# Patient Record
Sex: MALE | Race: OTHER | HISPANIC OR LATINO
[De-identification: names, ages, dates, MRNs, and addresses within clinical notes are randomized per-mention and may not be internally consistent; named-entity substitution may affect disease eponyms.]

---

## 2017-01-04 ENCOUNTER — APPOINTMENT (OUTPATIENT)
Dept: CARDIOLOGY | Facility: CLINIC | Age: 82
End: 2017-01-04

## 2017-01-04 ENCOUNTER — OUTPATIENT (OUTPATIENT)
Dept: OUTPATIENT SERVICES | Facility: HOSPITAL | Age: 82
LOS: 1 days | End: 2017-01-04
Payer: MEDICARE

## 2017-01-04 VITALS
SYSTOLIC BLOOD PRESSURE: 154 MMHG | BODY MASS INDEX: 24.73 KG/M2 | HEIGHT: 69 IN | HEART RATE: 69 BPM | RESPIRATION RATE: 16 BRPM | WEIGHT: 167 LBS | OXYGEN SATURATION: 97 % | DIASTOLIC BLOOD PRESSURE: 64 MMHG

## 2017-01-04 DIAGNOSIS — R00.2 PALPITATIONS: ICD-10-CM

## 2017-01-04 DIAGNOSIS — I71.9 AORTIC ANEURYSM OF UNSPECIFIED SITE, WITHOUT RUPTURE: ICD-10-CM

## 2017-01-04 DIAGNOSIS — R07.9 CHEST PAIN, UNSPECIFIED: ICD-10-CM

## 2017-01-04 DIAGNOSIS — E78.5 HYPERLIPIDEMIA, UNSPECIFIED: ICD-10-CM

## 2017-01-04 DIAGNOSIS — Z00.8 ENCOUNTER FOR OTHER GENERAL EXAMINATION: ICD-10-CM

## 2017-01-04 DIAGNOSIS — I10 ESSENTIAL (PRIMARY) HYPERTENSION: ICD-10-CM

## 2017-01-04 DIAGNOSIS — Z91.89 OTHER SPECIFIED PERSONAL RISK FACTORS, NOT ELSEWHERE CLASSIFIED: ICD-10-CM

## 2017-01-04 PROCEDURE — 93005 ELECTROCARDIOGRAM TRACING: CPT

## 2017-01-04 PROCEDURE — 93010 ELECTROCARDIOGRAM REPORT: CPT

## 2017-01-04 PROCEDURE — G0463: CPT | Mod: 25

## 2017-01-31 ENCOUNTER — APPOINTMENT (OUTPATIENT)
Dept: INTERNAL MEDICINE | Facility: CLINIC | Age: 82
End: 2017-01-31

## 2017-01-31 VITALS
SYSTOLIC BLOOD PRESSURE: 130 MMHG | DIASTOLIC BLOOD PRESSURE: 60 MMHG | OXYGEN SATURATION: 98 % | HEIGHT: 69 IN | WEIGHT: 171 LBS | HEART RATE: 74 BPM | TEMPERATURE: 97.7 F | BODY MASS INDEX: 25.33 KG/M2

## 2017-01-31 DIAGNOSIS — M72.2 PLANTAR FASCIAL FIBROMATOSIS: ICD-10-CM

## 2017-01-31 LAB
ALBUMIN SERPL ELPH-MCNC: 4.2 G/DL
ALP BLD-CCNC: 38 U/L
ALT SERPL-CCNC: 19 U/L
ANION GAP SERPL CALC-SCNC: 18 MMOL/L
APPEARANCE: CLEAR
AST SERPL-CCNC: 25 U/L
BASOPHILS # BLD AUTO: 0.03 K/UL
BASOPHILS NFR BLD AUTO: 0.5 %
BILIRUB SERPL-MCNC: 0.3 MG/DL
BILIRUBIN URINE: NEGATIVE
BLOOD URINE: NEGATIVE
BUN SERPL-MCNC: 20 MG/DL
CALCIUM SERPL-MCNC: 9.6 MG/DL
CHLORIDE SERPL-SCNC: 100 MMOL/L
CHOLEST SERPL-MCNC: 137 MG/DL
CHOLEST/HDLC SERPL: 2.9 RATIO
CO2 SERPL-SCNC: 23 MMOL/L
COLOR: YELLOW
CREAT SERPL-MCNC: 1.07 MG/DL
EOSINOPHIL # BLD AUTO: 0.24 K/UL
EOSINOPHIL NFR BLD AUTO: 3.8 %
GGT SERPL-CCNC: 16 U/L
GLUCOSE QUALITATIVE U: 1000 MG/DL
GLUCOSE SERPL-MCNC: 262 MG/DL
HCT VFR BLD CALC: 42.5 %
HDLC SERPL-MCNC: 48 MG/DL
HGB BLD-MCNC: 13.5 G/DL
IMM GRANULOCYTES NFR BLD AUTO: 0.2 %
KETONES URINE: NEGATIVE
LDLC SERPL CALC-MCNC: 35 MG/DL
LEUKOCYTE ESTERASE URINE: NEGATIVE
LYMPHOCYTES # BLD AUTO: 1.15 K/UL
LYMPHOCYTES NFR BLD AUTO: 18.2 %
MAN DIFF?: NORMAL
MCHC RBC-ENTMCNC: 29.8 PG
MCHC RBC-ENTMCNC: 31.8 GM/DL
MCV RBC AUTO: 93.8 FL
MONOCYTES # BLD AUTO: 0.48 K/UL
MONOCYTES NFR BLD AUTO: 7.6 %
NEUTROPHILS # BLD AUTO: 4.41 K/UL
NEUTROPHILS NFR BLD AUTO: 69.7 %
NITRITE URINE: NEGATIVE
PH URINE: 6.5
PLATELET # BLD AUTO: 122 K/UL
POTASSIUM SERPL-SCNC: 4.7 MMOL/L
PROT SERPL-MCNC: 7.3 G/DL
PROTEIN URINE: NEGATIVE MG/DL
RBC # BLD: 4.53 M/UL
RBC # FLD: 14.4 %
SODIUM SERPL-SCNC: 141 MMOL/L
SPECIFIC GRAVITY URINE: 1.02
TRIGL SERPL-MCNC: 270 MG/DL
TSH SERPL-ACNC: 1.09 UIU/ML
UROBILINOGEN URINE: NORMAL MG/DL
WBC # FLD AUTO: 6.32 K/UL

## 2017-02-01 LAB
25(OH)D3 SERPL-MCNC: 42.4 NG/ML
HBA1C MFR BLD HPLC: 7.4 %

## 2017-04-04 ENCOUNTER — APPOINTMENT (OUTPATIENT)
Dept: CARDIOLOGY | Facility: CLINIC | Age: 82
End: 2017-04-04

## 2017-04-04 ENCOUNTER — OUTPATIENT (OUTPATIENT)
Dept: OUTPATIENT SERVICES | Facility: HOSPITAL | Age: 82
LOS: 1 days | End: 2017-04-04
Payer: MEDICARE

## 2017-04-04 VITALS
BODY MASS INDEX: 25.18 KG/M2 | SYSTOLIC BLOOD PRESSURE: 168 MMHG | WEIGHT: 170 LBS | DIASTOLIC BLOOD PRESSURE: 75 MMHG | HEART RATE: 69 BPM | HEIGHT: 69 IN

## 2017-04-04 VITALS — DIASTOLIC BLOOD PRESSURE: 73 MMHG | SYSTOLIC BLOOD PRESSURE: 137 MMHG

## 2017-04-04 DIAGNOSIS — R00.2 PALPITATIONS: ICD-10-CM

## 2017-04-04 DIAGNOSIS — Z00.8 ENCOUNTER FOR OTHER GENERAL EXAMINATION: ICD-10-CM

## 2017-04-04 PROCEDURE — 93306 TTE W/DOPPLER COMPLETE: CPT

## 2017-04-04 PROCEDURE — 93005 ELECTROCARDIOGRAM TRACING: CPT

## 2017-04-04 PROCEDURE — 93306 TTE W/DOPPLER COMPLETE: CPT | Mod: 26

## 2017-04-04 PROCEDURE — 93010 ELECTROCARDIOGRAM REPORT: CPT

## 2017-04-04 PROCEDURE — G0463: CPT | Mod: 25

## 2017-04-05 ENCOUNTER — APPOINTMENT (OUTPATIENT)
Dept: INTERNAL MEDICINE | Facility: CLINIC | Age: 82
End: 2017-04-05

## 2017-04-05 VITALS
SYSTOLIC BLOOD PRESSURE: 130 MMHG | RESPIRATION RATE: 16 BRPM | BODY MASS INDEX: 25.18 KG/M2 | WEIGHT: 170 LBS | DIASTOLIC BLOOD PRESSURE: 55 MMHG | HEIGHT: 69 IN | TEMPERATURE: 98.1 F | HEART RATE: 66 BPM

## 2017-04-05 DIAGNOSIS — I73.9 PERIPHERAL VASCULAR DISEASE, UNSPECIFIED: ICD-10-CM

## 2017-04-05 DIAGNOSIS — E78.5 HYPERLIPIDEMIA, UNSPECIFIED: ICD-10-CM

## 2017-04-05 DIAGNOSIS — R00.2 PALPITATIONS: ICD-10-CM

## 2017-04-05 DIAGNOSIS — I71.9 AORTIC ANEURYSM OF UNSPECIFIED SITE, WITHOUT RUPTURE: ICD-10-CM

## 2017-04-05 DIAGNOSIS — I10 ESSENTIAL (PRIMARY) HYPERTENSION: ICD-10-CM

## 2017-04-19 ENCOUNTER — OTHER (OUTPATIENT)
Age: 82
End: 2017-04-19

## 2017-04-21 LAB
ANION GAP SERPL CALC-SCNC: 15 MMOL/L
BUN SERPL-MCNC: 20 MG/DL
CALCIUM SERPL-MCNC: 9.9 MG/DL
CHLORIDE SERPL-SCNC: 104 MMOL/L
CO2 SERPL-SCNC: 25 MMOL/L
CREAT SERPL-MCNC: 1.01 MG/DL
GLUCOSE SERPL-MCNC: 168 MG/DL
POTASSIUM SERPL-SCNC: 5.3 MMOL/L
SODIUM SERPL-SCNC: 144 MMOL/L

## 2017-04-24 ENCOUNTER — APPOINTMENT (OUTPATIENT)
Dept: CT IMAGING | Facility: HOSPITAL | Age: 82
End: 2017-04-24

## 2017-04-24 ENCOUNTER — OUTPATIENT (OUTPATIENT)
Dept: OUTPATIENT SERVICES | Facility: HOSPITAL | Age: 82
LOS: 1 days | End: 2017-04-24
Payer: MEDICARE

## 2017-04-24 DIAGNOSIS — I77.810 THORACIC AORTIC ECTASIA: ICD-10-CM

## 2017-04-24 DIAGNOSIS — I71.9 AORTIC ANEURYSM OF UNSPECIFIED SITE, WITHOUT RUPTURE: ICD-10-CM

## 2017-04-25 ENCOUNTER — RESULT REVIEW (OUTPATIENT)
Age: 82
End: 2017-04-25

## 2017-04-26 PROCEDURE — 71275 CT ANGIOGRAPHY CHEST: CPT

## 2017-04-26 PROCEDURE — 75635 CT ANGIO ABDOMINAL ARTERIES: CPT

## 2017-04-28 ENCOUNTER — APPOINTMENT (OUTPATIENT)
Dept: VASCULAR SURGERY | Facility: CLINIC | Age: 82
End: 2017-04-28

## 2017-04-28 VITALS
RESPIRATION RATE: 16 BRPM | WEIGHT: 170 LBS | BODY MASS INDEX: 25.18 KG/M2 | TEMPERATURE: 98.2 F | DIASTOLIC BLOOD PRESSURE: 60 MMHG | HEIGHT: 69 IN | SYSTOLIC BLOOD PRESSURE: 142 MMHG | HEART RATE: 60 BPM

## 2017-05-02 ENCOUNTER — APPOINTMENT (OUTPATIENT)
Dept: UROLOGY | Facility: CLINIC | Age: 82
End: 2017-05-02

## 2017-05-02 ENCOUNTER — APPOINTMENT (OUTPATIENT)
Dept: SURGERY | Facility: CLINIC | Age: 82
End: 2017-05-02

## 2017-05-02 VITALS
WEIGHT: 167 LBS | TEMPERATURE: 98.7 F | RESPIRATION RATE: 17 BRPM | DIASTOLIC BLOOD PRESSURE: 62 MMHG | SYSTOLIC BLOOD PRESSURE: 130 MMHG | HEART RATE: 72 BPM | HEIGHT: 69 IN | BODY MASS INDEX: 24.73 KG/M2

## 2017-05-02 VITALS
DIASTOLIC BLOOD PRESSURE: 68 MMHG | OXYGEN SATURATION: 98 % | WEIGHT: 167 LBS | HEIGHT: 69 IN | SYSTOLIC BLOOD PRESSURE: 128 MMHG | HEART RATE: 72 BPM | TEMPERATURE: 98.7 F | BODY MASS INDEX: 24.73 KG/M2

## 2017-05-03 ENCOUNTER — APPOINTMENT (OUTPATIENT)
Dept: INTERNAL MEDICINE | Facility: CLINIC | Age: 82
End: 2017-05-03

## 2017-05-03 VITALS
WEIGHT: 171 LBS | RESPIRATION RATE: 16 BRPM | TEMPERATURE: 98.4 F | HEART RATE: 63 BPM | BODY MASS INDEX: 25.33 KG/M2 | SYSTOLIC BLOOD PRESSURE: 130 MMHG | OXYGEN SATURATION: 98 % | DIASTOLIC BLOOD PRESSURE: 60 MMHG | HEIGHT: 69 IN

## 2017-05-03 DIAGNOSIS — Z01.818 ENCOUNTER FOR OTHER PREPROCEDURAL EXAMINATION: ICD-10-CM

## 2017-05-03 LAB
ALBUMIN SERPL ELPH-MCNC: 4.3 G/DL
ALP BLD-CCNC: 35 U/L
ALT SERPL-CCNC: 16 U/L
ANION GAP SERPL CALC-SCNC: 18 MMOL/L
APPEARANCE: CLEAR
AST SERPL-CCNC: 27 U/L
BASOPHILS # BLD AUTO: 0.02 K/UL
BASOPHILS NFR BLD AUTO: 0.3 %
BILIRUB SERPL-MCNC: 0.4 MG/DL
BILIRUBIN URINE: NEGATIVE
BLOOD URINE: NEGATIVE
BUN SERPL-MCNC: 19 MG/DL
CALCIUM SERPL-MCNC: 9.5 MG/DL
CHLORIDE SERPL-SCNC: 102 MMOL/L
CHOLEST SERPL-MCNC: 95 MG/DL
CHOLEST/HDLC SERPL: 2.4 RATIO
CO2 SERPL-SCNC: 21 MMOL/L
COLOR: YELLOW
CREAT SERPL-MCNC: 1.03 MG/DL
EOSINOPHIL # BLD AUTO: 0.34 K/UL
EOSINOPHIL NFR BLD AUTO: 5.4 %
GLUCOSE QUALITATIVE U: NORMAL MG/DL
GLUCOSE SERPL-MCNC: 79 MG/DL
HBA1C MFR BLD HPLC: 7 %
HCT VFR BLD CALC: 40.1 %
HDLC SERPL-MCNC: 40 MG/DL
HGB BLD-MCNC: 12.8 G/DL
IMM GRANULOCYTES NFR BLD AUTO: 0.2 %
KETONES URINE: NEGATIVE
LDLC SERPL CALC-MCNC: 15 MG/DL
LEUKOCYTE ESTERASE URINE: NEGATIVE
LYMPHOCYTES # BLD AUTO: 1.61 K/UL
LYMPHOCYTES NFR BLD AUTO: 25.4 %
MAN DIFF?: NORMAL
MCHC RBC-ENTMCNC: 29.6 PG
MCHC RBC-ENTMCNC: 31.9 GM/DL
MCV RBC AUTO: 92.6 FL
MONOCYTES # BLD AUTO: 0.48 K/UL
MONOCYTES NFR BLD AUTO: 7.6 %
NEUTROPHILS # BLD AUTO: 3.87 K/UL
NEUTROPHILS NFR BLD AUTO: 61.1 %
NITRITE URINE: NEGATIVE
PH URINE: 6
PLATELET # BLD AUTO: 143 K/UL
POTASSIUM SERPL-SCNC: 4.6 MMOL/L
PROT SERPL-MCNC: 7.5 G/DL
PROTEIN URINE: NEGATIVE MG/DL
RBC # BLD: 4.33 M/UL
RBC # FLD: 15.2 %
SODIUM SERPL-SCNC: 141 MMOL/L
SPECIFIC GRAVITY URINE: 1.01
TRIGL SERPL-MCNC: 202 MG/DL
UROBILINOGEN URINE: NORMAL MG/DL
WBC # FLD AUTO: 6.33 K/UL

## 2017-05-10 ENCOUNTER — OUTPATIENT (OUTPATIENT)
Dept: OUTPATIENT SERVICES | Facility: HOSPITAL | Age: 82
LOS: 1 days | End: 2017-05-10
Payer: MEDICARE

## 2017-05-10 VITALS
SYSTOLIC BLOOD PRESSURE: 132 MMHG | HEIGHT: 70 IN | HEART RATE: 76 BPM | WEIGHT: 169.98 LBS | TEMPERATURE: 98 F | RESPIRATION RATE: 18 BRPM | DIASTOLIC BLOOD PRESSURE: 66 MMHG | OXYGEN SATURATION: 99 %

## 2017-05-10 DIAGNOSIS — Z01.818 ENCOUNTER FOR OTHER PREPROCEDURAL EXAMINATION: ICD-10-CM

## 2017-05-10 DIAGNOSIS — I73.9 PERIPHERAL VASCULAR DISEASE, UNSPECIFIED: Chronic | ICD-10-CM

## 2017-05-10 DIAGNOSIS — Z90.89 ACQUIRED ABSENCE OF OTHER ORGANS: Chronic | ICD-10-CM

## 2017-05-10 DIAGNOSIS — R22.1 LOCALIZED SWELLING, MASS AND LUMP, NECK: ICD-10-CM

## 2017-05-10 PROCEDURE — G0463: CPT

## 2017-05-10 RX ORDER — SODIUM CHLORIDE 9 MG/ML
3 INJECTION INTRAMUSCULAR; INTRAVENOUS; SUBCUTANEOUS EVERY 8 HOURS
Qty: 0 | Refills: 0 | Status: DISCONTINUED | OUTPATIENT
Start: 2017-05-11 | End: 2017-05-19

## 2017-05-10 NOTE — H&P PST ADULT - ASSESSMENT
81 yr old male with PMH of BPH, seasonal allergies, PAD, Diabetes, Hypertension, Hyperlipidemia presents with posterior neck mass. Pt for excision of posterior neck mass on 5/11/2017. Pt is at low risk for procedure.

## 2017-05-10 NOTE — H&P PST ADULT - HISTORY OF PRESENT ILLNESS
81 yr old male with PMH of BPH, seasonal allergies, PAD, Diabetes, Hypertension, Hyperlipidemia presents with c/o posterior neck mass. Pt for excision of posterior neck mass on 5/11/2017.

## 2017-05-10 NOTE — H&P PST ADULT - PSH
History of tonsillectomy    PAD (peripheral artery disease)  h/o angiogram of bilateral lower extremities

## 2017-05-10 NOTE — H&P PST ADULT - FAMILY HISTORY
Sibling  Still living? Yes, Estimated age: Age Unknown  Family history of lung cancer, Age at diagnosis: Age Unknown

## 2017-05-10 NOTE — H&P PST ADULT - NEGATIVE ALLERGY TYPES
no indoor environmental allergies/no reactions to medicines/no reactions to food/no reactions to animals/no reactions to insect bites

## 2017-05-10 NOTE — H&P PST ADULT - RS GEN PE MLT RESP DETAILS PC
airway patent/no intercostal retractions/no rales/no rhonchi/breath sounds equal/no wheezes/respirations non-labored/no subcutaneous emphysema/normal/clear to auscultation bilaterally/no chest wall tenderness/good air movement

## 2017-05-10 NOTE — H&P PST ADULT - PMH
BPH (benign prostatic hypertrophy)    DM (diabetes mellitus)    HLD (hyperlipidemia)    HTN (hypertension) BPH (benign prostatic hypertrophy)    DM (diabetes mellitus)    HLD (hyperlipidemia)    HTN (hypertension)    PAD (peripheral artery disease)    Seasonal allergies

## 2017-05-11 ENCOUNTER — RESULT REVIEW (OUTPATIENT)
Age: 82
End: 2017-05-11

## 2017-05-11 ENCOUNTER — OUTPATIENT (OUTPATIENT)
Dept: OUTPATIENT SERVICES | Facility: HOSPITAL | Age: 82
LOS: 1 days | Discharge: HOME | End: 2017-05-11
Payer: MEDICARE

## 2017-05-11 VITALS
WEIGHT: 169.98 LBS | DIASTOLIC BLOOD PRESSURE: 50 MMHG | HEART RATE: 52 BPM | SYSTOLIC BLOOD PRESSURE: 130 MMHG | RESPIRATION RATE: 14 BRPM | OXYGEN SATURATION: 97 % | TEMPERATURE: 98 F | HEIGHT: 70 IN

## 2017-05-11 VITALS
SYSTOLIC BLOOD PRESSURE: 136 MMHG | HEART RATE: 54 BPM | RESPIRATION RATE: 14 BRPM | OXYGEN SATURATION: 98 % | DIASTOLIC BLOOD PRESSURE: 53 MMHG | TEMPERATURE: 98 F

## 2017-05-11 DIAGNOSIS — R22.1 LOCALIZED SWELLING, MASS AND LUMP, NECK: ICD-10-CM

## 2017-05-11 DIAGNOSIS — I73.9 PERIPHERAL VASCULAR DISEASE, UNSPECIFIED: Chronic | ICD-10-CM

## 2017-05-11 DIAGNOSIS — Z01.818 ENCOUNTER FOR OTHER PREPROCEDURAL EXAMINATION: ICD-10-CM

## 2017-05-11 DIAGNOSIS — Z90.89 ACQUIRED ABSENCE OF OTHER ORGANS: Chronic | ICD-10-CM

## 2017-05-11 PROCEDURE — 88304 TISSUE EXAM BY PATHOLOGIST: CPT

## 2017-05-11 PROCEDURE — 21552 EXC NECK LES SC 3 CM/>: CPT | Mod: AS

## 2017-05-11 PROCEDURE — 21555 EXC NECK LES SC < 3 CM: CPT

## 2017-05-11 PROCEDURE — 88304 TISSUE EXAM BY PATHOLOGIST: CPT | Mod: 26

## 2017-05-11 RX ORDER — OXYCODONE HYDROCHLORIDE 5 MG/1
1 TABLET ORAL
Qty: 12 | Refills: 0
Start: 2017-05-11 | End: 2017-05-14

## 2017-05-11 RX ORDER — FENTANYL CITRATE 50 UG/ML
25 INJECTION INTRAVENOUS
Qty: 0 | Refills: 0 | Status: DISCONTINUED | OUTPATIENT
Start: 2017-05-11 | End: 2017-05-11

## 2017-05-11 RX ORDER — ONDANSETRON 8 MG/1
4 TABLET, FILM COATED ORAL ONCE
Qty: 0 | Refills: 0 | Status: DISCONTINUED | OUTPATIENT
Start: 2017-05-11 | End: 2017-05-11

## 2017-05-11 RX ORDER — SODIUM CHLORIDE 9 MG/ML
1000 INJECTION, SOLUTION INTRAVENOUS
Qty: 0 | Refills: 0 | Status: DISCONTINUED | OUTPATIENT
Start: 2017-05-11 | End: 2017-05-11

## 2017-05-11 RX ORDER — ACETAMINOPHEN 500 MG
2 TABLET ORAL
Qty: 0 | Refills: 0 | COMMUNITY

## 2017-05-11 RX ORDER — SODIUM CHLORIDE 9 MG/ML
1000 INJECTION, SOLUTION INTRAVENOUS
Qty: 0 | Refills: 0 | Status: DISCONTINUED | OUTPATIENT
Start: 2017-05-11 | End: 2017-05-19

## 2017-05-11 RX ADMIN — SODIUM CHLORIDE 3 MILLILITER(S): 9 INJECTION INTRAMUSCULAR; INTRAVENOUS; SUBCUTANEOUS at 08:46

## 2017-05-11 NOTE — ASU PATIENT PROFILE, ADULT - PMH
BPH (benign prostatic hypertrophy)    DM (diabetes mellitus)    HLD (hyperlipidemia)    HTN (hypertension)    PAD (peripheral artery disease)    Seasonal allergies

## 2017-05-11 NOTE — ASU DISCHARGE PLAN (ADULT/PEDIATRIC). - MEDICATION SUMMARY - MEDICATIONS TO TAKE
I will START or STAY ON the medications listed below when I get home from the hospital:    Glucocil  -- 1 tab(s) by mouth once a day  -- Indication: For as directed     Prosta-Rye  -- 1 cap(s) by mouth once a day  -- Indication: For as directed     Cialis 5 mg oral tablet  -- 1 tab(s) by mouth once a day, As Needed  -- Indication: For as directed     acetaminophen-oxycodone 325 mg-5 mg oral tablet  -- 1 tab(s) by mouth every 6 hours, As needed, Moderate Pain (4 - 6) MDD:4  -- Indication: For Pain     Aspirin Enteric Coated 81 mg oral delayed release tablet  -- 1 tab(s) by mouth once a day  -- Indication: For as directed     losartan 100 mg oral tablet  -- 1 tab(s) by mouth once a day  -- Indication: For as directed     tamsulosin 0.4 mg oral capsule  -- 1 cap(s) by mouth once a day  -- Indication: For as directed     metFORMIN 500 mg oral tablet  -- 2 tab(s) by mouth 2 times a day (with meals)  -- Indication: For as directed     Tradjenta 5 mg oral tablet  -- 1 tab(s) by mouth once a day  -- Indication: For as directed     glimepiride 4 mg oral tablet  -- 1 tab(s) by mouth once a day  -- Indication: For as directed     Vytorin 10 mg-40 mg oral tablet  -- 1 tab(s) by mouth once a day  -- Indication: For as directed     Metoprolol Succinate ER 25 mg oral tablet, extended release  -- 1 tab(s) by mouth once a day  -- Indication: For as directed     Centrum Silver oral tablet  -- 1 tab(s) by mouth once a day  -- Indication: For as directed     Vitamin D3 1000 intl units oral capsule  -- 1 cap(s) by mouth once a day  -- Indication: For as directed

## 2017-05-12 ENCOUNTER — TRANSCRIPTION ENCOUNTER (OUTPATIENT)
Age: 82
End: 2017-05-12

## 2017-05-15 LAB — SURGICAL PATHOLOGY FINAL REPORT - CH: SIGNIFICANT CHANGE UP

## 2017-05-18 DIAGNOSIS — I10 ESSENTIAL (PRIMARY) HYPERTENSION: ICD-10-CM

## 2017-05-18 DIAGNOSIS — E11.9 TYPE 2 DIABETES MELLITUS WITHOUT COMPLICATIONS: ICD-10-CM

## 2017-05-18 DIAGNOSIS — N40.0 BENIGN PROSTATIC HYPERPLASIA WITHOUT LOWER URINARY TRACT SYMPTOMS: ICD-10-CM

## 2017-05-18 DIAGNOSIS — I73.9 PERIPHERAL VASCULAR DISEASE, UNSPECIFIED: ICD-10-CM

## 2017-05-18 DIAGNOSIS — L72.0 EPIDERMAL CYST: ICD-10-CM

## 2017-05-18 DIAGNOSIS — L02.11 CUTANEOUS ABSCESS OF NECK: ICD-10-CM

## 2017-05-18 DIAGNOSIS — Z87.891 PERSONAL HISTORY OF NICOTINE DEPENDENCE: ICD-10-CM

## 2017-05-18 DIAGNOSIS — Z79.82 LONG TERM (CURRENT) USE OF ASPIRIN: ICD-10-CM

## 2017-05-18 DIAGNOSIS — E78.5 HYPERLIPIDEMIA, UNSPECIFIED: ICD-10-CM

## 2017-05-23 ENCOUNTER — APPOINTMENT (OUTPATIENT)
Dept: SURGERY | Facility: CLINIC | Age: 82
End: 2017-05-23

## 2017-05-23 VITALS
HEART RATE: 63 BPM | OXYGEN SATURATION: 95 % | TEMPERATURE: 98.3 F | HEIGHT: 69 IN | SYSTOLIC BLOOD PRESSURE: 126 MMHG | BODY MASS INDEX: 25.48 KG/M2 | WEIGHT: 172 LBS | DIASTOLIC BLOOD PRESSURE: 76 MMHG

## 2017-08-23 ENCOUNTER — APPOINTMENT (OUTPATIENT)
Dept: INTERNAL MEDICINE | Facility: CLINIC | Age: 82
End: 2017-08-23
Payer: MEDICARE

## 2017-08-23 VITALS
OXYGEN SATURATION: 98 % | HEART RATE: 62 BPM | HEIGHT: 69 IN | TEMPERATURE: 99 F | DIASTOLIC BLOOD PRESSURE: 60 MMHG | RESPIRATION RATE: 16 BRPM | SYSTOLIC BLOOD PRESSURE: 140 MMHG | BODY MASS INDEX: 25.48 KG/M2 | WEIGHT: 172 LBS

## 2017-08-23 PROCEDURE — 99214 OFFICE O/P EST MOD 30 MIN: CPT

## 2017-08-24 LAB
25(OH)D3 SERPL-MCNC: 45.4 NG/ML
ALBUMIN SERPL ELPH-MCNC: 4.2 G/DL
ALP BLD-CCNC: 38 U/L
ALT SERPL-CCNC: 16 U/L
ANION GAP SERPL CALC-SCNC: 19 MMOL/L
APPEARANCE: CLEAR
AST SERPL-CCNC: 25 U/L
BASOPHILS # BLD AUTO: 0.03 K/UL
BASOPHILS NFR BLD AUTO: 0.5 %
BILIRUB SERPL-MCNC: 0.3 MG/DL
BILIRUBIN URINE: NEGATIVE
BLOOD URINE: NEGATIVE
BUN SERPL-MCNC: 17 MG/DL
CALCIUM SERPL-MCNC: 9.4 MG/DL
CHLORIDE SERPL-SCNC: 103 MMOL/L
CHOLEST SERPL-MCNC: 115 MG/DL
CHOLEST/HDLC SERPL: 2.9 RATIO
CO2 SERPL-SCNC: 23 MMOL/L
COLOR: YELLOW
CREAT SERPL-MCNC: 1.04 MG/DL
CREAT SPEC-SCNC: 46 MG/DL
EOSINOPHIL # BLD AUTO: 0.37 K/UL
EOSINOPHIL NFR BLD AUTO: 5.9 %
GGT SERPL-CCNC: 13 U/L
GLUCOSE QUALITATIVE U: NORMAL MG/DL
GLUCOSE SERPL-MCNC: 169 MG/DL
HBA1C MFR BLD HPLC: 7.3 %
HCT VFR BLD CALC: 42.6 %
HDLC SERPL-MCNC: 39 MG/DL
HGB BLD-MCNC: 13.4 G/DL
IMM GRANULOCYTES NFR BLD AUTO: 0.2 %
KETONES URINE: NEGATIVE
LDLC SERPL CALC-MCNC: 24 MG/DL
LEUKOCYTE ESTERASE URINE: NEGATIVE
LYMPHOCYTES # BLD AUTO: 1.53 K/UL
LYMPHOCYTES NFR BLD AUTO: 24.3 %
MAN DIFF?: NORMAL
MCHC RBC-ENTMCNC: 29.8 PG
MCHC RBC-ENTMCNC: 31.5 GM/DL
MCV RBC AUTO: 94.7 FL
MICROALBUMIN 24H UR DL<=1MG/L-MCNC: 0.6 MG/DL
MICROALBUMIN/CREAT 24H UR-RTO: 13 MG/G
MONOCYTES # BLD AUTO: 0.5 K/UL
MONOCYTES NFR BLD AUTO: 7.9 %
NEUTROPHILS # BLD AUTO: 3.85 K/UL
NEUTROPHILS NFR BLD AUTO: 61.2 %
NITRITE URINE: NEGATIVE
PH URINE: 6
PLATELET # BLD AUTO: 132 K/UL
POTASSIUM SERPL-SCNC: 4.5 MMOL/L
PROT SERPL-MCNC: 7.2 G/DL
PROTEIN URINE: NEGATIVE MG/DL
RBC # BLD: 4.5 M/UL
RBC # FLD: 14.5 %
SODIUM SERPL-SCNC: 145 MMOL/L
SPECIFIC GRAVITY URINE: 1.01
TRIGL SERPL-MCNC: 260 MG/DL
TSH SERPL-ACNC: 1.05 UIU/ML
UROBILINOGEN URINE: NORMAL MG/DL
WBC # FLD AUTO: 6.29 K/UL

## 2017-11-03 ENCOUNTER — APPOINTMENT (OUTPATIENT)
Dept: VASCULAR SURGERY | Facility: CLINIC | Age: 82
End: 2017-11-03
Payer: MEDICARE

## 2017-11-03 ENCOUNTER — APPOINTMENT (OUTPATIENT)
Dept: UROLOGY | Facility: CLINIC | Age: 82
End: 2017-11-03
Payer: MEDICARE

## 2017-11-03 VITALS
SYSTOLIC BLOOD PRESSURE: 110 MMHG | HEIGHT: 69 IN | DIASTOLIC BLOOD PRESSURE: 60 MMHG | HEART RATE: 66 BPM | TEMPERATURE: 98.4 F | WEIGHT: 170 LBS | RESPIRATION RATE: 16 BRPM | BODY MASS INDEX: 25.18 KG/M2

## 2017-11-03 PROCEDURE — 51736 URINE FLOW MEASUREMENT: CPT

## 2017-11-03 PROCEDURE — 93923 UPR/LXTR ART STDY 3+ LVLS: CPT

## 2017-11-03 PROCEDURE — 99214 OFFICE O/P EST MOD 30 MIN: CPT

## 2017-11-03 PROCEDURE — 51798 US URINE CAPACITY MEASURE: CPT | Mod: 59

## 2017-11-07 ENCOUNTER — APPOINTMENT (OUTPATIENT)
Dept: GASTROENTEROLOGY | Facility: CLINIC | Age: 82
End: 2017-11-07
Payer: MEDICARE

## 2017-11-07 VITALS
HEIGHT: 69 IN | BODY MASS INDEX: 25.18 KG/M2 | TEMPERATURE: 97.4 F | HEART RATE: 58 BPM | SYSTOLIC BLOOD PRESSURE: 148 MMHG | RESPIRATION RATE: 16 BRPM | DIASTOLIC BLOOD PRESSURE: 68 MMHG | WEIGHT: 170 LBS

## 2017-11-07 DIAGNOSIS — A04.8 OTHER SPECIFIED BACTERIAL INTESTINAL INFECTIONS: ICD-10-CM

## 2017-11-07 PROCEDURE — 99204 OFFICE O/P NEW MOD 45 MIN: CPT

## 2017-12-06 ENCOUNTER — OUTPATIENT (OUTPATIENT)
Dept: OUTPATIENT SERVICES | Facility: HOSPITAL | Age: 82
LOS: 1 days | End: 2017-12-06
Payer: MEDICARE

## 2017-12-06 DIAGNOSIS — I73.9 PERIPHERAL VASCULAR DISEASE, UNSPECIFIED: Chronic | ICD-10-CM

## 2017-12-06 DIAGNOSIS — Z90.89 ACQUIRED ABSENCE OF OTHER ORGANS: Chronic | ICD-10-CM

## 2017-12-06 DIAGNOSIS — R93.8 ABNORMAL FINDINGS ON DIAGNOSTIC IMAGING OF OTHER SPECIFIED BODY STRUCTURES: ICD-10-CM

## 2017-12-06 PROCEDURE — 45378 DIAGNOSTIC COLONOSCOPY: CPT

## 2017-12-06 PROCEDURE — 82962 GLUCOSE BLOOD TEST: CPT

## 2018-01-31 ENCOUNTER — APPOINTMENT (OUTPATIENT)
Dept: INTERNAL MEDICINE | Facility: CLINIC | Age: 83
End: 2018-01-31
Payer: MEDICARE

## 2018-01-31 VITALS
SYSTOLIC BLOOD PRESSURE: 110 MMHG | HEART RATE: 65 BPM | DIASTOLIC BLOOD PRESSURE: 70 MMHG | RESPIRATION RATE: 16 BRPM | HEIGHT: 69 IN | OXYGEN SATURATION: 98 % | BODY MASS INDEX: 24.88 KG/M2 | WEIGHT: 168 LBS | TEMPERATURE: 97.9 F

## 2018-01-31 LAB
BASOPHILS # BLD AUTO: 0.02 K/UL
BASOPHILS NFR BLD AUTO: 0.3 %
CHOLEST SERPL-MCNC: 105 MG/DL
CHOLEST/HDLC SERPL: 3.3 RATIO
EOSINOPHIL # BLD AUTO: 0.2 K/UL
EOSINOPHIL NFR BLD AUTO: 3.2 %
HBA1C MFR BLD HPLC: 7.2 %
HCT VFR BLD CALC: 40 %
HDLC SERPL-MCNC: 32 MG/DL
HGB BLD-MCNC: 12.5 G/DL
IMM GRANULOCYTES NFR BLD AUTO: 0.3 %
LDLC SERPL CALC-MCNC: 20 MG/DL
LYMPHOCYTES # BLD AUTO: 1.14 K/UL
LYMPHOCYTES NFR BLD AUTO: 18.5 %
MAN DIFF?: NORMAL
MCHC RBC-ENTMCNC: 29.1 PG
MCHC RBC-ENTMCNC: 31.3 GM/DL
MCV RBC AUTO: 93.2 FL
MONOCYTES # BLD AUTO: 0.44 K/UL
MONOCYTES NFR BLD AUTO: 7.1 %
NEUTROPHILS # BLD AUTO: 4.34 K/UL
NEUTROPHILS NFR BLD AUTO: 70.6 %
PLATELET # BLD AUTO: 167 K/UL
RBC # BLD: 4.29 M/UL
RBC # FLD: 14.2 %
TRIGL SERPL-MCNC: 263 MG/DL
WBC # FLD AUTO: 6.16 K/UL

## 2018-01-31 PROCEDURE — 99214 OFFICE O/P EST MOD 30 MIN: CPT

## 2018-04-30 ENCOUNTER — MEDICATION RENEWAL (OUTPATIENT)
Age: 83
End: 2018-04-30

## 2018-05-02 ENCOUNTER — APPOINTMENT (OUTPATIENT)
Dept: UROLOGY | Facility: CLINIC | Age: 83
End: 2018-05-02
Payer: MEDICARE

## 2018-05-02 VITALS
RESPIRATION RATE: 16 BRPM | HEART RATE: 63 BPM | OXYGEN SATURATION: 97 % | DIASTOLIC BLOOD PRESSURE: 68 MMHG | SYSTOLIC BLOOD PRESSURE: 128 MMHG | BODY MASS INDEX: 25.18 KG/M2 | WEIGHT: 170 LBS | HEIGHT: 69 IN

## 2018-05-02 PROCEDURE — 99213 OFFICE O/P EST LOW 20 MIN: CPT

## 2018-05-11 ENCOUNTER — OUTPATIENT (OUTPATIENT)
Dept: OUTPATIENT SERVICES | Facility: HOSPITAL | Age: 83
LOS: 1 days | End: 2018-05-11
Payer: MEDICARE

## 2018-05-11 ENCOUNTER — APPOINTMENT (OUTPATIENT)
Dept: CT IMAGING | Facility: HOSPITAL | Age: 83
End: 2018-05-11
Payer: MEDICARE

## 2018-05-11 ENCOUNTER — APPOINTMENT (OUTPATIENT)
Dept: VASCULAR SURGERY | Facility: CLINIC | Age: 83
End: 2018-05-11
Payer: MEDICARE

## 2018-05-11 DIAGNOSIS — Z90.89 ACQUIRED ABSENCE OF OTHER ORGANS: Chronic | ICD-10-CM

## 2018-05-11 DIAGNOSIS — I73.9 PERIPHERAL VASCULAR DISEASE, UNSPECIFIED: Chronic | ICD-10-CM

## 2018-05-11 DIAGNOSIS — N20.0 CALCULUS OF KIDNEY: ICD-10-CM

## 2018-05-11 PROCEDURE — 93923 UPR/LXTR ART STDY 3+ LVLS: CPT

## 2018-05-11 PROCEDURE — 99214 OFFICE O/P EST MOD 30 MIN: CPT

## 2018-05-11 PROCEDURE — 74176 CT ABD & PELVIS W/O CONTRAST: CPT

## 2018-05-11 PROCEDURE — 74176 CT ABD & PELVIS W/O CONTRAST: CPT | Mod: 26

## 2018-05-23 ENCOUNTER — APPOINTMENT (OUTPATIENT)
Dept: UROLOGY | Facility: CLINIC | Age: 83
End: 2018-05-23
Payer: MEDICARE

## 2018-05-23 VITALS — SYSTOLIC BLOOD PRESSURE: 140 MMHG | DIASTOLIC BLOOD PRESSURE: 65 MMHG

## 2018-05-23 PROCEDURE — 99213 OFFICE O/P EST LOW 20 MIN: CPT

## 2018-08-01 PROBLEM — E11.9 TYPE 2 DIABETES MELLITUS WITHOUT COMPLICATIONS: Chronic | Status: ACTIVE | Noted: 2017-05-10

## 2018-08-01 PROBLEM — I10 ESSENTIAL (PRIMARY) HYPERTENSION: Chronic | Status: ACTIVE | Noted: 2017-05-10

## 2018-08-01 PROBLEM — E78.5 HYPERLIPIDEMIA, UNSPECIFIED: Chronic | Status: ACTIVE | Noted: 2017-05-10

## 2018-08-01 PROBLEM — N40.0 BENIGN PROSTATIC HYPERPLASIA WITHOUT LOWER URINARY TRACT SYMPTOMS: Chronic | Status: ACTIVE | Noted: 2017-05-10

## 2018-08-07 ENCOUNTER — APPOINTMENT (OUTPATIENT)
Dept: INTERNAL MEDICINE | Facility: CLINIC | Age: 83
End: 2018-08-07
Payer: MEDICARE

## 2018-08-07 VITALS
TEMPERATURE: 99 F | OXYGEN SATURATION: 96 % | SYSTOLIC BLOOD PRESSURE: 143 MMHG | BODY MASS INDEX: 23.7 KG/M2 | RESPIRATION RATE: 16 BRPM | HEIGHT: 69 IN | WEIGHT: 160 LBS | DIASTOLIC BLOOD PRESSURE: 67 MMHG | HEART RATE: 61 BPM

## 2018-08-07 DIAGNOSIS — R63.4 ABNORMAL WEIGHT LOSS: ICD-10-CM

## 2018-08-07 PROCEDURE — 99214 OFFICE O/P EST MOD 30 MIN: CPT

## 2018-08-07 RX ORDER — SIMVASTATIN 40 MG/1
40 TABLET, FILM COATED ORAL DAILY
Qty: 90 | Refills: 3 | Status: DISCONTINUED | COMMUNITY
Start: 2017-07-27 | End: 2018-08-07

## 2018-08-07 RX ORDER — BENZONATATE 100 MG/1
100 CAPSULE ORAL
Qty: 15 | Refills: 0 | Status: DISCONTINUED | COMMUNITY
Start: 2017-12-15 | End: 2018-08-07

## 2018-08-07 RX ORDER — LEVOFLOXACIN 500 MG/1
500 TABLET, FILM COATED ORAL
Qty: 7 | Refills: 0 | Status: DISCONTINUED | COMMUNITY
Start: 2017-12-15 | End: 2018-08-07

## 2018-08-07 RX ORDER — POLYETHYLENE GLYCOL 3350 17 G/17G
17 POWDER, FOR SOLUTION ORAL
Qty: 238 | Refills: 0 | Status: DISCONTINUED | COMMUNITY
Start: 2017-11-07 | End: 2018-08-07

## 2018-08-07 RX ORDER — LANSOPRAZOLE, AMOXICILLIN, CLARITHROMYCIN 30-500-500
KIT ORAL
Qty: 1 | Refills: 0 | Status: DISCONTINUED | COMMUNITY
Start: 2017-11-07 | End: 2018-08-07

## 2018-08-07 RX ORDER — OXYCODONE AND ACETAMINOPHEN 5; 325 MG/1; MG/1
5-325 TABLET ORAL
Qty: 12 | Refills: 0 | Status: DISCONTINUED | COMMUNITY
Start: 2017-05-11 | End: 2018-08-07

## 2018-08-07 RX ORDER — OSELTAMIVIR PHOSPHATE 75 MG/1
75 CAPSULE ORAL
Qty: 10 | Refills: 0 | Status: DISCONTINUED | COMMUNITY
Start: 2018-01-24 | End: 2018-08-07

## 2018-08-07 RX ORDER — BROMPHENIRAMINE MALEATE, PSEUDOEPHEDRINE HYDROCHLORIDE, 2; 30; 10 MG/5ML; MG/5ML; MG/5ML
30-2-10 SYRUP ORAL
Qty: 118 | Refills: 0 | Status: DISCONTINUED | COMMUNITY
Start: 2017-12-15 | End: 2018-08-07

## 2018-08-07 NOTE — PHYSICAL EXAM
[No Acute Distress] : no acute distress [Well Nourished] : well nourished [Well Developed] : well developed [Well-Appearing] : well-appearing [Normal Sclera/Conjunctiva] : normal sclera/conjunctiva [PERRL] : pupils equal round and reactive to light [EOMI] : extraocular movements intact [Normal Outer Ear/Nose] : the outer ears and nose were normal in appearance [Normal Oropharynx] : the oropharynx was normal [No JVD] : no jugular venous distention [Supple] : supple [No Lymphadenopathy] : no lymphadenopathy [Thyroid Normal, No Nodules] : the thyroid was normal and there were no nodules present [No Respiratory Distress] : no respiratory distress  [Clear to Auscultation] : lungs were clear to auscultation bilaterally [No Accessory Muscle Use] : no accessory muscle use [Normal Rate] : normal rate  [Regular Rhythm] : with a regular rhythm [Normal S1, S2] : normal S1 and S2 [No Murmur] : no murmur heard [No Carotid Bruits] : no carotid bruits [No Abdominal Bruit] : a ~M bruit was not heard ~T in the abdomen [No Varicosities] : no varicosities [No Edema] : there was no peripheral edema [No Extremity Clubbing/Cyanosis] : no extremity clubbing/cyanosis [No Palpable Aorta] : no palpable aorta [Soft] : abdomen soft [Non Tender] : non-tender [Non-distended] : non-distended [No Masses] : no abdominal mass palpated [No HSM] : no HSM [Normal Bowel Sounds] : normal bowel sounds [Declined Rectal Exam] : declined rectal exam [Normal Posterior Cervical Nodes] : no posterior cervical lymphadenopathy [Normal Anterior Cervical Nodes] : no anterior cervical lymphadenopathy [No CVA Tenderness] : no CVA  tenderness [No Spinal Tenderness] : no spinal tenderness [No Joint Swelling] : no joint swelling [Grossly Normal Strength/Tone] : grossly normal strength/tone [No Rash] : no rash [Normal Gait] : normal gait [Coordination Grossly Intact] : coordination grossly intact [No Focal Deficits] : no focal deficits [Normal Affect] : the affect was normal [Normal Insight/Judgement] : insight and judgment were intact [Right Foot Was Examined] : Right foot ~C was examined [Left Foot Was Examined] : left foot ~C was examined [] : both feet [de-identified] : PP not appreciated b/l

## 2018-08-07 NOTE — COUNSELING
[Healthy eating counseling provided] : healthy eating [Activity counseling provided] : activity [Low Salt Diet] : Low salt diet [Walking] : Walking [None] : None [Good understanding] : Patient has a good understanding of lifestyle changes and the steps needed to achieve self management goals [de-identified] : ADA

## 2018-08-07 NOTE — HISTORY OF PRESENT ILLNESS
[FreeTextEntry1] : f/u  [de-identified] : 82 yo f/u.reports a transient recent episode of allergy/viral Sx's ass.with low appetite,hence lost 8-10 lbs.\par PMH long standing h/o not ideally controlled T2D,HTN,HLP,low HDL,PAD L>R.\par NqP2x=5,2%.on sulfonylurea,Metformin 1 gm,DPP-4.renal function preserved.ophth.exam no BGR.denies hypoglycemia,tingling,numbness.\par HDL=30,LDL=20,on statin.\par pt.c/o 3-4 block claudication,refused surgery,vasc.note reviewed,continue conservative Rx.\par had neg.stress test.EF=64%,mild ectasia of ascending aorta 3,7 cm noted.\par was Rxed for H.Pylori + in stool.\par colonoscopy neg.,12/17.\par CT abd.neg.images of liver,pancreas,adrenals.renal cysts noted/\par \par

## 2018-08-08 LAB
25(OH)D3 SERPL-MCNC: 41.5 NG/ML
ALBUMIN SERPL ELPH-MCNC: 4.3 G/DL
ALP BLD-CCNC: 51 U/L
ALT SERPL-CCNC: 18 U/L
ANION GAP SERPL CALC-SCNC: 16 MMOL/L
APPEARANCE: CLEAR
AST SERPL-CCNC: 20 U/L
BASOPHILS # BLD AUTO: 0.03 K/UL
BASOPHILS NFR BLD AUTO: 0.5 %
BILIRUB SERPL-MCNC: 0.2 MG/DL
BILIRUBIN URINE: NEGATIVE
BLOOD URINE: NEGATIVE
BUN SERPL-MCNC: 25 MG/DL
CALCIUM SERPL-MCNC: 10 MG/DL
CHLORIDE SERPL-SCNC: 102 MMOL/L
CHOLEST SERPL-MCNC: 207 MG/DL
CHOLEST/HDLC SERPL: 5.3 RATIO
CO2 SERPL-SCNC: 23 MMOL/L
COLOR: YELLOW
CREAT SERPL-MCNC: 0.98 MG/DL
CREAT SPEC-SCNC: 94 MG/DL
EOSINOPHIL # BLD AUTO: 0.12 K/UL
EOSINOPHIL NFR BLD AUTO: 1.8 %
GLUCOSE QUALITATIVE U: 250 MG/DL
GLUCOSE SERPL-MCNC: 223 MG/DL
HBA1C MFR BLD HPLC: 11.1 %
HCT VFR BLD CALC: 39.4 %
HDLC SERPL-MCNC: 39 MG/DL
HGB BLD-MCNC: 12.5 G/DL
IMM GRANULOCYTES NFR BLD AUTO: 0.2 %
KETONES URINE: NEGATIVE
LDLC SERPL CALC-MCNC: 109 MG/DL
LEUKOCYTE ESTERASE URINE: NEGATIVE
LYMPHOCYTES # BLD AUTO: 1.43 K/UL
LYMPHOCYTES NFR BLD AUTO: 21.7 %
MAN DIFF?: NORMAL
MCHC RBC-ENTMCNC: 29.8 PG
MCHC RBC-ENTMCNC: 31.7 GM/DL
MCV RBC AUTO: 94 FL
MICROALBUMIN 24H UR DL<=1MG/L-MCNC: <1.2 MG/DL
MICROALBUMIN/CREAT 24H UR-RTO: NORMAL
MONOCYTES # BLD AUTO: 0.38 K/UL
MONOCYTES NFR BLD AUTO: 5.8 %
NEUTROPHILS # BLD AUTO: 4.61 K/UL
NEUTROPHILS NFR BLD AUTO: 70 %
NITRITE URINE: NEGATIVE
PH URINE: 6
PLATELET # BLD AUTO: 227 K/UL
POTASSIUM SERPL-SCNC: 4.6 MMOL/L
PROT SERPL-MCNC: 7.1 G/DL
PROTEIN URINE: NEGATIVE MG/DL
RBC # BLD: 4.19 M/UL
RBC # FLD: 14.1 %
SODIUM SERPL-SCNC: 141 MMOL/L
SPECIFIC GRAVITY URINE: 1.02
TRIGL SERPL-MCNC: 295 MG/DL
TSH SERPL-ACNC: 0.72 UIU/ML
UROBILINOGEN URINE: NEGATIVE MG/DL
WBC # FLD AUTO: 6.58 K/UL

## 2018-08-14 ENCOUNTER — OTHER (OUTPATIENT)
Age: 83
End: 2018-08-14

## 2018-08-15 ENCOUNTER — APPOINTMENT (OUTPATIENT)
Dept: INTERNAL MEDICINE | Facility: CLINIC | Age: 83
End: 2018-08-15
Payer: MEDICARE

## 2018-08-15 ENCOUNTER — LABORATORY RESULT (OUTPATIENT)
Age: 83
End: 2018-08-15

## 2018-08-15 VITALS
SYSTOLIC BLOOD PRESSURE: 152 MMHG | HEIGHT: 69 IN | DIASTOLIC BLOOD PRESSURE: 77 MMHG | BODY MASS INDEX: 23.85 KG/M2 | WEIGHT: 161 LBS | HEART RATE: 66 BPM | OXYGEN SATURATION: 98 % | TEMPERATURE: 98.6 F | RESPIRATION RATE: 16 BRPM

## 2018-08-15 DIAGNOSIS — D64.9 ANEMIA, UNSPECIFIED: ICD-10-CM

## 2018-08-15 LAB
BASOPHILS # BLD AUTO: 0.04 K/UL
BASOPHILS NFR BLD AUTO: 1 %
EOSINOPHIL # BLD AUTO: 0.15 K/UL
EOSINOPHIL NFR BLD AUTO: 3.6 %
ERYTHROCYTE [SEDIMENTATION RATE] IN BLOOD BY WESTERGREN METHOD: 18 MM/HR
HCT VFR BLD CALC: 40.3 %
HGB BLD-MCNC: 12.6 G/DL
IMM GRANULOCYTES NFR BLD AUTO: 0.2 %
IRON SATN MFR SERPL: 27 %
IRON SERPL-MCNC: 90 UG/DL
LYMPHOCYTES # BLD AUTO: 0.99 K/UL
LYMPHOCYTES NFR BLD AUTO: 23.7 %
MAN DIFF?: NORMAL
MCHC RBC-ENTMCNC: 29.3 PG
MCHC RBC-ENTMCNC: 31.3 GM/DL
MCV RBC AUTO: 93.7 FL
MONOCYTES # BLD AUTO: 0.31 K/UL
MONOCYTES NFR BLD AUTO: 7.4 %
NEUTROPHILS # BLD AUTO: 2.68 K/UL
NEUTROPHILS NFR BLD AUTO: 64.1 %
PLATELET # BLD AUTO: 136 K/UL
RBC # BLD: 4.3 M/UL
RBC # FLD: 14.1 %
TIBC SERPL-MCNC: 337 UG/DL
UIBC SERPL-MCNC: 247 UG/DL
WBC # FLD AUTO: 4.18 K/UL

## 2018-08-15 PROCEDURE — 99213 OFFICE O/P EST LOW 20 MIN: CPT

## 2018-08-15 NOTE — ASSESSMENT
[FreeTextEntry1] : 84 yo with uncontrolled T2D,will resume meds,SMBG,\par anemia w/u requested.\par will f/u.

## 2018-08-15 NOTE — PHYSICAL EXAM
[No Acute Distress] : no acute distress [Well Nourished] : well nourished [Well Developed] : well developed [Well-Appearing] : well-appearing [EOMI] : extraocular movements intact [Clear to Auscultation] : lungs were clear to auscultation bilaterally [No Accessory Muscle Use] : no accessory muscle use [Normal Rate] : normal rate  [Regular Rhythm] : with a regular rhythm [Normal S1, S2] : normal S1 and S2 [No Murmur] : no murmur heard [No Carotid Bruits] : no carotid bruits [No Abdominal Bruit] : a ~M bruit was not heard ~T in the abdomen [No Varicosities] : no varicosities [No Edema] : there was no peripheral edema [No Extremity Clubbing/Cyanosis] : no extremity clubbing/cyanosis [No Palpable Aorta] : no palpable aorta [Soft] : abdomen soft [No Masses] : no abdominal mass palpated [No HSM] : no HSM [Normal Affect] : the affect was normal [Right Foot Was Examined] : Right foot ~C was examined [Left Foot Was Examined] : left foot ~C was examined [] : both feet [de-identified] : PP not appreciated b/l

## 2018-08-15 NOTE — COUNSELING
[Weight management counseling provided] : Weight management [Healthy eating counseling provided] : healthy eating [Activity counseling provided] : activity [Low Salt Diet] : Low salt diet [Walking] : Walking [None] : None [Good understanding] : Patient has a good understanding of lifestyle changes and the steps needed to achieve self management goals [de-identified] : ADA

## 2018-08-15 NOTE — HISTORY OF PRESENT ILLNESS
[FreeTextEntry1] : f/u [de-identified] : 82 yo was called by me to f/u on the following.HbA1c>11% up from 7,2% 6 months ago,also recent labs indicated mild anemia,also wt loss noted..pt initially attributed all of this to recent viral Sx's,ass.with loss of appetite.But today it appears that pt lost almost all his anti DM meds,also he regained his appetite,started to gain wt,he works full time.feels better in many aspects.\par PMH T2D,HTN,PAD,stable,claudication 3-6 blocks,h/o ascending aortic ectasia,recent normal colonoscopy.no h/o BGR.

## 2018-08-16 LAB
ALBUPE: 47.7 %
ALPHA1UPE: 12 %
ALPHA2UPE: 14.8 %
BETAUPE: 8.9 %
CREAT 24H UR-MCNC: NORMAL G/24 H
CREATININE UR (MAYO): 89 MG/DL
FERRITIN SERPL-MCNC: 59 NG/ML
GAMMAUPE: 16.6 %
IGA 24H UR QL IFE: NORMAL
KAPPA LC 24H UR QL: NORMAL
PROT PATTERN 24H UR ELPH-IMP: NORMAL
PROT UR-MCNC: 8 MG/DL
PROT UR-MCNC: 8 MG/DL
SPECIMEN VOL 24H UR: NORMAL ML

## 2018-09-05 LAB
ALBUMIN MFR SERPL ELPH: 58.2 %
ALBUMIN SERPL-MCNC: 3.9 G/DL
ALBUMIN/GLOB SERPL: 1.4 RATIO
ALPHA1 GLOB MFR SERPL ELPH: 3.7 %
ALPHA1 GLOB SERPL ELPH-MCNC: 0.2 G/DL
ALPHA2 GLOB MFR SERPL ELPH: 10.9 %
ALPHA2 GLOB SERPL ELPH-MCNC: 0.7 G/DL
ANION GAP SERPL CALC-SCNC: 22 MMOL/L
B-GLOBULIN MFR SERPL ELPH: 12.6 %
B-GLOBULIN SERPL ELPH-MCNC: 0.8 G/DL
BUN SERPL-MCNC: 18 MG/DL
CALCIUM SERPL-MCNC: 9.5 MG/DL
CHLORIDE SERPL-SCNC: 100 MMOL/L
CO2 SERPL-SCNC: 20 MMOL/L
CREAT SERPL-MCNC: 0.99 MG/DL
GAMMA GLOB FLD ELPH-MCNC: 1 G/DL
GAMMA GLOB MFR SERPL ELPH: 14.6 %
GLUCOSE SERPL-MCNC: 307 MG/DL
INTERPRETATION SERPL IEP-IMP: NORMAL
POTASSIUM SERPL-SCNC: 4.5 MMOL/L
PROT SERPL-MCNC: 6.7 G/DL
PROT SERPL-MCNC: 6.7 G/DL
SODIUM SERPL-SCNC: 142 MMOL/L

## 2019-02-06 ENCOUNTER — APPOINTMENT (OUTPATIENT)
Dept: INTERNAL MEDICINE | Facility: CLINIC | Age: 84
End: 2019-02-06
Payer: MEDICARE

## 2019-02-06 VITALS
OXYGEN SATURATION: 98 % | HEIGHT: 69 IN | HEART RATE: 63 BPM | SYSTOLIC BLOOD PRESSURE: 153 MMHG | RESPIRATION RATE: 16 BRPM | TEMPERATURE: 98 F | DIASTOLIC BLOOD PRESSURE: 61 MMHG | WEIGHT: 173 LBS | BODY MASS INDEX: 25.62 KG/M2

## 2019-02-06 PROCEDURE — 99214 OFFICE O/P EST MOD 30 MIN: CPT

## 2019-02-06 NOTE — PHYSICAL EXAM
[No Acute Distress] : no acute distress [Well Nourished] : well nourished [Well Developed] : well developed [Well-Appearing] : well-appearing [Normal Sclera/Conjunctiva] : normal sclera/conjunctiva [PERRL] : pupils equal round and reactive to light [EOMI] : extraocular movements intact [Normal Outer Ear/Nose] : the outer ears and nose were normal in appearance [Normal Oropharynx] : the oropharynx was normal [No JVD] : no jugular venous distention [Supple] : supple [No Lymphadenopathy] : no lymphadenopathy [Thyroid Normal, No Nodules] : the thyroid was normal and there were no nodules present [No Respiratory Distress] : no respiratory distress  [Clear to Auscultation] : lungs were clear to auscultation bilaterally [No Accessory Muscle Use] : no accessory muscle use [Normal Rate] : normal rate  [Regular Rhythm] : with a regular rhythm [Normal S1, S2] : normal S1 and S2 [No Murmur] : no murmur heard [No Carotid Bruits] : no carotid bruits [No Abdominal Bruit] : a ~M bruit was not heard ~T in the abdomen [No Varicosities] : no varicosities [No Edema] : there was no peripheral edema [No Extremity Clubbing/Cyanosis] : no extremity clubbing/cyanosis [No Palpable Aorta] : no palpable aorta [Soft] : abdomen soft [Non Tender] : non-tender [Non-distended] : non-distended [No Masses] : no abdominal mass palpated [No HSM] : no HSM [Normal Bowel Sounds] : normal bowel sounds [Declined Rectal Exam] : declined rectal exam [Normal Posterior Cervical Nodes] : no posterior cervical lymphadenopathy [Normal Anterior Cervical Nodes] : no anterior cervical lymphadenopathy [No CVA Tenderness] : no CVA  tenderness [No Spinal Tenderness] : no spinal tenderness [No Joint Swelling] : no joint swelling [Grossly Normal Strength/Tone] : grossly normal strength/tone [No Rash] : no rash [Normal Gait] : normal gait [Coordination Grossly Intact] : coordination grossly intact [No Focal Deficits] : no focal deficits [Normal Affect] : the affect was normal [Normal Insight/Judgement] : insight and judgment were intact [Right Foot Was Examined] : Right foot ~C was examined [Left Foot Was Examined] : left foot ~C was examined [] : both feet [de-identified] : pedal pulses not appreciated

## 2019-02-06 NOTE — COUNSELING
[Healthy eating counseling provided] : healthy eating [Activity counseling provided] : activity [Low Salt Diet] : Low salt diet [Walking] : Walking [None] : None [Good understanding] : Patient has a good understanding of lifestyle changes and the steps needed to achieve self management goals [Low Fat Diet] : Low fat diet [de-identified] : ADA

## 2019-02-06 NOTE — HISTORY OF PRESENT ILLNESS
[FreeTextEntry1] : claudication\par dysuria,nocturia [de-identified] : 84 yo f/u h/o PAD,under  medical management,no worsening of claudication.\par PMH long standing h/o of  uncontrolled T2D,last A1c=11,2%,due to non compliance with diet and meds.HTN,HLP,low HDL,PAD L>R.\par on sulfonylurea,Metformin 1 gm,DPP-4.renal function preserved.ophth.exam no BGR.denies hypoglycemia,tingling,numbness.\par HDL=38,LDL=90,on statin.\par pt.c/o 3-4 block claudication,refused surgery,vasc.note reviewed,continue conservative Rx.\par had neg.stress test.EF=64%,mild ectasia of ascending aorta 3,7 cm noted.\par was Rxed for H.Pylori + in stool.\par colonoscopy neg.,12/17.\par CT abd.neg.images of liver,pancreas,adrenals.renal cysts noted/\par also h/o BPH,pt c/o frequency,hesitancy,nocturia,he stopped Flomax for no reason,will renew,also referred to .\par also the pt is followed by hemat.for thrombocytopenia,report requested.\par \par

## 2019-02-06 NOTE — ASSESSMENT
[FreeTextEntry1] : 84 yo with uncontrolled T2D,mild syst.HTN,HLP,low HDL,stable PAD.will f/u labs,meds renewed.\par pt is instructed to take Glimeride 4 mg qd and Metformin 500 mg 4 tbl qd and Tradjenta 5 mg,which he did not follow correctly up to now.\par \par

## 2019-02-15 LAB
25(OH)D3 SERPL-MCNC: 29.3 NG/ML
ALBUMIN SERPL ELPH-MCNC: 4.6 G/DL
ALP BLD-CCNC: 43 U/L
ALT SERPL-CCNC: 16 U/L
ANION GAP SERPL CALC-SCNC: 12 MMOL/L
APPEARANCE: CLEAR
AST SERPL-CCNC: 30 U/L
BASOPHILS # BLD AUTO: 0.07 K/UL
BASOPHILS NFR BLD AUTO: 1.2 %
BILIRUB SERPL-MCNC: 0.5 MG/DL
BILIRUBIN URINE: NEGATIVE
BLOOD URINE: NEGATIVE
BUN SERPL-MCNC: 19 MG/DL
CALCIUM SERPL-MCNC: 9.5 MG/DL
CHLORIDE SERPL-SCNC: 104 MMOL/L
CHOLEST SERPL-MCNC: 227 MG/DL
CHOLEST/HDLC SERPL: 5.5 RATIO
CO2 SERPL-SCNC: 25 MMOL/L
COLOR: YELLOW
CREAT SERPL-MCNC: 1.23 MG/DL
CREAT SPEC-SCNC: 115 MG/DL
EOSINOPHIL # BLD AUTO: 0.2 K/UL
EOSINOPHIL NFR BLD AUTO: 3.4 %
GLUCOSE QUALITATIVE U: NEGATIVE MG/DL
GLUCOSE SERPL-MCNC: 136 MG/DL
HBA1C MFR BLD HPLC: 6.4 %
HCT VFR BLD CALC: 41.9 %
HDLC SERPL-MCNC: 41 MG/DL
HEMOCCULT STL QL IA: NEGATIVE
HGB BLD-MCNC: 13.4 G/DL
IMM GRANULOCYTES NFR BLD AUTO: 0.2 %
KETONES URINE: NEGATIVE
LDLC SERPL CALC-MCNC: 138 MG/DL
LEUKOCYTE ESTERASE URINE: NEGATIVE
LYMPHOCYTES # BLD AUTO: 1.85 K/UL
LYMPHOCYTES NFR BLD AUTO: 31.2 %
MAN DIFF?: NORMAL
MCHC RBC-ENTMCNC: 28.9 PG
MCHC RBC-ENTMCNC: 32 GM/DL
MCV RBC AUTO: 90.5 FL
MICROALBUMIN 24H UR DL<=1MG/L-MCNC: <1.2 MG/DL
MICROALBUMIN/CREAT 24H UR-RTO: NORMAL
MONOCYTES # BLD AUTO: 0.31 K/UL
MONOCYTES NFR BLD AUTO: 5.2 %
NEUTROPHILS # BLD AUTO: 3.49 K/UL
NEUTROPHILS NFR BLD AUTO: 58.8 %
NITRITE URINE: NEGATIVE
PH URINE: 7.5
PLATELET # BLD AUTO: 144 K/UL
POTASSIUM SERPL-SCNC: 5 MMOL/L
PROT SERPL-MCNC: 7 G/DL
PROTEIN URINE: NEGATIVE MG/DL
RBC # BLD: 4.63 M/UL
RBC # FLD: 15.5 %
SODIUM SERPL-SCNC: 141 MMOL/L
SPECIFIC GRAVITY URINE: 1.02
TRIGL SERPL-MCNC: 239 MG/DL
TSH SERPL-ACNC: 1.39 UIU/ML
UROBILINOGEN URINE: NEGATIVE MG/DL
WBC # FLD AUTO: 5.93 K/UL

## 2019-02-20 ENCOUNTER — MEDICATION RENEWAL (OUTPATIENT)
Age: 84
End: 2019-02-20

## 2019-04-10 ENCOUNTER — APPOINTMENT (OUTPATIENT)
Dept: INTERNAL MEDICINE | Facility: CLINIC | Age: 84
End: 2019-04-10
Payer: MEDICARE

## 2019-04-10 VITALS
HEIGHT: 69 IN | RESPIRATION RATE: 16 BRPM | BODY MASS INDEX: 25.48 KG/M2 | SYSTOLIC BLOOD PRESSURE: 157 MMHG | HEART RATE: 74 BPM | TEMPERATURE: 97.7 F | WEIGHT: 172 LBS | DIASTOLIC BLOOD PRESSURE: 61 MMHG | OXYGEN SATURATION: 97 %

## 2019-04-10 PROCEDURE — 99213 OFFICE O/P EST LOW 20 MIN: CPT

## 2019-04-10 NOTE — PHYSICAL EXAM
[No Acute Distress] : no acute distress [Well-Appearing] : well-appearing [Well Nourished] : well nourished [Well Developed] : well developed [Normal Sclera/Conjunctiva] : normal sclera/conjunctiva [EOMI] : extraocular movements intact [PERRL] : pupils equal round and reactive to light [Normal Outer Ear/Nose] : the outer ears and nose were normal in appearance [Normal Oropharynx] : the oropharynx was normal [No JVD] : no jugular venous distention [No Lymphadenopathy] : no lymphadenopathy [Supple] : supple [No Respiratory Distress] : no respiratory distress  [Thyroid Normal, No Nodules] : the thyroid was normal and there were no nodules present [Clear to Auscultation] : lungs were clear to auscultation bilaterally [Normal Rate] : normal rate  [No Accessory Muscle Use] : no accessory muscle use [Regular Rhythm] : with a regular rhythm [Normal S1, S2] : normal S1 and S2 [No Murmur] : no murmur heard [No Carotid Bruits] : no carotid bruits [No Abdominal Bruit] : a ~M bruit was not heard ~T in the abdomen [No Varicosities] : no varicosities [No Edema] : there was no peripheral edema [No Extremity Clubbing/Cyanosis] : no extremity clubbing/cyanosis [No Palpable Aorta] : no palpable aorta [Soft] : abdomen soft [Non-distended] : non-distended [Non Tender] : non-tender [No Masses] : no abdominal mass palpated [No HSM] : no HSM [Normal Bowel Sounds] : normal bowel sounds [Declined Rectal Exam] : declined rectal exam [Normal Posterior Cervical Nodes] : no posterior cervical lymphadenopathy [No CVA Tenderness] : no CVA  tenderness [Normal Anterior Cervical Nodes] : no anterior cervical lymphadenopathy [No Spinal Tenderness] : no spinal tenderness [No Joint Swelling] : no joint swelling [No Rash] : no rash [Grossly Normal Strength/Tone] : grossly normal strength/tone [Normal Gait] : normal gait [No Focal Deficits] : no focal deficits [Coordination Grossly Intact] : coordination grossly intact [Normal Affect] : the affect was normal [Normal Insight/Judgement] : insight and judgment were intact [Right Foot Was Examined] : Right foot ~C was examined [Left Foot Was Examined] : left foot ~C was examined [] : both feet [de-identified] : pedal pulses not appreciated

## 2019-04-10 NOTE — COUNSELING
[Activity counseling provided] : activity [Healthy eating counseling provided] : healthy eating [Low Salt Diet] : Low salt diet [Low Fat Diet] : Low fat diet [Good understanding] : Patient has a good understanding of lifestyle changes and the steps needed to achieve self management goals [None] : None [de-identified] : ADA

## 2019-04-10 NOTE — HISTORY OF PRESENT ILLNESS
[FreeTextEntry1] : knee pain,low back pain,both severe,few weeks [de-identified] : 82 yo c/o severe knee pain,R>L,and low back pain,both preventing him from walking more than one block.\par PMH long standing h/o T2D,HTN,HLP,PAD mild to moderate,ascending aorta ectasia.also mild thrombocytopenia.\par last MdT3y=4,4%.on Metformin 2 g,Glimepiride.no BGR.\par  h/o PAD,under  medical management,no worsening of claudication,pt chose med.management.pt will f/u with vascular in one month.\par \par on sulfonylurea,Metformin 1 gm,DPP-4.renal function preserved.ophth.exam no BGR.denies hypoglycemia,tingling,numbness.\par HDL=38,LDL=90,on statin.\par pt.c/o 3-4 block claudication,refused surgery,vasc.note reviewed,continue conservative Rx.\par had neg.stress test.EF=64%,mild ectasia of ascending aorta 3,7 cm noted.\par was Rxed for H.Pylori + in stool.\par colonoscopy neg.,12/17.\par CT abd.neg.images of liver,pancreas,adrenals.renal cysts noted/\par also h/o BPH,pt c/o frequency,hesitancy,nocturia,he stopped Flomax for no reason,will renew,also referred to .\par also the pt is followed by hemat.for thrombocytopenia,report requested.\par \par

## 2019-04-10 NOTE — ASSESSMENT
[FreeTextEntry1] : 82 yo with  OA's knees,referred to ortho/poss.intraarticular inj.,also referred to PT for low back pain.\par pt is hemodynamically stable.\par labs today to f/u on HbA1c,lipids,PLT.\par will f/i.\par \par \par

## 2019-04-15 ENCOUNTER — OTHER (OUTPATIENT)
Age: 84
End: 2019-04-15

## 2019-04-15 LAB
25(OH)D3 SERPL-MCNC: 35.4 NG/ML
ALBUMIN SERPL ELPH-MCNC: 4.2 G/DL
ALP BLD-CCNC: 46 U/L
ALT SERPL-CCNC: 19 U/L
ANION GAP SERPL CALC-SCNC: 12 MMOL/L
APPEARANCE: CLEAR
AST SERPL-CCNC: 25 U/L
BASOPHILS # BLD AUTO: 0.04 K/UL
BASOPHILS NFR BLD AUTO: 0.6 %
BILIRUB SERPL-MCNC: 0.3 MG/DL
BILIRUBIN URINE: NEGATIVE
BLOOD URINE: NEGATIVE
BUN SERPL-MCNC: 22 MG/DL
CALCIUM SERPL-MCNC: 9.4 MG/DL
CHLORIDE SERPL-SCNC: 102 MMOL/L
CHOLEST SERPL-MCNC: 229 MG/DL
CHOLEST/HDLC SERPL: 6.4 RATIO
CO2 SERPL-SCNC: 26 MMOL/L
COLOR: YELLOW
CREAT SERPL-MCNC: 1.49 MG/DL
CREAT SPEC-SCNC: 112 MG/DL
EOSINOPHIL # BLD AUTO: 0.26 K/UL
EOSINOPHIL NFR BLD AUTO: 4.1 %
ESTIMATED AVERAGE GLUCOSE: 148 MG/DL
GLUCOSE QUALITATIVE U: NEGATIVE
GLUCOSE SERPL-MCNC: 134 MG/DL
HBA1C MFR BLD HPLC: 6.8 %
HCT VFR BLD CALC: 40.8 %
HDLC SERPL-MCNC: 36 MG/DL
HGB BLD-MCNC: 12.7 G/DL
IMM GRANULOCYTES NFR BLD AUTO: 0.5 %
KETONES URINE: NEGATIVE
LDLC SERPL CALC-MCNC: 123 MG/DL
LEUKOCYTE ESTERASE URINE: NEGATIVE
LYMPHOCYTES # BLD AUTO: 1.33 K/UL
LYMPHOCYTES NFR BLD AUTO: 20.8 %
MAN DIFF?: NORMAL
MCHC RBC-ENTMCNC: 28.9 PG
MCHC RBC-ENTMCNC: 31.1 GM/DL
MCV RBC AUTO: 92.9 FL
MICROALBUMIN 24H UR DL<=1MG/L-MCNC: <1.2 MG/DL
MICROALBUMIN/CREAT 24H UR-RTO: NORMAL MG/G
MONOCYTES # BLD AUTO: 0.47 K/UL
MONOCYTES NFR BLD AUTO: 7.4 %
NEUTROPHILS # BLD AUTO: 4.25 K/UL
NEUTROPHILS NFR BLD AUTO: 66.6 %
NITRITE URINE: NEGATIVE
PH URINE: 6
PLATELET # BLD AUTO: 117 K/UL
POTASSIUM SERPL-SCNC: 4.4 MMOL/L
PROT SERPL-MCNC: 6.9 G/DL
PROTEIN URINE: NEGATIVE
RBC # BLD: 4.39 M/UL
RBC # FLD: 14.6 %
SODIUM SERPL-SCNC: 140 MMOL/L
SPECIFIC GRAVITY URINE: 1.02
TRIGL SERPL-MCNC: 350 MG/DL
UROBILINOGEN URINE: NORMAL
WBC # FLD AUTO: 6.38 K/UL

## 2019-04-22 ENCOUNTER — APPOINTMENT (OUTPATIENT)
Dept: ORTHOPEDIC SURGERY | Facility: CLINIC | Age: 84
End: 2019-04-22
Payer: MEDICARE

## 2019-04-22 VITALS
HEART RATE: 79 BPM | SYSTOLIC BLOOD PRESSURE: 132 MMHG | WEIGHT: 170 LBS | DIASTOLIC BLOOD PRESSURE: 78 MMHG | BODY MASS INDEX: 25.18 KG/M2 | HEIGHT: 69 IN

## 2019-04-22 DIAGNOSIS — M17.11 UNILATERAL PRIMARY OSTEOARTHRITIS, RIGHT KNEE: ICD-10-CM

## 2019-04-22 PROCEDURE — 73564 X-RAY EXAM KNEE 4 OR MORE: CPT | Mod: RT

## 2019-04-22 PROCEDURE — 99204 OFFICE O/P NEW MOD 45 MIN: CPT

## 2019-04-22 NOTE — PHYSICAL EXAM
[de-identified] : Physical Examination\par General: well nourished, in no acute distress, alert and oriented to person, place and time\par Psychiatric: normal mood and affect, no abnormal movements or speech patterns\par Eyes: vision intact + glasses\par Throat: no thyromegaly\par Lymph: no enlarged nodes, no lymphedema in extremity\par Respiratory: no wheezing, no shortness of breath with ambulation\par Cardiac: no cardiac leg swelling, 2+ peripheral pulses\par Neurology: normal gross sensation in extremities to light touch\par Abdomen: soft, non-tender, tympanic, no masses\par \par Musculoskeletal Examination\par Ambulation	+ antalgic gait, - assistive devices\par \par Knee			Right			Left\par General\par      Swelling/Deformity	normal			normal	\par      Skin			normal			normal\par      Erythema		-			-\par      Standing Alignment	neutral			neutral\par      Effusion		trace			none\par Range of Motion\par      Hip			full painless ROM		full painless ROM\par      Knee Flexion		120			130\par      Knee Extension	0			0\par Patella\par      J Sign		-			-\par      Quad Medial/Lateral	1/1 1/1\par      Apprehension		-			-\par      Grzegorz's		-			-\par      Grind Sign		-			-\par      Crepitus		+			-\par Palpation\par      Medial Joint Line	+			-\par      Medial Fem Condyle	-			-\par      Lateral Joint Line	-			-\par      Quad Tendon		-			-\par      Patella Tendon	-			-\par      Medial Patella		-			-\par      Lateral Patella 	-			-\par      Posterior Knee	+			-\par Ligamentous\par      Varus @ 0° / 30°	-/-			-/-\par      Valgus @ 0° / 30°	-/-			-/-\par Meniscus\par      Chuck		-			-\par      Flexion Pinch		-			-\par Strength Examination/Atrophy\par      Hip Flexors 		5+			5+\par      Quadriceps		5+			5+\par      Hamstring		5+			5+\par      Tibialis Anterior	5+			5+\par      Achilles/Soleus	5+			5+\par Sensation\par      Deep Peroneal	normal			normal\par      Superficial Peroneal 	normal			normal\par      Sural  		normal			normal\par      Posterior Tibial 	normal			normal\par      Saphneous 		normal			normal\par Pulses\par      DP			2+			2+\par  [de-identified] : 5 views of the affected Right knee (standing AP, flexing standing AP, 30degree flexed lateral, 0degree lateral, sunrise view)\par were ordered, obtained and evaluated by myself today and\par demonstrate:\par There is medial weightbearing a cement narrowing\par Trace osteophytic lipping\par Trace suprapatellar effusion\par  Trace patellofemoral joint space loss without evidence of tilt [or] subluxation on sunrise view\par Normal soft tissue density\par Otherwise normal osseous bone structure without fracture or dislocation

## 2019-04-22 NOTE — DISCUSSION/SUMMARY
[de-identified] : Right knee osteoarthritis\par \par \par The patient and I discussed the causes and progression of degenerative joint disease of the knee. Models, diagrams and drawings were used in the discussion. Treatment can include conservative non-operative management and surgical options. Conservative management includes weight loss, activity modification, physical therapy to improve motion and strength in the muscles around the knee and the body's core, PO and topical NSAIDs, corticosteroid and/or viscosupplementation intra-articular injections. If the patient fails to improve with non-operative management, surgical management is possible. Depending upon the patient's age, BMI, activity level, degree and location of arthrosis different surgical options are possible including arthroscopic debridement with chondroplasty, high-tibial osteotomy, unicondylar/partial arthroplasty, and total joint arthroplasty.\par \par Physical therapy was prescribed for knee ROM exercises, strengthening exercises, deep tissue massage, core strengthening, hip abductor strengthening, VMO strengthening, modalities PRN, and home exercises\par \par \par The patient was prescribed Diclofenac topical liquid/creme non-steroidal anti-inflammatory medication. 1-2 pumps twice daily and apply to area with pain. There is low systemic absorption of the medication but risks while reduced remain were discussed and include but not limited to renal damage and GI ulceration and bleeding. They were warned to stop the medication if worsening buring skin or gastric pain or dizziness or other side effects. Also to immediately stop the medication and seek appropriate medical attention if any severe stomach ache, gastritis, black/red vomit, black/red stools or any other medical concern.\par \par Due to failure of prior non-operative modalities of treatment including physical therapy, activity modification, non-steroidal anti-inflammatory medications, and weight-loss with inability to give corticosteroid injection due to the patient's diabetic status over concern for unsafe rise in blood glucose causing injury to the patient, I am ordering a viscosupplementation injection Euflexxa for the right knee and will obtain insurance authorization. The patient will be contacted by our authorization department over its status, copayment and when available for injection.\par \par The patient verifies their understanding the the visit, diagnosis and plan. They agree with the treatment plan and will contact the office with any questions or problems.\par Follow up\par PRN

## 2019-04-22 NOTE — HISTORY OF PRESENT ILLNESS
[de-identified] : CC Right knee\par \par HPI 84 yo male right HD presents with [chronic] onset of 5 years of intermittent pain in the anterior Right knee [without injury]. The pain is better, and rated a currently 3/10 but was recently as bad as a 9 out of 10, described as sharp, radiation from his feet to his leg when it swell. [Rest] makes the pain better and [walking standing stairs] makes the pain worse. The patient reports associated symptoms of swelling. The patient - pain at night affecting sleep, and + similar pain previously.\par \par The patient has tried the following treatments:\par Activity modification	+\par Ice/Compression  	+\par Braces    		-\par Nsaids    		+\par Physical Therapy 	+ 6 weeks home exercises\par Cortisone Injection	-\par Visco Injection		-\par Arthroscopy		-\par \par Review of Systems is positive for the above musculoskeletal symptoms and is otherwise non-contributory for general, constitutional, psychiatric, neurologic, HEENT, cardiac, respiratory, gastrointestinal, reproductive, lymphatic, and dermatologic complaints.\par \par Consult by Dr Martha Way\par \par

## 2019-05-10 ENCOUNTER — APPOINTMENT (OUTPATIENT)
Dept: VASCULAR SURGERY | Facility: CLINIC | Age: 84
End: 2019-05-10
Payer: MEDICARE

## 2019-05-10 PROCEDURE — 93923 UPR/LXTR ART STDY 3+ LVLS: CPT

## 2019-05-10 PROCEDURE — 99213 OFFICE O/P EST LOW 20 MIN: CPT

## 2019-05-10 NOTE — HISTORY OF PRESENT ILLNESS
[FreeTextEntry1] : patient with mild to moderate peripheral vascular disease. Complaining of claudication after 6-7 blocks. No complaint of rest pain. No history of tissue loss.

## 2019-05-10 NOTE — ASSESSMENT
[FreeTextEntry1] : patient with peripheral vascular disease. Mild to moderate. Claudication symptoms are stable. Continue antiplatelet therapy. \par CT was reviewed. Left SFA occlusion with reconstitution of left popliteal artery. I discussed with the patient regarding an angiogram and possible revascularization. At this point the patient would like to continue with conservative management.\par Followup in 6 months.

## 2019-06-04 NOTE — H&P PST ADULT - GENITOURINARY COMMENTS
Unfortunately, I cannot manage his arthritis.  He will need to speak with Dr. Dubon office about the medications they are prescribing and about managing arthritis.     BPH

## 2019-07-17 ENCOUNTER — APPOINTMENT (OUTPATIENT)
Dept: UROLOGY | Facility: CLINIC | Age: 84
End: 2019-07-17
Payer: MEDICARE

## 2019-07-17 VITALS
BODY MASS INDEX: 25.18 KG/M2 | HEIGHT: 69 IN | WEIGHT: 170 LBS | DIASTOLIC BLOOD PRESSURE: 67 MMHG | HEART RATE: 58 BPM | SYSTOLIC BLOOD PRESSURE: 150 MMHG | RESPIRATION RATE: 16 BRPM

## 2019-07-17 PROCEDURE — 99214 OFFICE O/P EST MOD 30 MIN: CPT

## 2019-07-17 NOTE — ASSESSMENT
[FreeTextEntry1] : Very pleasant 84-year-old gentleman presents for followup of complex renal cyst, BPH, ED\par -Renal ultrasound for surveillance of renal cyst\par -BPH-stable; no additional intervention at this time\par -We discussed options for management of erectile dysfunction, including PDE 5 inhibitors, intraurethral alprostadil, intracavernosal injections, PRAVEEN, penile implant\par -Patient is interested in intracavernosal injection\par -We'll start Trimix\par -We discussed the risks of Trimix at length\par -F/U in 2 weeks for injection teaching

## 2019-07-17 NOTE — HISTORY OF PRESENT ILLNESS
[FreeTextEntry1] : Very pleasant 84-year-old gentleman who presents for followup of complex renal cyst, erectile dysfunction, BPH. Patient feels well. He reports persistent erectile function. He reports that Viagra and Cialis have not helped in the past. When he uses these medications, he reports an erection that is still not sufficient for penetrated sexual intercourse. He does report that helped slightly. He denies urinary complaints. Nocturia x1. No dysuria. No flank pain or suprapubic pain. Last year, CT scan demonstrated a complex renal cyst with thin internal septations. [Urinary Retention] : no urinary retention [Urinary Urgency] : no urinary urgency [Urinary Frequency] : no urinary frequency [Nocturia] : no nocturia [Straining] : no straining [Erectile Dysfunction] : Erectile Dysfunction [Dysuria] : no dysuria [Hematuria - Gross] : no gross hematuria [Bladder Spasm] : no bladder spasm [Abdominal Pain] : no abdominal pain [Flank Pain] : no flank pain [Fever] : no fever [Fatigue] : no fatigue [Nausea] : no nausea [Anorexia] : no anorexia

## 2019-07-31 ENCOUNTER — APPOINTMENT (OUTPATIENT)
Dept: UROLOGY | Facility: CLINIC | Age: 84
End: 2019-07-31
Payer: MEDICARE

## 2019-07-31 VITALS
HEIGHT: 69 IN | BODY MASS INDEX: 24.44 KG/M2 | WEIGHT: 165 LBS | HEART RATE: 58 BPM | SYSTOLIC BLOOD PRESSURE: 128 MMHG | DIASTOLIC BLOOD PRESSURE: 75 MMHG

## 2019-07-31 PROCEDURE — 99214 OFFICE O/P EST MOD 30 MIN: CPT

## 2019-07-31 NOTE — ASSESSMENT
[FreeTextEntry1] : Very pleasant 84-year-old gentleman who presents for followup of erectile dysfunction and for teaching for Trimix injections\par -Patient was taught how to self administer Trimix\par -Patient demonstrated proficiency in administering an injection into the right corpora cavernosum\par -We again discussed the risks of Trimix injections\par -Information handout given to patient and reviewed\par -F/U in 1 month

## 2019-07-31 NOTE — HISTORY OF PRESENT ILLNESS
[FreeTextEntry1] : Very pleasant 84-year-old gentleman presents for followup of erectile dysfunction. Patient reports no additional complaints. He presents today for injection teaching for trimix.

## 2019-07-31 NOTE — PHYSICAL EXAM
[Normal Appearance] : normal appearance [General Appearance - Well Nourished] : well nourished [General Appearance - Well Developed] : well developed [Well Groomed] : well groomed [General Appearance - In No Acute Distress] : no acute distress [Abdomen Tenderness] : non-tender [Abdomen Soft] : soft [Urethral Meatus] : meatus normal [Costovertebral Angle Tenderness] : no ~M costovertebral angle tenderness [Urinary Bladder Findings] : the bladder was normal on palpation [Scrotum] : the scrotum was normal [Testes Mass (___cm)] : there were no testicular masses [Edema] : no peripheral edema [] : no respiratory distress [Respiration, Rhythm And Depth] : normal respiratory rhythm and effort [Exaggerated Use Of Accessory Muscles For Inspiration] : no accessory muscle use [Oriented To Time, Place, And Person] : oriented to person, place, and time [Affect] : the affect was normal [Mood] : the mood was normal [Not Anxious] : not anxious [Normal Station and Gait] : the gait and station were normal for the patient's age [No Focal Deficits] : no focal deficits [No Palpable Adenopathy] : no palpable adenopathy

## 2019-09-04 ENCOUNTER — APPOINTMENT (OUTPATIENT)
Dept: UROLOGY | Facility: CLINIC | Age: 84
End: 2019-09-04
Payer: MEDICARE

## 2019-09-04 VITALS
DIASTOLIC BLOOD PRESSURE: 77 MMHG | SYSTOLIC BLOOD PRESSURE: 158 MMHG | HEART RATE: 59 BPM | WEIGHT: 165 LBS | BODY MASS INDEX: 24.44 KG/M2 | HEIGHT: 69 IN | RESPIRATION RATE: 15 BRPM

## 2019-09-04 PROCEDURE — 99214 OFFICE O/P EST MOD 30 MIN: CPT

## 2019-09-04 NOTE — HISTORY OF PRESENT ILLNESS
[FreeTextEntry1] : Very pleasant 84-year-old gentleman presents for followup of erectile dysfunction and BPH. Patient reports stable symptoms of BPH on Flomax. He reports that he recently used tri-mix 3 times without any improvement in his erections at all. He reports that, even with using the medication, his erections are 0/10 in rigidity and tumescence. He denies dysuria. No hematuria. No flank pain or suprapubic pain. He is here for discussion of further management options. [Urinary Retention] : no urinary retention [Urinary Urgency] : no urinary urgency [Urinary Frequency] : no urinary frequency [Nocturia] : no nocturia [Straining] : no straining [Erectile Dysfunction] : Erectile Dysfunction [Hematuria - Gross] : no gross hematuria [Dysuria] : no dysuria [Bladder Spasm] : no bladder spasm [Abdominal Pain] : no abdominal pain [Flank Pain] : no flank pain [Fatigue] : no fatigue [Fever] : no fever [Nausea] : no nausea [Anorexia] : no anorexia

## 2019-09-04 NOTE — PHYSICAL EXAM
[General Appearance - Well Developed] : well developed [General Appearance - Well Nourished] : well nourished [Normal Appearance] : normal appearance [Well Groomed] : well groomed [General Appearance - In No Acute Distress] : no acute distress [Abdomen Soft] : soft [Abdomen Tenderness] : non-tender [Costovertebral Angle Tenderness] : no ~M costovertebral angle tenderness [Urethral Meatus] : meatus normal [Urinary Bladder Findings] : the bladder was normal on palpation [Scrotum] : the scrotum was normal [Testes Mass (___cm)] : there were no testicular masses [] : no respiratory distress [Edema] : no peripheral edema [Respiration, Rhythm And Depth] : normal respiratory rhythm and effort [Exaggerated Use Of Accessory Muscles For Inspiration] : no accessory muscle use [Oriented To Time, Place, And Person] : oriented to person, place, and time [Affect] : the affect was normal [Mood] : the mood was normal [Not Anxious] : not anxious [Normal Station and Gait] : the gait and station were normal for the patient's age [No Focal Deficits] : no focal deficits [No Palpable Adenopathy] : no palpable adenopathy

## 2019-09-04 NOTE — ASSESSMENT
[FreeTextEntry1] : Very pleasant 84-year-old gentleman who presents for followup of BPH and erectile dysfunction\par -Continue tamsulosin-refill sent to pharmacy\par -We had an extensive discussion regarding the options for management of erectile dysfunction; after a thorough discussion of the risks, benefits, alternatives of the procedure the patient is interested in a penile prosthesis\par -I have recommended him to see one of my colleagues who specializes in erectile dysfunction for further discussion of the procedure and alternatives; reference provided for the patient

## 2020-01-15 ENCOUNTER — APPOINTMENT (OUTPATIENT)
Dept: INTERNAL MEDICINE | Facility: CLINIC | Age: 85
End: 2020-01-15
Payer: MEDICARE

## 2020-01-15 VITALS
OXYGEN SATURATION: 97 % | WEIGHT: 162 LBS | SYSTOLIC BLOOD PRESSURE: 127 MMHG | HEIGHT: 69 IN | TEMPERATURE: 98.2 F | DIASTOLIC BLOOD PRESSURE: 70 MMHG | HEART RATE: 71 BPM | RESPIRATION RATE: 16 BRPM | BODY MASS INDEX: 23.99 KG/M2

## 2020-01-15 DIAGNOSIS — Z00.00 ENCOUNTER FOR GENERAL ADULT MEDICAL EXAMINATION W/OUT ABNORMAL FINDINGS: ICD-10-CM

## 2020-01-15 PROCEDURE — 99213 OFFICE O/P EST LOW 20 MIN: CPT

## 2020-01-15 NOTE — HISTORY OF PRESENT ILLNESS
[de-identified] : 83 yo \par PMH long standing h/o T2D,HTN,HLP,PAD mild to moderate,ascending aorta ectasia.also mild thrombocytopenia.\par last TqQ3v=6,4%.on Metformin 2 g,Glimepiride,DPP-4.no BGR.no hypoglycemia Sx's,no tingling,numbness.renal function preserved.no BGR\par ow HDL\par  h/o PAD,under  medical management,no worsening of claudication(1-2 blocks),pt chose med.management\par \par \par had neg.stress test.EF=64%,mild ectasia of ascending aorta 3,7 cm noted.\par was Rxed for H.Pylori + in stool.\par colonoscopy neg.,12/17.\par CT abd.neg.images of liver,pancreas,adrenals.renal cysts noted/\par also h/o BPH,pt c/o frequency,hesitancy,nocturia,he stopped Flomax for no reason,will renew,also referred to .\par also the pt is followed by hemat.for thrombocytopenia,report requested.\par \par

## 2020-01-15 NOTE — ASSESSMENT
[FreeTextEntry1] : 85 yo with HTN,HLD,T2D,PAD\par pt is hemodynamically stable.\par labs today to f/u on HbA1c,lipids,PLT.ophth.f/u\par will f/i.\par \par \par

## 2020-01-15 NOTE — PHYSICAL EXAM
[No Acute Distress] : no acute distress [Well Nourished] : well nourished [Well Developed] : well developed [Well-Appearing] : well-appearing [Normal Sclera/Conjunctiva] : normal sclera/conjunctiva [PERRL] : pupils equal round and reactive to light [EOMI] : extraocular movements intact [Normal Outer Ear/Nose] : the outer ears and nose were normal in appearance [Normal Oropharynx] : the oropharynx was normal [No JVD] : no jugular venous distention [Supple] : supple [No Lymphadenopathy] : no lymphadenopathy [Thyroid Normal, No Nodules] : the thyroid was normal and there were no nodules present [No Respiratory Distress] : no respiratory distress  [Clear to Auscultation] : lungs were clear to auscultation bilaterally [No Accessory Muscle Use] : no accessory muscle use [Normal Rate] : normal rate  [Regular Rhythm] : with a regular rhythm [Normal S1, S2] : normal S1 and S2 [No Murmur] : no murmur heard [No Carotid Bruits] : no carotid bruits [No Abdominal Bruit] : a ~M bruit was not heard ~T in the abdomen [No Varicosities] : no varicosities [No Edema] : there was no peripheral edema [No Extremity Clubbing/Cyanosis] : no extremity clubbing/cyanosis [No Palpable Aorta] : no palpable aorta [Non Tender] : non-tender [Soft] : abdomen soft [Non-distended] : non-distended [No Masses] : no abdominal mass palpated [No HSM] : no HSM [Normal Bowel Sounds] : normal bowel sounds [Declined Rectal Exam] : declined rectal exam [Normal Posterior Cervical Nodes] : no posterior cervical lymphadenopathy [Normal Anterior Cervical Nodes] : no anterior cervical lymphadenopathy [No CVA Tenderness] : no CVA  tenderness [No Spinal Tenderness] : no spinal tenderness [No Joint Swelling] : no joint swelling [Grossly Normal Strength/Tone] : grossly normal strength/tone [No Rash] : no rash [Normal Gait] : normal gait [Coordination Grossly Intact] : coordination grossly intact [No Focal Deficits] : no focal deficits [Normal Affect] : the affect was normal [Normal Insight/Judgement] : insight and judgment were intact [Right Foot Was Examined] : Right foot ~C was examined [Left Foot Was Examined] : left foot ~C was examined [] : both feet [de-identified] : pedal pulses not appreciated [TWNoteComboBox3] : 0 [TWNoteComboBox4] : 0

## 2020-01-20 LAB
25(OH)D3 SERPL-MCNC: 38.8 NG/ML
ALBUMIN SERPL ELPH-MCNC: 4.4 G/DL
ALP BLD-CCNC: 54 U/L
ALT SERPL-CCNC: 17 U/L
ANION GAP SERPL CALC-SCNC: 17 MMOL/L
APPEARANCE: CLEAR
AST SERPL-CCNC: 23 U/L
BASOPHILS # BLD AUTO: 0.05 K/UL
BASOPHILS NFR BLD AUTO: 0.8 %
BILIRUB SERPL-MCNC: 0.3 MG/DL
BILIRUBIN URINE: NEGATIVE
BLOOD URINE: NEGATIVE
BUN SERPL-MCNC: 32 MG/DL
CALCIUM SERPL-MCNC: 9.9 MG/DL
CHLORIDE SERPL-SCNC: 100 MMOL/L
CHOLEST SERPL-MCNC: 219 MG/DL
CHOLEST/HDLC SERPL: 5.4 RATIO
CO2 SERPL-SCNC: 24 MMOL/L
COLOR: YELLOW
CREAT SERPL-MCNC: 1.13 MG/DL
CREAT SPEC-SCNC: 199 MG/DL
EOSINOPHIL # BLD AUTO: 0.16 K/UL
EOSINOPHIL NFR BLD AUTO: 2.5 %
ESTIMATED AVERAGE GLUCOSE: 143 MG/DL
GLUCOSE QUALITATIVE U: NORMAL
GLUCOSE SERPL-MCNC: 234 MG/DL
HBA1C MFR BLD HPLC: 6.6 %
HCT VFR BLD CALC: 43.6 %
HDLC SERPL-MCNC: 41 MG/DL
HGB BLD-MCNC: 13.5 G/DL
IMM GRANULOCYTES NFR BLD AUTO: 0.3 %
KETONES URINE: NORMAL
LDLC SERPL CALC-MCNC: NORMAL MG/DL
LEUKOCYTE ESTERASE URINE: NEGATIVE
LYMPHOCYTES # BLD AUTO: 1.54 K/UL
LYMPHOCYTES NFR BLD AUTO: 23.7 %
MAN DIFF?: NORMAL
MCHC RBC-ENTMCNC: 29.2 PG
MCHC RBC-ENTMCNC: 31 GM/DL
MCV RBC AUTO: 94.4 FL
MICROALBUMIN 24H UR DL<=1MG/L-MCNC: <1.2 MG/DL
MICROALBUMIN/CREAT 24H UR-RTO: NORMAL MG/G
MONOCYTES # BLD AUTO: 0.51 K/UL
MONOCYTES NFR BLD AUTO: 7.8 %
NEUTROPHILS # BLD AUTO: 4.23 K/UL
NEUTROPHILS NFR BLD AUTO: 64.9 %
NITRITE URINE: NEGATIVE
PH URINE: 6
PLATELET # BLD AUTO: 149 K/UL
POTASSIUM SERPL-SCNC: 4.6 MMOL/L
PROT SERPL-MCNC: 7.3 G/DL
PROTEIN URINE: NORMAL
RBC # BLD: 4.62 M/UL
RBC # FLD: 15.1 %
SODIUM SERPL-SCNC: 141 MMOL/L
SPECIFIC GRAVITY URINE: 1.03
TRIGL SERPL-MCNC: 434 MG/DL
TSH SERPL-ACNC: 1.26 UIU/ML
UROBILINOGEN URINE: NORMAL
WBC # FLD AUTO: 6.51 K/UL

## 2020-02-07 ENCOUNTER — RX RENEWAL (OUTPATIENT)
Age: 85
End: 2020-02-07

## 2020-03-04 ENCOUNTER — APPOINTMENT (OUTPATIENT)
Dept: UROLOGY | Facility: CLINIC | Age: 85
End: 2020-03-04
Payer: MEDICARE

## 2020-03-04 VITALS
DIASTOLIC BLOOD PRESSURE: 64 MMHG | SYSTOLIC BLOOD PRESSURE: 142 MMHG | WEIGHT: 162 LBS | HEIGHT: 69 IN | HEART RATE: 66 BPM | BODY MASS INDEX: 23.99 KG/M2

## 2020-03-04 PROCEDURE — 99213 OFFICE O/P EST LOW 20 MIN: CPT

## 2020-03-04 NOTE — ASSESSMENT
[FreeTextEntry1] : Very pleasant 84-year-old gentleman presents for followup of BPH, erectile dysfunction\par -Continue tamsulosin-refill sent to pharmacy\par -I have recommended that he see one of my colleagues regarding penile prosthesis placement and a recommendation was provided for the patient\par -Followup in 6 months

## 2020-03-04 NOTE — HISTORY OF PRESENT ILLNESS
[FreeTextEntry1] : Very pleasant 84-year-old gentleman who presents for followup of erectile dysfunction and BPH. Patient reports that his urinary symptoms are well-controlled on tamsulosin. He denies dysuria. No hematuria. No flank pain or suprapubic pain. He still reports erectile dysfunction. He is not able to have intercourse. He has tried try to expand PDE 5 inhibitors in the past. He is interested in a prosthesis. [Urinary Retention] : no urinary retention [Urinary Urgency] : no urinary urgency [Urinary Frequency] : no urinary frequency [Nocturia] : no nocturia [Straining] : no straining [Erectile Dysfunction] : Erectile Dysfunction [Dysuria] : no dysuria [Hematuria - Gross] : no gross hematuria [Bladder Spasm] : no bladder spasm [Abdominal Pain] : no abdominal pain [Flank Pain] : no flank pain [Fever] : no fever [Fatigue] : no fatigue [Nausea] : no nausea [Anorexia] : no anorexia

## 2020-03-04 NOTE — PHYSICAL EXAM
[General Appearance - Well Developed] : well developed [General Appearance - Well Nourished] : well nourished [Normal Appearance] : normal appearance [Well Groomed] : well groomed [General Appearance - In No Acute Distress] : no acute distress [Abdomen Soft] : soft [Abdomen Tenderness] : non-tender [Costovertebral Angle Tenderness] : no ~M costovertebral angle tenderness [Urethral Meatus] : meatus normal [Urinary Bladder Findings] : the bladder was normal on palpation [Scrotum] : the scrotum was normal [Testes Mass (___cm)] : there were no testicular masses [No Prostate Nodules] : no prostate nodules [Respiration, Rhythm And Depth] : normal respiratory rhythm and effort [] : no respiratory distress [Edema] : no peripheral edema [Exaggerated Use Of Accessory Muscles For Inspiration] : no accessory muscle use [Oriented To Time, Place, And Person] : oriented to person, place, and time [Affect] : the affect was normal [Mood] : the mood was normal [Not Anxious] : not anxious [No Focal Deficits] : no focal deficits [Normal Station and Gait] : the gait and station were normal for the patient's age [No Palpable Adenopathy] : no palpable adenopathy

## 2020-03-13 ENCOUNTER — APPOINTMENT (OUTPATIENT)
Dept: UROLOGY | Facility: CLINIC | Age: 85
End: 2020-03-13

## 2020-05-05 ENCOUNTER — RX RENEWAL (OUTPATIENT)
Age: 85
End: 2020-05-05

## 2020-06-03 ENCOUNTER — RX RENEWAL (OUTPATIENT)
Age: 85
End: 2020-06-03

## 2020-06-05 ENCOUNTER — APPOINTMENT (OUTPATIENT)
Dept: UROLOGY | Facility: CLINIC | Age: 85
End: 2020-06-05
Payer: MEDICARE

## 2020-06-05 VITALS
SYSTOLIC BLOOD PRESSURE: 120 MMHG | HEART RATE: 65 BPM | DIASTOLIC BLOOD PRESSURE: 63 MMHG | RESPIRATION RATE: 15 BRPM | TEMPERATURE: 98 F

## 2020-06-05 DIAGNOSIS — I77.810 THORACIC AORTIC ECTASIA: ICD-10-CM

## 2020-06-05 PROCEDURE — 99213 OFFICE O/P EST LOW 20 MIN: CPT

## 2020-06-05 NOTE — PHYSICAL EXAM
[General Appearance - Well Developed] : well developed [General Appearance - Well Nourished] : well nourished [Normal Appearance] : normal appearance [General Appearance - In No Acute Distress] : no acute distress [Well Groomed] : well groomed [Abdomen Soft] : soft [Abdomen Tenderness] : non-tender [Costovertebral Angle Tenderness] : no ~M costovertebral angle tenderness [Urinary Bladder Findings] : the bladder was normal on palpation [Edema] : no peripheral edema [] : no respiratory distress [Respiration, Rhythm And Depth] : normal respiratory rhythm and effort [Exaggerated Use Of Accessory Muscles For Inspiration] : no accessory muscle use [Oriented To Time, Place, And Person] : oriented to person, place, and time [Affect] : the affect was normal [Mood] : the mood was normal [Normal Station and Gait] : the gait and station were normal for the patient's age [Not Anxious] : not anxious [No Focal Deficits] : no focal deficits [No Palpable Adenopathy] : no palpable adenopathy

## 2020-06-08 LAB
APPEARANCE: CLEAR
BACTERIA: NEGATIVE
BILIRUBIN URINE: NEGATIVE
BLOOD URINE: NEGATIVE
COLOR: YELLOW
GLUCOSE QUALITATIVE U: NEGATIVE
HYALINE CASTS: 0 /LPF
KETONES URINE: NEGATIVE
LEUKOCYTE ESTERASE URINE: NEGATIVE
MICROSCOPIC-UA: NORMAL
NITRITE URINE: NEGATIVE
PH URINE: 6
PROTEIN URINE: NEGATIVE
RED BLOOD CELLS URINE: 1 /HPF
SPECIFIC GRAVITY URINE: 1.02
SQUAMOUS EPITHELIAL CELLS: 0 /HPF
UROBILINOGEN URINE: NORMAL
WHITE BLOOD CELLS URINE: 1 /HPF

## 2020-06-08 NOTE — ASSESSMENT
[FreeTextEntry1] : Very pleasant 84-year-old gentleman who presents for follow-up of BPH, erectile dysfunction\par -Prior labs reviewed\par -Urinalysis today\par -I have recommended that he see 1 of my partners for evaluation of penile prosthesis and I have provided him with an appointment to do so\par -Follow-up in 3 months with me

## 2020-06-08 NOTE — HISTORY OF PRESENT ILLNESS
[Erectile Dysfunction] : Erectile Dysfunction [FreeTextEntry1] : Very pleasant 84-year-old gentleman who presents for follow-up of BPH with urinary obstruction and erectile dysfunction.  Patient feels well.  He reports that tamsulosin has improved his urinary symptoms significantly.  He still reports erectile dysfunction.  He is still interested in a penile prosthesis.  He denies dysuria.  No hematuria.  No flank pain or suprapubic pain.  No other complaints. [Urinary Retention] : no urinary retention [Urinary Frequency] : no urinary frequency [Urinary Urgency] : no urinary urgency [Nocturia] : no nocturia [Dysuria] : no dysuria [Straining] : no straining [Hematuria - Gross] : no gross hematuria [Bladder Spasm] : no bladder spasm [Abdominal Pain] : no abdominal pain [Flank Pain] : no flank pain [Fever] : no fever [Fatigue] : no fatigue [Nausea] : no nausea [Anorexia] : no anorexia

## 2020-07-08 ENCOUNTER — APPOINTMENT (OUTPATIENT)
Dept: UROLOGY | Facility: CLINIC | Age: 85
End: 2020-07-08
Payer: MEDICARE

## 2020-07-08 VITALS
SYSTOLIC BLOOD PRESSURE: 122 MMHG | DIASTOLIC BLOOD PRESSURE: 67 MMHG | TEMPERATURE: 97.1 F | HEART RATE: 63 BPM | RESPIRATION RATE: 15 BRPM

## 2020-07-08 PROCEDURE — 99214 OFFICE O/P EST MOD 30 MIN: CPT

## 2020-07-08 NOTE — ASSESSMENT
[FreeTextEntry1] : \par Pt would like prosthesis\par Reviewed different implant types , semirigid 2 piece and 3 piece\par Reviewed risk of procedure including bleeding, pain , infection - need for removal with possibility of failure of reimplant, mechanical failure - need for future surgeries, erosion, decrease in length and girth, and lack of erections if prosthesis is removed\par All question answered\par Will proceed with 3 piece IPP\par Greater than 50% of the encounter time was spent counseling the patient and I have spent 25 minutes face to face time with the patient\par

## 2020-07-08 NOTE — HISTORY OF PRESENT ILLNESS
[FreeTextEntry1] : cc ed\par 85 yo male h/o h/o T2D,HTN,HLP,PAD pt of dr wallis with ED \par present for years \par difficulty attaining and maintaining erection \par libido intact \par failed pde5 inhibitor and trimix

## 2020-07-29 ENCOUNTER — LABORATORY RESULT (OUTPATIENT)
Age: 85
End: 2020-07-29

## 2020-07-29 ENCOUNTER — APPOINTMENT (OUTPATIENT)
Dept: INTERNAL MEDICINE | Facility: CLINIC | Age: 85
End: 2020-07-29
Payer: MEDICARE

## 2020-07-29 VITALS
WEIGHT: 165 LBS | OXYGEN SATURATION: 97 % | RESPIRATION RATE: 16 BRPM | BODY MASS INDEX: 24.44 KG/M2 | HEART RATE: 60 BPM | HEIGHT: 69 IN | SYSTOLIC BLOOD PRESSURE: 104 MMHG | TEMPERATURE: 97.2 F | DIASTOLIC BLOOD PRESSURE: 63 MMHG

## 2020-07-29 LAB
ALBUMIN SERPL ELPH-MCNC: 4.5 G/DL
ALP BLD-CCNC: 49 U/L
ALT SERPL-CCNC: 24 U/L
ANION GAP SERPL CALC-SCNC: 13 MMOL/L
APPEARANCE: CLEAR
AST SERPL-CCNC: 25 U/L
BILIRUB SERPL-MCNC: 0.4 MG/DL
BILIRUBIN URINE: NEGATIVE
BLOOD URINE: NEGATIVE
BUN SERPL-MCNC: 29 MG/DL
CALCIUM SERPL-MCNC: 9.2 MG/DL
CHLORIDE SERPL-SCNC: 101 MMOL/L
CHOLEST SERPL-MCNC: 107 MG/DL
CHOLEST/HDLC SERPL: 3.1 RATIO
CO2 SERPL-SCNC: 27 MMOL/L
COLOR: YELLOW
CREAT SERPL-MCNC: 1.47 MG/DL
CREAT SPEC-SCNC: 218 MG/DL
GLUCOSE QUALITATIVE U: NEGATIVE
GLUCOSE SERPL-MCNC: 203 MG/DL
HDLC SERPL-MCNC: 35 MG/DL
KETONES URINE: NEGATIVE
LDLC SERPL CALC-MCNC: 22 MG/DL
LEUKOCYTE ESTERASE URINE: NEGATIVE
MICROALBUMIN 24H UR DL<=1MG/L-MCNC: <1.2 MG/DL
MICROALBUMIN/CREAT 24H UR-RTO: NORMAL MG/G
NITRITE URINE: NEGATIVE
PH URINE: 6
POTASSIUM SERPL-SCNC: 4.6 MMOL/L
PROT SERPL-MCNC: 6.8 G/DL
PROTEIN URINE: NORMAL
SODIUM SERPL-SCNC: 141 MMOL/L
SPECIFIC GRAVITY URINE: 1.03
TRIGL SERPL-MCNC: 249 MG/DL
UROBILINOGEN URINE: NORMAL

## 2020-07-29 PROCEDURE — 99213 OFFICE O/P EST LOW 20 MIN: CPT

## 2020-07-29 RX ORDER — PREDNISOLONE ACETATE 10 MG/ML
1 SUSPENSION/ DROPS OPHTHALMIC
Qty: 10 | Refills: 0 | Status: DISCONTINUED | COMMUNITY
Start: 2018-01-02 | End: 2020-07-29

## 2020-07-29 RX ORDER — TADALAFIL 5 MG/1
5 TABLET, FILM COATED ORAL
Qty: 30 | Refills: 5 | Status: DISCONTINUED | COMMUNITY
Start: 2017-05-02 | End: 2020-07-29

## 2020-07-29 NOTE — HEALTH RISK ASSESSMENT
[] : No [No] : In the past 12 months have you used drugs other than those required for medical reasons? No [No falls in past year] : Patient reported no falls in the past year [0] : 2) Feeling down, depressed, or hopeless: Not at all (0) [[id number]] : 0

## 2020-07-29 NOTE — COUNSELING
[Fall prevention counseling provided] : Fall prevention counseling provided [FreeTextEntry2] : low Tg,ADA,low salt [None] : None [Good understanding] : Patient has a good understanding of lifestyle changes and steps needed to achieve self management goal

## 2020-07-29 NOTE — PHYSICAL EXAM
[No Acute Distress] : no acute distress [Well Nourished] : well nourished [Well Developed] : well developed [Well-Appearing] : well-appearing [PERRL] : pupils equal round and reactive to light [Normal Sclera/Conjunctiva] : normal sclera/conjunctiva [Normal Outer Ear/Nose] : the outer ears and nose were normal in appearance [EOMI] : extraocular movements intact [Normal Oropharynx] : the oropharynx was normal [Supple] : supple [No JVD] : no jugular venous distention [Thyroid Normal, No Nodules] : the thyroid was normal and there were no nodules present [No Lymphadenopathy] : no lymphadenopathy [No Respiratory Distress] : no respiratory distress  [Clear to Auscultation] : lungs were clear to auscultation bilaterally [No Accessory Muscle Use] : no accessory muscle use [Normal S1, S2] : normal S1 and S2 [Normal Rate] : normal rate  [Regular Rhythm] : with a regular rhythm [No Murmur] : no murmur heard [No Carotid Bruits] : no carotid bruits [No Abdominal Bruit] : a ~M bruit was not heard ~T in the abdomen [No Varicosities] : no varicosities [No Edema] : there was no peripheral edema [No Palpable Aorta] : no palpable aorta [No Extremity Clubbing/Cyanosis] : no extremity clubbing/cyanosis [Non Tender] : non-tender [Soft] : abdomen soft [Non-distended] : non-distended [No Masses] : no abdominal mass palpated [Normal Bowel Sounds] : normal bowel sounds [No HSM] : no HSM [Declined Rectal Exam] : declined rectal exam [Normal Posterior Cervical Nodes] : no posterior cervical lymphadenopathy [Normal Anterior Cervical Nodes] : no anterior cervical lymphadenopathy [No CVA Tenderness] : no CVA  tenderness [No Spinal Tenderness] : no spinal tenderness [No Joint Swelling] : no joint swelling [No Rash] : no rash [Grossly Normal Strength/Tone] : grossly normal strength/tone [Normal Gait] : normal gait [Coordination Grossly Intact] : coordination grossly intact [No Focal Deficits] : no focal deficits [Normal Insight/Judgement] : insight and judgment were intact [Normal Affect] : the affect was normal [Right Foot Was Examined] : Right foot ~C was examined [Left Foot Was Examined] : left foot ~C was examined [] : both feet [de-identified] : pedal pulses not appreciated [TWNoteComboBox3] : 0 [TWNoteComboBox4] : 0

## 2020-07-29 NOTE — HISTORY OF PRESENT ILLNESS
[FreeTextEntry1] : claudication,b/l [de-identified] : 86 yo with T2D,on Metformin,Glimepiride,Tradjenta,last A1C=6,6%,denies tingling,numbness,yes h/o PAD,hence claudication 1 block as before.f/u with vascular next month.denies hypoglycemia.\par Htn,too tight control,will make Losartan/Hct 50/12,5.\par High Tg,low HDL,on diet.

## 2020-07-29 NOTE — ASSESSMENT
[FreeTextEntry1] : 85 with well controlled T2D,will d/c sulfonylurea if A1c is as good as before\par Htn,meds adjusted.\par TG high low HDL,labs to follow.,diet explained.\par PAD,stable,f/u vasc.

## 2020-08-01 LAB
25(OH)D3 SERPL-MCNC: 49.4 NG/ML
BASOPHILS # BLD AUTO: 0.06 K/UL
BASOPHILS NFR BLD AUTO: 0.9 %
EOSINOPHIL # BLD AUTO: 0.28 K/UL
EOSINOPHIL NFR BLD AUTO: 4 %
HCT VFR BLD CALC: 39.4 %
HGB BLD-MCNC: 12.5 G/DL
IMM GRANULOCYTES NFR BLD AUTO: 0.4 %
LYMPHOCYTES # BLD AUTO: 1.46 K/UL
LYMPHOCYTES NFR BLD AUTO: 21.1 %
MAN DIFF?: NORMAL
MCHC RBC-ENTMCNC: 31 PG
MCHC RBC-ENTMCNC: 31.7 GM/DL
MCV RBC AUTO: 97.8 FL
MONOCYTES # BLD AUTO: 0.43 K/UL
MONOCYTES NFR BLD AUTO: 6.2 %
NEUTROPHILS # BLD AUTO: 4.67 K/UL
NEUTROPHILS NFR BLD AUTO: 67.4 %
PLATELET # BLD AUTO: 117 K/UL
RBC # BLD: 4.03 M/UL
RBC # FLD: 14.2 %
TSH SERPL-ACNC: 1.12 UIU/ML
WBC # FLD AUTO: 6.93 K/UL

## 2020-08-07 ENCOUNTER — APPOINTMENT (OUTPATIENT)
Dept: VASCULAR SURGERY | Facility: CLINIC | Age: 85
End: 2020-08-07
Payer: MEDICARE

## 2020-08-07 PROCEDURE — 99213 OFFICE O/P EST LOW 20 MIN: CPT

## 2020-08-07 PROCEDURE — 93924 LWR XTR VASC STDY BILAT: CPT

## 2020-08-07 NOTE — PHYSICAL EXAM
[No Rash or Lesion] : No rash or lesion [Alert] : alert [Calm] : calm [JVD] : no jugular venous distention  [Normal Heart Sounds] : normal heart sounds [Normal Breath Sounds] : Normal breath sounds [2+] : left 2+ [Ankle Swelling (On Exam)] : not present [Varicose Veins Of Lower Extremities] : not present [Abdomen Masses] : No abdominal masses [] : not present [Oriented to Place] : oriented to place [Skin Ulcer] : no ulcer [Oriented to Person] : oriented to person [de-identified] : appears well

## 2020-08-07 NOTE — HISTORY OF PRESENT ILLNESS
[FreeTextEntry1] : 86 yo male with history of dm, htn, hld, presents for follow up of pad with claudications.  pt states that the symptoms have increased in severity and now starts to experience cramping after walking about 1 block.  pt states that the symptoms are interfering with his daily life and ability to walk longer distances.

## 2020-08-07 NOTE — ASSESSMENT
[FreeTextEntry1] : 86 yo male with history of dm, htn, hld, presents for follow up of pad with worsening claudications now after 1 block\par \par triston/pvr shows flattened distal waveforms\par pt with history of left sfa occlusion based on ct scan given worsened symptoms would recommend left lower extremity angiogram \par

## 2020-08-22 ENCOUNTER — TRANSCRIPTION ENCOUNTER (OUTPATIENT)
Age: 85
End: 2020-08-22

## 2020-08-24 ENCOUNTER — FORM ENCOUNTER (OUTPATIENT)
Age: 85
End: 2020-08-24

## 2020-08-25 ENCOUNTER — LABORATORY RESULT (OUTPATIENT)
Age: 85
End: 2020-08-25

## 2020-08-25 ENCOUNTER — APPOINTMENT (OUTPATIENT)
Dept: ENDOVASCULAR SURGERY | Facility: CLINIC | Age: 85
End: 2020-08-25
Payer: MEDICARE

## 2020-08-25 VITALS
DIASTOLIC BLOOD PRESSURE: 68 MMHG | RESPIRATION RATE: 16 BRPM | OXYGEN SATURATION: 98 % | WEIGHT: 165 LBS | SYSTOLIC BLOOD PRESSURE: 148 MMHG | HEIGHT: 69 IN | HEART RATE: 57 BPM | BODY MASS INDEX: 24.44 KG/M2 | TEMPERATURE: 97.2 F

## 2020-08-25 PROCEDURE — 75625 CONTRAST EXAM ABDOMINL AORTA: CPT | Mod: 59

## 2020-08-25 PROCEDURE — 37227Z: CUSTOM | Mod: 80

## 2020-08-25 PROCEDURE — 37227Z: CUSTOM | Mod: LT

## 2020-08-25 PROCEDURE — 37197Z: CUSTOM | Mod: 59,LT

## 2020-08-25 NOTE — PROCEDURE
[D/C IV on discharge] : D/C IV on discharge [Resume diet] : resume diet [Groin check for bleeding/hematoma, vitals checked, and Doppler/pulse checked on admission, then every 15 minutes for an hour and every 30 minutes for 3 hours thereafter.] : Groin check for bleeding/hematoma, vitals checked, and Doppler/pulse checked on admission, then every 15 minutes for an hour and every 30 minutes for 3 hours thereafter.  [IVF: ____ 0.9 NS] : IVF: [unfilled] 0.9 NS [at ____ ml/hr] : at [unfilled] ml/hr [x ____ h, then d/c.] : x [unfilled] h, then d/c. [Keep flat for 2 hours, then elevate head gradually as tolerated] : Keep flat for 2 hours, then elevate head gradually as tolerated [Bed rest for 3.5 hours, then ambulate and observe site for bleeding] : Bed rest for 3.5 hours, then ambulate and observe site for bleeding [Resume Diet] : resume diet [FreeTextEntry1] : aortogram, left leg angiogram/athrectomy/angioplasty/stent [Other: _____] : [unfilled]

## 2020-08-25 NOTE — PAST MEDICAL HISTORY
[No therapy indicated for cases scheduled for less than one hour] : No therapy indicated for cases scheduled for less than one hour. [Increasing age ( >40 years old)] : Increasing age ( >40 years old) [FreeTextEntry1] : Malignant Hyperthermia Screening Tool and Risk of Bleeding Assessment\par \par Mr. SWAPNA FINK denies family history of unexpected death following Anesthesia or Exercise.\par Denies Family history of Malignant Hyperthermia, Muscle or Neuromuscular disorder and High Temperature following exercise.\par \par Mr. SWAPNA FINK denies history of Muscle Spasm, Dark or Chocolate - Colored urine and Unanticipated fever immediately following anesthesia or serious exercise. \par Mr. FINK also denies bleeding tendencies/ Risks of Bleeding.\par

## 2020-08-25 NOTE — HISTORY OF PRESENT ILLNESS
[FreeTextEntry5] : 6pm 8/23/2020 [FreeTextEntry1] : accompanied by son Antonio [phone]\par Covid:not detected 8/22/2020\par Cr 1.2 8/7/2020\par took metoprolol and losartan this morning\par feels ok\par no falls [FreeTextEntry6] : Dr. Chow

## 2020-08-25 NOTE — PHYSICAL EXAM
[Normal Heart Sounds] : normal heart sounds [Normal Breath Sounds] : Normal breath sounds [2+] : right 2+ [No Rash or Lesion] : No rash or lesion [Alert] : alert [Oriented to Person] : oriented to person [Oriented to Place] : oriented to place [Calm] : calm [JVD] : no jugular venous distention  [Ankle Swelling (On Exam)] : not present [Varicose Veins Of Lower Extremities] : not present [] : not present [Abdomen Masses] : No abdominal masses [Skin Ulcer] : no ulcer [de-identified] : appears well

## 2020-09-04 ENCOUNTER — APPOINTMENT (OUTPATIENT)
Dept: UROLOGY | Facility: CLINIC | Age: 85
End: 2020-09-04
Payer: MEDICARE

## 2020-09-04 VITALS
SYSTOLIC BLOOD PRESSURE: 130 MMHG | DIASTOLIC BLOOD PRESSURE: 67 MMHG | HEART RATE: 68 BPM | BODY MASS INDEX: 24.44 KG/M2 | TEMPERATURE: 97.8 F | HEIGHT: 69 IN | WEIGHT: 165 LBS

## 2020-09-04 DIAGNOSIS — M17.10 UNILATERAL PRIMARY OSTEOARTHRITIS, UNSPECIFIED KNEE: ICD-10-CM

## 2020-09-04 PROCEDURE — 99213 OFFICE O/P EST LOW 20 MIN: CPT

## 2020-09-04 NOTE — ASSESSMENT
[FreeTextEntry1] : Very pleasant 85-year-old gentleman who presents for follow-up of BPH, erectile dysfunction\par -Refill for Flomax sent to the pharmacy\par -We again discussed procedure of an IPP\par -Follow-up in 6 months

## 2020-09-04 NOTE — HISTORY OF PRESENT ILLNESS
[Urinary Retention] : no urinary retention [FreeTextEntry1] : Very pleasant 85-year-old gentleman who presents for follow-up of BPH, erectile dysfunction.  Patient feels well.  He reports that Flomax significantly improves his urinary symptoms.  He continues to take the medication.  He was evaluated for an inflatable penile prosthesis, however he has not had the procedure performed yet because he has recently undergone a vascular procedure.  He denies dysuria.  No hematuria.  No flank pain or suprapubic pain.  With no other complaints. [Urinary Urgency] : no urinary urgency [Urinary Frequency] : no urinary frequency [Nocturia] : no nocturia [Straining] : no straining [Erectile Dysfunction] : Erectile Dysfunction [Dysuria] : no dysuria [Hematuria - Gross] : no gross hematuria [Bladder Spasm] : no bladder spasm [Abdominal Pain] : no abdominal pain [Flank Pain] : no flank pain [Fever] : no fever [Fatigue] : no fatigue [Nausea] : no nausea [Anorexia] : no anorexia

## 2020-09-04 NOTE — PHYSICAL EXAM
[General Appearance - Well Developed] : well developed [General Appearance - Well Nourished] : well nourished [Normal Appearance] : normal appearance [Well Groomed] : well groomed [General Appearance - In No Acute Distress] : no acute distress [Costovertebral Angle Tenderness] : no ~M costovertebral angle tenderness [Abdomen Soft] : soft [Abdomen Tenderness] : non-tender [Urinary Bladder Findings] : the bladder was normal on palpation [Testes Mass (___cm)] : there were no testicular masses [No Prostate Nodules] : no prostate nodules [Edema] : no peripheral edema [] : no respiratory distress [Respiration, Rhythm And Depth] : normal respiratory rhythm and effort [Oriented To Time, Place, And Person] : oriented to person, place, and time [Exaggerated Use Of Accessory Muscles For Inspiration] : no accessory muscle use [Affect] : the affect was normal [Mood] : the mood was normal [Not Anxious] : not anxious [Normal Station and Gait] : the gait and station were normal for the patient's age [No Focal Deficits] : no focal deficits [No Palpable Adenopathy] : no palpable adenopathy

## 2020-09-11 ENCOUNTER — APPOINTMENT (OUTPATIENT)
Dept: VASCULAR SURGERY | Facility: CLINIC | Age: 85
End: 2020-09-11
Payer: MEDICARE

## 2020-09-11 VITALS — TEMPERATURE: 97.5 F

## 2020-09-11 PROCEDURE — 93926 LOWER EXTREMITY STUDY: CPT

## 2020-09-11 PROCEDURE — 99213 OFFICE O/P EST LOW 20 MIN: CPT

## 2020-09-11 NOTE — ASSESSMENT
[FreeTextEntry1] : Patient with peripheral vascular disease, status post left SFA intervention.  Excellent results.  Continue antiplatelet therapy.  F/u in 3 months

## 2020-09-11 NOTE — HISTORY OF PRESENT ILLNESS
[FreeTextEntry1] : Patient with severe peripheral vascular disease.  Status post left SFA atherectomy and stent placement.  Patient reports resolution of all claudication symptoms.  No rest pain.

## 2020-09-11 NOTE — PHYSICAL EXAM
[Normal Breath Sounds] : Normal breath sounds [JVD] : no jugular venous distention  [Normal Heart Sounds] : normal heart sounds [2+] : left 2+

## 2020-09-15 ENCOUNTER — APPOINTMENT (OUTPATIENT)
Dept: UROLOGY | Facility: CLINIC | Age: 85
End: 2020-09-15

## 2020-10-14 ENCOUNTER — APPOINTMENT (OUTPATIENT)
Dept: INTERNAL MEDICINE | Facility: CLINIC | Age: 85
End: 2020-10-14
Payer: MEDICARE

## 2020-10-14 VITALS
RESPIRATION RATE: 16 BRPM | SYSTOLIC BLOOD PRESSURE: 152 MMHG | HEIGHT: 69 IN | DIASTOLIC BLOOD PRESSURE: 68 MMHG | HEART RATE: 64 BPM | TEMPERATURE: 97.3 F | WEIGHT: 174 LBS | OXYGEN SATURATION: 100 % | BODY MASS INDEX: 25.77 KG/M2

## 2020-10-14 DIAGNOSIS — Z23 ENCOUNTER FOR IMMUNIZATION: ICD-10-CM

## 2020-10-14 LAB
25(OH)D3 SERPL-MCNC: 46.3 NG/ML
ALBUMIN SERPL ELPH-MCNC: 4.3 G/DL
ALP BLD-CCNC: 49 U/L
ALT SERPL-CCNC: 14 U/L
ANION GAP SERPL CALC-SCNC: 13 MMOL/L
APPEARANCE: CLEAR
AST SERPL-CCNC: 17 U/L
BASOPHILS # BLD AUTO: 0.05 K/UL
BASOPHILS NFR BLD AUTO: 0.7 %
BILIRUB SERPL-MCNC: 0.3 MG/DL
BILIRUBIN URINE: NEGATIVE
BLOOD URINE: NEGATIVE
BUN SERPL-MCNC: 42 MG/DL
CALCIUM SERPL-MCNC: 9.9 MG/DL
CHLORIDE SERPL-SCNC: 100 MMOL/L
CHOLEST SERPL-MCNC: 109 MG/DL
CHOLEST/HDLC SERPL: 2.8 RATIO
CO2 SERPL-SCNC: 28 MMOL/L
COLOR: NORMAL
CREAT SERPL-MCNC: 1.27 MG/DL
CREAT SPEC-SCNC: 36 MG/DL
EOSINOPHIL # BLD AUTO: 0.26 K/UL
EOSINOPHIL NFR BLD AUTO: 3.8 %
ESTIMATED AVERAGE GLUCOSE: 160 MG/DL
GLUCOSE QUALITATIVE U: NEGATIVE
GLUCOSE SERPL-MCNC: 170 MG/DL
HBA1C MFR BLD HPLC: 7.2 %
HCT VFR BLD CALC: 41.6 %
HDLC SERPL-MCNC: 39 MG/DL
HGB BLD-MCNC: 13.1 G/DL
IMM GRANULOCYTES NFR BLD AUTO: 1.3 %
KETONES URINE: NEGATIVE
LDLC SERPL CALC-MCNC: 28 MG/DL
LEUKOCYTE ESTERASE URINE: NEGATIVE
LYMPHOCYTES # BLD AUTO: 1.99 K/UL
LYMPHOCYTES NFR BLD AUTO: 29.1 %
MAN DIFF?: NORMAL
MCHC RBC-ENTMCNC: 29.8 PG
MCHC RBC-ENTMCNC: 31.5 GM/DL
MCV RBC AUTO: 94.5 FL
MICROALBUMIN 24H UR DL<=1MG/L-MCNC: <1.2 MG/DL
MICROALBUMIN/CREAT 24H UR-RTO: NORMAL MG/G
MONOCYTES # BLD AUTO: 0.5 K/UL
MONOCYTES NFR BLD AUTO: 7.3 %
NEUTROPHILS # BLD AUTO: 3.94 K/UL
NEUTROPHILS NFR BLD AUTO: 57.8 %
NITRITE URINE: NEGATIVE
PH URINE: 6
PLATELET # BLD AUTO: 171 K/UL
POTASSIUM SERPL-SCNC: 4.5 MMOL/L
PROT SERPL-MCNC: 6.8 G/DL
PROTEIN URINE: NEGATIVE
RBC # BLD: 4.4 M/UL
RBC # FLD: 13.6 %
SODIUM SERPL-SCNC: 141 MMOL/L
SPECIFIC GRAVITY URINE: 1.01
TRIGL SERPL-MCNC: 207 MG/DL
UROBILINOGEN URINE: NORMAL
WBC # FLD AUTO: 6.83 K/UL

## 2020-10-14 PROCEDURE — 99214 OFFICE O/P EST MOD 30 MIN: CPT | Mod: 25

## 2020-10-14 PROCEDURE — 90662 IIV NO PRSV INCREASED AG IM: CPT

## 2020-10-14 PROCEDURE — 90472 IMMUNIZATION ADMIN EACH ADD: CPT

## 2020-10-14 PROCEDURE — G0009: CPT

## 2020-10-14 PROCEDURE — 90670 PCV13 VACCINE IM: CPT

## 2020-10-14 NOTE — HISTORY OF PRESENT ILLNESS
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

## 2020-10-14 NOTE — COUNSELING
[Fall prevention counseling provided] : Fall prevention counseling provided [FreeTextEntry2] : ADA diet,low salt,low chol [None] : None [Good understanding] : Patient has a good understanding of lifestyle changes and steps needed to achieve self management goal

## 2020-10-14 NOTE — HEALTH RISK ASSESSMENT
[No] : In the past 12 months have you used drugs other than those required for medical reasons? No [No falls in past year] : Patient reported no falls in the past year [0] : 2) Feeling down, depressed, or hopeless: Not at all (0) [] : No [[id number]] : 0

## 2020-10-14 NOTE — ASSESSMENT
[FreeTextEntry1] : 86 yo with -well controlled T2D with D.nephropathy,mild CRI,renal disease mild anemia.on reduced dose of Metformin will watch  GFR.\par -Htn,meds adjusted.\par -TG high low HDL,labs to follow.,diet explained.\par -PAD,st.p.L.SFA atherectomy/stent placement,on Plavix,marked clinical improvement,awaiting same procedure on the R.LE.f/u vasc.\par -Thrombocytopenia,mild,on observation

## 2020-10-14 NOTE — PHYSICAL EXAM
[No Acute Distress] : no acute distress [Well Nourished] : well nourished [Well Developed] : well developed [Well-Appearing] : well-appearing [Normal Sclera/Conjunctiva] : normal sclera/conjunctiva [PERRL] : pupils equal round and reactive to light [EOMI] : extraocular movements intact [Normal Outer Ear/Nose] : the outer ears and nose were normal in appearance [Normal Oropharynx] : the oropharynx was normal [No JVD] : no jugular venous distention [No Lymphadenopathy] : no lymphadenopathy [Supple] : supple [Thyroid Normal, No Nodules] : the thyroid was normal and there were no nodules present [Clear to Auscultation] : lungs were clear to auscultation bilaterally [Normal Rate] : normal rate  [Regular Rhythm] : with a regular rhythm [Normal S1, S2] : normal S1 and S2 [No Murmur] : no murmur heard [No Carotid Bruits] : no carotid bruits [No Edema] : there was no peripheral edema [Soft] : abdomen soft [Non Tender] : non-tender [Non-distended] : non-distended [No Masses] : no abdominal mass palpated [No HSM] : no HSM [Normal Bowel Sounds] : normal bowel sounds [Declined Rectal Exam] : declined rectal exam [Normal Supraclavicular Nodes] : no supraclavicular lymphadenopathy [No CVA Tenderness] : no CVA  tenderness [No Spinal Tenderness] : no spinal tenderness [No Joint Swelling] : no joint swelling [Grossly Normal Strength/Tone] : grossly normal strength/tone [No Rash] : no rash [Coordination Grossly Intact] : coordination grossly intact [Normal Gait] : normal gait [No Focal Deficits] : no focal deficits [Normal Affect] : the affect was normal [Normal Insight/Judgement] : insight and judgment were intact [Right Foot Was Examined] : Right foot ~C was examined [Left Foot Was Examined] : left foot ~C was examined [] : both feet [de-identified] : pedal pulses not appreciated [TWNoteComboBox3] : 0 [TWNoteComboBox4] : 0

## 2020-11-30 ENCOUNTER — APPOINTMENT (OUTPATIENT)
Dept: CARDIOLOGY | Facility: CLINIC | Age: 85
End: 2020-11-30
Payer: MEDICARE

## 2020-11-30 ENCOUNTER — NON-APPOINTMENT (OUTPATIENT)
Age: 85
End: 2020-11-30

## 2020-11-30 VITALS
HEIGHT: 69 IN | TEMPERATURE: 98 F | RESPIRATION RATE: 17 BRPM | SYSTOLIC BLOOD PRESSURE: 150 MMHG | HEART RATE: 66 BPM | WEIGHT: 174 LBS | DIASTOLIC BLOOD PRESSURE: 70 MMHG | BODY MASS INDEX: 25.77 KG/M2 | OXYGEN SATURATION: 98 %

## 2020-11-30 VITALS — SYSTOLIC BLOOD PRESSURE: 126 MMHG | DIASTOLIC BLOOD PRESSURE: 60 MMHG

## 2020-11-30 PROCEDURE — 99204 OFFICE O/P NEW MOD 45 MIN: CPT

## 2020-11-30 RX ORDER — CLOPIDOGREL BISULFATE 75 MG/1
75 TABLET, FILM COATED ORAL DAILY
Qty: 90 | Refills: 3 | Status: DISCONTINUED | COMMUNITY
Start: 2020-08-25 | End: 2020-11-30

## 2020-11-30 NOTE — HISTORY OF PRESENT ILLNESS
[FreeTextEntry1] : 85-year-old male with hypertension, hyperlipidemia, CKD, PVD s/p left SFA stent, and BPH who presents for initial visit. Patient last seen 3 years ago by cardiologist Dr. Rose. Echocardiogram from 2017 showed EF 65%, grade 1 diastolic dysfunction, and no LVH. Patient presents for routine check up, he has no specific cardiac complaints. Reports he is able to ascend a flight of stairs without feeling any chest pain, dyspnea, palpitations, lightheadedness, or syncope. Denies LE edema, orthopnea, or PND. Reports BP is well controlled at home. Patient reports that he used to experience claudication of the left leg prior to his stent, which has since resolved. He had some symptoms on the right as well, but further management was deferred to see how the left develops. Reports at this point, he is experiencing claudication when walking on the right leg, with symptoms resolving upon rest. He is scheduled to see his vascular surgeon Dr. Pratt in about 10 days. Patient reports compliance with all medications, denies adverse effects.\par

## 2020-11-30 NOTE — REASON FOR VISIT
[Consultation] : a consultation regarding [Hypertension] : hypertension [Peripheral Vascular Disease] : peripheral vascular disease

## 2020-11-30 NOTE — ASSESSMENT
[FreeTextEntry1] : 85-year-old male with hypertension, hyperlipidemia, CKD, PVD s/p left SFA stent, and BPH who presents for initial visit; overall seems to be doing well. \par \par 1. HTN: Well controlled on losartan-HCTZ and metoprolol, with EF 65% and no LVH by echo 04/2017. \par \par 2. HLD: Mostly acceptable lipid panel, with mild elevation of triglycerides noted.\par -Patient was counseled on lifestyle modifications including recommendations for diet and exercise\par -If no improvement in triglyceride levels, can consider Vascepa in the future  \par \par 3. PVD: Has right-sided claudication, pending upcoming visit with vascular.

## 2020-12-11 ENCOUNTER — APPOINTMENT (OUTPATIENT)
Dept: VASCULAR SURGERY | Facility: CLINIC | Age: 85
End: 2020-12-11
Payer: MEDICARE

## 2020-12-11 PROCEDURE — 93926 LOWER EXTREMITY STUDY: CPT

## 2020-12-11 PROCEDURE — 99213 OFFICE O/P EST LOW 20 MIN: CPT

## 2020-12-11 PROCEDURE — 99072 ADDL SUPL MATRL&STAF TM PHE: CPT

## 2020-12-11 NOTE — ASSESSMENT
[FreeTextEntry1] : 86 yo male with history of dm, htn, hld, presents for follow up of pad with claudications s/p left lower extremity sfa stent with improvement of the left lower extremity but persistent right lower extremity symptoms\par \par \par \par given severe claudication of the right lower extremity will arrange for right lower extremity angiogram

## 2020-12-11 NOTE — HISTORY OF PRESENT ILLNESS
[FreeTextEntry1] : 86 yo male with history of dm, htn, hld, presents for follow up of pad with claudications s/p left lower extremity sfa stent with improvement of the left lower extremity but persistent right lower extremity symptoms.  pt states that he experiences cramping pain in the right calf after walking about 1 block.  pt denies any open wounds or rest pain

## 2020-12-11 NOTE — PHYSICAL EXAM
[0] : right 0 [2+] : left 2+ [No Rash or Lesion] : No rash or lesion [Alert] : alert [Oriented to Person] : oriented to person [Oriented to Place] : oriented to place [Oriented to Time] : oriented to time [Calm] : calm [JVD] : no jugular venous distention  [Normal Breath Sounds] : Normal breath sounds [Normal Heart Sounds] : normal heart sounds [Ankle Swelling (On Exam)] : not present [Varicose Veins Of Lower Extremities] : not present [] : not present [Skin Ulcer] : no ulcer [de-identified] : appears well

## 2021-01-02 ENCOUNTER — APPOINTMENT (OUTPATIENT)
Dept: DISASTER EMERGENCY | Facility: CLINIC | Age: 86
End: 2021-01-02

## 2021-01-03 LAB — SARS-COV-2 N GENE NPH QL NAA+PROBE: NOT DETECTED

## 2021-01-06 ENCOUNTER — RESULT REVIEW (OUTPATIENT)
Age: 86
End: 2021-01-06

## 2021-01-06 ENCOUNTER — LABORATORY RESULT (OUTPATIENT)
Age: 86
End: 2021-01-06

## 2021-01-06 ENCOUNTER — APPOINTMENT (OUTPATIENT)
Dept: ENDOVASCULAR SURGERY | Facility: CLINIC | Age: 86
End: 2021-01-06
Payer: MEDICARE

## 2021-01-06 VITALS
DIASTOLIC BLOOD PRESSURE: 65 MMHG | TEMPERATURE: 97.4 F | OXYGEN SATURATION: 100 % | SYSTOLIC BLOOD PRESSURE: 127 MMHG | WEIGHT: 174 LBS | BODY MASS INDEX: 25.77 KG/M2 | RESPIRATION RATE: 16 BRPM | HEIGHT: 69 IN | HEART RATE: 59 BPM

## 2021-01-06 PROCEDURE — 99072 ADDL SUPL MATRL&STAF TM PHE: CPT

## 2021-01-06 PROCEDURE — 75625 CONTRAST EXAM ABDOMINL AORTA: CPT

## 2021-01-06 PROCEDURE — 37227Z: CUSTOM | Mod: RT

## 2021-01-06 NOTE — PAST MEDICAL HISTORY
[Increasing age ( >40 years old)] : Increasing age ( >40 years old) [No therapy indicated for cases scheduled for less than one hour] : No therapy indicated for cases scheduled for less than one hour. [FreeTextEntry1] : Malignant Hyperthermia Screening Tool and Risk of Bleeding Assessment\par \par Mr. SWAPNA FINK denies family history of unexpected death following Anesthesia or Exercise.\par Denies Family history of Malignant Hyperthermia, Muscle or Neuromuscular disorder and High Temperature following exercise.\par \par Mr. SWAPNA FINK denies history of Muscle Spasm, Dark or Chocolate - Colored urine and Unanticipated fever immediately following anesthesia or serious exercise. \par Mr. FINK also denies bleeding tendencies/ Risks of Bleeding.\par

## 2021-01-06 NOTE — PROCEDURE
[Groin check for bleeding/hematoma, vitals checked, and Doppler/pulse checked on admission, then every 15 minutes for an hour and every 30 minutes for 3 hours thereafter.] : Groin check for bleeding/hematoma, vitals checked, and Doppler/pulse checked on admission, then every 15 minutes for an hour and every 30 minutes for 3 hours thereafter.  [IVF: ____ 0.9 NS] : IVF: [unfilled] 0.9 NS [at ____ ml/hr] : at [unfilled] ml/hr [x ____ h, then d/c.] : x [unfilled] h, then d/c. [Keep flat for 2 hours, then elevate head gradually as tolerated] : Keep flat for 2 hours, then elevate head gradually as tolerated [Bed rest for 3.5 hours, then ambulate and observe site for bleeding] : Bed rest for 3.5 hours, then ambulate and observe site for bleeding [Resume Diet] : resume diet [Other: _____] : [unfilled] [D/C IV on discharge] : D/C IV on discharge [Resume diet] : resume diet [FreeTextEntry1] : aortogram, right leg angiogram/athrectomy/angioplasty/stent

## 2021-01-06 NOTE — HISTORY OF PRESENT ILLNESS
[FreeTextEntry1] : accompanied by son Antonio [phone]\par Covid:not detected 1/2//2021\par Cr 1.35 12/14/2020\par took losartan and metoprolol this morning\par plavix and aspirin last night\par feels ok\par no falls [FreeTextEntry5] : 10pm 1/5/2021 [FreeTextEntry6] : Dr. Chow

## 2021-01-06 NOTE — PHYSICAL EXAM
[Normal Breath Sounds] : Normal breath sounds [Normal Heart Sounds] : normal heart sounds [0] : right 0 [2+] : left 2+ [No Rash or Lesion] : No rash or lesion [Alert] : alert [Oriented to Person] : oriented to person [Oriented to Place] : oriented to place [Oriented to Time] : oriented to time [Calm] : calm [JVD] : no jugular venous distention  [Ankle Swelling (On Exam)] : not present [Varicose Veins Of Lower Extremities] : not present [] : not present [Abdomen Masses] : No abdominal masses [Skin Ulcer] : no ulcer [de-identified] : appears well

## 2021-01-22 ENCOUNTER — APPOINTMENT (OUTPATIENT)
Dept: VASCULAR SURGERY | Facility: CLINIC | Age: 86
End: 2021-01-22
Payer: MEDICARE

## 2021-01-22 VITALS
HEIGHT: 69 IN | WEIGHT: 165 LBS | HEART RATE: 63 BPM | BODY MASS INDEX: 24.44 KG/M2 | SYSTOLIC BLOOD PRESSURE: 120 MMHG | DIASTOLIC BLOOD PRESSURE: 48 MMHG

## 2021-01-22 VITALS — TEMPERATURE: 97.2 F

## 2021-01-22 PROCEDURE — 99213 OFFICE O/P EST LOW 20 MIN: CPT

## 2021-01-22 PROCEDURE — 93926 LOWER EXTREMITY STUDY: CPT

## 2021-01-22 PROCEDURE — 99072 ADDL SUPL MATRL&STAF TM PHE: CPT

## 2021-01-22 NOTE — PHYSICAL EXAM
[JVD] : no jugular venous distention  [Normal Breath Sounds] : Normal breath sounds [Normal Heart Sounds] : normal heart sounds [0] : right 0 [2+] : left 2+ [Ankle Swelling (On Exam)] : not present [Varicose Veins Of Lower Extremities] : not present [] : not present [No Rash or Lesion] : No rash or lesion [Skin Ulcer] : no ulcer [Alert] : alert [Oriented to Person] : oriented to person [Oriented to Place] : oriented to place [Oriented to Time] : oriented to time [Calm] : calm [de-identified] : appears well

## 2021-01-22 NOTE — HISTORY OF PRESENT ILLNESS
[FreeTextEntry1] : 84 yo male with history of dm, htn, hld, presents for follow up of pad with claudications s/p bilateral lower extremity sfa stent with improvement of the symptoms of claudication.  Patient very happy with the results.  \par Patient has developed significant hive formation throughout his body after 2 days after the procedure.

## 2021-01-22 NOTE — ASSESSMENT
[FreeTextEntry1] : 86 yo male with history of dm, htn, hld, presents for follow up of pad with claudications s/p bilateral lower extremity sfa stent with improvement of the bilateral lower extremity\par \par \par Patient has developed significant hive formation throughout his body after 2 days after the procedure.  Suspect contrast allergic reaction.  Recommend treatment with steroids and follow-up with dermatology.

## 2021-02-08 ENCOUNTER — RX RENEWAL (OUTPATIENT)
Age: 86
End: 2021-02-08

## 2021-03-05 ENCOUNTER — APPOINTMENT (OUTPATIENT)
Dept: UROLOGY | Facility: CLINIC | Age: 86
End: 2021-03-05
Payer: MEDICARE

## 2021-03-05 DIAGNOSIS — N52.9 MALE ERECTILE DYSFUNCTION, UNSPECIFIED: ICD-10-CM

## 2021-03-05 PROCEDURE — 99213 OFFICE O/P EST LOW 20 MIN: CPT

## 2021-03-05 PROCEDURE — 99072 ADDL SUPL MATRL&STAF TM PHE: CPT

## 2021-03-05 NOTE — ASSESSMENT
[FreeTextEntry1] : Very pleasant 85-year-old gentleman who presents for follow-up of BPH with urinary obstruction, erectile dysfunction\par -we again discussed options for management of erectile dysfunction, however at this time he is not interested in an inflatable penile prosthesis and other options have not worked in the past\par -Continue Flomax\par -Follow-up in 6 months or sooner if necessary

## 2021-03-05 NOTE — HISTORY OF PRESENT ILLNESS
[FreeTextEntry1] : Very pleasant 85-year-old gentleman who presents for follow-up of BPH, erectile dysfunction.  Patient feels well.  He reports that Flomax significantly improves his urinary symptoms.  He continues to take the medication.    He denies dysuria.  No hematuria.  No flank pain or suprapubic pain.  With no other complaints.  At this time he is not interested in a penile prosthesis anymore.

## 2021-03-22 ENCOUNTER — RX RENEWAL (OUTPATIENT)
Age: 86
End: 2021-03-22

## 2021-04-02 ENCOUNTER — APPOINTMENT (OUTPATIENT)
Dept: VASCULAR SURGERY | Facility: CLINIC | Age: 86
End: 2021-04-02
Payer: MEDICARE

## 2021-04-02 VITALS — TEMPERATURE: 96.8 F

## 2021-04-02 PROCEDURE — 99072 ADDL SUPL MATRL&STAF TM PHE: CPT

## 2021-04-02 PROCEDURE — 93925 LOWER EXTREMITY STUDY: CPT

## 2021-04-13 ENCOUNTER — APPOINTMENT (OUTPATIENT)
Dept: INTERNAL MEDICINE | Facility: CLINIC | Age: 86
End: 2021-04-13
Payer: MEDICARE

## 2021-04-13 VITALS
HEART RATE: 57 BPM | TEMPERATURE: 97 F | OXYGEN SATURATION: 100 % | SYSTOLIC BLOOD PRESSURE: 130 MMHG | RESPIRATION RATE: 16 BRPM | DIASTOLIC BLOOD PRESSURE: 63 MMHG | HEIGHT: 69 IN | WEIGHT: 162 LBS | BODY MASS INDEX: 23.99 KG/M2

## 2021-04-13 DIAGNOSIS — R09.89 OTHER SPECIFIED SYMPTOMS AND SIGNS INVOLVING THE CIRCULATORY AND RESPIRATORY SYSTEMS: ICD-10-CM

## 2021-04-13 PROCEDURE — 99214 OFFICE O/P EST MOD 30 MIN: CPT

## 2021-04-13 PROCEDURE — 99072 ADDL SUPL MATRL&STAF TM PHE: CPT

## 2021-04-13 NOTE — HISTORY OF PRESENT ILLNESS
[FreeTextEntry1] : L.LE claudication [de-identified] : 84 yo c/o recurrent claudication L.LE for the past 1 month after walking for 3-4 min necessitating pt to stop due to pain .\par he also c/o pain in the L.inguinal area at the site of previous vascular procedure.he is st.p.L>SFA atherectomy& stent placement L.SFA 9 months ago.\par h/o long standing PAD,st.p.recent successful R.SFA atherectomy,stent placement with marked clinical improvement,on Plavix.\par PMH of -T2D,last A1C 7,2%,denies hypoglycemia Sx's,denies tingling,numbness in his feet.\par -mild CRI BUN/Cr 29/1,47,GFR 48,metformin was adjusted to lower dose of 1 g/d instead of 2 g.will f/u on renal function today.\par -h/o HTN,well controlled on ARB,BB.denies HA's,amaurosis,dizziness,syncope,palpitations.\par -HLD,low HDL,on Vytorin\par -h/o long standing PAD,st.p.recent successful L.SFA atherectomy,stent placement with marked clinical improvement,on Plavix.\par pt is awaiting the same procedure on his R.leg.\par -h/o Thrombocytopenia,on observation,last count 117,0.no Sx's of bleeding tendency.\par -h/o BPH,on Flomax.

## 2021-04-13 NOTE — ASSESSMENT
[FreeTextEntry1] : 86 yo referred to vascular surgery for eval of recurrent L.LE claudication and faint R.carotid briut.\par h/o T2D with D.nephropathy,mild CRI,renal disease mild anemia.on reduced dose of Metformin will watch  GFR.\par -Htn,meds adjusted.\par -TG high,low HDL,labs to follow.,diet explained.\par -Thrombocytopenia,mild,on observation

## 2021-04-13 NOTE — COUNSELING
[Fall prevention counseling provided] : Fall prevention counseling provided [FreeTextEntry2] : ADA,low salt,low chol. [None] : None [Good understanding] : Patient has a good understanding of lifestyle changes and steps needed to achieve self management goal

## 2021-04-13 NOTE — PHYSICAL EXAM
[No Acute Distress] : no acute distress [Well Nourished] : well nourished [Well Developed] : well developed [Well-Appearing] : well-appearing [Normal Sclera/Conjunctiva] : normal sclera/conjunctiva [PERRL] : pupils equal round and reactive to light [EOMI] : extraocular movements intact [Normal Outer Ear/Nose] : the outer ears and nose were normal in appearance [Normal Oropharynx] : the oropharynx was normal [No JVD] : no jugular venous distention [Supple] : supple [No Lymphadenopathy] : no lymphadenopathy [Thyroid Normal, No Nodules] : the thyroid was normal and there were no nodules present [Clear to Auscultation] : lungs were clear to auscultation bilaterally [Normal Rate] : normal rate  [Regular Rhythm] : with a regular rhythm [Normal S1, S2] : normal S1 and S2 [No Murmur] : no murmur heard [No Edema] : there was no peripheral edema [Soft] : abdomen soft [Non-distended] : non-distended [No Masses] : no abdominal mass palpated [No HSM] : no HSM [Normal Bowel Sounds] : normal bowel sounds [Declined Rectal Exam] : declined rectal exam [Normal Supraclavicular Nodes] : no supraclavicular lymphadenopathy [No CVA Tenderness] : no CVA  tenderness [No Spinal Tenderness] : no spinal tenderness [No Joint Swelling] : no joint swelling [Grossly Normal Strength/Tone] : grossly normal strength/tone [No Rash] : no rash [Normal Gait] : normal gait [Coordination Grossly Intact] : coordination grossly intact [No Focal Deficits] : no focal deficits [Normal Affect] : the affect was normal [Normal Insight/Judgement] : insight and judgment were intact [Right Foot Was Examined] : Right foot ~C was examined [Left Foot Was Examined] : left foot ~C was examined [] : both feet [No Hernias] : no hernias [de-identified] : faint R.carotid bruit.pedal pulses not appreciated [de-identified] : mild tenderness,L.inguinal area. [TWNoteComboBox3] : 0 [TWNoteComboBox4] : 0

## 2021-04-14 LAB
25(OH)D3 SERPL-MCNC: 52.1 NG/ML
ALBUMIN SERPL ELPH-MCNC: 4.3 G/DL
ALP BLD-CCNC: 58 U/L
ALT SERPL-CCNC: 16 U/L
ANION GAP SERPL CALC-SCNC: 12 MMOL/L
APPEARANCE: CLEAR
AST SERPL-CCNC: 24 U/L
BASOPHILS # BLD AUTO: 0.05 K/UL
BASOPHILS NFR BLD AUTO: 0.8 %
BILIRUB SERPL-MCNC: 0.4 MG/DL
BILIRUBIN URINE: NEGATIVE
BLOOD URINE: NEGATIVE
BUN SERPL-MCNC: 20 MG/DL
CALCIUM SERPL-MCNC: 9.6 MG/DL
CHLORIDE SERPL-SCNC: 102 MMOL/L
CHOLEST SERPL-MCNC: 136 MG/DL
CO2 SERPL-SCNC: 27 MMOL/L
COLOR: NORMAL
CREAT SERPL-MCNC: 1.34 MG/DL
CREAT SPEC-SCNC: 176 MG/DL
EOSINOPHIL # BLD AUTO: 0.36 K/UL
EOSINOPHIL NFR BLD AUTO: 5.5 %
ESTIMATED AVERAGE GLUCOSE: 174 MG/DL
GLUCOSE QUALITATIVE U: NEGATIVE
GLUCOSE SERPL-MCNC: 164 MG/DL
HBA1C MFR BLD HPLC: 7.7 %
HCT VFR BLD CALC: 39.4 %
HDLC SERPL-MCNC: 40 MG/DL
HGB BLD-MCNC: 12.6 G/DL
IMM GRANULOCYTES NFR BLD AUTO: 0.3 %
KETONES URINE: NEGATIVE
LDLC SERPL CALC-MCNC: 49 MG/DL
LEUKOCYTE ESTERASE URINE: NEGATIVE
LYMPHOCYTES # BLD AUTO: 1.24 K/UL
LYMPHOCYTES NFR BLD AUTO: 19 %
MAN DIFF?: NORMAL
MCHC RBC-ENTMCNC: 29.9 PG
MCHC RBC-ENTMCNC: 32 GM/DL
MCV RBC AUTO: 93.4 FL
MICROALBUMIN 24H UR DL<=1MG/L-MCNC: <1.2 MG/DL
MICROALBUMIN/CREAT 24H UR-RTO: NORMAL MG/G
MONOCYTES # BLD AUTO: 0.57 K/UL
MONOCYTES NFR BLD AUTO: 8.7 %
NEUTROPHILS # BLD AUTO: 4.29 K/UL
NEUTROPHILS NFR BLD AUTO: 65.7 %
NITRITE URINE: NEGATIVE
NONHDLC SERPL-MCNC: 97 MG/DL
PH URINE: 7
PLATELET # BLD AUTO: 126 K/UL
POTASSIUM SERPL-SCNC: 4.4 MMOL/L
PROT SERPL-MCNC: 7.1 G/DL
PROTEIN URINE: NEGATIVE
RBC # BLD: 4.22 M/UL
RBC # FLD: 13.6 %
SODIUM SERPL-SCNC: 141 MMOL/L
SPECIFIC GRAVITY URINE: 1.02
TRIGL SERPL-MCNC: 238 MG/DL
UROBILINOGEN URINE: NORMAL
WBC # FLD AUTO: 6.53 K/UL

## 2021-04-14 RX ORDER — AMOXICILLIN 500 MG/1
500 CAPSULE ORAL
Qty: 21 | Refills: 0 | Status: DISCONTINUED | COMMUNITY
Start: 2020-10-27 | End: 2021-04-14

## 2021-04-15 LAB
ALBUMIN SERPL ELPH-MCNC: 4.6 G/DL
ALP BLD-CCNC: 59 U/L
ALT SERPL-CCNC: 15 U/L
ANION GAP SERPL CALC-SCNC: 12 MMOL/L
ANION GAP SERPL CALC-SCNC: 13 MMOL/L
APTT BLD: 31 SEC
APTT BLD: 32.2 SEC
AST SERPL-CCNC: 21 U/L
BASOPHILS # BLD AUTO: 0.04 K/UL
BASOPHILS # BLD AUTO: 0.05 K/UL
BASOPHILS NFR BLD AUTO: 0.7 %
BASOPHILS NFR BLD AUTO: 0.9 %
BILIRUB SERPL-MCNC: 0.4 MG/DL
BUN SERPL-MCNC: 20 MG/DL
BUN SERPL-MCNC: 24 MG/DL
CALCIUM SERPL-MCNC: 9.8 MG/DL
CALCIUM SERPL-MCNC: 9.9 MG/DL
CHLORIDE SERPL-SCNC: 102 MMOL/L
CHLORIDE SERPL-SCNC: 99 MMOL/L
CO2 SERPL-SCNC: 26 MMOL/L
CO2 SERPL-SCNC: 27 MMOL/L
CREAT SERPL-MCNC: 1.2 MG/DL
CREAT SERPL-MCNC: 1.35 MG/DL
EOSINOPHIL # BLD AUTO: 0.16 K/UL
EOSINOPHIL # BLD AUTO: 0.2 K/UL
EOSINOPHIL NFR BLD AUTO: 2.7 %
EOSINOPHIL NFR BLD AUTO: 3.5 %
GLUCOSE SERPL-MCNC: 208 MG/DL
GLUCOSE SERPL-MCNC: 295 MG/DL
HCT VFR BLD CALC: 39.3 %
HCT VFR BLD CALC: 42.3 %
HGB BLD-MCNC: 12.2 G/DL
HGB BLD-MCNC: 13 G/DL
IMM GRANULOCYTES NFR BLD AUTO: 0.2 %
IMM GRANULOCYTES NFR BLD AUTO: 0.4 %
INR PPP: 0.98 RATIO
INR PPP: 1 RATIO
LYMPHOCYTES # BLD AUTO: 1.27 K/UL
LYMPHOCYTES # BLD AUTO: 1.3 K/UL
LYMPHOCYTES NFR BLD AUTO: 21.7 %
LYMPHOCYTES NFR BLD AUTO: 22.8 %
MAN DIFF?: NORMAL
MAN DIFF?: NORMAL
MCHC RBC-ENTMCNC: 30.1 PG
MCHC RBC-ENTMCNC: 30.1 PG
MCHC RBC-ENTMCNC: 30.7 GM/DL
MCHC RBC-ENTMCNC: 31 GM/DL
MCV RBC AUTO: 97 FL
MCV RBC AUTO: 97.9 FL
MONOCYTES # BLD AUTO: 0.39 K/UL
MONOCYTES # BLD AUTO: 0.41 K/UL
MONOCYTES NFR BLD AUTO: 6.9 %
MONOCYTES NFR BLD AUTO: 7 %
NEUTROPHILS # BLD AUTO: 3.73 K/UL
NEUTROPHILS # BLD AUTO: 3.95 K/UL
NEUTROPHILS NFR BLD AUTO: 65.5 %
NEUTROPHILS NFR BLD AUTO: 67.7 %
PLATELET # BLD AUTO: 116 K/UL
PLATELET # BLD AUTO: 136 K/UL
POTASSIUM SERPL-SCNC: 4.7 MMOL/L
POTASSIUM SERPL-SCNC: 4.7 MMOL/L
PROT SERPL-MCNC: 6.8 G/DL
PT BLD: 11.6 SEC
PT BLD: 11.8 SEC
RBC # BLD: 4.05 M/UL
RBC # BLD: 4.32 M/UL
RBC # FLD: 14 %
RBC # FLD: 14.2 %
SODIUM SERPL-SCNC: 137 MMOL/L
SODIUM SERPL-SCNC: 142 MMOL/L
WBC # FLD AUTO: 5.69 K/UL
WBC # FLD AUTO: 5.84 K/UL

## 2021-05-10 ENCOUNTER — APPOINTMENT (OUTPATIENT)
Dept: CARDIOLOGY | Facility: CLINIC | Age: 86
End: 2021-05-10
Payer: MEDICARE

## 2021-05-10 VITALS
BODY MASS INDEX: 24.73 KG/M2 | SYSTOLIC BLOOD PRESSURE: 110 MMHG | WEIGHT: 167 LBS | HEIGHT: 69 IN | OXYGEN SATURATION: 98 % | HEART RATE: 58 BPM | DIASTOLIC BLOOD PRESSURE: 56 MMHG | RESPIRATION RATE: 16 BRPM | TEMPERATURE: 97.9 F

## 2021-05-10 PROCEDURE — 99072 ADDL SUPL MATRL&STAF TM PHE: CPT

## 2021-05-10 PROCEDURE — 99214 OFFICE O/P EST MOD 30 MIN: CPT

## 2021-05-10 NOTE — HISTORY OF PRESENT ILLNESS
[FreeTextEntry1] : 85-year-old male with hypertension, hyperlipidemia, CKD, PVD s/p left SFA stent and Right-sided intervention 01/2021, who presents for follow-up visit. Echocardiogram from 2017 showed EF 65%, grade 1 diastolic dysfunction, and no LVH. \par \par Patient presents for routine check up, he has no specific cardiac complaints. Reports he is able to ascend a flight of stairs without feeling any chest pain, dyspnea, palpitations, lightheadedness, or syncope. Denies LE edema, orthopnea, or PND. Reports BP is well controlled at home. Eats healthy including fruits and vegetables, lean meats including chicken, fish, and minimal red meat. \par \par Patient reports that his symptoms of claudication improved after his most recent intervention, but he now feels as if the symptoms are coming back to both legs when he walks. Patient reports compliance with all medications, denies adverse effects.\par

## 2021-05-10 NOTE — ASSESSMENT
[FreeTextEntry1] : 85-year-old male with hypertension, hyperlipidemia, CKD, PVD s/p left SFA stent, and BPH who presents for FUV; now with some symptoms of claudication again. \par \par 1. HTN: Well controlled on losartan-HCTZ and metoprolol, with EF 65% and no LVH by echo 04/2017. \par \par 2. HLD: Mostly acceptable lipid panel, with mild elevation of triglycerides noted but better than priors. \par -Patient was counseled on lifestyle modifications including recommendations for diet and exercise\par -If no improvement in triglyceride levels, can consider Vascepa in the future (will hold off for now given risk of AFib and only mildly elevated triglycerides) \par \par 3. PVD: Has bilateral claudication after intervention, recommended follow up with vascular to discuss repeat procedure versus medical management, i.e. cilostazol. \par \par FUV 6 months or PRN

## 2021-06-18 ENCOUNTER — APPOINTMENT (OUTPATIENT)
Dept: VASCULAR SURGERY | Facility: CLINIC | Age: 86
End: 2021-06-18
Payer: MEDICARE

## 2021-06-18 VITALS
SYSTOLIC BLOOD PRESSURE: 132 MMHG | DIASTOLIC BLOOD PRESSURE: 67 MMHG | BODY MASS INDEX: 24.73 KG/M2 | HEART RATE: 72 BPM | WEIGHT: 167 LBS | HEIGHT: 69 IN

## 2021-06-18 VITALS — TEMPERATURE: 96.9 F

## 2021-06-18 PROCEDURE — 93925 LOWER EXTREMITY STUDY: CPT

## 2021-06-18 PROCEDURE — 99213 OFFICE O/P EST LOW 20 MIN: CPT

## 2021-06-18 PROCEDURE — 99072 ADDL SUPL MATRL&STAF TM PHE: CPT

## 2021-06-18 NOTE — ASSESSMENT
[FreeTextEntry1] : 84 yo male with history of dm, htn, hld, presents for follow up of pad with claudications s/p bilateral lower extremity sfa stent with new onset of severe disabling claudication of bilateral lower extremity, left worse than right.  Severe stenosis in the IntraStent area.  Plan for left leg angiogram and possible intervention.

## 2021-06-18 NOTE — HISTORY OF PRESENT ILLNESS
[FreeTextEntry1] : 86 yo male with history of dm, htn, hld, presents for follow up of pad with claudications s/p bilateral lower extremity sfa stent with improvement of the symptoms of claudication postoperatively but now with new onset of severe disabling claudication of bilateral lower extremity, left worse than right.

## 2021-06-18 NOTE — PHYSICAL EXAM
[JVD] : no jugular venous distention  [Normal Breath Sounds] : Normal breath sounds [Normal Heart Sounds] : normal heart sounds [0] : right 0 [2+] : left 2+ [Ankle Swelling (On Exam)] : not present [Varicose Veins Of Lower Extremities] : not present [] : not present [No Rash or Lesion] : No rash or lesion [Skin Ulcer] : no ulcer [Alert] : alert [Oriented to Person] : oriented to person [Oriented to Place] : oriented to place [Oriented to Time] : oriented to time [Calm] : calm [de-identified] : appears well

## 2021-06-21 LAB
APTT BLD: 31.9 SEC
INR PPP: 0.95 RATIO
PT BLD: 11.2 SEC

## 2021-07-13 ENCOUNTER — RESULT REVIEW (OUTPATIENT)
Age: 86
End: 2021-07-13

## 2021-07-13 ENCOUNTER — APPOINTMENT (OUTPATIENT)
Dept: ENDOVASCULAR SURGERY | Facility: CLINIC | Age: 86
End: 2021-07-13
Payer: MEDICARE

## 2021-07-13 PROCEDURE — 99072 ADDL SUPL MATRL&STAF TM PHE: CPT

## 2021-07-13 PROCEDURE — 37225Z: CUSTOM | Mod: 82

## 2021-07-13 PROCEDURE — 75625 CONTRAST EXAM ABDOMINL AORTA: CPT

## 2021-07-13 PROCEDURE — 37225Z: CUSTOM

## 2021-07-13 RX ORDER — EZETIMIBE AND SIMVASTATIN 10; 40 MG/1; MG/1
10-40 TABLET ORAL DAILY
Qty: 90 | Refills: 2 | Status: DISCONTINUED | COMMUNITY
Start: 2020-02-10 | End: 2021-07-13

## 2021-07-15 NOTE — HISTORY OF PRESENT ILLNESS
[FreeTextEntry1] : accompanied by daughter Alla Goddard [phone]\par Covid  \par Cr 1.42  6/21/2021\par alert and oriented\par took losartan,metoprolol, plavix and aspirin this morning\par feels ok\par no reported falls [FreeTextEntry6] : Dr. Chow

## 2021-07-15 NOTE — PROCEDURE
[Groin check for bleeding/hematoma, vitals checked, and Doppler/pulse checked on admission, then every 15 minutes for an hour and every 30 minutes for 3 hours thereafter.] : Groin check for bleeding/hematoma, vitals checked, and Doppler/pulse checked on admission, then every 15 minutes for an hour and every 30 minutes for 3 hours thereafter.  [IVF: ____ 0.9 NS] : IVF: [unfilled] 0.9 NS [at ____ ml/hr] : at [unfilled] ml/hr [x ____ h, then d/c.] : x [unfilled] h, then d/c. [Keep flat for 2 hours, then elevate head gradually as tolerated] : Keep flat for 2 hours, then elevate head gradually as tolerated [Bed rest for 3.5 hours, then ambulate and observe site for bleeding] : Bed rest for 3.5 hours, then ambulate and observe site for bleeding [Resume Diet] : resume diet [Asprin 81mg] : Aspirin 81mg [Other: _____] : [unfilled] [D/C IV on discharge] : D/C IV on discharge [Resume diet] : resume diet [FreeTextEntry1] : aortogram, left leg angiogram/Drug eluting balloon angioplasty/athrectomy

## 2021-07-15 NOTE — PHYSICAL EXAM
[Normal Breath Sounds] : Normal breath sounds [Normal Heart Sounds] : normal heart sounds [0] : right 0 [2+] : left 2+ [No Rash or Lesion] : No rash or lesion [Alert] : alert [Oriented to Person] : oriented to person [Oriented to Place] : oriented to place [Oriented to Time] : oriented to time [Calm] : calm [JVD] : no jugular venous distention  [Ankle Swelling (On Exam)] : not present [Varicose Veins Of Lower Extremities] : not present [] : not present [Abdomen Masses] : No abdominal masses [Skin Ulcer] : no ulcer [de-identified] : appears well

## 2021-07-28 ENCOUNTER — APPOINTMENT (OUTPATIENT)
Dept: INTERNAL MEDICINE | Facility: CLINIC | Age: 86
End: 2021-07-28
Payer: MEDICARE

## 2021-07-28 VITALS
RESPIRATION RATE: 16 BRPM | BODY MASS INDEX: 24.73 KG/M2 | OXYGEN SATURATION: 100 % | HEIGHT: 69 IN | HEART RATE: 62 BPM | TEMPERATURE: 97.6 F | WEIGHT: 167 LBS | SYSTOLIC BLOOD PRESSURE: 121 MMHG | DIASTOLIC BLOOD PRESSURE: 53 MMHG

## 2021-07-28 DIAGNOSIS — R21 RASH AND OTHER NONSPECIFIC SKIN ERUPTION: ICD-10-CM

## 2021-07-28 PROCEDURE — 99072 ADDL SUPL MATRL&STAF TM PHE: CPT

## 2021-07-28 PROCEDURE — 99213 OFFICE O/P EST LOW 20 MIN: CPT

## 2021-07-28 NOTE — PHYSICAL EXAM
[No Acute Distress] : no acute distress [Well Nourished] : well nourished [Well Developed] : well developed [Well-Appearing] : well-appearing [Normal Sclera/Conjunctiva] : normal sclera/conjunctiva [PERRL] : pupils equal round and reactive to light [EOMI] : extraocular movements intact [Normal Outer Ear/Nose] : the outer ears and nose were normal in appearance [Normal Oropharynx] : the oropharynx was normal [No JVD] : no jugular venous distention [Supple] : supple [No Lymphadenopathy] : no lymphadenopathy [Thyroid Normal, No Nodules] : the thyroid was normal and there were no nodules present [Clear to Auscultation] : lungs were clear to auscultation bilaterally [Normal Rate] : normal rate  [Regular Rhythm] : with a regular rhythm [Normal S1, S2] : normal S1 and S2 [No Murmur] : no murmur heard [No Edema] : there was no peripheral edema [Soft] : abdomen soft [Non-distended] : non-distended [No Masses] : no abdominal mass palpated [No HSM] : no HSM [Normal Bowel Sounds] : normal bowel sounds [No Hernias] : no hernias [Declined Rectal Exam] : declined rectal exam [Normal Supraclavicular Nodes] : no supraclavicular lymphadenopathy [No CVA Tenderness] : no CVA  tenderness [No Spinal Tenderness] : no spinal tenderness [No Joint Swelling] : no joint swelling [Grossly Normal Strength/Tone] : grossly normal strength/tone [No Rash] : no rash [Normal Gait] : normal gait [Coordination Grossly Intact] : coordination grossly intact [No Focal Deficits] : no focal deficits [Normal Affect] : the affect was normal [Normal Insight/Judgement] : insight and judgment were intact [Right Foot Was Examined] : Right foot ~C was examined [Left Foot Was Examined] : left foot ~C was examined [] : both feet [de-identified] : faint R.carotid bruit.pedal pulses not appreciated [de-identified] : mild tenderness,L.inguinal area. [TWNoteComboBox3] : 0 [TWNoteComboBox4] : 0

## 2021-07-28 NOTE — ASSESSMENT
[FreeTextEntry1] : 85 yo post  vascular procedure/L.LE angiogram,poss stent placement,improved.\par awaiting the same for R.LE\par h/o T2D with D.nephropathy,mild CRI,renal disease mild anemia.on reduced dose of Metformin will watch  GFR.\par -Htn,meds adjusted.\par -TG high,low HDL,labs to follow.,diet explained.\par -Thrombocytopenia,mild,on observation

## 2021-07-28 NOTE — HISTORY OF PRESENT ILLNESS
[FreeTextEntry1] : f/u\par claudication [de-identified] : 85 yo with h/o PAD,st.p. L.LE endovascular surgery ~3 weeks ago,reports improvement in walking ability few blocks.\par awaiting the same procedure on the R.LE.\par h/o long standing PAD,on Plavix.\par PMH of -T2D,last A1C 7,7%,denies hypoglycemia Sx's,denies tingling,numbness in his feet.\par -mild CRI BUN/Cr 29/1,47,GFR 48,metformin was adjusted to lower dose of 1 g/d instead of 2 g.will f/u on renal function today.\par -h/o HTN,well controlled on ARB,BB.denies HA's,amaurosis,dizziness,syncope,palpitations.\par -HLD,low HDL,on Vytorin\par -h/o long standing PAD,st.p.recent successful L.SFA atherectomy,stent placement with marked clinical improvement,on Plavix.\par pt is awaiting the same procedure on his R.leg.\par -h/o Thrombocytopenia,on observation,last count 117,0.no Sx's of bleeding tendency.\par -h/o BPH,on Flomax.

## 2021-07-31 ENCOUNTER — NON-APPOINTMENT (OUTPATIENT)
Age: 86
End: 2021-07-31

## 2021-07-31 LAB
ANION GAP SERPL CALC-SCNC: 14 MMOL/L
BASOPHILS # BLD AUTO: 0.04 K/UL
BASOPHILS NFR BLD AUTO: 0.7 %
BUN SERPL-MCNC: 21 MG/DL
CALCIUM SERPL-MCNC: 9.5 MG/DL
CHLORIDE SERPL-SCNC: 99 MMOL/L
CO2 SERPL-SCNC: 26 MMOL/L
CREAT SERPL-MCNC: 1.36 MG/DL
EOSINOPHIL # BLD AUTO: 0.28 K/UL
EOSINOPHIL NFR BLD AUTO: 4.6 %
ESTIMATED AVERAGE GLUCOSE: 151 MG/DL
GLUCOSE SERPL-MCNC: 172 MG/DL
HBA1C MFR BLD HPLC: 6.9 %
HCT VFR BLD CALC: 36.8 %
HGB BLD-MCNC: 11.8 G/DL
IMM GRANULOCYTES NFR BLD AUTO: 0.5 %
LYMPHOCYTES # BLD AUTO: 1.02 K/UL
LYMPHOCYTES NFR BLD AUTO: 16.6 %
MAN DIFF?: NORMAL
MCHC RBC-ENTMCNC: 29.9 PG
MCHC RBC-ENTMCNC: 32.1 GM/DL
MCV RBC AUTO: 93.2 FL
MONOCYTES # BLD AUTO: 0.51 K/UL
MONOCYTES NFR BLD AUTO: 8.3 %
NEUTROPHILS # BLD AUTO: 4.27 K/UL
NEUTROPHILS NFR BLD AUTO: 69.3 %
PLATELET # BLD AUTO: 114 K/UL
POTASSIUM SERPL-SCNC: 4.9 MMOL/L
RBC # BLD: 3.95 M/UL
RBC # FLD: 13.9 %
SODIUM SERPL-SCNC: 139 MMOL/L
WBC # FLD AUTO: 6.15 K/UL

## 2021-08-02 ENCOUNTER — APPOINTMENT (OUTPATIENT)
Dept: VASCULAR SURGERY | Facility: CLINIC | Age: 86
End: 2021-08-02

## 2021-08-02 LAB
ALBUMIN SERPL ELPH-MCNC: 4.2 G/DL
ALP BLD-CCNC: 46 U/L
ALT SERPL-CCNC: 25 U/L
ANION GAP SERPL CALC-SCNC: 16 MMOL/L
AST SERPL-CCNC: 33 U/L
B BURGDOR AB SER-IMP: NEGATIVE
B BURGDOR IGM PATRN SER IB-IMP: NEGATIVE
B BURGDOR18KD IGG SER QL IB: NORMAL
B BURGDOR23KD IGG SER QL IB: NORMAL
B BURGDOR23KD IGM SER QL IB: NORMAL
B BURGDOR28KD IGG SER QL IB: NORMAL
B BURGDOR30KD IGG SER QL IB: NORMAL
B BURGDOR31KD IGG SER QL IB: NORMAL
B BURGDOR39KD IGG SER QL IB: NORMAL
B BURGDOR39KD IGM SER QL IB: NORMAL
B BURGDOR41KD IGG SER QL IB: PRESENT
B BURGDOR41KD IGM SER QL IB: NORMAL
B BURGDOR45KD IGG SER QL IB: NORMAL
B BURGDOR58KD IGG SER QL IB: NORMAL
B BURGDOR66KD IGG SER QL IB: NORMAL
B BURGDOR93KD IGG SER QL IB: NORMAL
BILIRUB SERPL-MCNC: 0.3 MG/DL
BUN SERPL-MCNC: 20 MG/DL
CALCIUM SERPL-MCNC: 9.9 MG/DL
CHLORIDE SERPL-SCNC: 101 MMOL/L
CO2 SERPL-SCNC: 24 MMOL/L
CREAT SERPL-MCNC: 1.42 MG/DL
GLUCOSE SERPL-MCNC: 218 MG/DL
POTASSIUM SERPL-SCNC: 4.7 MMOL/L
PROT SERPL-MCNC: 6.5 G/DL
SODIUM SERPL-SCNC: 141 MMOL/L

## 2021-08-06 ENCOUNTER — APPOINTMENT (OUTPATIENT)
Dept: VASCULAR SURGERY | Facility: CLINIC | Age: 86
End: 2021-08-06
Payer: MEDICARE

## 2021-08-06 PROCEDURE — 93926 LOWER EXTREMITY STUDY: CPT

## 2021-08-06 PROCEDURE — 99213 OFFICE O/P EST LOW 20 MIN: CPT

## 2021-08-06 NOTE — PHYSICAL EXAM
[JVD] : no jugular venous distention  [Normal Breath Sounds] : Normal breath sounds [Normal Heart Sounds] : normal heart sounds [0] : right 0 [2+] : left 2+ [Ankle Swelling (On Exam)] : not present [Varicose Veins Of Lower Extremities] : not present [] : not present [No Rash or Lesion] : No rash or lesion [Skin Ulcer] : no ulcer [Alert] : alert [Oriented to Person] : oriented to person [Oriented to Place] : oriented to place [Oriented to Time] : oriented to time [Calm] : calm [de-identified] : appears well

## 2021-08-06 NOTE — ASSESSMENT
[FreeTextEntry1] : Patient with severe peripheral vascular disease.  Patient with severe right lower extremity SFA stenosis and disabling claudication.  Plan for right leg angiogram and possible reintervention.

## 2021-08-06 NOTE — HISTORY OF PRESENT ILLNESS
[FreeTextEntry1] : Patient with severe peripheral vascular disease, status post bilateral lower extremity SFA stent placement with recurrent stenosis bilaterally who recently underwent reintervention of the left lower extremity with excellent results.  Patient reports no significant claudication of the left leg but reports less than half a block claudication of right lower extremity.  No rest pain or tissue loss.

## 2021-08-07 LAB
ALBUMIN SERPL ELPH-MCNC: 4.4 G/DL
ALP BLD-CCNC: 55 U/L
ALT SERPL-CCNC: 14 U/L
ANION GAP SERPL CALC-SCNC: 13 MMOL/L
APTT BLD: 80.3 SEC
AST SERPL-CCNC: 22 U/L
BASOPHILS # BLD AUTO: 0.07 K/UL
BASOPHILS NFR BLD AUTO: 1.1 %
BILIRUB SERPL-MCNC: 0.3 MG/DL
BUN SERPL-MCNC: 26 MG/DL
CALCIUM SERPL-MCNC: 9.7 MG/DL
CHLORIDE SERPL-SCNC: 104 MMOL/L
CO2 SERPL-SCNC: 24 MMOL/L
CREAT SERPL-MCNC: 1.37 MG/DL
EOSINOPHIL # BLD AUTO: 0.3 K/UL
EOSINOPHIL NFR BLD AUTO: 4.9 %
GLUCOSE SERPL-MCNC: 101 MG/DL
HCT VFR BLD CALC: 38.9 %
HGB BLD-MCNC: 12.4 G/DL
IMM GRANULOCYTES NFR BLD AUTO: 0.2 %
INR PPP: 1.83 RATIO
LYMPHOCYTES # BLD AUTO: 1.55 K/UL
LYMPHOCYTES NFR BLD AUTO: 25.2 %
MAN DIFF?: NORMAL
MCHC RBC-ENTMCNC: 29.5 PG
MCHC RBC-ENTMCNC: 31.9 GM/DL
MCV RBC AUTO: 92.6 FL
MONOCYTES # BLD AUTO: 0.52 K/UL
MONOCYTES NFR BLD AUTO: 8.5 %
NEUTROPHILS # BLD AUTO: 3.69 K/UL
NEUTROPHILS NFR BLD AUTO: 60.1 %
PLATELET # BLD AUTO: 129 K/UL
POTASSIUM SERPL-SCNC: 4.3 MMOL/L
PROT SERPL-MCNC: 6.9 G/DL
PT BLD: 21 SEC
RBC # BLD: 4.2 M/UL
RBC # FLD: 13.7 %
SODIUM SERPL-SCNC: 141 MMOL/L
WBC # FLD AUTO: 6.14 K/UL

## 2021-08-17 ENCOUNTER — APPOINTMENT (OUTPATIENT)
Dept: ENDOVASCULAR SURGERY | Facility: CLINIC | Age: 86
End: 2021-08-17
Payer: MEDICARE

## 2021-08-17 ENCOUNTER — LABORATORY RESULT (OUTPATIENT)
Age: 86
End: 2021-08-17

## 2021-08-17 ENCOUNTER — RESULT REVIEW (OUTPATIENT)
Age: 86
End: 2021-08-17

## 2021-08-17 ENCOUNTER — NON-APPOINTMENT (OUTPATIENT)
Age: 86
End: 2021-08-17

## 2021-08-17 VITALS
HEART RATE: 64 BPM | RESPIRATION RATE: 16 BRPM | OXYGEN SATURATION: 96 % | SYSTOLIC BLOOD PRESSURE: 130 MMHG | HEIGHT: 69 IN | WEIGHT: 167 LBS | DIASTOLIC BLOOD PRESSURE: 68 MMHG | TEMPERATURE: 97.3 F | BODY MASS INDEX: 24.73 KG/M2

## 2021-08-17 PROCEDURE — 37225Z: CUSTOM | Mod: 82,RT

## 2021-08-17 PROCEDURE — 37225Z: CUSTOM | Mod: RT

## 2021-09-01 NOTE — HISTORY OF PRESENT ILLNESS
[FreeTextEntry1] : accompanied by son Antonio [phone]\par Covid  not detected \par Cr 1.37 8/6/2021\par alert and oriented\par took plavix, aspirin and BP meds this morning\par last Eliquis yesterday morning \par feels ok\par no reported falls [FreeTextEntry5] : 6pm 8/16/2021 [FreeTextEntry6] : Dr. Chow

## 2021-09-01 NOTE — PROCEDURE
[Groin check for bleeding/hematoma, vitals checked, and Doppler/pulse checked on admission, then every 15 minutes for an hour and every 30 minutes for 3 hours thereafter.] : Groin check for bleeding/hematoma, vitals checked, and Doppler/pulse checked on admission, then every 15 minutes for an hour and every 30 minutes for 3 hours thereafter.  [IVF: ____ 0.9 NS] : IVF: [unfilled] 0.9 NS [at ____ ml/hr] : at [unfilled] ml/hr [x ____ h, then d/c.] : x [unfilled] h, then d/c. [Keep flat for 2 hours, then elevate head gradually as tolerated] : Keep flat for 2 hours, then elevate head gradually as tolerated [Bed rest for 3.5 hours, then ambulate and observe site for bleeding] : Bed rest for 3.5 hours, then ambulate and observe site for bleeding [Resume Diet] : resume diet [Asprin 81mg] : Aspirin 81mg [Other: _____] : [unfilled] [D/C IV on discharge] : D/C IV on discharge [Resume diet] : resume diet [FreeTextEntry1] : aortogram, right  leg angiogram/athrectomy/angioplasty

## 2021-09-01 NOTE — PHYSICAL EXAM
[Normal Breath Sounds] : Normal breath sounds [Normal Heart Sounds] : normal heart sounds [0] : right 0 [2+] : left 2+ [No Rash or Lesion] : No rash or lesion [Alert] : alert [Oriented to Person] : oriented to person [Oriented to Place] : oriented to place [Oriented to Time] : oriented to time [Calm] : calm [JVD] : no jugular venous distention  [Ankle Swelling (On Exam)] : not present [Varicose Veins Of Lower Extremities] : not present [] : not present [Abdomen Masses] : No abdominal masses [Skin Ulcer] : no ulcer [de-identified] : appears well

## 2021-09-01 NOTE — REASON FOR VISIT
Pt ABCs intact and A&Ox4. Dressed at bedside. Discharge instructions completed and prescriptions provided.   [Other ___] : a [unfilled] visit for [FreeTextEntry2] : PAD/right leg claudication and SFA  stenosis

## 2021-09-03 ENCOUNTER — APPOINTMENT (OUTPATIENT)
Dept: VASCULAR SURGERY | Facility: CLINIC | Age: 86
End: 2021-09-03
Payer: MEDICARE

## 2021-09-03 VITALS — TEMPERATURE: 96.4 F

## 2021-09-03 VITALS
DIASTOLIC BLOOD PRESSURE: 57 MMHG | BODY MASS INDEX: 24.07 KG/M2 | WEIGHT: 163 LBS | HEART RATE: 61 BPM | SYSTOLIC BLOOD PRESSURE: 132 MMHG

## 2021-09-03 PROCEDURE — 93925 LOWER EXTREMITY STUDY: CPT

## 2021-09-03 PROCEDURE — 99213 OFFICE O/P EST LOW 20 MIN: CPT

## 2021-09-03 NOTE — PHYSICAL EXAM
[2+] : left 2+ [No Rash or Lesion] : No rash or lesion [Alert] : alert [Calm] : calm [JVD] : no jugular venous distention  [Ankle Swelling (On Exam)] : not present [Varicose Veins Of Lower Extremities] : not present [] : not present [Skin Ulcer] : no ulcer [de-identified] : appears well

## 2021-09-03 NOTE — HISTORY OF PRESENT ILLNESS
[FreeTextEntry1] : 87 yo male with history of dm, htn, hld, presents for follow up of pad with claudications s/p  b/l sfa stents and recent right lower extremity angiogram with atherectomy and angioplasty of instent stenosis.  pt states that pain is better but unsure of how long it is going to last given the history of restenosis in the past.  pt states that he is unsure of the medications that he is taking, he is taking baby asa but unsure of any other anticoagulants or antiplatlet medication \par pt denies any history of rest pain

## 2021-09-03 NOTE — ASSESSMENT
[FreeTextEntry1] : 85 yo male with history of dm, htn, hld, presents for follow up of pad with claudications s/p  b/l sfa stents and recent right lower extremity angiogram with atherectomy and angioplasty of instent stenosis. \par \par duplex shows no evidence of psa and patent right sfa stent \par \par repeat b/l sfa stent duplex in 3 months \par pt advised to bring medication list at his next visit and to continue with asa 81 mg

## 2021-09-10 ENCOUNTER — APPOINTMENT (OUTPATIENT)
Dept: UROLOGY | Facility: CLINIC | Age: 86
End: 2021-09-10
Payer: MEDICARE

## 2021-09-10 PROCEDURE — 99214 OFFICE O/P EST MOD 30 MIN: CPT

## 2021-09-10 NOTE — ASSESSMENT
[FreeTextEntry1] : Very pleasant 86-year-old gentleman who presents for follow-up of BPH with urinary obstruction, erectile dysfunction\par -Continue tamsulosin–refill sent to the pharmacy\par -Continue finasteride–refill sent to the pharmacy\par -We discussed options for management of erectile dysfunction, including PDE 5 inhibitors, PRAVEEN, ICI, intraurethral alprostadil, penile prosthesis.  We discussed that he is not a candidate because of blood thinners for PRAVEEN or ICI.  He is not interested in a penile prosthesis at this time\par -Follow-up in 6 months or sooner if necessary

## 2021-09-10 NOTE — HISTORY OF PRESENT ILLNESS
[FreeTextEntry1] : Very pleasant 86-year-old gentleman presents for follow-up of BPH with urinary obstruction and erectile dysfunction.  He feels well.  He reports that tamsulosin and finasteride significantly improved his urinary symptoms.  He is very happy with his symptoms on these medications.  He continues to report erectile dysfunction.  He tried Trimix in the past which did not improve his erections significantly.  He also tried multiple PDE 5 inhibitors in the past which did not significantly improve his erections.

## 2021-11-02 ENCOUNTER — APPOINTMENT (OUTPATIENT)
Dept: INTERNAL MEDICINE | Facility: CLINIC | Age: 86
End: 2021-11-02
Payer: MEDICARE

## 2021-11-02 VITALS
TEMPERATURE: 97.3 F | BODY MASS INDEX: 23.99 KG/M2 | HEART RATE: 67 BPM | OXYGEN SATURATION: 100 % | WEIGHT: 162 LBS | SYSTOLIC BLOOD PRESSURE: 155 MMHG | DIASTOLIC BLOOD PRESSURE: 63 MMHG | HEIGHT: 69 IN | RESPIRATION RATE: 16 BRPM

## 2021-11-02 PROCEDURE — 99213 OFFICE O/P EST LOW 20 MIN: CPT

## 2021-11-02 NOTE — COUNSELING
[Fall prevention counseling provided] : Fall prevention counseling provided [FreeTextEntry2] : ADA,low salt,low chol. [de-identified] : healthy eating,walking [None] : None [Good understanding] : Patient has a good understanding of lifestyle changes and steps needed to achieve self management goal

## 2021-11-02 NOTE — PHYSICAL EXAM
[No Acute Distress] : no acute distress [Well Nourished] : well nourished [Well Developed] : well developed [Well-Appearing] : well-appearing [Normal Sclera/Conjunctiva] : normal sclera/conjunctiva [PERRL] : pupils equal round and reactive to light [EOMI] : extraocular movements intact [Normal Outer Ear/Nose] : the outer ears and nose were normal in appearance [Normal Oropharynx] : the oropharynx was normal [No JVD] : no jugular venous distention [Supple] : supple [No Lymphadenopathy] : no lymphadenopathy [Thyroid Normal, No Nodules] : the thyroid was normal and there were no nodules present [Clear to Auscultation] : lungs were clear to auscultation bilaterally [Normal Rate] : normal rate  [Regular Rhythm] : with a regular rhythm [Normal S1, S2] : normal S1 and S2 [No Murmur] : no murmur heard [No Edema] : there was no peripheral edema [Soft] : abdomen soft [Non-distended] : non-distended [No Masses] : no abdominal mass palpated [No HSM] : no HSM [Normal Bowel Sounds] : normal bowel sounds [No Hernias] : no hernias [Declined Rectal Exam] : declined rectal exam [Normal Supraclavicular Nodes] : no supraclavicular lymphadenopathy [No CVA Tenderness] : no CVA  tenderness [No Spinal Tenderness] : no spinal tenderness [No Joint Swelling] : no joint swelling [Grossly Normal Strength/Tone] : grossly normal strength/tone [No Rash] : no rash [Normal Gait] : normal gait [Coordination Grossly Intact] : coordination grossly intact [No Focal Deficits] : no focal deficits [Normal Affect] : the affect was normal [Normal Insight/Judgement] : insight and judgment were intact [Right Foot Was Examined] : Right foot ~C was examined [Left Foot Was Examined] : left foot ~C was examined [] : both feet [de-identified] : faint R.carotid bruit.pedal pulses not appreciated [de-identified] : mild tenderness,L.inguinal area. [TWNoteComboBox3] : 0 [TWNoteComboBox4] : 0

## 2021-11-02 NOTE — HISTORY OF PRESENT ILLNESS
[FreeTextEntry1] : Follow up.  [de-identified] : 85 yo with h/o PAD,st.p. L.RE endovascular atherectomy and  angioplasty of in stent stenosis~3 months ago,reports improvement in walking ability few blocks.\par h/o long standing PAD,on ASA 81 mg and Eliquis 5 mg bid.\par PMH of -T2D,last A1C 6,9%,denies hypoglycemia Sx's,denies tingling,numbness in his feet.\par -mild CRI BUN/Cr 26/1,37,GFR 47, 3 months ago.metformin was adjusted to lower dose of 1 g/d instead of 2 g.will f/u on renal function today.\par -h/o HTN,usually well controlled on ARB,BB.but today pt did not take his meds\par denies HA's,amaurosis,dizziness,syncope,palpitations.\par -HLD,low HDL,on Vytorin\par -h/o Thrombocytopenia,on observation,last count 129,0.no Sx's of bleeding tendency.\par -h/o BPH,on Flomax.

## 2021-11-02 NOTE — ASSESSMENT
[FreeTextEntry1] : 85 yo post  vascular procedure/L.LE angiogram,poss stent placement,improved.\par awaiting the same for R.LE\par h/o T2D with D.nephropathy,mild CRI,renal disease mild anemia.on reduced dose of Metformin will watch  GFR.\par -PAD,st.p.recent revascularization os R.SFA,pt reports improved ambulation. continue ASA,Eliquis.\par -Htn,meds adjusted.\par -TG high,low HDL,labs to follow.,diet explained.\par -Thrombocytopenia,mild,on observation\par - BP elevated systolic 155/ today - Pt states he hasn't taken the medications today but states good compliance overall. Enforced compliance with medication for better BP control. \par - Routine labs ordered. \par - Pt received the flu vaccine last week at the pharmacy. \par

## 2021-11-02 NOTE — REVIEW OF SYSTEMS
[Fever] : no fever [Chills] : no chills [Fatigue] : no fatigue [Night Sweats] : no night sweats [Recent Change In Weight] : ~T no recent weight change [Discharge] : no discharge [Pain] : no pain [Redness] : no redness [Vision Problems] : no vision problems [Earache] : no earache [Hearing Loss] : no hearing loss [Nosebleeds] : no nosebleeds [Nasal Discharge] : no nasal discharge [Sore Throat] : no sore throat [Chest Pain] : no chest pain [Palpitations] : no palpitations [Claudication] : no  leg claudication [Orthopena] : no orthopnea [Paroxysmal Nocturnal Dyspnea] : no paroxysmal nocturnal dyspnea [Shortness Of Breath] : no shortness of breath [Wheezing] : no wheezing [Cough] : no cough [Abdominal Pain] : no abdominal pain [Nausea] : no nausea [Constipation] : no constipation [Vomiting] : no vomiting [Heartburn] : no heartburn [Dysuria] : no dysuria [Incontinence] : no incontinence [Hematuria] : no hematuria [Frequency] : no frequency [Joint Pain] : no joint pain [Joint Stiffness] : no joint stiffness [Back Pain] : no back pain [Itching] : no itching [Mole Changes] : no mole changes [Skin Rash] : no skin rash [Headache] : no headache [Dizziness] : no dizziness [Unsteady Walk] : no ataxia [Memory Loss] : no memory loss [Suicidal] : not suicidal

## 2021-11-03 ENCOUNTER — NON-APPOINTMENT (OUTPATIENT)
Age: 86
End: 2021-11-03

## 2021-11-03 LAB
25(OH)D3 SERPL-MCNC: 55.7 NG/ML
ALBUMIN SERPL ELPH-MCNC: 4.4 G/DL
ALP BLD-CCNC: 58 U/L
ALT SERPL-CCNC: 16 U/L
ANION GAP SERPL CALC-SCNC: 17 MMOL/L
APPEARANCE: CLEAR
AST SERPL-CCNC: 24 U/L
BASOPHILS # BLD AUTO: 0.03 K/UL
BASOPHILS NFR BLD AUTO: 0.6 %
BILIRUB SERPL-MCNC: 0.4 MG/DL
BILIRUBIN URINE: NEGATIVE
BLOOD URINE: NEGATIVE
BUN SERPL-MCNC: 19 MG/DL
CALCIUM SERPL-MCNC: 9.5 MG/DL
CHLORIDE SERPL-SCNC: 100 MMOL/L
CHOLEST SERPL-MCNC: 123 MG/DL
CO2 SERPL-SCNC: 24 MMOL/L
COLOR: YELLOW
CREAT SERPL-MCNC: 1.24 MG/DL
EOSINOPHIL # BLD AUTO: 0.17 K/UL
EOSINOPHIL NFR BLD AUTO: 3.2 %
ESTIMATED AVERAGE GLUCOSE: 154 MG/DL
GLUCOSE QUALITATIVE U: NEGATIVE
GLUCOSE SERPL-MCNC: 201 MG/DL
HBA1C MFR BLD HPLC: 7 %
HCT VFR BLD CALC: 40.4 %
HDLC SERPL-MCNC: 41 MG/DL
HGB BLD-MCNC: 12.6 G/DL
IMM GRANULOCYTES NFR BLD AUTO: 0.2 %
KETONES URINE: NEGATIVE
LDLC SERPL CALC-MCNC: 45 MG/DL
LEUKOCYTE ESTERASE URINE: NEGATIVE
LYMPHOCYTES # BLD AUTO: 1.1 K/UL
LYMPHOCYTES NFR BLD AUTO: 20.9 %
MAN DIFF?: NORMAL
MCHC RBC-ENTMCNC: 29.6 PG
MCHC RBC-ENTMCNC: 31.2 GM/DL
MCV RBC AUTO: 95.1 FL
MONOCYTES # BLD AUTO: 0.33 K/UL
MONOCYTES NFR BLD AUTO: 6.3 %
NEUTROPHILS # BLD AUTO: 3.63 K/UL
NEUTROPHILS NFR BLD AUTO: 68.8 %
NITRITE URINE: NEGATIVE
NONHDLC SERPL-MCNC: 82 MG/DL
PH URINE: 7
PLATELET # BLD AUTO: 138 K/UL
POTASSIUM SERPL-SCNC: 4.5 MMOL/L
PROT SERPL-MCNC: 6.6 G/DL
PROTEIN URINE: NEGATIVE
RBC # BLD: 4.25 M/UL
RBC # FLD: 15 %
SODIUM SERPL-SCNC: 140 MMOL/L
SPECIFIC GRAVITY URINE: 1.01
TRIGL SERPL-MCNC: 189 MG/DL
UROBILINOGEN URINE: NORMAL
WBC # FLD AUTO: 5.27 K/UL

## 2021-12-10 ENCOUNTER — APPOINTMENT (OUTPATIENT)
Dept: VASCULAR SURGERY | Facility: CLINIC | Age: 86
End: 2021-12-10
Payer: MEDICARE

## 2021-12-10 VITALS
DIASTOLIC BLOOD PRESSURE: 85 MMHG | HEART RATE: 56 BPM | HEIGHT: 69 IN | BODY MASS INDEX: 23.99 KG/M2 | SYSTOLIC BLOOD PRESSURE: 135 MMHG | WEIGHT: 162 LBS

## 2021-12-10 PROCEDURE — 99212 OFFICE O/P EST SF 10 MIN: CPT

## 2021-12-10 PROCEDURE — 93925 LOWER EXTREMITY STUDY: CPT

## 2021-12-10 NOTE — PHYSICAL EXAM
[JVD] : no jugular venous distention  [2+] : left 2+ [Ankle Swelling (On Exam)] : not present [Varicose Veins Of Lower Extremities] : not present [] : not present [No Rash or Lesion] : No rash or lesion [Skin Ulcer] : no ulcer [Alert] : alert [Calm] : calm [de-identified] : appears well

## 2021-12-10 NOTE — ASSESSMENT
[FreeTextEntry1] : 85 yo male with history of dm, htn, hld, presents for follow up of pad with claudications s/p  b/l sfa stents and recent right lower extremity angiogram with atherectomy and angioplasty of instent stenosis. \par \par duplex shows patent sfa stents bilaterally with 75% stenosis of the proximal native sfa and 50% stenosis of the native distal sfa on the right lower extremity and 50-75% stenosis of the native left pop \par \par pt to continue with plavix and given no symptoms will continue to monitor \par pt to follow up in 3 months with repeat duplex

## 2021-12-10 NOTE — HISTORY OF PRESENT ILLNESS
[FreeTextEntry1] : 87 yo male with history of dm, htn, hld, presents for follow up of pad with claudications s/p  b/l sfa stents and right lower extremity angiogram with atherectomy and angioplasty of instent stenosis.  pt denies any pain or lower extremity wounds \par

## 2022-02-11 ENCOUNTER — APPOINTMENT (OUTPATIENT)
Dept: VASCULAR SURGERY | Facility: CLINIC | Age: 87
End: 2022-02-11
Payer: COMMERCIAL

## 2022-02-11 VITALS
WEIGHT: 162 LBS | BODY MASS INDEX: 23.99 KG/M2 | SYSTOLIC BLOOD PRESSURE: 175 MMHG | HEART RATE: 62 BPM | HEIGHT: 69 IN | DIASTOLIC BLOOD PRESSURE: 75 MMHG

## 2022-02-11 PROCEDURE — 99213 OFFICE O/P EST LOW 20 MIN: CPT

## 2022-02-11 PROCEDURE — 93925 LOWER EXTREMITY STUDY: CPT

## 2022-02-11 NOTE — ASSESSMENT
[FreeTextEntry1] : 87 yo male with history of dm, htn, hld, presents for follow up of pad with claudications s/p  b/l sfa stents and recent right lower extremity angiogram with atherectomy and angioplasty of instent stenosis. \par \par \par pt to continue with plavix and given no symptoms will continue to monitor \par pt to follow up in 3 months with repeat duplex

## 2022-02-11 NOTE — PHYSICAL EXAM
[JVD] : no jugular venous distention  [2+] : left 2+ [Ankle Swelling (On Exam)] : not present [Varicose Veins Of Lower Extremities] : not present [] : not present [No Rash or Lesion] : No rash or lesion [Skin Ulcer] : no ulcer [Alert] : alert [Calm] : calm [de-identified] : appears well

## 2022-03-11 ENCOUNTER — APPOINTMENT (OUTPATIENT)
Dept: UROLOGY | Facility: CLINIC | Age: 87
End: 2022-03-11
Payer: MEDICARE

## 2022-03-11 PROCEDURE — 99213 OFFICE O/P EST LOW 20 MIN: CPT

## 2022-03-11 RX ORDER — CEPHALEXIN 500 MG/1
500 CAPSULE ORAL
Qty: 14 | Refills: 0 | Status: DISCONTINUED | COMMUNITY
Start: 2021-02-15 | End: 2022-03-11

## 2022-03-11 RX ORDER — IBUPROFEN 800 MG/1
800 TABLET, FILM COATED ORAL
Qty: 21 | Refills: 0 | Status: DISCONTINUED | COMMUNITY
Start: 2020-10-27 | End: 2022-03-11

## 2022-03-11 RX ORDER — AZITHROMYCIN 250 MG/1
250 TABLET, FILM COATED ORAL
Qty: 6 | Refills: 0 | Status: DISCONTINUED | COMMUNITY
Start: 2021-07-28 | End: 2022-03-11

## 2022-03-11 NOTE — ASSESSMENT
[FreeTextEntry1] : Very pleasant 86-year-old gentleman who presents for follow-up of BPH with urinary obstruction, erectile dysfunction\par -Continue tamsulosin–refill sent to the pharmacy\par -Continue finasteride–refill sent to the pharmacy\par -CT reviewed demonstrating a exophytic cyst measuring 3.3 cm \par -Follow-up in 6 months or sooner if necessary.

## 2022-03-11 NOTE — HISTORY OF PRESENT ILLNESS
[FreeTextEntry1] : Very pleasant 86-year-old gentleman presents for follow-up of BPH with urinary obstruction and erectile dysfunction. He feels well. He reports that tamsulosin and finasteride significantly improved his urinary symptoms. He is very happy with his symptoms on these medications.\par Had CT scan done on 12/29/21 which demonstrated a right exophytic 3.3 cm renal cyst. \par \par

## 2022-05-03 ENCOUNTER — APPOINTMENT (OUTPATIENT)
Dept: INTERNAL MEDICINE | Facility: CLINIC | Age: 87
End: 2022-05-03
Payer: MEDICARE

## 2022-05-03 VITALS
DIASTOLIC BLOOD PRESSURE: 61 MMHG | RESPIRATION RATE: 16 BRPM | BODY MASS INDEX: 23.85 KG/M2 | TEMPERATURE: 97.8 F | SYSTOLIC BLOOD PRESSURE: 132 MMHG | OXYGEN SATURATION: 99 % | HEIGHT: 69 IN | WEIGHT: 161 LBS | HEART RATE: 75 BPM

## 2022-05-03 PROCEDURE — 99213 OFFICE O/P EST LOW 20 MIN: CPT

## 2022-05-03 NOTE — PHYSICAL EXAM
[No Acute Distress] : no acute distress [Well Nourished] : well nourished [Well Developed] : well developed [Well-Appearing] : well-appearing [Normal Sclera/Conjunctiva] : normal sclera/conjunctiva [PERRL] : pupils equal round and reactive to light [EOMI] : extraocular movements intact [Normal Outer Ear/Nose] : the outer ears and nose were normal in appearance [Normal Oropharynx] : the oropharynx was normal [No JVD] : no jugular venous distention [Supple] : supple [No Lymphadenopathy] : no lymphadenopathy [Thyroid Normal, No Nodules] : the thyroid was normal and there were no nodules present [Clear to Auscultation] : lungs were clear to auscultation bilaterally [Normal Rate] : normal rate  [Regular Rhythm] : with a regular rhythm [Normal S1, S2] : normal S1 and S2 [No Murmur] : no murmur heard [No Edema] : there was no peripheral edema [Soft] : abdomen soft [Non-distended] : non-distended [No Masses] : no abdominal mass palpated [No HSM] : no HSM [Normal Bowel Sounds] : normal bowel sounds [No Hernias] : no hernias [Declined Rectal Exam] : declined rectal exam [Normal Supraclavicular Nodes] : no supraclavicular lymphadenopathy [No CVA Tenderness] : no CVA  tenderness [No Spinal Tenderness] : no spinal tenderness [No Joint Swelling] : no joint swelling [Grossly Normal Strength/Tone] : grossly normal strength/tone [No Rash] : no rash [Normal Gait] : normal gait [Coordination Grossly Intact] : coordination grossly intact [No Focal Deficits] : no focal deficits [Normal Affect] : the affect was normal [Normal Insight/Judgement] : insight and judgment were intact [Right Foot Was Examined] : Right foot ~C was examined [Left Foot Was Examined] : left foot ~C was examined [] : both feet [de-identified] : faint R.carotid bruit.pedal pulses not appreciated [de-identified] : mild tenderness,L.inguinal area. [TWNoteComboBox3] : 0 [TWNoteComboBox4] : 0

## 2022-05-03 NOTE — HISTORY OF PRESENT ILLNESS
[FreeTextEntry1] : knee pain [de-identified] : 87 yo with h/o PAD,reports improvement in walking ability few blocks.\par h/o long standing PAD,on ASA 81 mg and Eliquis 5 mg bid.st.p.successful revascularization procedures\par PMH of -T2D,last A1C 7%,denies hypoglycemia Sx's,denies tingling,numbness in his feet.\par -mild CRI BUN/Cr 19/1,24,GFR 53,6 months ago\par -h/o HTN\par denies HA's,amaurosis,dizziness,syncope,palpitations.\par -HLD,low HDL,on Vytorin\par -h/o Thrombocytopenia,on observation,last count 129,0.no Sx's of bleeding tendency.\par -h/o BPH,on Flomax.

## 2022-05-03 NOTE — COUNSELING
[Fall prevention counseling provided] : Fall prevention counseling provided [FreeTextEntry2] : ADA,low salt,low chol [de-identified] : healthy eating,walking [None] : None [Good understanding] : Patient has a good understanding of lifestyle changes and steps needed to achieve self management goal

## 2022-05-03 NOTE — ASSESSMENT
[FreeTextEntry1] : 85 yo mostly asymptomatic pt\par h/o T2D with D.nephropathy,mild CRI,renal disease mild anemia.on reduced dose of Metformin will watch  GFR.\par -PAD,st.p. revascularization procedures,pt reports improved ambulation. continue ASA,Eliquis.\par -Htn,cont.meds .\par -TG high,low HDL,labs to follow.,diet explained.\par -Thrombocytopenia,mild,on observation\par - Routine labs ordered. \par - OA's R.knee,on Euflexa inj.\par

## 2022-05-04 ENCOUNTER — NON-APPOINTMENT (OUTPATIENT)
Age: 87
End: 2022-05-04

## 2022-05-04 LAB
25(OH)D3 SERPL-MCNC: 55.4 NG/ML
ALBUMIN SERPL ELPH-MCNC: 4.4 G/DL
ALP BLD-CCNC: 60 U/L
ALT SERPL-CCNC: 18 U/L
ANION GAP SERPL CALC-SCNC: 15 MMOL/L
APPEARANCE: CLEAR
AST SERPL-CCNC: 22 U/L
BASOPHILS # BLD AUTO: 0.05 K/UL
BASOPHILS NFR BLD AUTO: 0.7 %
BILIRUB SERPL-MCNC: 0.4 MG/DL
BILIRUBIN URINE: NEGATIVE
BLOOD URINE: NEGATIVE
BUN SERPL-MCNC: 22 MG/DL
CALCIUM SERPL-MCNC: 9.6 MG/DL
CHLORIDE SERPL-SCNC: 98 MMOL/L
CHOLEST SERPL-MCNC: 135 MG/DL
CO2 SERPL-SCNC: 25 MMOL/L
COLOR: YELLOW
CREAT SERPL-MCNC: 1.37 MG/DL
CREAT SPEC-SCNC: 118 MG/DL
EGFR: 51 ML/MIN/1.73M2
EOSINOPHIL # BLD AUTO: 0.19 K/UL
EOSINOPHIL NFR BLD AUTO: 2.6 %
ESTIMATED AVERAGE GLUCOSE: 183 MG/DL
GLUCOSE QUALITATIVE U: ABNORMAL
GLUCOSE SERPL-MCNC: 314 MG/DL
HBA1C MFR BLD HPLC: 8 %
HCT VFR BLD CALC: 39.9 %
HDLC SERPL-MCNC: 39 MG/DL
HGB BLD-MCNC: 12.3 G/DL
IMM GRANULOCYTES NFR BLD AUTO: 0.3 %
KETONES URINE: NEGATIVE
LDLC SERPL CALC-MCNC: 40 MG/DL
LEUKOCYTE ESTERASE URINE: NEGATIVE
LYMPHOCYTES # BLD AUTO: 1.16 K/UL
LYMPHOCYTES NFR BLD AUTO: 15.6 %
MAN DIFF?: NORMAL
MCHC RBC-ENTMCNC: 28.7 PG
MCHC RBC-ENTMCNC: 30.8 GM/DL
MCV RBC AUTO: 93 FL
MICROALBUMIN 24H UR DL<=1MG/L-MCNC: <1.2 MG/DL
MICROALBUMIN/CREAT 24H UR-RTO: NORMAL MG/G
MONOCYTES # BLD AUTO: 0.47 K/UL
MONOCYTES NFR BLD AUTO: 6.3 %
NEUTROPHILS # BLD AUTO: 5.54 K/UL
NEUTROPHILS NFR BLD AUTO: 74.5 %
NITRITE URINE: NEGATIVE
NONHDLC SERPL-MCNC: 96 MG/DL
PH URINE: 7.5
PLATELET # BLD AUTO: 151 K/UL
POTASSIUM SERPL-SCNC: 4.6 MMOL/L
PROT SERPL-MCNC: 6.8 G/DL
PROTEIN URINE: NORMAL
RBC # BLD: 4.29 M/UL
RBC # FLD: 14.2 %
SODIUM SERPL-SCNC: 138 MMOL/L
SPECIFIC GRAVITY URINE: 1.02
TRIGL SERPL-MCNC: 278 MG/DL
UROBILINOGEN URINE: NORMAL
WBC # FLD AUTO: 7.43 K/UL

## 2022-05-13 ENCOUNTER — APPOINTMENT (OUTPATIENT)
Dept: VASCULAR SURGERY | Facility: CLINIC | Age: 87
End: 2022-05-13
Payer: MEDICARE

## 2022-05-13 PROCEDURE — 93925 LOWER EXTREMITY STUDY: CPT

## 2022-05-13 PROCEDURE — 99214 OFFICE O/P EST MOD 30 MIN: CPT

## 2022-05-13 NOTE — ASSESSMENT
[FreeTextEntry1] : 85 yo male with history of dm, htn, hld, presents for follow up of pad with claudications s/p  b/l sfa stents and recent right lower extremity angiogram with atherectomy and angioplasty of instent stenosis. \par \par \par Patient reports significant improvement of symptoms.  No rest pain or claudication.\par \par Continue aspirin and Eliquis.\par pt to follow up in 3 months with repeat duplex

## 2022-05-13 NOTE — PHYSICAL EXAM
[JVD] : no jugular venous distention  [2+] : left 2+ [Ankle Swelling (On Exam)] : not present [Varicose Veins Of Lower Extremities] : not present [] : not present [No Rash or Lesion] : No rash or lesion [Skin Ulcer] : no ulcer [Alert] : alert [Calm] : calm [de-identified] : appears well

## 2022-05-13 NOTE — HISTORY OF PRESENT ILLNESS
[FreeTextEntry1] : 85 yo male with history of dm, htn, hld, presents for follow up of pad with claudications s/p  b/l sfa stents and right lower extremity angiogram with atherectomy and angioplasty of instent stenosis.  pt denies any pain or lower extremity wounds \par

## 2022-05-16 ENCOUNTER — APPOINTMENT (OUTPATIENT)
Dept: CARDIOLOGY | Facility: CLINIC | Age: 87
End: 2022-05-16
Payer: MEDICARE

## 2022-05-16 ENCOUNTER — NON-APPOINTMENT (OUTPATIENT)
Age: 87
End: 2022-05-16

## 2022-05-16 VITALS
RESPIRATION RATE: 16 BRPM | HEART RATE: 73 BPM | HEIGHT: 69 IN | TEMPERATURE: 98.1 F | DIASTOLIC BLOOD PRESSURE: 86 MMHG | OXYGEN SATURATION: 96 % | BODY MASS INDEX: 24.59 KG/M2 | SYSTOLIC BLOOD PRESSURE: 122 MMHG | WEIGHT: 166 LBS

## 2022-05-16 DIAGNOSIS — R07.89 OTHER CHEST PAIN: ICD-10-CM

## 2022-05-16 PROCEDURE — 93000 ELECTROCARDIOGRAM COMPLETE: CPT

## 2022-05-16 PROCEDURE — 99214 OFFICE O/P EST MOD 30 MIN: CPT

## 2022-05-17 NOTE — HISTORY OF PRESENT ILLNESS
[FreeTextEntry1] : 86-year-old male with hypertension, hyperlipidemia, CKD, PVD s/p left SFA stent and Right-sided intervention 01/2021, in-stent restenosis s/p stenting and initiation of Eliquis, who presents for follow-up visit. Echocardiogram from 2017 showed EF 65%, grade 1 diastolic dysfunction, and no LVH. \par \par Patient now complains of right-sided chest discomfort, pressure-like in nature, that occurs after he walks 5-10 blocks.  He reports that if he burps the symptoms get better.  The symptoms are relieved with rest and are not associated with dyspnea.  The chest discomfort does not radiate. Denies LE edema, orthopnea, or PND. Patient reports compliance with all medications, denies adverse effects including bleeding on the aspirin and Eliquis.\par \par \par

## 2022-05-17 NOTE — ASSESSMENT
[FreeTextEntry1] : 86-year-old male with hypertension, hyperlipidemia, CKD, PVD s/p left SFA stent, and BPH who presents for FUV; now with exertional chest discomfort. \par \par 1. Chest pain: Though the chest pain symptoms sound atypical, the presence of exertional symptoms as well as a changed EKG and risk factors such as PVD Place patient at intermediate pretest probability of CAD. As such, will send patient for pharmacologic nuclear stress test for further ischemic evaluation.\par –We will also send patient for echocardiogram to assess for wall motion abnormalities\par \par 2. HTN: Well controlled on losartan-HCTZ and metoprolol, with EF 65% and no LVH by echo 04/2017. \par \par 3. HLD: Mostly acceptable lipid panel, with mild elevation of triglycerides noted  \par -Patient was counseled on lifestyle modifications including recommendations for diet and exercise\par \par 4. PVD: on aspirin and Eliquis after in-stent restenosis. Symptoms improved after intervention, follows with vascular. \par \par FUV 6 months or PRN \par \par Will call patient with results of testing at # in chart, OK to leave voicemail.\par

## 2022-06-10 ENCOUNTER — OUTPATIENT (OUTPATIENT)
Dept: OUTPATIENT SERVICES | Facility: HOSPITAL | Age: 87
LOS: 1 days | End: 2022-06-10
Payer: MEDICARE

## 2022-06-10 DIAGNOSIS — I25.2 OLD MYOCARDIAL INFARCTION: ICD-10-CM

## 2022-06-10 DIAGNOSIS — R07.89 OTHER CHEST PAIN: ICD-10-CM

## 2022-06-10 DIAGNOSIS — I73.9 PERIPHERAL VASCULAR DISEASE, UNSPECIFIED: Chronic | ICD-10-CM

## 2022-06-10 DIAGNOSIS — R94.39 ABNORMAL RESULT OF OTHER CARDIOVASCULAR FUNCTION STUDY: ICD-10-CM

## 2022-06-10 DIAGNOSIS — Z90.89 ACQUIRED ABSENCE OF OTHER ORGANS: Chronic | ICD-10-CM

## 2022-06-10 PROCEDURE — 93016 CV STRESS TEST SUPVJ ONLY: CPT

## 2022-06-10 PROCEDURE — 93306 TTE W/DOPPLER COMPLETE: CPT | Mod: 26

## 2022-06-10 PROCEDURE — 93017 CV STRESS TEST TRACING ONLY: CPT

## 2022-06-10 PROCEDURE — 93306 TTE W/DOPPLER COMPLETE: CPT

## 2022-06-10 PROCEDURE — 93018 CV STRESS TEST I&R ONLY: CPT

## 2022-06-10 PROCEDURE — 78452 HT MUSCLE IMAGE SPECT MULT: CPT | Mod: 26,MH

## 2022-06-10 PROCEDURE — 78452 HT MUSCLE IMAGE SPECT MULT: CPT | Mod: MH

## 2022-06-10 PROCEDURE — A9502: CPT

## 2022-06-13 ENCOUNTER — NON-APPOINTMENT (OUTPATIENT)
Age: 87
End: 2022-06-13

## 2022-06-27 ENCOUNTER — INPATIENT (INPATIENT)
Facility: HOSPITAL | Age: 87
LOS: 1 days | Discharge: HOME | End: 2022-06-29
Attending: INTERNAL MEDICINE | Admitting: INTERNAL MEDICINE
Payer: MEDICARE

## 2022-06-27 VITALS — WEIGHT: 160.06 LBS | HEIGHT: 68.5 IN

## 2022-06-27 DIAGNOSIS — Z90.89 ACQUIRED ABSENCE OF OTHER ORGANS: Chronic | ICD-10-CM

## 2022-06-27 DIAGNOSIS — Z95.820 PERIPHERAL VASCULAR ANGIOPLASTY STATUS WITH IMPLANTS AND GRAFTS: Chronic | ICD-10-CM

## 2022-06-27 DIAGNOSIS — I25.2 OLD MYOCARDIAL INFARCTION: ICD-10-CM

## 2022-06-27 LAB
ALBUMIN SERPL ELPH-MCNC: 3.9 G/DL — SIGNIFICANT CHANGE UP (ref 3.3–5)
ALP SERPL-CCNC: 49 U/L — SIGNIFICANT CHANGE UP (ref 40–120)
ALT FLD-CCNC: 10 U/L — SIGNIFICANT CHANGE UP (ref 4–41)
ANION GAP SERPL CALC-SCNC: 11 MMOL/L — SIGNIFICANT CHANGE UP (ref 7–14)
AST SERPL-CCNC: 18 U/L — SIGNIFICANT CHANGE UP (ref 4–40)
BILIRUB SERPL-MCNC: 0.2 MG/DL — SIGNIFICANT CHANGE UP (ref 0.2–1.2)
BUN SERPL-MCNC: 28 MG/DL — HIGH (ref 7–23)
CALCIUM SERPL-MCNC: 9.3 MG/DL — SIGNIFICANT CHANGE UP (ref 8.4–10.5)
CHLORIDE SERPL-SCNC: 105 MMOL/L — SIGNIFICANT CHANGE UP (ref 98–107)
CO2 SERPL-SCNC: 26 MMOL/L — SIGNIFICANT CHANGE UP (ref 22–31)
CREAT SERPL-MCNC: 1.34 MG/DL — HIGH (ref 0.5–1.3)
EGFR: 52 ML/MIN/1.73M2 — LOW
GLUCOSE BLDC GLUCOMTR-MCNC: 112 MG/DL — HIGH (ref 70–99)
GLUCOSE BLDC GLUCOMTR-MCNC: 116 MG/DL — HIGH (ref 70–99)
GLUCOSE BLDC GLUCOMTR-MCNC: 128 MG/DL — HIGH (ref 70–99)
GLUCOSE BLDC GLUCOMTR-MCNC: 93 MG/DL — SIGNIFICANT CHANGE UP (ref 70–99)
GLUCOSE SERPL-MCNC: 94 MG/DL — SIGNIFICANT CHANGE UP (ref 70–99)
HCT VFR BLD CALC: 36.8 % — LOW (ref 39–50)
HGB BLD-MCNC: 11.4 G/DL — LOW (ref 13–17)
MAGNESIUM SERPL-MCNC: 1.9 MG/DL — SIGNIFICANT CHANGE UP (ref 1.6–2.6)
MCHC RBC-ENTMCNC: 28.5 PG — SIGNIFICANT CHANGE UP (ref 27–34)
MCHC RBC-ENTMCNC: 31 GM/DL — LOW (ref 32–36)
MCV RBC AUTO: 92 FL — SIGNIFICANT CHANGE UP (ref 80–100)
NRBC # BLD: 0 /100 WBCS — SIGNIFICANT CHANGE UP
NRBC # FLD: 0 K/UL — SIGNIFICANT CHANGE UP
PHOSPHATE SERPL-MCNC: 3.3 MG/DL — SIGNIFICANT CHANGE UP (ref 2.5–4.5)
PLATELET # BLD AUTO: 131 K/UL — LOW (ref 150–400)
POTASSIUM SERPL-MCNC: 4 MMOL/L — SIGNIFICANT CHANGE UP (ref 3.5–5.3)
POTASSIUM SERPL-SCNC: 4 MMOL/L — SIGNIFICANT CHANGE UP (ref 3.5–5.3)
PROT SERPL-MCNC: 6.8 G/DL — SIGNIFICANT CHANGE UP (ref 6–8.3)
RBC # BLD: 4 M/UL — LOW (ref 4.2–5.8)
RBC # FLD: 14.3 % — SIGNIFICANT CHANGE UP (ref 10.3–14.5)
SODIUM SERPL-SCNC: 142 MMOL/L — SIGNIFICANT CHANGE UP (ref 135–145)
WBC # BLD: 5.18 K/UL — SIGNIFICANT CHANGE UP (ref 3.8–10.5)
WBC # FLD AUTO: 5.18 K/UL — SIGNIFICANT CHANGE UP (ref 3.8–10.5)

## 2022-06-27 PROCEDURE — 93458 L HRT ARTERY/VENTRICLE ANGIO: CPT | Mod: 26,59

## 2022-06-27 PROCEDURE — 93010 ELECTROCARDIOGRAM REPORT: CPT

## 2022-06-27 PROCEDURE — 92928 PRQ TCAT PLMT NTRAC ST 1 LES: CPT | Mod: LD

## 2022-06-27 RX ORDER — DEXTROSE 50 % IN WATER 50 %
15 SYRINGE (ML) INTRAVENOUS ONCE
Refills: 0 | Status: DISCONTINUED | OUTPATIENT
Start: 2022-06-27 | End: 2022-06-29

## 2022-06-27 RX ORDER — EZETIMIBE AND SIMVASTATIN 10; 80 MG/1; MG/1
1 TABLET, FILM COATED ORAL
Qty: 0 | Refills: 0 | DISCHARGE

## 2022-06-27 RX ORDER — DEXTROSE 50 % IN WATER 50 %
25 SYRINGE (ML) INTRAVENOUS ONCE
Refills: 0 | Status: DISCONTINUED | OUTPATIENT
Start: 2022-06-27 | End: 2022-06-29

## 2022-06-27 RX ORDER — ASPIRIN/CALCIUM CARB/MAGNESIUM 324 MG
81 TABLET ORAL DAILY
Refills: 0 | Status: DISCONTINUED | OUTPATIENT
Start: 2022-06-28 | End: 2022-06-29

## 2022-06-27 RX ORDER — FINASTERIDE 5 MG/1
5 TABLET, FILM COATED ORAL DAILY
Refills: 0 | Status: DISCONTINUED | OUTPATIENT
Start: 2022-06-27 | End: 2022-06-29

## 2022-06-27 RX ORDER — TAMSULOSIN HYDROCHLORIDE 0.4 MG/1
1 CAPSULE ORAL
Qty: 0 | Refills: 0 | DISCHARGE

## 2022-06-27 RX ORDER — ASPIRIN/CALCIUM CARB/MAGNESIUM 324 MG
1 TABLET ORAL
Qty: 0 | Refills: 0 | DISCHARGE

## 2022-06-27 RX ORDER — GLUCAGON INJECTION, SOLUTION 0.5 MG/.1ML
1 INJECTION, SOLUTION SUBCUTANEOUS ONCE
Refills: 0 | Status: DISCONTINUED | OUTPATIENT
Start: 2022-06-27 | End: 2022-06-29

## 2022-06-27 RX ORDER — LINAGLIPTIN 5 MG/1
1 TABLET, FILM COATED ORAL
Qty: 0 | Refills: 0 | DISCHARGE

## 2022-06-27 RX ORDER — SODIUM CHLORIDE 9 MG/ML
1000 INJECTION, SOLUTION INTRAVENOUS
Refills: 0 | Status: DISCONTINUED | OUTPATIENT
Start: 2022-06-27 | End: 2022-06-29

## 2022-06-27 RX ORDER — LOSARTAN POTASSIUM 100 MG/1
50 TABLET, FILM COATED ORAL DAILY
Refills: 0 | Status: DISCONTINUED | OUTPATIENT
Start: 2022-06-27 | End: 2022-06-29

## 2022-06-27 RX ORDER — METOPROLOL TARTRATE 50 MG
1 TABLET ORAL
Qty: 0 | Refills: 0 | DISCHARGE

## 2022-06-27 RX ORDER — SODIUM CHLORIDE 9 MG/ML
1000 INJECTION INTRAMUSCULAR; INTRAVENOUS; SUBCUTANEOUS
Refills: 0 | Status: DISCONTINUED | OUTPATIENT
Start: 2022-06-27 | End: 2022-06-29

## 2022-06-27 RX ORDER — TADALAFIL 10 MG/1
1 TABLET, FILM COATED ORAL
Qty: 0 | Refills: 0 | DISCHARGE

## 2022-06-27 RX ORDER — MULTIVIT-MIN/FERROUS GLUCONATE 9 MG/15 ML
1 LIQUID (ML) ORAL
Qty: 0 | Refills: 0 | DISCHARGE

## 2022-06-27 RX ORDER — SODIUM CHLORIDE 9 MG/ML
3 INJECTION INTRAMUSCULAR; INTRAVENOUS; SUBCUTANEOUS EVERY 8 HOURS
Refills: 0 | Status: DISCONTINUED | OUTPATIENT
Start: 2022-06-27 | End: 2022-06-29

## 2022-06-27 RX ORDER — INSULIN LISPRO 100/ML
VIAL (ML) SUBCUTANEOUS
Refills: 0 | Status: DISCONTINUED | OUTPATIENT
Start: 2022-06-27 | End: 2022-06-29

## 2022-06-27 RX ORDER — CHOLECALCIFEROL (VITAMIN D3) 125 MCG
1 CAPSULE ORAL
Qty: 0 | Refills: 0 | DISCHARGE

## 2022-06-27 RX ORDER — CLOPIDOGREL BISULFATE 75 MG/1
75 TABLET, FILM COATED ORAL DAILY
Refills: 0 | Status: DISCONTINUED | OUTPATIENT
Start: 2022-06-28 | End: 2022-06-29

## 2022-06-27 RX ORDER — INSULIN LISPRO 100/ML
VIAL (ML) SUBCUTANEOUS AT BEDTIME
Refills: 0 | Status: DISCONTINUED | OUTPATIENT
Start: 2022-06-27 | End: 2022-06-29

## 2022-06-27 RX ORDER — GLIMEPIRIDE 1 MG
1 TABLET ORAL
Qty: 0 | Refills: 0 | DISCHARGE

## 2022-06-27 RX ORDER — SIMVASTATIN 20 MG/1
40 TABLET, FILM COATED ORAL AT BEDTIME
Refills: 0 | Status: DISCONTINUED | OUTPATIENT
Start: 2022-06-27 | End: 2022-06-29

## 2022-06-27 RX ORDER — LOSARTAN POTASSIUM 100 MG/1
1 TABLET, FILM COATED ORAL
Qty: 0 | Refills: 0 | DISCHARGE

## 2022-06-27 RX ORDER — TAMSULOSIN HYDROCHLORIDE 0.4 MG/1
0.4 CAPSULE ORAL DAILY
Refills: 0 | Status: DISCONTINUED | OUTPATIENT
Start: 2022-06-27 | End: 2022-06-29

## 2022-06-27 RX ORDER — METFORMIN HYDROCHLORIDE 850 MG/1
2 TABLET ORAL
Qty: 0 | Refills: 0 | DISCHARGE

## 2022-06-27 RX ORDER — DEXTROSE 50 % IN WATER 50 %
12.5 SYRINGE (ML) INTRAVENOUS ONCE
Refills: 0 | Status: DISCONTINUED | OUTPATIENT
Start: 2022-06-27 | End: 2022-06-29

## 2022-06-27 RX ADMIN — SODIUM CHLORIDE 3 MILLILITER(S): 9 INJECTION INTRAMUSCULAR; INTRAVENOUS; SUBCUTANEOUS at 21:19

## 2022-06-27 RX ADMIN — SIMVASTATIN 40 MILLIGRAM(S): 20 TABLET, FILM COATED ORAL at 21:29

## 2022-06-27 RX ADMIN — SODIUM CHLORIDE 75 MILLILITER(S): 9 INJECTION INTRAMUSCULAR; INTRAVENOUS; SUBCUTANEOUS at 12:27

## 2022-06-27 NOTE — CHART NOTE - NSCHARTNOTEFT_GEN_A_CORE
Pt is s/p cath w/ R. radial access.   Patient denies pain, numbness, tingling, CP, SOB.     Vital Signs Last 24 Hrs  T(C): 36.7 (27 Jun 2022 15:25), Max: 36.7 (27 Jun 2022 15:25)  T(F): 98.1 (27 Jun 2022 15:25), Max: 98.1 (27 Jun 2022 15:25)  HR: 63 (27 Jun 2022 15:25) (63 - 63)  BP: 150/64 (27 Jun 2022 15:25) (150/64 - 150/64)  BP(mean): --  RR: 18 (27 Jun 2022 15:25) (18 - 18)  SpO2: 100% (27 Jun 2022 15:25) (100% - 100%)  VSS.     Dressing is clear/dry/intact.   Site is without hematoma or bleeding.   Pulses palpable, cap refill <3 sec.   Strength, sensation intact in UE b/l     Will continue to monitor.

## 2022-06-27 NOTE — PATIENT PROFILE ADULT - SURGICAL SITE DESCRIPTION
R radial cath site s/p RHC cardiac catheterization
no nocturia/no urinary hesitancy/no dysuria/no hematuria/no flank pain L/no flank pain R/no bladder infections/normal urinary frequency

## 2022-06-27 NOTE — PATIENT PROFILE ADULT - FALL HARM RISK - HARM RISK INTERVENTIONS

## 2022-06-27 NOTE — H&P CARDIOLOGY - NSICDXPASTMEDICALHX_GEN_ALL_CORE_FT
PAST MEDICAL HISTORY:  BPH (benign prostatic hypertrophy)     CKD (chronic kidney disease)     DM (diabetes mellitus)     HLD (hyperlipidemia)     HTN (hypertension)     PAD (peripheral artery disease) s/p stent placement

## 2022-06-27 NOTE — PATIENT PROFILE ADULT - NSPROIMPLANTSMEDDEV_GEN_A_NUR
fine crackles
Patient has history of stent placement and received a stent in the LAD from cardiac catheterization

## 2022-06-27 NOTE — H&P CARDIOLOGY - NSICDXPASTSURGICALHX_GEN_ALL_CORE_FT
PAST SURGICAL HISTORY:  S/P peripheral artery angioplasty with stent placement     S/P tonsillectomy

## 2022-06-27 NOTE — H&P CARDIOLOGY - HISTORY OF PRESENT ILLNESS
87 y/o male with a PMHx of PVD s/p left SFA stent and right lower extremity intervention complicated by in-stent restenosis s/p stent with subsequent initiation of Eliquis (last dose 6/26/22 AM), HTN, HLD, DM, CKD and BPH presents for elective cardiac catheterization. Pt has been experiencing intermittent, 5/10, non-pleuritic, non-radiating, exertional right sided chest pain for the past few weeks. Pt describes the pain as a "pressure" which occurs with exertion and is improved with rest. Pt also states that the pain is improved when he burps and he attributes it to having "gas." Pt does elicit that he used to walk several miles per day but he is now limited to how much he ambulates; now he only walks 5-10 minutes before experiencing symptoms. Pt also admits to dyspnea on exertion, such as walking up a flight of stairs, with symptoms improving at rest. Pt saw his cardiologist, Dr. Melton, and underwent echocardiogram and nuclear stress test, which were abnormal (results below). Pt denies fever, chills, recent travel, headache, dizziness, visual deficits, orthopnea, palpitations, abdominal pain, N/V/D/C, hematochezia, melena, dysuria, hematuria, LOC, syncope, peripheral edema. In light of patients cardiac risk factors, symptoms, and abnormal noninvasive test findings, there is high suspicion for CAD. Patient is now referred to [facility] for a cardiac catheterization with possible PTCA/stent.    Yasmeen 10, 2022 - Nuclear stress test: Abnormal study. There are medium sized, predominantly fixed, severe intensity defects in the apical and septal walls, consistent with myocardial infarction with christopher-infarction ischemia. Post-stress resting myocardial perfusion gated SPECT imaging was performed (LVEF = 50%; LVEDV = 114 ml.).  Yasmeen 10, 2022 - Echo: EF 45-50%. Normal mitral valve. Trace mitral regurgitation. Calcified trileaflet aortic valve with normal opening. No aortic stenosis. No aortic valve regurgitation seen. Normal left ventricular internal dimensions and wall thicknesses. Mild segmental left ventricular systolic dysfunction (EF 45-50% by visual estimation). There is hypokinesis of the apical to mid anteroseptal and apical inferoseptal walls with akinesis of the apical cap. Grade I diastolic dysfunction (Impaired relaxation, mild). Normal right ventricular size and systolic function (TAPSE 2.6 cm). RV systolic pressure is normal at 30 mm Hg. Normal tricuspid valve. Mild tricuspid regurgitation. Normal pulmonic valve. Trace pulmonic insufficiency is noted. No pericardial effusion.  Cardiologist: Dr. Yogi Melton  COVID status: PCR negative 6/24/2022

## 2022-06-27 NOTE — H&P CARDIOLOGY - NSICDXFAMILYHX_GEN_ALL_CORE_FT
FAMILY HISTORY:  Sibling  Still living? Yes, Estimated age: Age Unknown  Family history of lung cancer, Age at diagnosis: Age Unknown

## 2022-06-28 ENCOUNTER — TRANSCRIPTION ENCOUNTER (OUTPATIENT)
Age: 87
End: 2022-06-28

## 2022-06-28 LAB
A1C WITH ESTIMATED AVERAGE GLUCOSE RESULT: 6.8 % — HIGH (ref 4–5.6)
ALBUMIN SERPL ELPH-MCNC: 3.9 G/DL — SIGNIFICANT CHANGE UP (ref 3.3–5)
ALP SERPL-CCNC: 50 U/L — SIGNIFICANT CHANGE UP (ref 40–120)
ALT FLD-CCNC: 9 U/L — SIGNIFICANT CHANGE UP (ref 4–41)
ANION GAP SERPL CALC-SCNC: 12 MMOL/L — SIGNIFICANT CHANGE UP (ref 7–14)
AST SERPL-CCNC: 18 U/L — SIGNIFICANT CHANGE UP (ref 4–40)
BASOPHILS # BLD AUTO: 0.05 K/UL — SIGNIFICANT CHANGE UP (ref 0–0.2)
BASOPHILS NFR BLD AUTO: 1 % — SIGNIFICANT CHANGE UP (ref 0–2)
BILIRUB SERPL-MCNC: 0.3 MG/DL — SIGNIFICANT CHANGE UP (ref 0.2–1.2)
BUN SERPL-MCNC: 22 MG/DL — SIGNIFICANT CHANGE UP (ref 7–23)
CALCIUM SERPL-MCNC: 9.6 MG/DL — SIGNIFICANT CHANGE UP (ref 8.4–10.5)
CHLORIDE SERPL-SCNC: 102 MMOL/L — SIGNIFICANT CHANGE UP (ref 98–107)
CHOLEST SERPL-MCNC: 136 MG/DL — SIGNIFICANT CHANGE UP
CO2 SERPL-SCNC: 26 MMOL/L — SIGNIFICANT CHANGE UP (ref 22–31)
CREAT SERPL-MCNC: 1.19 MG/DL — SIGNIFICANT CHANGE UP (ref 0.5–1.3)
EGFR: 60 ML/MIN/1.73M2 — SIGNIFICANT CHANGE UP
EOSINOPHIL # BLD AUTO: 0.23 K/UL — SIGNIFICANT CHANGE UP (ref 0–0.5)
EOSINOPHIL NFR BLD AUTO: 4.6 % — SIGNIFICANT CHANGE UP (ref 0–6)
ESTIMATED AVERAGE GLUCOSE: 148 — SIGNIFICANT CHANGE UP
GLUCOSE BLDC GLUCOMTR-MCNC: 102 MG/DL — HIGH (ref 70–99)
GLUCOSE BLDC GLUCOMTR-MCNC: 153 MG/DL — HIGH (ref 70–99)
GLUCOSE BLDC GLUCOMTR-MCNC: 178 MG/DL — HIGH (ref 70–99)
GLUCOSE BLDC GLUCOMTR-MCNC: 97 MG/DL — SIGNIFICANT CHANGE UP (ref 70–99)
GLUCOSE SERPL-MCNC: 84 MG/DL — SIGNIFICANT CHANGE UP (ref 70–99)
HCT VFR BLD CALC: 37.4 % — LOW (ref 39–50)
HDLC SERPL-MCNC: 35 MG/DL — LOW
HGB BLD-MCNC: 12 G/DL — LOW (ref 13–17)
IANC: 3.41 K/UL — SIGNIFICANT CHANGE UP (ref 1.8–7.4)
IMM GRANULOCYTES NFR BLD AUTO: 0.4 % — SIGNIFICANT CHANGE UP (ref 0–1.5)
LIPID PNL WITH DIRECT LDL SERPL: 65 MG/DL — SIGNIFICANT CHANGE UP
LYMPHOCYTES # BLD AUTO: 0.88 K/UL — LOW (ref 1–3.3)
LYMPHOCYTES # BLD AUTO: 17.5 % — SIGNIFICANT CHANGE UP (ref 13–44)
MAGNESIUM SERPL-MCNC: 1.9 MG/DL — SIGNIFICANT CHANGE UP (ref 1.6–2.6)
MCHC RBC-ENTMCNC: 28.5 PG — SIGNIFICANT CHANGE UP (ref 27–34)
MCHC RBC-ENTMCNC: 32.1 GM/DL — SIGNIFICANT CHANGE UP (ref 32–36)
MCV RBC AUTO: 88.8 FL — SIGNIFICANT CHANGE UP (ref 80–100)
MONOCYTES # BLD AUTO: 0.43 K/UL — SIGNIFICANT CHANGE UP (ref 0–0.9)
MONOCYTES NFR BLD AUTO: 8.6 % — SIGNIFICANT CHANGE UP (ref 2–14)
NEUTROPHILS # BLD AUTO: 3.41 K/UL — SIGNIFICANT CHANGE UP (ref 1.8–7.4)
NEUTROPHILS NFR BLD AUTO: 67.9 % — SIGNIFICANT CHANGE UP (ref 43–77)
NON HDL CHOLESTEROL: 101 MG/DL — SIGNIFICANT CHANGE UP
NRBC # BLD: 0 /100 WBCS — SIGNIFICANT CHANGE UP
NRBC # FLD: 0 K/UL — SIGNIFICANT CHANGE UP
PHOSPHATE SERPL-MCNC: 3.3 MG/DL — SIGNIFICANT CHANGE UP (ref 2.5–4.5)
PLATELET # BLD AUTO: 132 K/UL — LOW (ref 150–400)
POTASSIUM SERPL-MCNC: 4.1 MMOL/L — SIGNIFICANT CHANGE UP (ref 3.5–5.3)
POTASSIUM SERPL-SCNC: 4.1 MMOL/L — SIGNIFICANT CHANGE UP (ref 3.5–5.3)
PROT SERPL-MCNC: 6.7 G/DL — SIGNIFICANT CHANGE UP (ref 6–8.3)
RBC # BLD: 4.21 M/UL — SIGNIFICANT CHANGE UP (ref 4.2–5.8)
RBC # FLD: 14.5 % — SIGNIFICANT CHANGE UP (ref 10.3–14.5)
SODIUM SERPL-SCNC: 140 MMOL/L — SIGNIFICANT CHANGE UP (ref 135–145)
TRIGL SERPL-MCNC: 180 MG/DL — HIGH
WBC # BLD: 5.02 K/UL — SIGNIFICANT CHANGE UP (ref 3.8–10.5)
WBC # FLD AUTO: 5.02 K/UL — SIGNIFICANT CHANGE UP (ref 3.8–10.5)

## 2022-06-28 PROCEDURE — 93571 IV DOP VEL&/PRESS C FLO 1ST: CPT | Mod: 26,RC

## 2022-06-28 PROCEDURE — 92928 PRQ TCAT PLMT NTRAC ST 1 LES: CPT | Mod: RC

## 2022-06-28 PROCEDURE — 93010 ELECTROCARDIOGRAM REPORT: CPT

## 2022-06-28 RX ADMIN — LOSARTAN POTASSIUM 50 MILLIGRAM(S): 100 TABLET, FILM COATED ORAL at 06:15

## 2022-06-28 RX ADMIN — SODIUM CHLORIDE 3 MILLILITER(S): 9 INJECTION INTRAMUSCULAR; INTRAVENOUS; SUBCUTANEOUS at 06:40

## 2022-06-28 RX ADMIN — SIMVASTATIN 40 MILLIGRAM(S): 20 TABLET, FILM COATED ORAL at 22:46

## 2022-06-28 RX ADMIN — CLOPIDOGREL BISULFATE 75 MILLIGRAM(S): 75 TABLET, FILM COATED ORAL at 10:21

## 2022-06-28 RX ADMIN — Medication 1: at 17:54

## 2022-06-28 RX ADMIN — SODIUM CHLORIDE 3 MILLILITER(S): 9 INJECTION INTRAMUSCULAR; INTRAVENOUS; SUBCUTANEOUS at 22:38

## 2022-06-28 RX ADMIN — FINASTERIDE 5 MILLIGRAM(S): 5 TABLET, FILM COATED ORAL at 17:55

## 2022-06-28 RX ADMIN — Medication 81 MILLIGRAM(S): at 10:21

## 2022-06-28 RX ADMIN — Medication 1 TABLET(S): at 18:33

## 2022-06-28 RX ADMIN — TAMSULOSIN HYDROCHLORIDE 0.4 MILLIGRAM(S): 0.4 CAPSULE ORAL at 17:54

## 2022-06-28 RX ADMIN — SODIUM CHLORIDE 3 MILLILITER(S): 9 INJECTION INTRAMUSCULAR; INTRAVENOUS; SUBCUTANEOUS at 13:36

## 2022-06-28 NOTE — DISCHARGE NOTE PROVIDER - NSDCMRMEDTOKEN_GEN_ALL_CORE_FT
Aspirin Enteric Coated 81 mg oral delayed release tablet: 1 tab(s) orally once a day  Eliquis 5 mg oral tablet: 1 tab(s) orally 2 times a day  ezetimibe-simvastatin 10 mg-40 mg oral tablet: 1 tab(s) orally once a day  finasteride 5 mg oral tablet: 1 tab(s) orally once a day  glimepiride 4 mg oral tablet: 1 tab(s) orally once a day  losartan-hydrochlorothiazide 50 mg-12.5 mg oral tablet: 1 tab(s) orally once a day  metFORMIN 500 mg oral tablet: 2 tab(s) orally 2 times a day (with meals)  Multiple Vitamins oral tablet: 1 tab(s) orally once a day  tamsulosin 0.4 mg oral capsule: 1 cap(s) orally once a day   Aspirin Enteric Coated 81 mg oral delayed release tablet: 1 tab(s) orally once a day  clopidogrel 75 mg oral tablet: 1 tab(s) orally once a day  Eliquis 5 mg oral tablet: 1 tab(s) orally 2 times a day  ezetimibe-simvastatin 10 mg-20 mg oral tablet: 1 tab(s) orally once a day   finasteride 5 mg oral tablet: 1 tab(s) orally once a day  glimepiride 4 mg oral tablet: 1 tab(s) orally once a day  losartan-hydrochlorothiazide 50 mg-12.5 mg oral tablet: 1 tab(s) orally once a day  metFORMIN 500 mg oral tablet: 2 tab(s) orally 2 times a day (with meals)  Multiple Vitamins oral tablet: 1 tab(s) orally once a day  tamsulosin 0.4 mg oral capsule: 1 cap(s) orally once a day   Aspirin Enteric Coated 81 mg oral delayed release tablet: 1 tab(s) orally once a day  clopidogrel 75 mg oral tablet: 1 tab(s) orally once a day  Eliquis 5 mg oral tablet: 1 tab(s) orally 2 times a day  ezetimibe 10 mg oral tablet: 1 tab(s) orally once a day   ezetimibe-simvastatin 10 mg-20 mg oral tablet: 1 tab(s) orally once a day   finasteride 5 mg oral tablet: 1 tab(s) orally once a day  glimepiride 4 mg oral tablet: 1 tab(s) orally once a day  losartan-hydrochlorothiazide 50 mg-12.5 mg oral tablet: 1 tab(s) orally once a day  metFORMIN 500 mg oral tablet: 2 tab(s) orally 2 times a day (with meals)  Multiple Vitamins oral tablet: 1 tab(s) orally once a day  simvastatin 20 mg oral tablet: 1 tab(s) orally once a day (at bedtime)   tamsulosin 0.4 mg oral capsule: 1 cap(s) orally once a day

## 2022-06-28 NOTE — CHART NOTE - NSCHARTNOTEFT_GEN_A_CORE
Pt is s/p cath w/ right radial access.   Patient denies pain, numbness, tingling, CP, SOB.     Vital Signs Last 24 Hrs  T(C): 36.9 (28 Jun 2022 21:28), Max: 36.9 (28 Jun 2022 21:28)  T(F): 98.4 (28 Jun 2022 21:28), Max: 98.4 (28 Jun 2022 21:28)  HR: 67 (28 Jun 2022 21:28) (56 - 67)  BP: 134/59 (28 Jun 2022 21:28) (115/53 - 134/59)  BP(mean): --  RR: 18 (28 Jun 2022 21:28) (18 - 19)  SpO2: 99% (28 Jun 2022 21:28) (96% - 100%)  VSS.     Dressing is clear/dry/intact.   Site is without hematoma or bleeding.   Pulses palpable, cap refill <3 sec.   Strength, sensation intact in UE b/l     Will continue to monitor.

## 2022-06-28 NOTE — DISCHARGE NOTE PROVIDER - NSDCFUSCHEDAPPT_GEN_ALL_CORE_FT
Martha Way  [facility] Physician [facility]  Med [address]  Scheduled Appointment: 08/02/2022    [facility]  VASCULAR [address] [facility] [address]  Scheduled Appointment: 08/19/2022    Shay Pratt  [facility]  VASCULAR [address] [facility] [address] [facility]  Scheduled Appointment: 08/19/2022    Al Lee  [facility]  Urology [address]  Scheduled Appointment: 09/16/2022

## 2022-06-28 NOTE — DISCHARGE NOTE PROVIDER - NSDCCPCAREPLAN_GEN_ALL_CORE_FT
PRINCIPAL DISCHARGE DIAGNOSIS  Diagnosis: Status post cardiac catheterization  Assessment and Plan of Treatment: You were admitted for cardiac catheterization after having chest pain and an abnormal stress test. You had stents placed in your heart. You  had 2 stents placed in your heart. You will be on Aspirin, Plavix and Eliquis for 2 weeks and then you will discontinue the Aspirin and remain on Plavix and Eliquis.   Please also continue your statin medication to control cholesterol levels, as well as the Aspirin and Plavix as prescribed in order to optimize your heart health. Follow-up with your primary provider/cardiologist as outpatient for ongoing care. If you have persistent chest pain, shortness of breath, heart palpitations, or dizziness you should return to the emergency room.       PRINCIPAL DISCHARGE DIAGNOSIS  Diagnosis: Status post cardiac catheterization  Assessment and Plan of Treatment: You were admitted for cardiac catheterization after having chest pain and an abnormal stress test. You had stents placed in your heart. You  had 2 stents placed in your heart. You will be on Aspirin, Plavix and Eliquis for 2 weeks and then you will discontinue the Aspirin and remain on Plavix and Eliquis.   Please also continue your statin medication to control cholesterol levels, as well as the Aspirin and Plavix as prescribed in order to optimize your heart health. Follow-up with your primary provider/cardiologist as outpatient for ongoing care. If you have persistent chest pain, shortness of breath, heart palpitations, or dizziness you should return to the emergency room.  Follow up with your outpatient cardiologist in 1 week.

## 2022-06-28 NOTE — DISCHARGE NOTE PROVIDER - HOSPITAL COURSE
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    6/27 C: mid LAD 99% s/p LISA x 1. LPL 80%. mid RCA 90%. D1 80%. LVEDP 22. RRA access. ASA and Plavix. NS at 75 cc/hr x 2 hours. Admitted to tele.  6/28 LHC: mid RCA of 90% s/p 1 LISA. RRA access site. On Aspirin and Plavix.   Eliquis restarted on ________    On _________ , discussed with  __________ , patient is medically cleared and optimized for discharge today to _____________ . All medications were reviewed with attending, and any new/required prescriptions were sent to mutually agreed upon pharmacy.   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 Patient underwent LHC (6/27) revealing mid LAD 99% s/p LISA x 1. LPL 80%. mid RCA 90%. D1 80%. LVEDP 22.& LHC (6/28): mid RCA of 90% s/p 1 LISA. FOllowing the procedure the RRA access site remained without e/o bleeding or hematoma and Eliquis restarted 6/29.     On 6/29, discussed with Dr. Ramiro Cunha. Pt is medically cleared and optimized for discharge today.. All medications were reviewed with attending, and any new/required prescriptions were sent to mutually agreed upon pharmacy.

## 2022-06-29 ENCOUNTER — TRANSCRIPTION ENCOUNTER (OUTPATIENT)
Age: 87
End: 2022-06-29

## 2022-06-29 VITALS
DIASTOLIC BLOOD PRESSURE: 59 MMHG | RESPIRATION RATE: 18 BRPM | SYSTOLIC BLOOD PRESSURE: 135 MMHG | TEMPERATURE: 97 F | HEART RATE: 64 BPM | OXYGEN SATURATION: 98 %

## 2022-06-29 LAB
ANION GAP SERPL CALC-SCNC: 13 MMOL/L — SIGNIFICANT CHANGE UP (ref 7–14)
BUN SERPL-MCNC: 22 MG/DL — SIGNIFICANT CHANGE UP (ref 7–23)
CALCIUM SERPL-MCNC: 9.3 MG/DL — SIGNIFICANT CHANGE UP (ref 8.4–10.5)
CHLORIDE SERPL-SCNC: 105 MMOL/L — SIGNIFICANT CHANGE UP (ref 98–107)
CO2 SERPL-SCNC: 23 MMOL/L — SIGNIFICANT CHANGE UP (ref 22–31)
CREAT SERPL-MCNC: 1.15 MG/DL — SIGNIFICANT CHANGE UP (ref 0.5–1.3)
EGFR: 63 ML/MIN/1.73M2 — SIGNIFICANT CHANGE UP
GLUCOSE BLDC GLUCOMTR-MCNC: 128 MG/DL — HIGH (ref 70–99)
GLUCOSE BLDC GLUCOMTR-MCNC: 209 MG/DL — HIGH (ref 70–99)
GLUCOSE SERPL-MCNC: 115 MG/DL — HIGH (ref 70–99)
HCT VFR BLD CALC: 37.6 % — LOW (ref 39–50)
HGB BLD-MCNC: 12.2 G/DL — LOW (ref 13–17)
MAGNESIUM SERPL-MCNC: 1.9 MG/DL — SIGNIFICANT CHANGE UP (ref 1.6–2.6)
MCHC RBC-ENTMCNC: 28.2 PG — SIGNIFICANT CHANGE UP (ref 27–34)
MCHC RBC-ENTMCNC: 32.4 GM/DL — SIGNIFICANT CHANGE UP (ref 32–36)
MCV RBC AUTO: 87 FL — SIGNIFICANT CHANGE UP (ref 80–100)
NRBC # BLD: 0 /100 WBCS — SIGNIFICANT CHANGE UP
NRBC # FLD: 0 K/UL — SIGNIFICANT CHANGE UP
PHOSPHATE SERPL-MCNC: 3.2 MG/DL — SIGNIFICANT CHANGE UP (ref 2.5–4.5)
PLATELET # BLD AUTO: 129 K/UL — LOW (ref 150–400)
POTASSIUM SERPL-MCNC: 3.9 MMOL/L — SIGNIFICANT CHANGE UP (ref 3.5–5.3)
POTASSIUM SERPL-SCNC: 3.9 MMOL/L — SIGNIFICANT CHANGE UP (ref 3.5–5.3)
RBC # BLD: 4.32 M/UL — SIGNIFICANT CHANGE UP (ref 4.2–5.8)
RBC # FLD: 14.4 % — SIGNIFICANT CHANGE UP (ref 10.3–14.5)
SODIUM SERPL-SCNC: 141 MMOL/L — SIGNIFICANT CHANGE UP (ref 135–145)
WBC # BLD: 5.98 K/UL — SIGNIFICANT CHANGE UP (ref 3.8–10.5)
WBC # FLD AUTO: 5.98 K/UL — SIGNIFICANT CHANGE UP (ref 3.8–10.5)

## 2022-06-29 RX ORDER — FINASTERIDE 5 MG/1
1 TABLET, FILM COATED ORAL
Qty: 0 | Refills: 0 | DISCHARGE

## 2022-06-29 RX ORDER — EZETIMIBE AND SIMVASTATIN 10; 80 MG/1; MG/1
1 TABLET, FILM COATED ORAL
Qty: 28 | Refills: 0
Start: 2022-06-29

## 2022-06-29 RX ORDER — TAMSULOSIN HYDROCHLORIDE 0.4 MG/1
1 CAPSULE ORAL
Qty: 0 | Refills: 0 | DISCHARGE

## 2022-06-29 RX ORDER — EZETIMIBE AND SIMVASTATIN 10; 80 MG/1; MG/1
1 TABLET, FILM COATED ORAL
Qty: 0 | Refills: 0 | DISCHARGE

## 2022-06-29 RX ORDER — FINASTERIDE 5 MG/1
1 TABLET, FILM COATED ORAL
Qty: 0 | Refills: 0 | DISCHARGE
Start: 2022-06-29

## 2022-06-29 RX ORDER — APIXABAN 2.5 MG/1
5 TABLET, FILM COATED ORAL EVERY 12 HOURS
Refills: 0 | Status: DISCONTINUED | OUTPATIENT
Start: 2022-06-29 | End: 2022-06-29

## 2022-06-29 RX ORDER — TAMSULOSIN HYDROCHLORIDE 0.4 MG/1
1 CAPSULE ORAL
Qty: 0 | Refills: 0 | DISCHARGE
Start: 2022-06-29

## 2022-06-29 RX ORDER — CLOPIDOGREL BISULFATE 75 MG/1
1 TABLET, FILM COATED ORAL
Qty: 0 | Refills: 0 | DISCHARGE
Start: 2022-06-29

## 2022-06-29 RX ADMIN — SODIUM CHLORIDE 3 MILLILITER(S): 9 INJECTION INTRAMUSCULAR; INTRAVENOUS; SUBCUTANEOUS at 06:33

## 2022-06-29 RX ADMIN — CLOPIDOGREL BISULFATE 75 MILLIGRAM(S): 75 TABLET, FILM COATED ORAL at 11:35

## 2022-06-29 RX ADMIN — Medication 81 MILLIGRAM(S): at 11:35

## 2022-06-29 RX ADMIN — SODIUM CHLORIDE 3 MILLILITER(S): 9 INJECTION INTRAMUSCULAR; INTRAVENOUS; SUBCUTANEOUS at 14:00

## 2022-06-29 RX ADMIN — Medication 2: at 12:26

## 2022-06-29 RX ADMIN — APIXABAN 5 MILLIGRAM(S): 2.5 TABLET, FILM COATED ORAL at 15:35

## 2022-06-29 RX ADMIN — LOSARTAN POTASSIUM 50 MILLIGRAM(S): 100 TABLET, FILM COATED ORAL at 05:54

## 2022-06-29 NOTE — PROGRESS NOTE ADULT - ASSESSMENT
86M with a PMHx of PVD s/p left SFA stent and R in-stent restenosis on Eliquis, HTN, HLD, DM, CKD and BPH presented for elective cardiac cath. Found to have severe mLAD lesion now s/p LISA. Pending staged PCI to RCA today.     -Right radial access site c/d/i, no hematoma. Mild bruising.   -Resume ASA+Plavix.  -Transition to high intensity statin (at minimum Atorvastatin 40mg QD).  -Plan for staged to PCI to RCA today. 
86M with a PMHx of PVD s/p left SFA stent and R in-stent restenosis on Eliquis, HTN, HLD, DM, CKD and BPH presented for elective cardiac cath. Now s/p PCI to mLAD and staged PCI to RCA.     -Right radial access site c/d/i, no hematoma.   -Resume ASA+Plavix+Eliquis for 2 weeks, but then discontinue ASA (and resume Plavix+Eliquis).   -Transition to high intensity statin (at minimum Atorvastatin 40mg QD).  -Ok for discharge with outpatient cardiology follow up

## 2022-06-29 NOTE — PROGRESS NOTE ADULT - SUBJECTIVE AND OBJECTIVE BOX
Patient seen and examined at bedside.    Overnight Events:   No events. Patient asymptomatic. Denies CP, SOB, palpitations. Ready for discharge.     REVIEW OF SYSTEMS:  Constitutional:     [x ] negative [ ] fevers [ ] chills [ ] weight loss [ ] weight gain  HEENT:                  [x ] negative [ ] dry eyes [ ] eye irritation [ ] postnasal drip [ ] nasal congestion  CV:                         [ x] negative  [ ] chest pain [ ] orthopnea [ ] palpitations [ ] murmur  Resp:                     [x ] negative [ ] cough [ ] shortness of breath [ ] dyspnea [ ] wheezing [ ] sputum [ ]hemoptysis  GI:                          [ ] negative [ x] nausea [ ] vomiting [ ] diarrhea [ ] constipation [ ] abd pain [ ] dysphagia   :                        [ x] negative [ ] dysuria [ ] nocturia [ ] hematuria [ ] increased urinary frequency  Musculoskeletal: [x ] negative [ ] back pain [ ] myalgias [ ] arthralgias [ ] fracture  Skin:                       [ x] negative [ ] rash [ ] itch  Neurological:        [ x] negative [ ] headache [ ] dizziness [ ] syncope [ ] weakness [ ] numbness  Psychiatric:           [ x] negative [ ] anxiety [ ] depression  Endocrine:            [ x] negative [ ] diabetes [ ] thyroid problem  Heme/Lymph:      [ x] negative [ ] anemia [ ] bleeding problem  Allergic/Immune: [ x] negative [ ] itchy eyes [ ] nasal discharge [ ] hives [ ] angioedema    [ x] All other systems negative          Current Meds:  apixaban 5 milliGRAM(s) Oral every 12 hours  aspirin enteric coated 81 milliGRAM(s) Oral daily  clopidogrel Tablet 75 milliGRAM(s) Oral daily  dextrose 5%. 1000 milliLiter(s) IV Continuous <Continuous>  dextrose 5%. 1000 milliLiter(s) IV Continuous <Continuous>  dextrose 50% Injectable 25 Gram(s) IV Push once  dextrose 50% Injectable 12.5 Gram(s) IV Push once  dextrose 50% Injectable 25 Gram(s) IV Push once  dextrose Oral Gel 15 Gram(s) Oral once PRN  finasteride 5 milliGRAM(s) Oral daily  glucagon  Injectable 1 milliGRAM(s) IntraMuscular once  insulin lispro (ADMELOG) corrective regimen sliding scale   SubCutaneous three times a day before meals  insulin lispro (ADMELOG) corrective regimen sliding scale   SubCutaneous at bedtime  losartan 50 milliGRAM(s) Oral daily  multivitamin 1 Tablet(s) Oral daily  simvastatin 40 milliGRAM(s) Oral at bedtime  sodium chloride 0.9% lock flush 3 milliLiter(s) IV Push every 8 hours  sodium chloride 0.9%. 1000 milliLiter(s) IV Continuous <Continuous>  tamsulosin 0.4 milliGRAM(s) Oral daily      Vitals:  T(F): 98.3 (06-29), Max: 98.4 (06-28)  HR: 72 (06-29) (66 - 72)  BP: 129/62 (06-29) (115/53 - 134/59)  RR: 18 (06-29)  SpO2: 99% (06-29)  I&O's Summary    28 Jun 2022 07:01  -  29 Jun 2022 07:00  --------------------------------------------------------  IN: 0 mL / OUT: 1 mL / NET: -1 mL    29 Jun 2022 07:01  -  29 Jun 2022 11:46  --------------------------------------------------------  IN: 0 mL / OUT: 11 mL / NET: -11 mL        Physical Exam:  Appearance: No acute distress; well appearing  Eyes: EOMI, no scleral icterus   HEENT: Normal oral mucosa  Cardiovascular: RRR, S1, S2, no murmurs, rubs, or gallops; no edema; no JVD  Respiratory: Clear to auscultation bilaterally, no auditory stridor   Gastrointestinal: soft, non-tender, non-distended with normal bowel sounds  Musculoskeletal: No clubbing; no joint deformity   Neurologic: Non-focal  Lymphatic: No lymphadenopathy  Psychiatry: AAOx3, mood & affect appropriate  Skin: No rashes, ecchymoses, or cyanosis. Access site cdi.                           12.2   5.98  )-----------( 129      ( 29 Jun 2022 05:33 )             37.6     06-29    141  |  105  |  22  ----------------------------<  115<H>  3.9   |  23  |  1.15    Ca    9.3      29 Jun 2022 05:33  Phos  3.2     06-29  Mg     1.90     06-29    TPro  6.7  /  Alb  3.9  /  TBili  0.3  /  DBili  x   /  AST  18  /  ALT  9   /  AlkPhos  50  06-28                  New ECG(s): Personally reviewed    Echo:  CONCLUSIONS:  1. Normal mitral valve. Trace mitral regurgitation.  2. Calcified trileaflet aortic valve with normal opening.  No aortic stenosis. No aortic valve regurgitation seen.  3. Normal left ventricular internal dimensions and wall  thicknesses.  4. Mild segmental left ventricular systolic dysfunction (EF  45-50% by visual estimation). There is hypokinesis of the  apical to mid anteroseptal and apical inferoseptal walls  with akinesis of the apical cap.  5. Grade I diastolic dysfunction (Impaired relaxation,  mild).  6. Normal right ventricular size and systolic function  (TAPSE 2.6 cm).  7. RV systolic pressure is normal at  30 mm Hg.  8. Normal tricuspid valve. Mild tricuspid regurgitation.  9. Normal pulmonic valve. Trace pulmonic insufficiency is  noted.  10. No pericardial effusion.    *** Compared with echocardiogram report of 4/4/2017, there  is now LV systolic dysfunction with wall motion  abnormalities.    Cath:  RCA   Mid right coronary artery: There is a 90 % stenosis in the middle  third portion of the segment. ADRIANA Flow 3. Instant wave-free  ratio was performed using a J Tip OmniWire with a calculated value of  0.79. Based on the results, the lesion was judged to be  significant and an intervention was performed.      Recommendations:   Continue dual antiplatelet therapy (aspirin and clopidogrel) for 2  weeks then discontinue aspirin (patient also on apixaban).  Consider LPL PCI should patient have ongoing ischemic symptoms despite  optimal medical therapy.      Imaging:  
Patient seen and examined at bedside.    Overnight Events:   No events.  Patient feels well. Denies any CP, nausea, palpitations, SOB, abdominal pain.   Radial site clean, dry; no hematoma.     REVIEW OF SYSTEMS:  Constitutional:     [x ] negative [ ] fevers [ ] chills [ ] weight loss [ ] weight gain  HEENT:                  [x ] negative [ ] dry eyes [ ] eye irritation [ ] postnasal drip [ ] nasal congestion  CV:                         [ x] negative  [ ] chest pain [ ] orthopnea [ ] palpitations [ ] murmur  Resp:                     [x ] negative [ ] cough [ ] shortness of breath [ ] dyspnea [ ] wheezing [ ] sputum [ ]hemoptysis  GI:                          [ ] negative [ x] nausea [ ] vomiting [ ] diarrhea [ ] constipation [ ] abd pain [ ] dysphagia   :                        [ x] negative [ ] dysuria [ ] nocturia [ ] hematuria [ ] increased urinary frequency  Musculoskeletal: [x ] negative [ ] back pain [ ] myalgias [ ] arthralgias [ ] fracture  Skin:                       [ x] negative [ ] rash [ ] itch  Neurological:        [ x] negative [ ] headache [ ] dizziness [ ] syncope [ ] weakness [ ] numbness  Psychiatric:           [ x] negative [ ] anxiety [ ] depression  Endocrine:            [ x] negative [ ] diabetes [ ] thyroid problem  Heme/Lymph:      [ x] negative [ ] anemia [ ] bleeding problem  Allergic/Immune: [ x] negative [ ] itchy eyes [ ] nasal discharge [ ] hives [ ] angioedema    [ x] All other systems negative          Current Meds:  aspirin enteric coated 81 milliGRAM(s) Oral daily  clopidogrel Tablet 75 milliGRAM(s) Oral daily  dextrose 5%. 1000 milliLiter(s) IV Continuous <Continuous>  dextrose 5%. 1000 milliLiter(s) IV Continuous <Continuous>  dextrose 50% Injectable 25 Gram(s) IV Push once  dextrose 50% Injectable 12.5 Gram(s) IV Push once  dextrose 50% Injectable 25 Gram(s) IV Push once  dextrose Oral Gel 15 Gram(s) Oral once PRN  finasteride 5 milliGRAM(s) Oral daily  glucagon  Injectable 1 milliGRAM(s) IntraMuscular once  insulin lispro (ADMELOG) corrective regimen sliding scale   SubCutaneous three times a day before meals  insulin lispro (ADMELOG) corrective regimen sliding scale   SubCutaneous at bedtime  losartan 50 milliGRAM(s) Oral daily  multivitamin 1 Tablet(s) Oral daily  simvastatin 40 milliGRAM(s) Oral at bedtime  sodium chloride 0.9% lock flush 3 milliLiter(s) IV Push every 8 hours  sodium chloride 0.9%. 1000 milliLiter(s) IV Continuous <Continuous>  tamsulosin 0.4 milliGRAM(s) Oral daily      Vitals:  T(F): 97.9 (06-28), Max: 98.3 (06-27)  HR: 61 (06-27) (61 - 63)  BP: 129/68 (06-28) (129/68 - 150/64)  RR: 18 (06-28)  SpO2: 96% (06-28)  I&O's Summary    27 Jun 2022 07:01  -  28 Jun 2022 07:00  --------------------------------------------------------  IN: 0 mL / OUT: 1 mL / NET: -1 mL    28 Jun 2022 07:01  -  28 Jun 2022 08:11  --------------------------------------------------------  IN: 0 mL / OUT: 1 mL / NET: -1 mL        Physical Exam:  Appearance: No acute distress; well appearing  Eyes: EOMI, no scleral icterus   HEENT: Normal oral mucosa  Cardiovascular: RRR, S1, S2, no murmurs, rubs, or gallops; no edema; no JVD  Respiratory: Clear to auscultation bilaterally, no auditory stridor   Gastrointestinal: soft, non-tender, non-distended with normal bowel sounds  Musculoskeletal: No clubbing; no joint deformity   Neurologic: Non-focal  Lymphatic: No lymphadenopathy  Psychiatry: AAOx3, mood & affect appropriate  Skin: Mild bruising at right radial access site. No rashes, ecchymoses, or cyanosis                          12.0   5.02  )-----------( 132      ( 28 Jun 2022 05:41 )             37.4     06-27    142  |  105  |  28<H>  ----------------------------<  94  4.0   |  26  |  1.34<H>    Ca    9.3      27 Jun 2022 08:55  Phos  3.3     06-27  Mg     1.90     06-27    TPro  6.8  /  Alb  3.9  /  TBili  0.2  /  DBili  x   /  AST  18  /  ALT  10  /  AlkPhos  49  06-27                  New ECG(s): Personally reviewed    Echo:  CONCLUSIONS:  1. Normal mitral valve. Trace mitral regurgitation.  2. Calcified trileaflet aortic valve with normal opening.  No aortic stenosis. No aortic valve regurgitation seen.  3. Normal left ventricular internal dimensions and wall  thicknesses.  4. Mild segmental left ventricular systolic dysfunction (EF  45-50% by visual estimation). There is hypokinesis of the  apical to mid anteroseptal and apical inferoseptal walls  with akinesis of the apical cap.  5. Grade I diastolic dysfunction (Impaired relaxation,  mild).  6. Normal right ventricular size and systolic function  (TAPSE 2.6 cm).  7. RV systolic pressure is normal at  30 mm Hg.  8. Normal tricuspid valve. Mild tricuspid regurgitation.  9. Normal pulmonic valve. Trace pulmonic insufficiency is  noted.  10. No pericardial effusion.

## 2022-06-29 NOTE — DISCHARGE NOTE NURSING/CASE MANAGEMENT/SOCIAL WORK - HAVE YOU HAD A SECOND COVID-19 BOOSTER?
What Type Of Note Output Would You Prefer (Optional)?: Standard Output Is This A New Presentation, Or A Follow-Up?: Rash Additional History: Pt notes the outline from her nipple cover is still present on both breast for a week now and it hasn’t gone away No

## 2022-06-29 NOTE — DISCHARGE NOTE NURSING/CASE MANAGEMENT/SOCIAL WORK - PATIENT PORTAL LINK FT
You can access the FollowMyHealth Patient Portal offered by [facility] by registering at the following website: [email or web address]. By joining [facility]’s FollowMyHealth portal, you will also be able to view your health information using other applications (apps) compatible with our system.

## 2022-06-29 NOTE — DISCHARGE NOTE NURSING/CASE MANAGEMENT/SOCIAL WORK - NSDCPEFALRISK_GEN_ALL_CORE
For information on Fall & Injury Prevention, visit: [email or web address] OR  [email or web address] OR  https://www.cdc.gov/steadi/patient.html

## 2022-06-30 ENCOUNTER — NON-APPOINTMENT (OUTPATIENT)
Age: 87
End: 2022-06-30

## 2022-06-30 RX ORDER — EZETIMIBE 10 MG/1
1 TABLET ORAL
Qty: 30 | Refills: 0
Start: 2022-06-30 | End: 2022-07-29

## 2022-06-30 RX ORDER — SIMVASTATIN 20 MG/1
1 TABLET, FILM COATED ORAL
Qty: 30 | Refills: 0
Start: 2022-06-30 | End: 2022-07-29

## 2022-07-13 ENCOUNTER — APPOINTMENT (OUTPATIENT)
Dept: INTERNAL MEDICINE | Facility: CLINIC | Age: 87
End: 2022-07-13

## 2022-07-13 VITALS
RESPIRATION RATE: 16 BRPM | BODY MASS INDEX: 23.7 KG/M2 | TEMPERATURE: 97.5 F | OXYGEN SATURATION: 96 % | HEART RATE: 76 BPM | SYSTOLIC BLOOD PRESSURE: 134 MMHG | DIASTOLIC BLOOD PRESSURE: 64 MMHG | WEIGHT: 160 LBS | HEIGHT: 69 IN

## 2022-07-13 PROBLEM — I73.9 PERIPHERAL VASCULAR DISEASE, UNSPECIFIED: Chronic | Status: ACTIVE | Noted: 2017-05-10

## 2022-07-13 PROBLEM — N18.9 CHRONIC KIDNEY DISEASE, UNSPECIFIED: Chronic | Status: ACTIVE | Noted: 2022-06-27

## 2022-07-13 PROCEDURE — 99214 OFFICE O/P EST MOD 30 MIN: CPT

## 2022-07-13 RX ORDER — EZETIMIBE AND SIMVASTATIN 10; 40 MG/1; MG/1
10-40 TABLET ORAL DAILY
Qty: 1 | Refills: 3 | Status: DISCONTINUED | COMMUNITY
Start: 2021-09-12 | End: 2022-07-13

## 2022-08-02 ENCOUNTER — APPOINTMENT (OUTPATIENT)
Dept: INTERNAL MEDICINE | Facility: CLINIC | Age: 87
End: 2022-08-02

## 2022-08-02 VITALS
DIASTOLIC BLOOD PRESSURE: 67 MMHG | BODY MASS INDEX: 23.99 KG/M2 | WEIGHT: 162 LBS | SYSTOLIC BLOOD PRESSURE: 135 MMHG | TEMPERATURE: 97.9 F | RESPIRATION RATE: 16 BRPM | HEART RATE: 72 BPM | HEIGHT: 69 IN | OXYGEN SATURATION: 96 %

## 2022-08-02 DIAGNOSIS — D69.6 THROMBOCYTOPENIA, UNSPECIFIED: ICD-10-CM

## 2022-08-02 PROCEDURE — 99213 OFFICE O/P EST LOW 20 MIN: CPT

## 2022-08-02 NOTE — HISTORY OF PRESENT ILLNESS
[Post-hospitalization from ___ Hospital] : Post-hospitalization from [unfilled] Hospital [Admitted on: ___] : The patient was admitted on [unfilled] [Discharged on ___] : discharged on [unfilled] [FreeTextEntry1] : f/u [de-identified] : 86 yo reports improvement post recent angioplasty\par was evaluated by card.for atypical R.side c.p. 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,with christopher-infarction ischemia,reduced  EF,underwent coronary angio and PCI to mLAD and RCA due to high degree stenosis.was d/c-ed on ASA for 3 weeks,now continues on Plavix,Eliquis.\par currently feels better,denies c.p.,sob,palpitations,PND,dizziness,syncope.\par h/o long standing PAD,st.p.successful revascularization procedures\par PMH of -T2D,last A1C 8%,denies hypoglycemia Sx's,denies tingling,numbness in his feet.\par -mild CRI BUN/Cr 22/1,37,GFR 51,2 month ago\par -h/o HTN,well controlled\par -HLD,low HDL,LDL 40 ,on statin [FreeTextEntry2] : 88 yo\par was evaluated by card.for atypical R.side c.p. 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

## 2022-08-02 NOTE — COUNSELING
[Fall prevention counseling provided] : Fall prevention counseling provided [FreeTextEntry2] : ADA,low salt,low chol.diet [de-identified] : healthy eating,ambulation [None] : None [Good understanding] : Patient has a good understanding of lifestyle changes and steps needed to achieve self management goal

## 2022-08-02 NOTE — PHYSICAL EXAM
[No Acute Distress] : no acute distress [Well Nourished] : well nourished [Well Developed] : well developed [Well-Appearing] : well-appearing [Normal Sclera/Conjunctiva] : normal sclera/conjunctiva [PERRL] : pupils equal round and reactive to light [EOMI] : extraocular movements intact [Normal Outer Ear/Nose] : the outer ears and nose were normal in appearance [Normal Oropharynx] : the oropharynx was normal [No JVD] : no jugular venous distention [Supple] : supple [No Lymphadenopathy] : no lymphadenopathy [Thyroid Normal, No Nodules] : the thyroid was normal and there were no nodules present [Clear to Auscultation] : lungs were clear to auscultation bilaterally [Normal Rate] : normal rate  [Regular Rhythm] : with a regular rhythm [Normal S1, S2] : normal S1 and S2 [No Murmur] : no murmur heard [No Edema] : there was no peripheral edema [Soft] : abdomen soft [Non-distended] : non-distended [No Masses] : no abdominal mass palpated [No HSM] : no HSM [Normal Bowel Sounds] : normal bowel sounds [No Hernias] : no hernias [Declined Rectal Exam] : declined rectal exam [Normal Supraclavicular Nodes] : no supraclavicular lymphadenopathy [No CVA Tenderness] : no CVA  tenderness [No Spinal Tenderness] : no spinal tenderness [No Joint Swelling] : no joint swelling [Grossly Normal Strength/Tone] : grossly normal strength/tone [No Rash] : no rash [Normal Gait] : normal gait [Coordination Grossly Intact] : coordination grossly intact [No Focal Deficits] : no focal deficits [Normal Affect] : the affect was normal [Normal Insight/Judgement] : insight and judgment were intact [Right Foot Was Examined] : Right foot ~C was examined [Left Foot Was Examined] : left foot ~C was examined [] : both feet [de-identified] : faint R.carotid bruit.pedal pulses not appreciated [de-identified] : mild tenderness,L.inguinal area. [TWNoteComboBox3] : 0 [TWNoteComboBox4] : 0

## 2022-08-02 NOTE — ASSESSMENT
[FreeTextEntry1] : 86 yo post recent Dx of apical,septal MI,periinfarction ischemia,st.p.PCI to mLAD,RCA\par h/o T2D,HTN,HLD,PAD,st.p.successful re vascularization procedures,currently on ASA,Plavix,Eliquis,\par he will stop ASA now(2 weeks post PCI) and he will continue Plavix,Eliquis.\par also will d/c Zetia/?Simvastatin,will start Atorvastatin 40 mg.\par will f/u.

## 2022-08-02 NOTE — COUNSELING
[Fall prevention counseling provided] : Fall prevention counseling provided [Good understanding] : Patient has a good understanding of disease, goals and obesity follow-up plan [FreeTextEntry2] : ADA,low salt,low chol

## 2022-08-02 NOTE — PHYSICAL EXAM
[No Acute Distress] : no acute distress [Well Nourished] : well nourished [Well Developed] : well developed [Well-Appearing] : well-appearing [Normal Sclera/Conjunctiva] : normal sclera/conjunctiva [PERRL] : pupils equal round and reactive to light [EOMI] : extraocular movements intact [Normal Outer Ear/Nose] : the outer ears and nose were normal in appearance [Normal Oropharynx] : the oropharynx was normal [No JVD] : no jugular venous distention [Supple] : supple [No Lymphadenopathy] : no lymphadenopathy [Thyroid Normal, No Nodules] : the thyroid was normal and there were no nodules present [Clear to Auscultation] : lungs were clear to auscultation bilaterally [Normal Rate] : normal rate  [Regular Rhythm] : with a regular rhythm [Normal S1, S2] : normal S1 and S2 [No Murmur] : no murmur heard [No Edema] : there was no peripheral edema [Soft] : abdomen soft [Non-distended] : non-distended [No Masses] : no abdominal mass palpated [No HSM] : no HSM [Normal Bowel Sounds] : normal bowel sounds [No Hernias] : no hernias [Declined Rectal Exam] : declined rectal exam [Normal Supraclavicular Nodes] : no supraclavicular lymphadenopathy [No CVA Tenderness] : no CVA  tenderness [No Spinal Tenderness] : no spinal tenderness [No Joint Swelling] : no joint swelling [Grossly Normal Strength/Tone] : grossly normal strength/tone [No Rash] : no rash [Normal Gait] : normal gait [Coordination Grossly Intact] : coordination grossly intact [No Focal Deficits] : no focal deficits [Normal Affect] : the affect was normal [Normal Insight/Judgement] : insight and judgment were intact [Right Foot Was Examined] : Right foot ~C was examined [Left Foot Was Examined] : left foot ~C was examined [] : both feet [No Abdominal Bruit] : a ~M bruit was not heard ~T in the abdomen [de-identified] : faint R.carotid bruit.pedal pulses not appreciated [de-identified] : mild tenderness,L.inguinal area. [TWNoteComboBox3] : 0 [TWNoteComboBox4] : 0

## 2022-08-02 NOTE — HISTORY OF PRESENT ILLNESS
[Post-hospitalization from ___ Hospital] : Post-hospitalization from [unfilled] Hospital [Admitted on: ___] : The patient was admitted on [unfilled] [Discharged on ___] : discharged on [unfilled] [FreeTextEntry2] : 86 yo comes with his son.\par was evaluated by card.for atypical R.side c.p. 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

## 2022-08-02 NOTE — ASSESSMENT
[FreeTextEntry1] : 86 yo post recent Dx of apical,septal MI,periinfarction ischemia,st.p.PCI to mLAD,RCA,on Plavix,Eliquis,\par h/o T2D,HTN,HLD,PAD,st.p.successful re vascularization procedures.\par cont.Atorvastatin 40 mg.\par labs\par card.f/u\par will f/u.

## 2022-08-03 LAB
25(OH)D3 SERPL-MCNC: 62.7 NG/ML
ALBUMIN SERPL ELPH-MCNC: 4.6 G/DL
ALP BLD-CCNC: 59 U/L
ALT SERPL-CCNC: 11 U/L
ANION GAP SERPL CALC-SCNC: 13 MMOL/L
APPEARANCE: CLEAR
AST SERPL-CCNC: 19 U/L
BASOPHILS # BLD AUTO: 0.05 K/UL
BASOPHILS NFR BLD AUTO: 0.8 %
BILIRUB SERPL-MCNC: 0.3 MG/DL
BILIRUBIN URINE: NEGATIVE
BLOOD URINE: NEGATIVE
BUN SERPL-MCNC: 25 MG/DL
CALCIUM SERPL-MCNC: 9.9 MG/DL
CHLORIDE SERPL-SCNC: 101 MMOL/L
CHOLEST SERPL-MCNC: 87 MG/DL
CO2 SERPL-SCNC: 26 MMOL/L
COLOR: NORMAL
CREAT SERPL-MCNC: 1.26 MG/DL
CREAT SPEC-SCNC: 49 MG/DL
EGFR: 56 ML/MIN/1.73M2
EOSINOPHIL # BLD AUTO: 0.23 K/UL
EOSINOPHIL NFR BLD AUTO: 3.8 %
ESTIMATED AVERAGE GLUCOSE: 148 MG/DL
GLUCOSE QUALITATIVE U: NEGATIVE
GLUCOSE SERPL-MCNC: 205 MG/DL
HBA1C MFR BLD HPLC: 6.8 %
HCT VFR BLD CALC: 39.2 %
HDLC SERPL-MCNC: 36 MG/DL
HGB BLD-MCNC: 12.2 G/DL
IMM GRANULOCYTES NFR BLD AUTO: 0.3 %
KETONES URINE: NEGATIVE
LDLC SERPL CALC-MCNC: 25 MG/DL
LEUKOCYTE ESTERASE URINE: NEGATIVE
LYMPHOCYTES # BLD AUTO: 1.14 K/UL
LYMPHOCYTES NFR BLD AUTO: 19 %
MAN DIFF?: NORMAL
MCHC RBC-ENTMCNC: 28 PG
MCHC RBC-ENTMCNC: 31.1 GM/DL
MCV RBC AUTO: 89.9 FL
MICROALBUMIN 24H UR DL<=1MG/L-MCNC: <1.2 MG/DL
MICROALBUMIN/CREAT 24H UR-RTO: NORMAL MG/G
MONOCYTES # BLD AUTO: 0.48 K/UL
MONOCYTES NFR BLD AUTO: 8 %
NEUTROPHILS # BLD AUTO: 4.07 K/UL
NEUTROPHILS NFR BLD AUTO: 68.1 %
NITRITE URINE: NEGATIVE
NONHDLC SERPL-MCNC: 51 MG/DL
PH URINE: 6.5
PLATELET # BLD AUTO: 141 K/UL
POTASSIUM SERPL-SCNC: 4.8 MMOL/L
PROT SERPL-MCNC: 7 G/DL
PROTEIN URINE: NEGATIVE
RBC # BLD: 4.36 M/UL
RBC # FLD: 14.7 %
SODIUM SERPL-SCNC: 141 MMOL/L
SPECIFIC GRAVITY URINE: 1.01
TRIGL SERPL-MCNC: 131 MG/DL
TSH SERPL-ACNC: 0.7 UIU/ML
UROBILINOGEN URINE: NORMAL
WBC # FLD AUTO: 5.99 K/UL

## 2022-08-03 RX ORDER — SIMVASTATIN 20 MG/1
20 TABLET, FILM COATED ORAL
Qty: 30 | Refills: 0 | Status: DISCONTINUED | COMMUNITY
Start: 2022-06-30

## 2022-08-03 RX ORDER — EZETIMIBE 10 MG/1
10 TABLET ORAL
Qty: 30 | Refills: 0 | Status: DISCONTINUED | COMMUNITY
Start: 2022-06-30

## 2022-08-19 ENCOUNTER — APPOINTMENT (OUTPATIENT)
Dept: VASCULAR SURGERY | Facility: CLINIC | Age: 87
End: 2022-08-19

## 2022-08-19 VITALS
DIASTOLIC BLOOD PRESSURE: 64 MMHG | HEIGHT: 69 IN | BODY MASS INDEX: 24.44 KG/M2 | SYSTOLIC BLOOD PRESSURE: 132 MMHG | WEIGHT: 165 LBS | HEART RATE: 67 BPM

## 2022-08-19 PROCEDURE — 99214 OFFICE O/P EST MOD 30 MIN: CPT

## 2022-08-19 PROCEDURE — 93925 LOWER EXTREMITY STUDY: CPT

## 2022-08-19 NOTE — ASSESSMENT
[FreeTextEntry1] : 85 yo male with history of dm, htn, hld, presents for follow up of pad with claudications s/p  b/l sfa stents and recent right lower extremity angiogram with atherectomy and angioplasty of in-stent stenosis. \par \par \par Patient reports significant improvement of symptoms.  No rest pain or claudication.\par \par Continue aspirin and Eliquis.\par pt to follow up in 3 months with repeat duplex

## 2022-08-19 NOTE — PHYSICAL EXAM
[JVD] : no jugular venous distention  [2+] : left 2+ [Ankle Swelling (On Exam)] : not present [Varicose Veins Of Lower Extremities] : not present [] : not present [No Rash or Lesion] : No rash or lesion [Skin Ulcer] : no ulcer [Alert] : alert [Calm] : calm [de-identified] : appears well

## 2022-08-29 ENCOUNTER — APPOINTMENT (OUTPATIENT)
Dept: CARDIOLOGY | Facility: CLINIC | Age: 87
End: 2022-08-29

## 2022-08-29 VITALS
TEMPERATURE: 96.5 F | SYSTOLIC BLOOD PRESSURE: 138 MMHG | WEIGHT: 160 LBS | HEART RATE: 74 BPM | BODY MASS INDEX: 23.7 KG/M2 | HEIGHT: 69 IN | DIASTOLIC BLOOD PRESSURE: 77 MMHG | OXYGEN SATURATION: 97 %

## 2022-08-29 PROCEDURE — 99214 OFFICE O/P EST MOD 30 MIN: CPT

## 2022-08-29 NOTE — PHYSICAL EXAM
[Well Developed] : well developed [Well Nourished] : well nourished [No Acute Distress] : no acute distress [Normal] : normal conjunctiva [Normal Conjunctiva] : normal conjunctiva [Normal Venous Pressure] : normal venous pressure [Normal S1, S2] : normal S1, S2 [No Murmur] : no murmur [No Rub] : no rub [No Gallop] : no gallop [Clear Lung Fields] : clear lung fields [Good Air Entry] : good air entry [Soft] : abdomen soft [Non Tender] : non-tender [Normal Bowel Sounds] : normal bowel sounds [No Edema] : no edema

## 2022-08-30 NOTE — HISTORY OF PRESENT ILLNESS
[FreeTextEntry1] : Patient is a 87 yoM with HTN, HLD, PAD (s/p L SFA stent), CAD (s/p 2 stent in July 2022), comes to the clinic for follow up 2 months after cardiac Catherization and stent placement. He reports significant symptom improvement since the stent placement. He states he no longer has chest pain or dyspnea. He endorses occasional mild dizziness when taking more than 10 stairs. He denies headache, bleeding, fever, chills, n/v, chest pain, dyspnea, abdominal pain, urinary or bowel movement changes. Patient reports compliance with all medications, denies adverse effects.\par

## 2022-08-30 NOTE — END OF VISIT
[] : Resident [FreeTextEntry3] : 87 old male with HTN, HLD, CKD, PAD s/p L SFA stent, with abnormal nuclear stress test in 06/2022 showing apical MI and EF 45 to 50%, with subsequent cardiac catheterization and placement of LISA to mid LAD and mid RCA, with residual stenosis of LPL, who presents for follow-up visit after his cardiac catheterization.  Patient reports significant improvement in symptoms after the procedure.  He is currently taking clopidogrel and Eliquis and denies any bleeding issues.  He is able to ambulate without complaints.\par \par 1.  CAD s/p PCI: Will have patient continue clopidogrel and Eliquis; patient is at higher risk of restenosis given his SFA stent restenosis, therefore will leave on clopidogrel and Eliquis in preference to aspirin\par – Continue atorvastatin\par – Restarted patient's metoprolol\par – We will consider sending patient for PCI of LPL if he has further anginal symptoms in the future\par \par 2.  HTN: Will be well controlled with resumption of metoprolol.  In light of his CKD, if need additional BP support will restart losartan but without HCTZ\par \par 3.  HLD: On atorvastatin, LDL is at target\par \par Follow-up visit in 6 months or as needed

## 2022-08-30 NOTE — ASSESSMENT
[FreeTextEntry1] : Patient is a 87yoM, PMH of HTN, HLD, DM, PAD (s/p stent), CAD (s/p 2 stent in July 2022), comes to the clinic for follow up 2 months after cardiac Catherization and stent placement.\par \par 1. CAD w/ recent 2 stent placement\par - no anginal chest pain\par - continue with plavix and eliquis\par - plan to cath again if symptomatic for LPL blockage in the future\par \par 2. HTN\par - reports his primary care physician stopped all his anti-hypertensive medications\par - will start back on metoprolol ER 25mg once a day\par \par 3. HLD, on lipitor\par - continue with lipitor
The patient is a 12y Male complaining of dental pain/injury.

## 2022-09-07 ENCOUNTER — NON-APPOINTMENT (OUTPATIENT)
Age: 87
End: 2022-09-07

## 2022-09-16 ENCOUNTER — APPOINTMENT (OUTPATIENT)
Dept: UROLOGY | Facility: CLINIC | Age: 87
End: 2022-09-16

## 2022-09-16 VITALS
HEIGHT: 69 IN | BODY MASS INDEX: 24.29 KG/M2 | SYSTOLIC BLOOD PRESSURE: 135 MMHG | HEART RATE: 73 BPM | OXYGEN SATURATION: 96 % | TEMPERATURE: 97.2 F | WEIGHT: 164 LBS | DIASTOLIC BLOOD PRESSURE: 77 MMHG

## 2022-09-16 PROCEDURE — 99213 OFFICE O/P EST LOW 20 MIN: CPT

## 2022-09-16 NOTE — ASSESSMENT
[FreeTextEntry1] : Mr. Ruggiero is a very pleasant 87 year old man here today for history of BPH.\par He reports feeling well with no real complaints.\par Denies any dysuria, hematuria or urinary retention.\par Continues to take tamsulosin and finasteride daily.\par He is a happy with his urinary symptoms at this time.\par DORCAS slightly enlarged no nodules or indurations.\par Continue tamsulosin - does not need refills at the moment.\par Continue finasteride - does not need refills at the moment.\par RTO in 6 months.

## 2022-09-16 NOTE — PHYSICAL EXAM
[General Appearance - Well Developed] : well developed [General Appearance - Well Nourished] : well nourished [Normal Appearance] : normal appearance [Well Groomed] : well groomed [General Appearance - In No Acute Distress] : no acute distress [Abdomen Soft] : soft [Abdomen Tenderness] : non-tender [Costovertebral Angle Tenderness] : no ~M costovertebral angle tenderness [Edema] : no peripheral edema [] : no respiratory distress [Respiration, Rhythm And Depth] : normal respiratory rhythm and effort [Exaggerated Use Of Accessory Muscles For Inspiration] : no accessory muscle use [Oriented To Time, Place, And Person] : oriented to person, place, and time [Affect] : the affect was normal [Mood] : the mood was normal [Not Anxious] : not anxious [Normal Station and Gait] : the gait and station were normal for the patient's age [No Focal Deficits] : no focal deficits [No Palpable Adenopathy] : no palpable adenopathy [FreeTextEntry1] : DORCAS slightly enlarged, no nodules or indurations.

## 2022-09-16 NOTE — HISTORY OF PRESENT ILLNESS
[None] : None [FreeTextEntry1] : Mr. Ruggiero is a very pleasant 87 year old man here today for history of BPH.\par He reports feeling well with no real complaints.\par Denies any dysuria, hematuria or urinary retention.\par Continues to take tamsulosin and finasteride daily.\par He is a happy with his urinary symptoms at this time.

## 2022-10-08 DIAGNOSIS — R05.9 COUGH, UNSPECIFIED: ICD-10-CM

## 2022-10-11 ENCOUNTER — TRANSCRIPTION ENCOUNTER (OUTPATIENT)
Age: 87
End: 2022-10-11

## 2022-10-12 ENCOUNTER — NON-APPOINTMENT (OUTPATIENT)
Age: 87
End: 2022-10-12

## 2022-10-14 ENCOUNTER — NON-APPOINTMENT (OUTPATIENT)
Age: 87
End: 2022-10-14

## 2022-10-14 RX ORDER — LOSARTAN POTASSIUM AND HYDROCHLOROTHIAZIDE 12.5; 5 MG/1; MG/1
50-12.5 TABLET ORAL
Qty: 90 | Refills: 3 | Status: DISCONTINUED | COMMUNITY
Start: 2019-04-10 | End: 2022-10-14

## 2022-10-18 ENCOUNTER — INPATIENT (INPATIENT)
Facility: HOSPITAL | Age: 87
LOS: 12 days | Discharge: NURSING, REHAB OR LONG-TERM CARE | DRG: 553 | End: 2022-10-31
Attending: HOSPITALIST | Admitting: HOSPITALIST
Payer: MEDICARE

## 2022-10-18 ENCOUNTER — APPOINTMENT (OUTPATIENT)
Dept: INTERNAL MEDICINE | Facility: CLINIC | Age: 87
End: 2022-10-18

## 2022-10-18 VITALS
HEIGHT: 70 IN | SYSTOLIC BLOOD PRESSURE: 144 MMHG | OXYGEN SATURATION: 95 % | RESPIRATION RATE: 18 BRPM | TEMPERATURE: 98 F | DIASTOLIC BLOOD PRESSURE: 65 MMHG | WEIGHT: 164.91 LBS | HEART RATE: 60 BPM

## 2022-10-18 DIAGNOSIS — M25.569 PAIN IN UNSPECIFIED KNEE: ICD-10-CM

## 2022-10-18 DIAGNOSIS — Z95.820 PERIPHERAL VASCULAR ANGIOPLASTY STATUS WITH IMPLANTS AND GRAFTS: Chronic | ICD-10-CM

## 2022-10-18 DIAGNOSIS — Z90.89 ACQUIRED ABSENCE OF OTHER ORGANS: Chronic | ICD-10-CM

## 2022-10-18 DIAGNOSIS — U07.1 COVID-19: ICD-10-CM

## 2022-10-18 LAB
ALBUMIN SERPL ELPH-MCNC: 2.8 G/DL — LOW (ref 3.5–5)
ALP SERPL-CCNC: 61 U/L — SIGNIFICANT CHANGE UP (ref 40–120)
ALT FLD-CCNC: 34 U/L DA — SIGNIFICANT CHANGE UP (ref 10–60)
ANION GAP SERPL CALC-SCNC: 7 MMOL/L — SIGNIFICANT CHANGE UP (ref 5–17)
APTT BLD: 32.2 SEC — SIGNIFICANT CHANGE UP (ref 27.5–35.5)
AST SERPL-CCNC: 39 U/L — SIGNIFICANT CHANGE UP (ref 10–40)
BASOPHILS # BLD AUTO: 0.01 K/UL — SIGNIFICANT CHANGE UP (ref 0–0.2)
BASOPHILS NFR BLD AUTO: 0.1 % — SIGNIFICANT CHANGE UP (ref 0–2)
BILIRUB SERPL-MCNC: 0.5 MG/DL — SIGNIFICANT CHANGE UP (ref 0.2–1.2)
BUN SERPL-MCNC: 20 MG/DL — HIGH (ref 7–18)
CALCIUM SERPL-MCNC: 9.4 MG/DL — SIGNIFICANT CHANGE UP (ref 8.4–10.5)
CHLORIDE SERPL-SCNC: 108 MMOL/L — SIGNIFICANT CHANGE UP (ref 96–108)
CO2 SERPL-SCNC: 26 MMOL/L — SIGNIFICANT CHANGE UP (ref 22–31)
CREAT SERPL-MCNC: 1.06 MG/DL — SIGNIFICANT CHANGE UP (ref 0.5–1.3)
EGFR: 69 ML/MIN/1.73M2 — SIGNIFICANT CHANGE UP
EOSINOPHIL # BLD AUTO: 0.15 K/UL — SIGNIFICANT CHANGE UP (ref 0–0.5)
EOSINOPHIL NFR BLD AUTO: 2 % — SIGNIFICANT CHANGE UP (ref 0–6)
ERYTHROCYTE [SEDIMENTATION RATE] IN BLOOD: 83 MM/HR — HIGH (ref 0–20)
FLUAV AG NPH QL: SIGNIFICANT CHANGE UP
FLUBV AG NPH QL: SIGNIFICANT CHANGE UP
GLUCOSE SERPL-MCNC: 142 MG/DL — HIGH (ref 70–99)
HCT VFR BLD CALC: 35.2 % — LOW (ref 39–50)
HGB BLD-MCNC: 11.2 G/DL — LOW (ref 13–17)
IMM GRANULOCYTES NFR BLD AUTO: 0.4 % — SIGNIFICANT CHANGE UP (ref 0–0.9)
INR BLD: 1.51 RATIO — HIGH (ref 0.88–1.16)
LYMPHOCYTES # BLD AUTO: 0.79 K/UL — LOW (ref 1–3.3)
LYMPHOCYTES # BLD AUTO: 10.3 % — LOW (ref 13–44)
MCHC RBC-ENTMCNC: 27.6 PG — SIGNIFICANT CHANGE UP (ref 27–34)
MCHC RBC-ENTMCNC: 31.8 GM/DL — LOW (ref 32–36)
MCV RBC AUTO: 86.7 FL — SIGNIFICANT CHANGE UP (ref 80–100)
MONOCYTES # BLD AUTO: 0.55 K/UL — SIGNIFICANT CHANGE UP (ref 0–0.9)
MONOCYTES NFR BLD AUTO: 7.2 % — SIGNIFICANT CHANGE UP (ref 2–14)
NEUTROPHILS # BLD AUTO: 6.14 K/UL — SIGNIFICANT CHANGE UP (ref 1.8–7.4)
NEUTROPHILS NFR BLD AUTO: 80 % — HIGH (ref 43–77)
NRBC # BLD: 0 /100 WBCS — SIGNIFICANT CHANGE UP (ref 0–0)
PLATELET # BLD AUTO: 213 K/UL — SIGNIFICANT CHANGE UP (ref 150–400)
POTASSIUM SERPL-MCNC: 4.4 MMOL/L — SIGNIFICANT CHANGE UP (ref 3.5–5.3)
POTASSIUM SERPL-SCNC: 4.4 MMOL/L — SIGNIFICANT CHANGE UP (ref 3.5–5.3)
PROT SERPL-MCNC: 7.1 G/DL — SIGNIFICANT CHANGE UP (ref 6–8.3)
PROTHROM AB SERPL-ACNC: 18.1 SEC — HIGH (ref 10.5–13.4)
RBC # BLD: 4.06 M/UL — LOW (ref 4.2–5.8)
RBC # FLD: 14.1 % — SIGNIFICANT CHANGE UP (ref 10.3–14.5)
SARS-COV-2 RNA SPEC QL NAA+PROBE: DETECTED
SODIUM SERPL-SCNC: 141 MMOL/L — SIGNIFICANT CHANGE UP (ref 135–145)
WBC # BLD: 7.67 K/UL — SIGNIFICANT CHANGE UP (ref 3.8–10.5)
WBC # FLD AUTO: 7.67 K/UL — SIGNIFICANT CHANGE UP (ref 3.8–10.5)

## 2022-10-18 PROCEDURE — 73564 X-RAY EXAM KNEE 4 OR MORE: CPT | Mod: 26,LT

## 2022-10-18 PROCEDURE — 99441: CPT

## 2022-10-18 PROCEDURE — 93971 EXTREMITY STUDY: CPT | Mod: 26,LT

## 2022-10-18 PROCEDURE — 72125 CT NECK SPINE W/O DYE: CPT | Mod: 26,MA

## 2022-10-18 PROCEDURE — 20611 DRAIN/INJ JOINT/BURSA W/US: CPT

## 2022-10-18 PROCEDURE — 71045 X-RAY EXAM CHEST 1 VIEW: CPT | Mod: 26

## 2022-10-18 PROCEDURE — 70450 CT HEAD/BRAIN W/O DYE: CPT | Mod: 26,MA

## 2022-10-18 PROCEDURE — 99285 EMERGENCY DEPT VISIT HI MDM: CPT | Mod: 25

## 2022-10-18 RX ORDER — ACETAMINOPHEN 500 MG
975 TABLET ORAL ONCE
Refills: 0 | Status: COMPLETED | OUTPATIENT
Start: 2022-10-18 | End: 2022-10-18

## 2022-10-18 RX ORDER — CEFTRIAXONE 500 MG/1
1000 INJECTION, POWDER, FOR SOLUTION INTRAMUSCULAR; INTRAVENOUS ONCE
Refills: 0 | Status: COMPLETED | OUTPATIENT
Start: 2022-10-18 | End: 2022-10-18

## 2022-10-18 RX ADMIN — Medication 975 MILLIGRAM(S): at 20:05

## 2022-10-18 RX ADMIN — CEFTRIAXONE 100 MILLIGRAM(S): 500 INJECTION, POWDER, FOR SOLUTION INTRAMUSCULAR; INTRAVENOUS at 23:23

## 2022-10-18 RX ADMIN — Medication 975 MILLIGRAM(S): at 20:35

## 2022-10-18 NOTE — ED PROCEDURE NOTE - CPROC ED POST PROC CARE GUIDE1
Freddie de leon (extra green), lactic, ammonia, blood gas (venous), blood culture 1 on patient.       Zettie Homans  06/10/20 [id number] Verbal/written post procedure instructions were given to patient/caregiver./Instructed patient/caregiver to follow-up with primary care physician./Instructed patient/caregiver regarding signs and symptoms of infection./Keep the cast/splint/dressing clean and dry.

## 2022-10-18 NOTE — ED PROVIDER NOTE - ATTENDING CONTRIBUTION TO CARE
87 year old male with a pmhx of CAD, s/p stents, PAD, on Plavix, Eliquis, DM, BPH, HTN,  suspected fall/injury left knee 2 days ago  recent admission for COVID  ttp warm to anterior left knee  limited rom due to pain  minimal effusion  insufficient fluid on arthrocentesis  differential trauma, gout, septic arthritis  admission for ambulatory dysfunction, ortho eval,

## 2022-10-18 NOTE — ED PROVIDER NOTE - CLINICAL SUMMARY MEDICAL DECISION MAKING FREE TEXT BOX
Orlando Lopez, PGY-3- 87 year old male with L knee pain s/p fall that occurred 2 days ago. Unknown LOC. Pt on Eliquis and Plavix. Recent hospitalization for COVID. Unclear etiology of knee pain or fall. Will check infectious work up/metabolic derangement. Plan for rectal temp. Check ESR/CRP. May need tap, dispo pending work up. DVT study unlikely to be positive given pt already on AC, though at higher risk with recent hospitalization and covid-19 infection.

## 2022-10-18 NOTE — HISTORY OF PRESENT ILLNESS
[Home] : at home, [unfilled] , at the time of the visit. [Medical Office: ([facility])___] : at the medical office located in  [Verbal consent obtained from patient] : the patient, [unfilled] [FreeTextEntry1] : f/u post COVID [de-identified] : 88 yo reports improvement post recent admission for few days to  with COVID19 infection,poss.Pn.was Rxed with?,improved,currently asymptomatic:denies fever,chills,cough,dyspnea,cp.,palpitations,swelling.\par was Dxed  apical/septal MI,with christopher-infarction ischemia,reduced  EF,underwent coronary angio and PCI to mLAD and RCA due to high degree stenosis.was d/c-ed on ASA for 3 weeks,now continues on Plavix,Eliquis.\par currently feels better,denies c.p.,sob,palpitations,PND,dizziness,syncope.\par h/o long standing PAD,st.p.successful revascularization procedures\par PMH of -T2D,last A1C 6,8%,denies hypoglycemia Sx's,denies tingling,numbness in his feet.\par -mild CRI BUN/Cr 22/1,37,GFR 51,2 month ago,on Metformin\par -h/o HTN,well controlled\par -HLD,low HDL,LDL 40 ,on statin

## 2022-10-18 NOTE — ASSESSMENT
[FreeTextEntry1] : 88 yo ws admitted to  with COVID19 Pn,Rxed with?,no intubation,improved,d/c-ed\par  post recent Dx of apical,septal MI,periinfarction ischemia,st.p.PCI to mLAD,RCA,on Plavix,Eliquis,\par h/o T2D,HTN,HLD,PAD,st.p.successful re vascularization procedures.\par cont.meds\par labs\par card.f/u\par will f/u.

## 2022-10-18 NOTE — ED PROVIDER NOTE - PROGRESS NOTE DETAILS
Orlando Lopez, PGY-3- joint aspiration with 0.5 cc fluid. Clear appearing. Not enough to send to lab. Will start on Ceftriaxone for cellulitis. Pt is moving joint better than before. Hospitalist paged for admission.

## 2022-10-18 NOTE — ED ADULT TRIAGE NOTE - PAIN RATING/NUMBER SCALE (0-10): REST
Assumed care of patient, report received from GABO Erazo. Patient resting in bed, tolerating BiPap. VSS. Safety precautions in place.   8

## 2022-10-18 NOTE — ED ADULT NURSE NOTE - NSIMPLEMENTINTERV_GEN_ALL_ED
Implemented All Fall Risk Interventions:  [place] to call system. Call bell, personal items and telephone within reach. Instruct patient to call for assistance. Room bathroom lighting operational. Non-slip footwear when patient is off stretcher. Physically safe environment: no spills, clutter or unnecessary equipment. Stretcher in lowest position, wheels locked, appropriate side rails in place. Provide visual cue, wrist band, yellow gown, etc. Monitor gait and stability. Monitor for mental status changes and reorient to person, place, and time. Review medications for side effects contributing to fall risk. Reinforce activity limits and safety measures with patient and family.

## 2022-10-18 NOTE — ED ADULT TRIAGE NOTE - CHIEF COMPLAINT QUOTE
s/p fall last night unwitnessed with left knee pain with movement, unable to walk with walker today per EMS. Pt reports " doesn't remember falling and its been painful for 2 days". Pt takes Elimaddi. Maximino Alvarez [phone]

## 2022-10-18 NOTE — ED ADULT NURSE NOTE - CHIEF COMPLAINT QUOTE
s/p fall last night unwitnessed with left knee pain with movement, unable to walk with walker today per EMS. Pt reports " doesn't remember falling and its been painful for 2 days". Pt takes Elimaddi. Maximino Alvarez [phone]

## 2022-10-18 NOTE — ED PROVIDER NOTE - OBJECTIVE STATEMENT
Orlando Lopez, PGY-3- 87 year old male with a pmhx of CAD, s/p stents, PAD, on Plavix, Eliquis, DM, BPH, HTN, is brought to ED by EMS for evaluation of fall and pain in knee. Per son, pt felt 2 days ago and hurt his L knee. Unknown if head-strike or LOC. Fall unwitnessed. Pt having progressive worsening ambulating 2/2 pain since then. Was hospitalized 11 days ago for COVID-19 infection and weakness. Stayed 5 days and was discharged home ambulatory without assistive devices. Pt reports knee pain started while in hospital. No hx of fever, chills, cp, sob, cough, vomiting, diarrhea, calf pain. No hx of DVT/PE. Pt does not recall if he hit his head or not. Pt is AOx3 at baseline but sometimes forgets recent events, per son.

## 2022-10-18 NOTE — ED PROVIDER NOTE - PHYSICAL EXAMINATION
General: well appearing, no acute distress, AOx3  Skin: no rash, no pallor  Head: normocephalic, atraumatic  Eyes: clear conjunctiva, EOMI  ENMT: airway patent, no nasal discharge  Cardiovascular: normal rate, normal rhythm, S1/S2  Pulmonary: clear to auscultation bilaterally, no rales, rhonchi, or wheeze  Abdomen: soft, nontender  Musculoskeletal: moving extremities well, no deformity  Psych: normal mood, normal affect General: well appearing, no acute distress, AOx3  Skin: no rash, no pallor, erythema over L medial knee  Head: normocephalic, atraumatic  Eyes: clear conjunctiva, EOMI  ENMT: airway patent, no nasal discharge  Cardiovascular: normal rate, normal rhythm, S1/S2  Pulmonary: clear to auscultation bilaterally, no rales, rhonchi, or wheeze  Abdomen: soft, nontender, no guarding   Musculoskeletal: moving extremities well, no deformity, no midline c/t/l spine tenderness, decrease ROM of L knee 2/2 pain, 2+ peripheral pulses, sensation intact   Psych: normal mood, normal affect

## 2022-10-19 ENCOUNTER — NON-APPOINTMENT (OUTPATIENT)
Age: 87
End: 2022-10-19

## 2022-10-19 DIAGNOSIS — Z29.9 ENCOUNTER FOR PROPHYLACTIC MEASURES, UNSPECIFIED: ICD-10-CM

## 2022-10-19 DIAGNOSIS — E11.9 TYPE 2 DIABETES MELLITUS WITHOUT COMPLICATIONS: ICD-10-CM

## 2022-10-19 DIAGNOSIS — I10 ESSENTIAL (PRIMARY) HYPERTENSION: ICD-10-CM

## 2022-10-19 DIAGNOSIS — N40.0 BENIGN PROSTATIC HYPERPLASIA WITHOUT LOWER URINARY TRACT SYMPTOMS: ICD-10-CM

## 2022-10-19 DIAGNOSIS — M25.569 PAIN IN UNSPECIFIED KNEE: ICD-10-CM

## 2022-10-19 DIAGNOSIS — R26.2 DIFFICULTY IN WALKING, NOT ELSEWHERE CLASSIFIED: ICD-10-CM
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
## 2022-10-19 DIAGNOSIS — I25.10 ATHEROSCLEROTIC HEART DISEASE OF NATIVE CORONARY ARTERY WITHOUT ANGINA PECTORIS: ICD-10-CM

## 2022-10-19 DIAGNOSIS — U07.1 COVID-19: ICD-10-CM

## 2022-10-19 LAB
ALBUMIN SERPL ELPH-MCNC: 2.5 G/DL — LOW (ref 3.5–5)
ALP SERPL-CCNC: 58 U/L — SIGNIFICANT CHANGE UP (ref 40–120)
ALT FLD-CCNC: 27 U/L DA — SIGNIFICANT CHANGE UP (ref 10–60)
ANION GAP SERPL CALC-SCNC: 9 MMOL/L — SIGNIFICANT CHANGE UP (ref 5–17)
APPEARANCE UR: CLEAR — SIGNIFICANT CHANGE UP
AST SERPL-CCNC: 28 U/L — SIGNIFICANT CHANGE UP (ref 10–40)
BACTERIA # UR AUTO: ABNORMAL /HPF
BASOPHILS # BLD AUTO: 0.01 K/UL — SIGNIFICANT CHANGE UP (ref 0–0.2)
BASOPHILS NFR BLD AUTO: 0.1 % — SIGNIFICANT CHANGE UP (ref 0–2)
BILIRUB SERPL-MCNC: 0.5 MG/DL — SIGNIFICANT CHANGE UP (ref 0.2–1.2)
BILIRUB UR-MCNC: NEGATIVE — SIGNIFICANT CHANGE UP
BUN SERPL-MCNC: 20 MG/DL — HIGH (ref 7–18)
CALCIUM SERPL-MCNC: 9.2 MG/DL — SIGNIFICANT CHANGE UP (ref 8.4–10.5)
CHLORIDE SERPL-SCNC: 109 MMOL/L — HIGH (ref 96–108)
CO2 SERPL-SCNC: 25 MMOL/L — SIGNIFICANT CHANGE UP (ref 22–31)
COLOR SPEC: YELLOW — SIGNIFICANT CHANGE UP
CREAT SERPL-MCNC: 1.18 MG/DL — SIGNIFICANT CHANGE UP (ref 0.5–1.3)
CRP SERPL-MCNC: 53 MG/L — HIGH
DIFF PNL FLD: NEGATIVE — SIGNIFICANT CHANGE UP
EGFR: 60 ML/MIN/1.73M2 — SIGNIFICANT CHANGE UP
EOSINOPHIL # BLD AUTO: 0.13 K/UL — SIGNIFICANT CHANGE UP (ref 0–0.5)
EOSINOPHIL NFR BLD AUTO: 1.9 % — SIGNIFICANT CHANGE UP (ref 0–6)
EPI CELLS # UR: SIGNIFICANT CHANGE UP /HPF
GLUCOSE SERPL-MCNC: 110 MG/DL — HIGH (ref 70–99)
GLUCOSE UR QL: NEGATIVE — SIGNIFICANT CHANGE UP
HCT VFR BLD CALC: 33.4 % — LOW (ref 39–50)
HGB BLD-MCNC: 10.7 G/DL — LOW (ref 13–17)
IMM GRANULOCYTES NFR BLD AUTO: 0.7 % — SIGNIFICANT CHANGE UP (ref 0–0.9)
KETONES UR-MCNC: NEGATIVE — SIGNIFICANT CHANGE UP
LEUKOCYTE ESTERASE UR-ACNC: NEGATIVE — SIGNIFICANT CHANGE UP
LYMPHOCYTES # BLD AUTO: 0.77 K/UL — LOW (ref 1–3.3)
LYMPHOCYTES # BLD AUTO: 11.1 % — LOW (ref 13–44)
MAGNESIUM SERPL-MCNC: 1.8 MG/DL — SIGNIFICANT CHANGE UP (ref 1.6–2.6)
MCHC RBC-ENTMCNC: 27.6 PG — SIGNIFICANT CHANGE UP (ref 27–34)
MCHC RBC-ENTMCNC: 32 GM/DL — SIGNIFICANT CHANGE UP (ref 32–36)
MCV RBC AUTO: 86.3 FL — SIGNIFICANT CHANGE UP (ref 80–100)
MONOCYTES # BLD AUTO: 0.52 K/UL — SIGNIFICANT CHANGE UP (ref 0–0.9)
MONOCYTES NFR BLD AUTO: 7.5 % — SIGNIFICANT CHANGE UP (ref 2–14)
NEUTROPHILS # BLD AUTO: 5.43 K/UL — SIGNIFICANT CHANGE UP (ref 1.8–7.4)
NEUTROPHILS NFR BLD AUTO: 78.7 % — HIGH (ref 43–77)
NITRITE UR-MCNC: NEGATIVE — SIGNIFICANT CHANGE UP
NRBC # BLD: 0 /100 WBCS — SIGNIFICANT CHANGE UP (ref 0–0)
PH UR: 6 — SIGNIFICANT CHANGE UP (ref 5–8)
PHOSPHATE SERPL-MCNC: 3.8 MG/DL — SIGNIFICANT CHANGE UP (ref 2.5–4.5)
PLATELET # BLD AUTO: 205 K/UL — SIGNIFICANT CHANGE UP (ref 150–400)
POTASSIUM SERPL-MCNC: 4.5 MMOL/L — SIGNIFICANT CHANGE UP (ref 3.5–5.3)
POTASSIUM SERPL-SCNC: 4.5 MMOL/L — SIGNIFICANT CHANGE UP (ref 3.5–5.3)
PROT SERPL-MCNC: 6.7 G/DL — SIGNIFICANT CHANGE UP (ref 6–8.3)
PROT UR-MCNC: 15
RBC # BLD: 3.87 M/UL — LOW (ref 4.2–5.8)
RBC # FLD: 14.3 % — SIGNIFICANT CHANGE UP (ref 10.3–14.5)
RBC CASTS # UR COMP ASSIST: NEGATIVE /HPF — SIGNIFICANT CHANGE UP (ref 0–2)
SODIUM SERPL-SCNC: 143 MMOL/L — SIGNIFICANT CHANGE UP (ref 135–145)
SP GR SPEC: 1.01 — SIGNIFICANT CHANGE UP (ref 1.01–1.02)
UROBILINOGEN FLD QL: NEGATIVE — SIGNIFICANT CHANGE UP
WBC # BLD: 6.91 K/UL — SIGNIFICANT CHANGE UP (ref 3.8–10.5)
WBC # FLD AUTO: 6.91 K/UL — SIGNIFICANT CHANGE UP (ref 3.8–10.5)
WBC UR QL: SIGNIFICANT CHANGE UP /HPF (ref 0–5)

## 2022-10-19 PROCEDURE — 99222 1ST HOSP IP/OBS MODERATE 55: CPT

## 2022-10-19 PROCEDURE — 12345: CPT | Mod: NC

## 2022-10-19 RX ORDER — KETOROLAC TROMETHAMINE 30 MG/ML
30 SYRINGE (ML) INJECTION ONCE
Refills: 0 | Status: DISCONTINUED | OUTPATIENT
Start: 2022-10-19 | End: 2022-10-19

## 2022-10-19 RX ORDER — ATORVASTATIN CALCIUM 80 MG/1
40 TABLET, FILM COATED ORAL AT BEDTIME
Refills: 0 | Status: DISCONTINUED | OUTPATIENT
Start: 2022-10-19 | End: 2022-10-31

## 2022-10-19 RX ORDER — METOPROLOL TARTRATE 50 MG
25 TABLET ORAL DAILY
Refills: 0 | Status: DISCONTINUED | OUTPATIENT
Start: 2022-10-19 | End: 2022-10-31

## 2022-10-19 RX ORDER — APIXABAN 2.5 MG/1
5 TABLET, FILM COATED ORAL EVERY 12 HOURS
Refills: 0 | Status: DISCONTINUED | OUTPATIENT
Start: 2022-10-19 | End: 2022-10-21

## 2022-10-19 RX ORDER — TAMSULOSIN HYDROCHLORIDE 0.4 MG/1
0 CAPSULE ORAL
Qty: 0 | Refills: 0 | DISCHARGE

## 2022-10-19 RX ORDER — CLOPIDOGREL BISULFATE 75 MG/1
0 TABLET, FILM COATED ORAL
Qty: 0 | Refills: 0 | DISCHARGE

## 2022-10-19 RX ORDER — METFORMIN HYDROCHLORIDE 850 MG/1
0 TABLET ORAL
Qty: 0 | Refills: 0 | DISCHARGE

## 2022-10-19 RX ORDER — ACETAMINOPHEN 500 MG
650 TABLET ORAL EVERY 6 HOURS
Refills: 0 | Status: DISCONTINUED | OUTPATIENT
Start: 2022-10-19 | End: 2022-10-21

## 2022-10-19 RX ORDER — TAMSULOSIN HYDROCHLORIDE 0.4 MG/1
0.4 CAPSULE ORAL AT BEDTIME
Refills: 0 | Status: DISCONTINUED | OUTPATIENT
Start: 2022-10-19 | End: 2022-10-31

## 2022-10-19 RX ORDER — LOSARTAN POTASSIUM 100 MG/1
0 TABLET, FILM COATED ORAL
Qty: 0 | Refills: 0 | DISCHARGE

## 2022-10-19 RX ORDER — FINASTERIDE 5 MG/1
0 TABLET, FILM COATED ORAL
Qty: 0 | Refills: 0 | DISCHARGE

## 2022-10-19 RX ORDER — FINASTERIDE 5 MG/1
5 TABLET, FILM COATED ORAL DAILY
Refills: 0 | Status: DISCONTINUED | OUTPATIENT
Start: 2022-10-19 | End: 2022-10-31

## 2022-10-19 RX ORDER — CLOPIDOGREL BISULFATE 75 MG/1
75 TABLET, FILM COATED ORAL DAILY
Refills: 0 | Status: DISCONTINUED | OUTPATIENT
Start: 2022-10-19 | End: 2022-10-21

## 2022-10-19 RX ORDER — EZETIMIBE AND SIMVASTATIN 10; 80 MG/1; MG/1
0 TABLET, FILM COATED ORAL
Qty: 0 | Refills: 0 | DISCHARGE

## 2022-10-19 RX ORDER — APIXABAN 2.5 MG/1
0 TABLET, FILM COATED ORAL
Qty: 0 | Refills: 0 | DISCHARGE

## 2022-10-19 RX ADMIN — FINASTERIDE 5 MILLIGRAM(S): 5 TABLET, FILM COATED ORAL at 12:14

## 2022-10-19 RX ADMIN — CLOPIDOGREL BISULFATE 75 MILLIGRAM(S): 75 TABLET, FILM COATED ORAL at 12:14

## 2022-10-19 RX ADMIN — Medication 30 MILLIGRAM(S): at 09:13

## 2022-10-19 RX ADMIN — ATORVASTATIN CALCIUM 40 MILLIGRAM(S): 80 TABLET, FILM COATED ORAL at 22:32

## 2022-10-19 RX ADMIN — Medication 30 MILLIGRAM(S): at 04:11

## 2022-10-19 RX ADMIN — APIXABAN 5 MILLIGRAM(S): 2.5 TABLET, FILM COATED ORAL at 18:04

## 2022-10-19 RX ADMIN — TAMSULOSIN HYDROCHLORIDE 0.4 MILLIGRAM(S): 0.4 CAPSULE ORAL at 22:32

## 2022-10-19 NOTE — H&P ADULT - ASSESSMENT
87M, coming form home, declining in ambulatory status, with a PMHx of CAD, s/p stents, PAD on Plavix and Eliquis, DM, BPH, HTN BIBEMS for evaluation s/p fall and knee pain. Patient admitted s/p fall in the setting of weakness and ambulatory decline after recent hospitalization.     PRIMARY TEAM TO CONFIRM MEDS IN THE AM, PATIENT DOES NOT RECALL MEDICATIONS AND UNABLE TO REACH THE SON.

## 2022-10-19 NOTE — H&P ADULT - PROBLEM SELECTOR PLAN 4
COIVD positive over a weeks ago from another hospital admission  not in resp distress, saturating well on RA  confirm with infection for needed isolation

## 2022-10-19 NOTE — H&P ADULT - ATTENDING COMMENTS
87 year old man with PMH of CAD s/p PCI, DM2, PAD, HTN here with left knee pain and swelling for the last 2 days which is limiting ambulation. report of fall preceding but patient states he usually has recurrent pain in the knee at least once a year. Denies fever, redness or other constitutional symptoms.   OF note, he also describes intermittent room spinning dizziness that occurs occasionally usually in the setting of ambulation. NO chest pain, no SOB, no LOC, no diaphoresis. He does not have the symptoms presently.     Vital Signs Last 24 Hrs  T(C): 36.7 (19 Oct 2022 04:19), Max: 37.2 (18 Oct 2022 20:01)  T(F): 98 (19 Oct 2022 04:19), Max: 98.9 (18 Oct 2022 20:01)  HR: 53 (19 Oct 2022 04:19) (47 - 60)  BP: 179/56 (19 Oct 2022 04:19) (106/64 - 179/56)  RR: 18 (19 Oct 2022 04:19) (16 - 18)  SpO2: 98% (19 Oct 2022 04:19) (95% - 99%)    Exam  Left leg and knee  Dressing taken down   Area of attempt at arthrocentesis noted  Left knee with no redness, no warmth or circumferential swelling; some localized swelling medial to the patella  Exquisite tenderness with attempt to examine left lower extremity with inability to find focal area of tenderness.  Pain med given with plans for re-evaluation    No gross neurological findings     Labs                         11.2   7.67  )-----------( 213      ( 18 Oct 2022 17:57 )             35.2     10-18    141  |  108  |  20<H>  ----------------------------<  142<H>  4.4   |  26  |  1.06    Ca    9.4      18 Oct 2022 17:57    TPro  7.1  /  Alb  2.8<L>  /  TBili  0.5  /  DBili  x   /  AST  39  /  ALT  34  /  AlkPhos  61  10-18      ESR - 83  CRP - 53    COVID 19 - +VE    - X- ray left knee   No obvious soft tissue swelling   No fracture ; stent obvious on x ray     Impression   Left knee/LLE pain   - suspicious for pre-patellar vs superficial infrapatellar bursitis   likely precipitated by report of fall  Admit to medicine   Pain control with NSAIDs   Supportive care with immobilization   No obvious infection or sepsis   Orthopedic evaluation /consult   Monitor closely    Chronic medical conditions as above   - No acute issues  -resume home meds except OHA; start insulin therapy for DM

## 2022-10-19 NOTE — H&P ADULT - PROBLEM SELECTOR PLAN 8
h/o HTN unclear what BP patient still takes as per sure scripts  c/w lopressor 25 BID (per sure scripts takes toprol 25 qd)  will hold for parameters   monitor BP

## 2022-10-19 NOTE — H&P ADULT - HISTORY OF PRESENT ILLNESS
87M, coming form home, declining in ambulatory status, with a PMHx of CAD, s/p stents, PAD on Plavix and Eliquis, DM, BPH, HTN BIBEMS for evaluation s/p fall and knee pain. Patient is limited historian, AOx1-2. Collateral obtained per chart review, called emergency contact listed in the chart Antonio Ruggiero and number is not active. Per son, the patient fell 2 days ago and hurt his L knee. Unknown if head-strike or LOC. Fall unwitnessed. Per the son, patient was hospitalized for 5-d more than 1week ago for COVID, and since then has had progressively worsening ambulation 2/2 knee pain. Per son, patient was discharged back to home with out any ambulatory assistive devices and services. Patient reports knee pain started while in hospital. No reported fever, chills, cp, sob, cough, vomiting, diarrhea, calf pain. No hx of DVT/PE. Pt does not recall if he hit his head or not. Unable to obtain ROS d/t mental status.

## 2022-10-19 NOTE — H&P ADULT - PROBLEM SELECTOR PLAN 2
s/p mechanical fall w/ left knee injury 2 days ago  likely in the setting of deconditioning d/t recent hospital admission for COVID infection, not DC with ambulatory devices or assisstance  noted to have progressively worsening ambulation   CTH no acute pathology, only significant for sinusitis  Afebrile with no WBC  s/p arthrocentesis in the ED but unable to collect enough fluid to send dot lab s/p mechanical fall w/ left knee injury 2 days ago  likely in the setting of deconditioning d/t recent hospital admission for COVID infection, not DC with ambulatory devices or assisstance  noted to have progressively worsening ambulation   CTH no acute pathology, only significant for sinusitis  Afebrile with no WBC  s/p arthrocentesis in the ED but unable to collect enough fluid to send to the lab  concern for bursitis s/p mechanical fall w/ left knee injury 2 days ago  likely in the setting of deconditioning d/t recent hospital admission for COVID infection, not DC with ambulatory devices or assisstance  noted to have progressively worsening ambulation   CTH no acute pathology, only significant for sinusitis  Afebrile with no WBC  s/p arthrocentesis in the ED but unable to collect enough fluid to send to the lab  concern for bursitis  PT consulted   Ortho to be consulted in the AM

## 2022-10-19 NOTE — H&P ADULT - NSHPPHYSICALEXAM_GEN_ALL_CORE
Vital Signs Last 24 Hrs  T(C): 36.7 (19 Oct 2022 04:19), Max: 37.2 (18 Oct 2022 20:01)  T(F): 98 (19 Oct 2022 04:19), Max: 98.9 (18 Oct 2022 20:01)  HR: 53 (19 Oct 2022 04:19) (47 - 60)  BP: 179/56 (19 Oct 2022 04:19) (106/64 - 179/56)  BP(mean): --  RR: 18 (19 Oct 2022 04:19) (16 - 18)  SpO2: 98% (19 Oct 2022 04:19) (95% - 99%)    Parameters below as of 19 Oct 2022 04:19  Patient On (Oxygen Delivery Method): room air    GENERAL: NAD, lying in bed comfortably  HEAD:  Atraumatic, Normocephalic  EYES: EOMI, PERRLA, conjunctiva and sclera clear  ENT: Moist mucous membranes  NECK: Supple, No JVD  CHEST/LUNG: Clear to auscultation bilaterally; No rales, rhonchi, wheezing, or rubs. Unlabored respirations  HEART: Regular rate and rhythm; No murmurs, rubs, or gallops  ABDOMEN: Bowel sounds present; Soft, Nontender, Nondistended. No hepatomegally  EXTREMITIES:  2+ Peripheral Pulses, brisk capillary refill. No clubbing, cyanosis, or edema (+) LEFT knee effusion with warmth s/p arthrocentesis by ED resident 10/19  NERVOUS SYSTEM:  Alert & Oriented X3, speech clear. No deficits   MSK: (+) limited ROM d/t LEFT knee pain   SKIN: No rashes or lesions

## 2022-10-19 NOTE — H&P ADULT - PROBLEM SELECTOR PLAN 1
s/p fall associated with weakness, no LOC, headache, dizziness, chest pain, SOB.  likely mechanical, could be due to deconditioning, weakness from recent hospital DC   CT head Non-contrast :  no acute hge or stroke, , only significant for sinusitis  Fall Precaution   f/u TSH , lipid profile , hgb a1c  PT consulted  Nutrition consulted  CM and SW consulted s/p fall associated with weakness, no LOC, headache, dizziness, chest pain, SOB.  likely mechanical, could be due to deconditioning, weakness from recent hospital DC   CT head Non-contrast :  no acute hge or stroke, , only significant for sinusitis  XR no fracture or fluid collection   Fall Precaution   f/u TSH , lipid profile , hgb a1c  PT consulted  Nutrition consulted  CM and SW consulted

## 2022-10-19 NOTE — CHART NOTE - NSCHARTNOTEFT_GEN_A_CORE
- spoke with son Antonio Ruggiero, over the phone, Son upset that no one updated him regarding pt overnight. Updated son regarding plan of care and medical intervention so far, and pending imaging results. all questions were answered.

## 2022-10-19 NOTE — CHART NOTE - NSCHARTNOTEFT_GEN_A_CORE
OBJECTIVE:  Vital Signs Last 24 Hrs  T(C): 36.7 (19 Oct 2022 15:50), Max: 37.2 (18 Oct 2022 20:01)  T(F): 98 (19 Oct 2022 15:50), Max: 98.9 (18 Oct 2022 20:01)  HR: 65 (19 Oct 2022 15:50) (47 - 65)  BP: 155/56 (19 Oct 2022 15:50) (106/64 - 179/56)  BP(mean): --  RR: 18 (19 Oct 2022 15:50) (16 - 18)  SpO2: 93% (19 Oct 2022 15:50) (93% - 99%)    Parameters below as of 19 Oct 2022 15:50  Patient On (Oxygen Delivery Method): room air        FOCUSED PHYSICAL EXAM: Nod, Breathing comfortably, L knee swelling, able to lift with limited ROM. +PPP bilat    LABS:                        10.7   6.91  )-----------( 205      ( 19 Oct 2022 10:23 )             33.4     10-19    143  |  109<H>  |  20<H>  ----------------------------<  110<H>  4.5   |  25  |  1.18    Ca    9.2      19 Oct 2022 10:23  Phos  3.8     10-19  Mg     1.8     10-19    TPro  6.7  /  Alb  2.5<L>  /  TBili  0.5  /  DBili  x   /  AST  28  /  ALT  27  /  AlkPhos  58  10-19      87M, coming form home, declining in ambulatory status, with a PMHx of CAD, s/p stents, PAD on Plavix and Eliquis, DM, BPH, HTN BIBEMS for evaluation s/p fall and knee pain. Patient admitted s/p fall in the setting of weakness and ambulatory decline after recent hospitalization. CT spine and head not acute findings. L knee x-ray with No fracture, and Small suprapatellar left knee joint effusion. s/p dry tap in ed.     #  Fall.   CT head Non-contrast  knee XR no fracture, Small suprapatellar left knee joint effusion  s/p dry tap in ED  f/u PT    #  2019 novel coronavirus disease (COVID-19).   saturating well RA  per son pt has had covid + since 15 days ago,   asymtomatic    # CAD (coronary artery disease).   cont plavix and Eliquis     # DM (diabetes mellitus).   - on metofromin at  home   hold oral dm meds  f/u A1c  c/w sliding scale  Adjust insulin as indicated  FS ACHS.    # BPH (benign prostatic hyperplasia).   ·  Plan: takes finasteride and flomax per sure scripts   c/w home meds.    #HTN (hypertension).   ·  Plan: h/o HTN unclear what BP patient still takes as per sure scripts  c/w lopressor 25 BID (per sure scripts takes toprol 25 qd)  will hold for parameters   monitor BP.    # Prophylactic measure.   ·  Plan: Eliquis and plavix.

## 2022-10-19 NOTE — CHART NOTE - NSCHARTNOTEFT_GEN_A_CORE
87M, coming form home, declining in ambulatory status, with a PMHx of CAD, s/p stents, PAD on Plavix and Eliquis, DM, BPH, HTN BIBEMS for evaluation s/p fall and knee pain. Admitted for ambulatory dysfunction 2/2 left knee pain.     Pt was seen and examined continues to have left knee pain, appears swollen, warm and tender to palpation.    Labs reviewed.     A/P:  #Left knee pain suspected bursitis   #Ambulatory dysfunction    #Covid 19 PNA  #CAD s/p stents    #PAD  #HTN  #DM  #BPH     Plan:  -Pt p/w left knee swelling, appears swollen, tender and warm to palpation w/ dec ROM across joint. S/p dry tap in ED. Suspect joint prepatellar bursitis, doubt gout as etiology.   -X-ray knee joint w/ Small suprapatellar left knee joint effusion.  -No fever, leukocytosis to suggest septic joint, ESR/CRP  is elevated   -Ortho consulted, awaiting recs  -C/w ice packs, topical NSAID for now, further recommendations pending ortho eval   -Pt tested positive for Covid, asymptomatic. He reportedly was positive almost a week ago. Advised son to bring reports   -C/w Plavix and Eliquis   -C/w Toprol  -C/w tamsulosin   -Spoke w/ patient's son and updated about current clinical status.

## 2022-10-19 NOTE — H&P ADULT - PROBLEM SELECTOR PLAN 6
h/o DM on metformin per sure script   will hold oral dm meds  f/u A1c  c/w sliding scale  Adjust insulin as indicated  FS ACHS

## 2022-10-20 LAB
ANION GAP SERPL CALC-SCNC: 7 MMOL/L — SIGNIFICANT CHANGE UP (ref 5–17)
BUN SERPL-MCNC: 18 MG/DL — SIGNIFICANT CHANGE UP (ref 7–18)
CALCIUM SERPL-MCNC: 9.1 MG/DL — SIGNIFICANT CHANGE UP (ref 8.4–10.5)
CHLORIDE SERPL-SCNC: 108 MMOL/L — SIGNIFICANT CHANGE UP (ref 96–108)
CO2 SERPL-SCNC: 26 MMOL/L — SIGNIFICANT CHANGE UP (ref 22–31)
CREAT SERPL-MCNC: 0.99 MG/DL — SIGNIFICANT CHANGE UP (ref 0.5–1.3)
CULTURE RESULTS: NO GROWTH — SIGNIFICANT CHANGE UP
EGFR: 75 ML/MIN/1.73M2 — SIGNIFICANT CHANGE UP
GLUCOSE SERPL-MCNC: 115 MG/DL — HIGH (ref 70–99)
HCT VFR BLD CALC: 32.6 % — LOW (ref 39–50)
HGB BLD-MCNC: 10.5 G/DL — LOW (ref 13–17)
MCHC RBC-ENTMCNC: 27.4 PG — SIGNIFICANT CHANGE UP (ref 27–34)
MCHC RBC-ENTMCNC: 32.2 GM/DL — SIGNIFICANT CHANGE UP (ref 32–36)
MCV RBC AUTO: 85.1 FL — SIGNIFICANT CHANGE UP (ref 80–100)
NRBC # BLD: 0 /100 WBCS — SIGNIFICANT CHANGE UP (ref 0–0)
PLATELET # BLD AUTO: 241 K/UL — SIGNIFICANT CHANGE UP (ref 150–400)
POTASSIUM SERPL-MCNC: 4.3 MMOL/L — SIGNIFICANT CHANGE UP (ref 3.5–5.3)
POTASSIUM SERPL-SCNC: 4.3 MMOL/L — SIGNIFICANT CHANGE UP (ref 3.5–5.3)
RBC # BLD: 3.83 M/UL — LOW (ref 4.2–5.8)
RBC # FLD: 14.1 % — SIGNIFICANT CHANGE UP (ref 10.3–14.5)
SODIUM SERPL-SCNC: 141 MMOL/L — SIGNIFICANT CHANGE UP (ref 135–145)
SPECIMEN SOURCE: SIGNIFICANT CHANGE UP
WBC # BLD: 7.46 K/UL — SIGNIFICANT CHANGE UP (ref 3.8–10.5)
WBC # FLD AUTO: 7.46 K/UL — SIGNIFICANT CHANGE UP (ref 3.8–10.5)

## 2022-10-20 PROCEDURE — 99233 SBSQ HOSP IP/OBS HIGH 50: CPT | Mod: GC

## 2022-10-20 RX ORDER — POLYETHYLENE GLYCOL 3350 17 G/17G
17 POWDER, FOR SOLUTION ORAL DAILY
Refills: 0 | Status: DISCONTINUED | OUTPATIENT
Start: 2022-10-20 | End: 2022-10-31

## 2022-10-20 RX ORDER — KETOROLAC TROMETHAMINE 30 MG/ML
15 SYRINGE (ML) INJECTION ONCE
Refills: 0 | Status: DISCONTINUED | OUTPATIENT
Start: 2022-10-20 | End: 2022-10-20

## 2022-10-20 RX ORDER — LIDOCAINE 4 G/100G
1 CREAM TOPICAL DAILY
Refills: 0 | Status: DISCONTINUED | OUTPATIENT
Start: 2022-10-20 | End: 2022-10-31

## 2022-10-20 RX ORDER — OXYCODONE HYDROCHLORIDE 5 MG/1
10 TABLET ORAL DAILY
Refills: 0 | Status: DISCONTINUED | OUTPATIENT
Start: 2022-10-20 | End: 2022-10-21

## 2022-10-20 RX ORDER — SENNA PLUS 8.6 MG/1
2 TABLET ORAL AT BEDTIME
Refills: 0 | Status: DISCONTINUED | OUTPATIENT
Start: 2022-10-20 | End: 2022-10-31

## 2022-10-20 RX ORDER — OXYCODONE HYDROCHLORIDE 5 MG/1
5 TABLET ORAL DAILY
Refills: 0 | Status: DISCONTINUED | OUTPATIENT
Start: 2022-10-20 | End: 2022-10-21

## 2022-10-20 RX ADMIN — CLOPIDOGREL BISULFATE 75 MILLIGRAM(S): 75 TABLET, FILM COATED ORAL at 12:00

## 2022-10-20 RX ADMIN — FINASTERIDE 5 MILLIGRAM(S): 5 TABLET, FILM COATED ORAL at 11:59

## 2022-10-20 RX ADMIN — LIDOCAINE 1 PATCH: 4 CREAM TOPICAL at 22:47

## 2022-10-20 RX ADMIN — ATORVASTATIN CALCIUM 40 MILLIGRAM(S): 80 TABLET, FILM COATED ORAL at 21:23

## 2022-10-20 RX ADMIN — TAMSULOSIN HYDROCHLORIDE 0.4 MILLIGRAM(S): 0.4 CAPSULE ORAL at 21:23

## 2022-10-20 RX ADMIN — Medication 25 MILLIGRAM(S): at 07:16

## 2022-10-20 RX ADMIN — APIXABAN 5 MILLIGRAM(S): 2.5 TABLET, FILM COATED ORAL at 18:44

## 2022-10-20 RX ADMIN — POLYETHYLENE GLYCOL 3350 17 GRAM(S): 17 POWDER, FOR SOLUTION ORAL at 22:27

## 2022-10-20 RX ADMIN — APIXABAN 5 MILLIGRAM(S): 2.5 TABLET, FILM COATED ORAL at 07:16

## 2022-10-20 NOTE — CONSULT NOTE ADULT - SUBJECTIVE AND OBJECTIVE BOX
SWAPNA FINK  [id number]    Orthopedic Consult:    Orthopedic Diagnosis:      SWAPNA FINK87yMale  HPI:  88 y/o M, ambulates with walker, from home, with a PMHx of CAD, s/p stents, PAD on Plavix and Eliquis, DM, BPH, HTN who presents today c/o left knee pain s/p fall 2 days ago. Patient was admitted to [facility] on 10/18/2022, where a needle aspiration of the left knee was attempted by the ED with 0.5cc obtained. Per chart, patient was hospitalized for 5 days more than 1week ago for COVID, and since then has had progressively worsening ambulation 2/2 knee pain.  Patient states that after the fall he has had ambulatory dysfunction, fall was unwitnessed.  Patient states the pain is localized to the left knee, non-radiating, exacerbated with movement of the knee. Pt denies Chest pain, fevers, chills,  SOB, dyspnea, paresthesias, N/V/D, abdominal pain, syncope, or pain anywhere else.     Ambulation: walker     PAST MEDICAL & SURGICAL HISTORY:  HTN (hypertension)      HLD (hyperlipidemia)      BPH (benign prostatic hypertrophy)      DM (diabetes mellitus)      PAD (peripheral artery disease)  s/p stent placement      CKD (chronic kidney disease)      S/P tonsillectomy      S/P peripheral artery angioplasty with stent placement          FAMILY HISTORY:  Family history of lung cancer (Sibling)        Social History:      Allergies    No Known Allergies    Intolerances        Home Medications:  Aspirin Enteric Coated 81 mg oral delayed release tablet: 1 tab(s) orally once a day (19 Oct 2022 08:52)  ATORVASTATIN 40MG TAB:  (19 Oct 2022 08:57)  clopidogrel 75 mg oral tablet: 1 tab(s) orally once a day (19 Oct 2022 08:57)  Eliquis 5 mg oral tablet: 1 tab(s) orally 2 times a day (19 Oct 2022 08:57)  finasteride 5 mg oral tablet: 1 tab(s) orally once a day (19 Oct 2022 08:57)  glimepiride 4 mg oral tablet: 1 tab(s) orally once a day (19 Oct 2022 08:52)  losartan-hydrochlorothiazide 50 mg-12.5 mg oral tablet: 1 tab(s) orally once a day (19 Oct 2022 08:57)  metFORMIN 500 mg oral tablet: 2 tab(s) orally 2 times a day (with meals) (19 Oct 2022 08:57)  METOPROL SUC 25MG ER TAB:  (19 Oct 2022 08:57)  Multiple Vitamins oral tablet: 1 tab(s) orally once a day (19 Oct 2022 08:57)  tamsulosin 0.4 mg oral capsule: 1 cap(s) orally once a day (19 Oct 2022 08:57)      Vital Signs Last 24 Hrs  T(C): 36.9 (20 Oct 2022 14:10), Max: 37.5 (20 Oct 2022 03:21)  T(F): 98.5 (20 Oct 2022 14:10), Max: 99.5 (20 Oct 2022 03:21)  HR: 70 (20 Oct 2022 14:49) (57 - 70)  BP: 151/48 (20 Oct 2022 14:10) (151/48 - 174/67)  BP(mean): --  RR: 18 (20 Oct 2022 14:10) (17 - 18)  SpO2: 98% (20 Oct 2022 14:49) (94% - 98%)    Parameters below as of 20 Oct 2022 14:49  Patient On (Oxygen Delivery Method): room air      I&O's Summary      Physical Exam:  General: Alert and oriented, NAD, resting comfortably  Musculoskeletal:  Left knee: Skin warm and pink. Large effusion noted. No ecchymosis. No erythema. ROM limited 2/2 pain 0-15 degrees flexion. No palpable defect noted over quad/patella tendon.  Lower extremities: Calves soft and NTTP b/l. SILT. NVI. (+)EHL/FHL/ADF/APF intact bilaterally    Labs:                        10.5   7.46  )-----------( 241      ( 20 Oct 2022 08:09 )             32.6     10-20    141  |  108  |  18  ----------------------------<  115<H>  4.3   |  26  |  0.99    Ca    9.1      20 Oct 2022 08:09  Phos  3.8     10-19  Mg     1.8     10-19    TPro  6.7  /  Alb  2.5<L>  /  TBili  0.5  /  DBili  x   /  AST  28  /  ALT  27  /  AlkPhos  58  10-19    PT/INR - ( 18 Oct 2022 17:57 )   PT: 18.1 sec;   INR: 1.51 ratio         PTT - ( 18 Oct 2022 17:57 )  PTT:32.2 sec    Radiology:    Impression: Patient is a 87yMale with Left knee traumatic arthropathy / left knee effusion   Plan:  - Recommendation: [ XX ] Conservative management [  ] Surgical intervention  - Pain management  - DVT prophylaxis  - Daily PT - WBAT to LLE with appropriate assistive device   > Low suspicion for septic arthritis, no leukocytosis, afebrile, no erythema   > Effusion is likely a hemarthrosis from prior arthrocentesis attempt being that patient is on Eliquis and Plavix   - No arthrocentesis recommended at this time   > Ice PRN   - Case d/w Dr. Batista

## 2022-10-20 NOTE — PROGRESS NOTE ADULT - PROBLEM SELECTOR PLAN 6
h/o DM on metformin per sure script   will hold oral dm meds  f/u A1c  c/w sliding scale  Adjust insulin as indicated  FS ACHS Statement Selected

## 2022-10-20 NOTE — PROGRESS NOTE ADULT - PROBLEM SELECTOR PLAN 1
s/p fall associated with weakness, no LOC, headache, dizziness, chest pain, SOB.  likely mechanical, could be due to deconditioning, weakness from recent hospital DC   CT head Non-contrast :  no acute hge or stroke, , only significant for sinusitis  XR no fracture or fluid collection   Fall Precaution   f/u TSH , lipid profile , hgb a1c  PT consulted  Nutrition consulted  CM and SW consulted  f/u ortho recs s/p fall associated with weakness, no LOC, headache, dizziness, chest pain, SOB.  likely mechanical, could be due to deconditioning, weakness from recent hospital DC   CT head Non-contrast :  no acute hge or stroke, , only significant for sinusitis  XR no fracture or fluid collection   Fall Precaution   f/u TSH , lipid profile , hgb a1c  PT consulted  Nutrition consulted  CM and SW consulted  Ortho consulted

## 2022-10-20 NOTE — PHYSICAL THERAPY INITIAL EVALUATION ADULT - PLANNED THERAPY INTERVENTIONS, PT EVAL
Problem: Patient Care Overview (Adult)  Goal: Plan of Care Review  Outcome: Ongoing (interventions implemented as appropriate)    12/10/17 1635   Coping/Psychosocial Response Interventions   Plan Of Care Reviewed With patient   Patient Care Overview   Progress no change   Outcome Evaluation   Outcome Summary/Follow up Plan pt remains comfortable, sleeping between medications/other care, only eating 5% of each meal,        Goal: Adult Individualization and Mutuality  Outcome: Ongoing (interventions implemented as appropriate)    12/10/17 1635   Individualization   Patient Specific Goals pt will remain comfortable   Patient Specific Interventions assess pt pain/comfort every 2 hours, administer pain meds as ordered, dim lighting, use pillows as needed         Problem: Fall Risk (Adult)  Goal: Identify Related Risk Factors and Signs and Symptoms  Outcome: Ongoing (interventions implemented as appropriate)    12/10/17 1635   Fall Risk   Fall Risk: Related Risk Factors age-related changes;bladder function altered;fatigue/slow reaction;gait/mobility problems;sleep pattern alteration;environment unfamiliar   Fall Risk: Signs and Symptoms presence of risk factors       Goal: Absence of Falls  Outcome: Ongoing (interventions implemented as appropriate)    12/10/17 1635   Fall Risk (Adult)   Absence of Falls making progress toward outcome            stairs training/bed mobility training/gait training/strengthening/transfer training

## 2022-10-20 NOTE — PHYSICAL THERAPY INITIAL EVALUATION ADULT - GENERAL OBSERVATIONS, REHAB EVAL
patient refer for PT assessment in medical floor, on isolation and room air, difficulty with rw gait and transfers with PT assistance secondary to unstable balance and gross deconditioning

## 2022-10-20 NOTE — PROGRESS NOTE ADULT - SUBJECTIVE AND OBJECTIVE BOX
PGY-1 Progress Note discussed with attending    PAGER #: [[phone]] TILL 5:00 PM  PLEASE CONTACT ON CALL TEAM:  - On Call Team (Please refer to Remberto) FROM 5:00 PM - 8:30PM  - Nightfloat Team FROM 8:30 -7:30 AM    OVERNIGHT EVENTS:   - No acute overnight events. Pt seen at bedside, NAD, AAOx2. Pt stating that he has 8/10 pain in his left knee that's dull and is worse with movement. Denies fevers, chills, CP, SOB, N/V/D, abdominal pain.     REVIEW OF SYSTEMS:  CONSTITUTIONAL: No fever, weight loss, or fatigue  RESPIRATORY: No cough, wheezing, chills or hemoptysis; No shortness of breath  CARDIOVASCULAR: No chest pain, palpitations, dizziness, or leg swelling  GASTROINTESTINAL: No abdominal pain. No nausea, vomiting, or hematemesis; No diarrhea or constipation. No melena or hematochezia.  GENITOURINARY: No dysuria or hematuria, urinary frequency  NEUROLOGICAL: (+) weakness. No headaches, memory loss, numbness, or tremors  EXTREMITIES: (+) dull pain in left knee that worsens with movement.   SKIN: No itching, burning, rashes, or lesions     MEDICATIONS  (STANDING):  apixaban 5 milliGRAM(s) Oral every 12 hours  atorvastatin 40 milliGRAM(s) Oral at bedtime  clopidogrel Tablet 75 milliGRAM(s) Oral daily  finasteride 5 milliGRAM(s) Oral daily  metoprolol succinate ER 25 milliGRAM(s) Oral daily  tamsulosin 0.4 milliGRAM(s) Oral at bedtime    MEDICATIONS  (PRN):  acetaminophen     Tablet .. 650 milliGRAM(s) Oral every 6 hours PRN Temp greater or equal to 38C (100.4F), Mild Pain (1 - 3)      Vital Signs Last 24 Hrs  T(C): 36.9 (20 Oct 2022 14:10), Max: 37.5 (20 Oct 2022 03:21)  T(F): 98.5 (20 Oct 2022 14:10), Max: 99.5 (20 Oct 2022 03:21)  HR: 66 (20 Oct 2022 14:10) (57 - 66)  BP: 151/48 (20 Oct 2022 14:10) (151/48 - 174/67)  BP(mean): --  RR: 18 (20 Oct 2022 14:10) (17 - 18)  SpO2: 95% (20 Oct 2022 14:10) (93% - 98%)    Parameters below as of 20 Oct 2022 14:10  Patient On (Oxygen Delivery Method): room air        GENERAL: NAD, lying in bed comfortably  HEAD:  Atraumatic, Normocephalic  EYES: EOMI, PERRLA, conjunctiva and sclera clear  ENT: Moist mucous membranes  NECK: Supple, No JVD  CHEST/LUNG: Clear to auscultation bilaterally; No rales, rhonchi, wheezing, or rubs. Unlabored respirations  HEART: Regular rate and rhythm; No murmurs, rubs, or gallops  ABDOMEN: Bowel sounds present; Soft, Nontender, Nondistended. No hepatomegally  EXTREMITIES:  2+ Peripheral Pulses, brisk capillary refill. No clubbing, cyanosis, or edema (+) LEFT knee effusion with warmth s/p arthrocentesis by ED resident 10/19  NERVOUS SYSTEM:  Alert & Oriented X2, speech clear. No deficits   MSK: (+) limited ROM d/t LEFT knee pain   SKIN: No rashes or lesions                          10.5   7.46  )-----------( 241      ( 20 Oct 2022 08:09 )             32.6     10-20    141  |  108  |  18  ----------------------------<  115<H>  4.3   |  26  |  0.99    Ca    9.1      20 Oct 2022 08:09  Phos  3.8     10-19  Mg     1.8     10-19    TPro  6.7  /  Alb  2.5<L>  /  TBili  0.5  /  DBili  x   /  AST  28  /  ALT  27  /  AlkPhos  58  10-19    LIVER FUNCTIONS - ( 19 Oct 2022 10:23 )  Alb: 2.5 g/dL / Pro: 6.7 g/dL / ALK PHOS: 58 U/L / ALT: 27 U/L DA / AST: 28 U/L / GGT: x               PT/INR - ( 18 Oct 2022 17:57 )   PT: 18.1 sec;   INR: 1.51 ratio         PTT - ( 18 Oct 2022 17:57 )  PTT:32.2 sec      CAPILLARY BLOOD GLUCOSE          RADIOLOGY & ADDITIONAL TESTS:    < from: CT Head No Cont (10.18.22 @ 20:41) >  CT brain:  No acute intracranial hemorrhage, brain edema, or mass effect.  No displaced calvarial fracture.  Air-fluid level in the right sphenoid sinus, correlate for the presence   of sinusitis.    CT cervical spine:  No acute fracture ortraumatic subluxation.  No prevertebral soft tissue swelling.  Degenerative changes.    < end of copied text >  < from: Xray Knee 4 Views, Left (10.18.22 @ 19:25) >  1.  No fracture.  2.  Small suprapatellar left knee joint effusion.  3.  Vague opacity right lower lobe may represent confluence of shadows   versus infiltrate. Recommend correlation clinically and as needed a   follow-up examination.    < end of copied text >
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
## 2022-10-20 NOTE — PATIENT PROFILE ADULT - FALL HARM RISK - HARM RISK INTERVENTIONS

## 2022-10-20 NOTE — PROGRESS NOTE ADULT - ASSESSMENT
87M, coming form home, declining in ambulatory status, with a PMHx of CAD, s/p stents, PAD on Plavix and Eliquis, DM, BPH, HTN BIBEMS for evaluation s/p fall and knee pain. Patient admitted s/p fall in the setting of weakness and ambulatory decline after recent hospitalization.     PRIMARY TEAM TO CONFIRM MEDS IN THE AM, PATIENT DOES NOT RECALL MEDICATIONS AND UNABLE TO REACH THE SON.      87M, coming form home, declining in ambulatory status, with a PMHx of CAD, s/p stents, PAD on Plavix and Eliquis, DM, BPH, HTN BIBEMS for evaluation s/p fall and knee pain. Patient admitted s/p fall in the setting of weakness and ambulatory decline after recent hospitalization.

## 2022-10-20 NOTE — PROGRESS NOTE ADULT - PROBLEM SELECTOR PLAN 2
s/p mechanical fall w/ left knee injury 2 days ago  likely in the setting of deconditioning d/t recent hospital admission for COVID infection, not DC with ambulatory devices or assisstance  noted to have progressively worsening ambulation   CTH no acute pathology, only significant for sinusitis  Afebrile with no WBC  s/p arthrocentesis in the ED but unable to collect enough fluid to send to the lab  concern for bursitis  PT consulted   Ortho consulted, f/u recs

## 2022-10-21 DIAGNOSIS — M25.562 PAIN IN LEFT KNEE: ICD-10-CM

## 2022-10-21 LAB
ANION GAP SERPL CALC-SCNC: 8 MMOL/L — SIGNIFICANT CHANGE UP (ref 5–17)
B PERT IGG+IGM PNL SER: ABNORMAL
BUN SERPL-MCNC: 15 MG/DL — SIGNIFICANT CHANGE UP (ref 7–18)
CALCIUM SERPL-MCNC: 8.9 MG/DL — SIGNIFICANT CHANGE UP (ref 8.4–10.5)
CHLORIDE SERPL-SCNC: 106 MMOL/L — SIGNIFICANT CHANGE UP (ref 96–108)
CO2 SERPL-SCNC: 25 MMOL/L — SIGNIFICANT CHANGE UP (ref 22–31)
COLOR FLD: YELLOW — SIGNIFICANT CHANGE UP
CREAT SERPL-MCNC: 1.05 MG/DL — SIGNIFICANT CHANGE UP (ref 0.5–1.3)
CRP SERPL-MCNC: 89 MG/L — HIGH
EGFR: 70 ML/MIN/1.73M2 — SIGNIFICANT CHANGE UP
ERYTHROCYTE [SEDIMENTATION RATE] IN BLOOD: 96 MM/HR — HIGH (ref 0–20)
FLUID INTAKE SUBSTANCE CLASS: SIGNIFICANT CHANGE UP
GLUCOSE SERPL-MCNC: 193 MG/DL — HIGH (ref 70–99)
HCT VFR BLD CALC: 32 % — LOW (ref 39–50)
HGB BLD-MCNC: 10.3 G/DL — LOW (ref 13–17)
LYMPHOCYTES # FLD: 1 % — SIGNIFICANT CHANGE UP
MAGNESIUM SERPL-MCNC: 1.7 MG/DL — SIGNIFICANT CHANGE UP (ref 1.6–2.6)
MCHC RBC-ENTMCNC: 27.2 PG — SIGNIFICANT CHANGE UP (ref 27–34)
MCHC RBC-ENTMCNC: 32.2 GM/DL — SIGNIFICANT CHANGE UP (ref 32–36)
MCV RBC AUTO: 84.7 FL — SIGNIFICANT CHANGE UP (ref 80–100)
MONOS+MACROS # FLD: 4 % — SIGNIFICANT CHANGE UP
NEUTROPHILS-BODY FLUID: 95 % — SIGNIFICANT CHANGE UP
NRBC # BLD: 0 /100 WBCS — SIGNIFICANT CHANGE UP (ref 0–0)
PHOSPHATE SERPL-MCNC: 2.6 MG/DL — SIGNIFICANT CHANGE UP (ref 2.5–4.5)
PLATELET # BLD AUTO: 248 K/UL — SIGNIFICANT CHANGE UP (ref 150–400)
POTASSIUM SERPL-MCNC: 3.8 MMOL/L — SIGNIFICANT CHANGE UP (ref 3.5–5.3)
POTASSIUM SERPL-SCNC: 3.8 MMOL/L — SIGNIFICANT CHANGE UP (ref 3.5–5.3)
RBC # BLD: 3.78 M/UL — LOW (ref 4.2–5.8)
RBC # FLD: 14.1 % — SIGNIFICANT CHANGE UP (ref 10.3–14.5)
RCV VOL RI: HIGH /UL (ref 0–5)
SODIUM SERPL-SCNC: 139 MMOL/L — SIGNIFICANT CHANGE UP (ref 135–145)
TOTAL NUCLEATED CELL COUNT, BODY FLUID: SIGNIFICANT CHANGE UP /UL
TUBE TYPE: SIGNIFICANT CHANGE UP
WBC # BLD: 8.64 K/UL — SIGNIFICANT CHANGE UP (ref 3.8–10.5)
WBC # FLD AUTO: 8.64 K/UL — SIGNIFICANT CHANGE UP (ref 3.8–10.5)

## 2022-10-21 PROCEDURE — 71045 X-RAY EXAM CHEST 1 VIEW: CPT | Mod: 26

## 2022-10-21 PROCEDURE — 99233 SBSQ HOSP IP/OBS HIGH 50: CPT | Mod: GC

## 2022-10-21 PROCEDURE — 99222 1ST HOSP IP/OBS MODERATE 55: CPT

## 2022-10-21 RX ORDER — CYCLOBENZAPRINE HYDROCHLORIDE 10 MG/1
5 TABLET, FILM COATED ORAL THREE TIMES A DAY
Refills: 0 | Status: DISCONTINUED | OUTPATIENT
Start: 2022-10-21 | End: 2022-10-21

## 2022-10-21 RX ORDER — ACETAMINOPHEN 500 MG
1000 TABLET ORAL EVERY 8 HOURS
Refills: 0 | Status: DISCONTINUED | OUTPATIENT
Start: 2022-10-21 | End: 2022-10-24

## 2022-10-21 RX ORDER — OXYCODONE HYDROCHLORIDE 5 MG/1
5 TABLET ORAL EVERY 4 HOURS
Refills: 0 | Status: DISCONTINUED | OUTPATIENT
Start: 2022-10-21 | End: 2022-10-21

## 2022-10-21 RX ORDER — APIXABAN 2.5 MG/1
5 TABLET, FILM COATED ORAL EVERY 12 HOURS
Refills: 0 | Status: DISCONTINUED | OUTPATIENT
Start: 2022-10-22 | End: 2022-10-31

## 2022-10-21 RX ORDER — OXYCODONE HYDROCHLORIDE 5 MG/1
2.5 TABLET ORAL EVERY 6 HOURS
Refills: 0 | Status: DISCONTINUED | OUTPATIENT
Start: 2022-10-21 | End: 2022-10-24

## 2022-10-21 RX ORDER — ACETAMINOPHEN 500 MG
1000 TABLET ORAL EVERY 8 HOURS
Refills: 0 | Status: DISCONTINUED | OUTPATIENT
Start: 2022-10-21 | End: 2022-10-21

## 2022-10-21 RX ORDER — CLOPIDOGREL BISULFATE 75 MG/1
75 TABLET, FILM COATED ORAL DAILY
Refills: 0 | Status: DISCONTINUED | OUTPATIENT
Start: 2022-10-21 | End: 2022-10-24

## 2022-10-21 RX ORDER — VANCOMYCIN HCL 1 G
1000 VIAL (EA) INTRAVENOUS EVERY 24 HOURS
Refills: 0 | Status: DISCONTINUED | OUTPATIENT
Start: 2022-10-21 | End: 2022-10-23

## 2022-10-21 RX ORDER — LOSARTAN POTASSIUM 100 MG/1
50 TABLET, FILM COATED ORAL DAILY
Refills: 0 | Status: DISCONTINUED | OUTPATIENT
Start: 2022-10-21 | End: 2022-10-25

## 2022-10-21 RX ADMIN — Medication 250 MILLIGRAM(S): at 17:51

## 2022-10-21 RX ADMIN — Medication 1000 MILLIGRAM(S): at 17:52

## 2022-10-21 RX ADMIN — LIDOCAINE 1 PATCH: 4 CREAM TOPICAL at 21:37

## 2022-10-21 RX ADMIN — ATORVASTATIN CALCIUM 40 MILLIGRAM(S): 80 TABLET, FILM COATED ORAL at 21:37

## 2022-10-21 RX ADMIN — CLOPIDOGREL BISULFATE 75 MILLIGRAM(S): 75 TABLET, FILM COATED ORAL at 11:13

## 2022-10-21 RX ADMIN — FINASTERIDE 5 MILLIGRAM(S): 5 TABLET, FILM COATED ORAL at 11:10

## 2022-10-21 RX ADMIN — LIDOCAINE 1 PATCH: 4 CREAM TOPICAL at 11:00

## 2022-10-21 RX ADMIN — Medication 1000 MILLIGRAM(S): at 18:48

## 2022-10-21 RX ADMIN — OXYCODONE HYDROCHLORIDE 5 MILLIGRAM(S): 5 TABLET ORAL at 11:12

## 2022-10-21 RX ADMIN — LOSARTAN POTASSIUM 50 MILLIGRAM(S): 100 TABLET, FILM COATED ORAL at 11:10

## 2022-10-21 RX ADMIN — SENNA PLUS 2 TABLET(S): 8.6 TABLET ORAL at 21:37

## 2022-10-21 RX ADMIN — Medication 25 MILLIGRAM(S): at 05:48

## 2022-10-21 RX ADMIN — APIXABAN 5 MILLIGRAM(S): 2.5 TABLET, FILM COATED ORAL at 05:49

## 2022-10-21 RX ADMIN — TAMSULOSIN HYDROCHLORIDE 0.4 MILLIGRAM(S): 0.4 CAPSULE ORAL at 21:37

## 2022-10-21 RX ADMIN — CLOPIDOGREL BISULFATE 75 MILLIGRAM(S): 75 TABLET, FILM COATED ORAL at 21:36

## 2022-10-21 RX ADMIN — POLYETHYLENE GLYCOL 3350 17 GRAM(S): 17 POWDER, FOR SOLUTION ORAL at 11:14

## 2022-10-21 RX ADMIN — Medication 650 MILLIGRAM(S): at 05:50

## 2022-10-21 RX ADMIN — LIDOCAINE 1 PATCH: 4 CREAM TOPICAL at 07:15

## 2022-10-21 RX ADMIN — OXYCODONE HYDROCHLORIDE 5 MILLIGRAM(S): 5 TABLET ORAL at 12:12

## 2022-10-21 NOTE — CONSULT NOTE ADULT - ASSESSMENT
Confidential Drug Utilization Report  Search Terms: Art Ruggiero, 1935Search Date: 10/21/2022 14:54:34 PM  The Drug Utilization Report below displays all of the controlled substance prescriptions, if any, that your patient has filled in the last twelve months. The information displayed on this report is compiled from pharmacy submissions to the Department, and accurately reflects the information as submitted by the pharmacies.    This report was requested by: Quyen Clay | Reference #: [id number]    There are no results for the search terms that you entered.     
Fevers  Left arthritis - r/o gout / Pseudogout , septic arthritis ( seems less likely)      Plan - Cont Vancomycin 1 gm iv q24hrs for now.  await arthrocentesis results.

## 2022-10-21 NOTE — PROGRESS NOTE ADULT - PROBLEM SELECTOR PLAN 1
s/p fall associated with weakness, no LOC, headache, dizziness, chest pain, SOB.  likely mechanical, could be due to deconditioning, weakness from recent hospital DC   CT head Non-contrast :  no acute hge or stroke, , only significant for sinusitis  XR no fracture or fluid collection   Fall Precaution   f/u TSH , lipid profile , hgb a1c  PT consulted  Nutrition consulted  CM and SW consulted  Ortho consulted

## 2022-10-21 NOTE — CONSULT NOTE ADULT - SUBJECTIVE AND OBJECTIVE BOX
HPI:  87M, coming form home, declining in ambulatory status, with a PMHx of CAD, s/p stents, PAD on Plavix and Eliquis, DM, BPH, HTN BIBEMS for evaluation s/p fall and knee pain. Patient is limited historian, AOx1-2. Collateral obtained per chart review, called emergency contact listed in the chart Antonio Romero number is not active. Per son, the patient fell 2 days ago and hurt his L knee. Unknown if head-strike or LOC. Fall unwitnessed. Per the son, patient was hospitalized for 5-d more than 1week ago for COVID, and since then has had progressively worsening ambulation 2/2 knee pain. Per son, patient was discharged back to home with out any ambulatory assistive devices and services. Patient reports knee pain started while in hospital. No reported fever, chills, cp, sob, cough, vomiting, diarrhea, calf pain. No hx of DVT/PE. Pt does not recall if he hit his head or not. Unable to obtain ROS d/t mental status.  (19 Oct 2022 05:18)      PAST MEDICAL & SURGICAL HISTORY:  HTN (hypertension)      HLD (hyperlipidemia)      BPH (benign prostatic hypertrophy)      DM (diabetes mellitus)      PAD (peripheral artery disease)  s/p stent placement      CKD (chronic kidney disease)      S/P tonsillectomy      S/P peripheral artery angioplasty with stent placement          No Known Allergies      Meds:  acetaminophen     Tablet .. 1000 milliGRAM(s) Oral every 8 hours  atorvastatin 40 milliGRAM(s) Oral at bedtime  finasteride 5 milliGRAM(s) Oral daily  lidocaine   4% Patch 1 Patch Transdermal daily  losartan 50 milliGRAM(s) Oral daily  metoprolol succinate ER 25 milliGRAM(s) Oral daily  oxyCODONE    IR 5 milliGRAM(s) Oral every 4 hours PRN  polyethylene glycol 3350 17 Gram(s) Oral daily  senna 2 Tablet(s) Oral at bedtime  tamsulosin 0.4 milliGRAM(s) Oral at bedtime      SOCIAL HISTORY:  Smoker:  YES / NO        PACK YEARS:                         WHEN QUIT?  ETOH use:  YES / NO               FREQUENCY / QUANTITY:  Ilicit Drug use:  YES / NO  Occupation:  Assisted device use (Cane / Walker):  Live with:    FAMILY HISTORY:  Family history of lung cancer (Sibling)        VITALS:  Vital Signs Last 24 Hrs  T(C): 38.5 (21 Oct 2022 16:39), Max: 38.5 (21 Oct 2022 16:39)  T(F): 101.3 (21 Oct 2022 16:39), Max: 101.3 (21 Oct 2022 16:39)  HR: 70 (21 Oct 2022 14:05) (70 - 80)  BP: 149/74 (21 Oct 2022 14:05) (149/74 - 152/59)  BP(mean): --  RR: 17 (21 Oct 2022 14:05) (17 - 18)  SpO2: 95% (21 Oct 2022 14:05) (93% - 97%)    Parameters below as of 21 Oct 2022 14:05  Patient On (Oxygen Delivery Method): room air        LABS/DIAGNOSTIC TESTS:                          10.3   8.64  )-----------( 248      ( 21 Oct 2022 07:46 )             32.0     WBC Count: 8.64 K/uL (10-21 @ 07:46)  WBC Count: 7.46 K/uL (10-20 @ 08:09)  WBC Count: 6.91 K/uL (10-19 @ 10:23)  WBC Count: 7.67 K/uL (10-18 @ 17:57)      10-21    139  |  106  |  15  ----------------------------<  193<H>  3.8   |  25  |  1.05    Ca    8.9      21 Oct 2022 07:46  Phos  2.6     10-21  Mg     1.7     10-21                    LACTATE:    ABG -     CULTURES:   Clean Catch Clean Catch (Midstream)  10-19 @ 04:19   No growth  --  --          RADIOLOGY:      ROS  [  ] UNABLE TO ELICIT

## 2022-10-21 NOTE — PROGRESS NOTE ADULT - SUBJECTIVE AND OBJECTIVE BOX
PGY-1 Progress Note discussed with attending    PAGER #: [[phone]] TILL 5:00 PM  PLEASE CONTACT ON CALL TEAM:  - On Call Team (Please refer to Remberto) FROM 5:00 PM - 8:30PM  - Nightfloat Team FROM 8:30 -7:30 AM    OVERNIGHT EVENTS:   - No acute overnight events. Pt seen at bedside, complaining of severe left knee pain, 10/10, unable to move due to pain. Denies fevers, chills, SOB, CP, N/V/D or abdominal pain.     REVIEW OF SYSTEMS:  CONSTITUTIONAL: No fever, weight loss, or fatigue  RESPIRATORY: No cough, wheezing, chills or hemoptysis; No shortness of breath  CARDIOVASCULAR: No chest pain, palpitations, dizziness, or leg swelling  GASTROINTESTINAL: No abdominal pain. No nausea, vomiting, or hematemesis; No diarrhea or constipation. No melena or hematochezia.  GENITOURINARY: No dysuria or hematuria, urinary frequency  NEUROLOGICAL: (+) weakness. No headaches, memory loss, numbness, or tremors  EXTREMITIES: (+) sharp pain in left knee that worsens with movement.   SKIN: No itching, burning, rashes, or lesions     MEDICATIONS  (STANDING):  atorvastatin 40 milliGRAM(s) Oral at bedtime  finasteride 5 milliGRAM(s) Oral daily  lidocaine   4% Patch 1 Patch Transdermal daily  losartan 50 milliGRAM(s) Oral daily  metoprolol succinate ER 25 milliGRAM(s) Oral daily  polyethylene glycol 3350 17 Gram(s) Oral daily  senna 2 Tablet(s) Oral at bedtime  tamsulosin 0.4 milliGRAM(s) Oral at bedtime  vancomycin  IVPB 1000 milliGRAM(s) IV Intermittent every 24 hours    MEDICATIONS  (PRN):  acetaminophen     Tablet .. 1000 milliGRAM(s) Oral every 8 hours PRN Temp greater or equal to 38C (100.4F), Moderate Pain (4 - 6)  oxyCODONE    IR 5 milliGRAM(s) Oral every 4 hours PRN Severe Pain (7 - 10)      Vital Signs Last 24 Hrs  T(C): 38.5 (21 Oct 2022 17:09), Max: 38.5 (21 Oct 2022 16:39)  T(F): 101.3 (21 Oct 2022 17:09), Max: 101.3 (21 Oct 2022 16:39)  HR: 72 (21 Oct 2022 17:09) (70 - 80)  BP: 158/54 (21 Oct 2022 17:09) (149/74 - 158/54)  BP(mean): --  RR: 20 (21 Oct 2022 17:09) (17 - 20)  SpO2: 96% (21 Oct 2022 17:09) (93% - 97%)    Parameters below as of 21 Oct 2022 17:09  Patient On (Oxygen Delivery Method): room air      GENERAL: NAD, lying in bed comfortably  HEAD:  Atraumatic, Normocephalic  EYES: EOMI, PERRLA, conjunctiva and sclera clear  ENT: Moist mucous membranes  NECK: Supple, No JVD  CHEST/LUNG: Clear to auscultation bilaterally; No rales, rhonchi, wheezing, or rubs. Unlabored respirations  HEART: Regular rate and rhythm; No murmurs, rubs, or gallops  ABDOMEN: Bowel sounds present; Soft, Nontender, Nondistended. No hepatomegally  EXTREMITIES:  2+ Peripheral Pulses, brisk capillary refill. No clubbing, cyanosis, or edema (+) LEFT knee effusion with warmth s/p arthrocentesis by ED resident 10/19  NERVOUS SYSTEM:  Alert & Oriented X2, speech clear. No deficits   MSK: (+) limited ROM d/t LEFT knee pain   SKIN: No rashes or lesions                        10.3   8.64  )-----------( 248      ( 21 Oct 2022 07:46 )             32.0     10-21    139  |  106  |  15  ----------------------------<  193<H>  3.8   |  25  |  1.05    Ca    8.9      21 Oct 2022 07:46  Phos  2.6     10-21  Mg     1.7     10-21                  CAPILLARY BLOOD GLUCOSE          RADIOLOGY & ADDITIONAL TESTS:

## 2022-10-21 NOTE — CONSULT NOTE ADULT - PROBLEM SELECTOR RECOMMENDATION 9
Pt with pain on left knee which is somatic in nature due to left knee joint effusion per XR. Per Ortho recommendations, will treat conservatively. High risk medications reviewed. Avoid polypharmacy. Avoid IV opioids. Avoid NSAIDs and benzodiazepines. Non-pharmacological sleep aides initiated. Non-opioid medications and non-pharmacological pain management measures initiated.  Opioid pain recommendations   - Start Oxycodone 5 mg PO q 4 hours PRN severe pain. Monitor for sedation/ respiratory depression.   Non-opioid pain recommendations   - Consider NSAID's per primary team. Toradol 15 mg IV q6h standing x 3 days. Monitor renal function.  - Change Acetaminophen 1 gram PO q 8 hours for 4 days. Monitor LFTs  - Continue Lidoderm 5% patch daily.   Bowel Regimen  - Continue Miralax 17G PO daily  - Continue Senna 2 tablets at bedtime for constipation  Mild pain   - Non-pharmacological pain treatment recommendations  - Warm/ Cool packs PRN   - Repositioning, imagery, relaxation, distraction.  - Physical therapy OOB if no contraindications   Recommendations discussed with primary team and RN

## 2022-10-21 NOTE — CONSULT NOTE ADULT - GASTROINTESTINAL
normal/soft/nontender/nondistended/normal active bowel sounds/no guarding/no rigidity/no organomegaly/no masses palpable

## 2022-10-21 NOTE — PROCEDURE NOTE - NSINFORMCONSENT_GEN_A_CORE
Maximino Ruggiero/Benefits, risks, and possible complications of procedure explained to patient/caregiver who verbalized understanding and gave verbal consent.

## 2022-10-21 NOTE — PROGRESS NOTE ADULT - PROBLEM SELECTOR PLAN 2
s/p mechanical fall w/ left knee injury 2 days ago  likely in the setting of deconditioning d/t recent hospital admission for COVID infection, not DC with ambulatory devices or assisstance  noted to have progressively worsening ambulation   CTH no acute pathology, only significant for sinusitis  Afebrile with no WBC  s/p arthrocentesis in the ED but unable to collect enough fluid to send to the lab  concern for bursitis  PT consulted   Ortho consulted, f/u recs  -spiked fever today 101.3F, ortho to re-evaluate, will do arthrocentesis  -f/u MRI

## 2022-10-21 NOTE — PROGRESS NOTE ADULT - SUBJECTIVE AND OBJECTIVE BOX
SWAPNA FINK  [id number]        SWAPNA FINK87yMale  HPI:  Patient seen and evaluated at bedside.   Patient was febrile today, tMax 101.3.  Patient confused unable to obtain reliable history/ros    Ambulation: walker     PAST MEDICAL & SURGICAL HISTORY:  HTN (hypertension)      HLD (hyperlipidemia)      BPH (benign prostatic hypertrophy)      DM (diabetes mellitus)      PAD (peripheral artery disease)  s/p stent placement      CKD (chronic kidney disease)      S/P tonsillectomy      S/P peripheral artery angioplasty with stent placement          FAMILY HISTORY:  Family history of lung cancer (Sibling)        Social History:      Allergies    No Known Allergies    Intolerances        Home Medications:  Aspirin Enteric Coated 81 mg oral delayed release tablet: 1 tab(s) orally once a day (19 Oct 2022 08:52)  ATORVASTATIN 40MG TAB:  (19 Oct 2022 08:57)  clopidogrel 75 mg oral tablet: 1 tab(s) orally once a day (19 Oct 2022 08:57)  Eliquis 5 mg oral tablet: 1 tab(s) orally 2 times a day (19 Oct 2022 08:57)  finasteride 5 mg oral tablet: 1 tab(s) orally once a day (19 Oct 2022 08:57)  glimepiride 4 mg oral tablet: 1 tab(s) orally once a day (19 Oct 2022 08:52)  losartan-hydrochlorothiazide 50 mg-12.5 mg oral tablet: 1 tab(s) orally once a day (19 Oct 2022 08:57)  metFORMIN 500 mg oral tablet: 2 tab(s) orally 2 times a day (with meals) (19 Oct 2022 08:57)  METOPROL SUC 25MG ER TAB:  (19 Oct 2022 08:57)  Multiple Vitamins oral tablet: 1 tab(s) orally once a day (19 Oct 2022 08:57)  tamsulosin 0.4 mg oral capsule: 1 cap(s) orally once a day (19 Oct 2022 08:57)      Vital Signs Last 24 Hrs  T(C): 36.9 (20 Oct 2022 14:10), Max: 37.5 (20 Oct 2022 03:21)  T(F): 98.5 (20 Oct 2022 14:10), Max: 99.5 (20 Oct 2022 03:21)  HR: 70 (20 Oct 2022 14:49) (57 - 70)  BP: 151/48 (20 Oct 2022 14:10) (151/48 - 174/67)  BP(mean): --  RR: 18 (20 Oct 2022 14:10) (17 - 18)  SpO2: 98% (20 Oct 2022 14:49) (94% - 98%)    Parameters below as of 20 Oct 2022 14:49  Patient On (Oxygen Delivery Method): room air      I&O's Summary      Physical Exam:  General:  NAD, resting comfortably  Musculoskeletal:  Left knee: Skin warm and pink. Large effusion noted. No ecchymosis. No erythema. ROM limited 2/2 pain 20-90 degrees flexion passively  No palpable defect noted over quad/patella tendon.  Lower extremities: Calves soft and NTTP b/l. SILT. NVI. (+)EHL/FHL/ADF/APF intact bilaterally    Labs:                           10.3   8.64  )-----------( 248      ( 21 Oct 2022 07:46 )             32.0   10-21    139  |  106  |  15  ----------------------------<  193<H>  3.8   |  25  |  1.05    Ca    8.9      21 Oct 2022 07:46  Phos  2.6     10-21  Mg     1.7     10-21      Radiology:    Impression: Patient is a 87yMale with Left knee knee effusion   Plan:  - Pain management  - DVT prophylaxis  - Daily PT - WBAT to LLE with appropriate assistive device  > Over the phone consent obtained from son Antonio Fink [phone] for a left knee arthrocentesis  > Consent in chart   > Arthrocentesis performed at bedside: synovial fluid sent out for cell count, crystals, gram stain and culture  > Ace wrap applied with 4x4's   > Ice PRN   > MRI of left knee ordered by medical team   - Case d/w Dr. Batista

## 2022-10-21 NOTE — CONSULT NOTE ADULT - MUSCULOSKELETAL COMMENTS
left knee mildly swollen, no erythema of skin and knee is just marginal warm, pain on trying to bend knee

## 2022-10-21 NOTE — PROGRESS NOTE ADULT - ATTENDING COMMENTS
Patient seen/evaluated at bedside on 10/21/22. I agree with the resident progress note/outlined plan of care. My independent findings and conclusions are documented.    s: somnolent. Reports ongoing left knee pain. Tmax 101.3 while during my assessment    Left knee: + swelling, slightly warm    CRP 89 ESR 96    88 y/o male with CAD, s/p stents, PAD on Plavix and Eliquis, DM, BPH, HTN recently hospitalized with COVID x 5 days more than 1 week ago- symptomatically improved but then fell at home, noted with Left knee effusion    1. post traumatic and inflammatory left knee effusion- possible septic joint vs. gout vs. pseudogout  2. acute left knee pain- inability to ambulate/weight bear  3. fever  4. h/o recent COVID infection  5. h/o CAD s/p stent  6. atrial fibrillation on eliquis  7. DM T II    Orthopedics service asked to reassess patient- proceed with athrocentesis- prelim analysis with inflammatory fluid  -c/w empiric abx w/ vancomycin per ID- Dr. Watson, f/u   -MRI left knee  -decrease oxycodone to 2.5mg from 5mg, dc flexeril due to sedating effect  -pain mgt consult  -f/u ID and orthopedics  -c/w eliquis/plavix  -fall precaution  -for YESSICA once evaluation complete Patient seen/evaluated at bedside on 10/21/22. I agree with the resident progress note/outlined plan of care. My independent findings and conclusions are documented.    s: somnolent. Reports ongoing left knee pain. Tmax 101.3 while during my assessment    Left knee: + swelling, slightly warm    CRP 89 ESR 96    88 y/o male with CAD, s/p stents, PAD on Plavix and Eliquis, DM, BPH, HTN recently hospitalized with COVID x 5 days more than 1 week ago- symptomatically improved but then fell at home, noted with Left knee effusion    1. post traumatic and inflammatory left knee effusion- possible septic joint vs. gout vs. pseudogout  2. acute left knee pain- inability to ambulate/weight bear  3. fever  4. h/o recent COVID infection  5. h/o CAD s/p stent  6. atrial fibrillation on eliquis  7. DM T II    Orthopedics service asked to reassess patient- proceed with athrocentesis- prelim analysis with inflammatory fluid  -c/w empiric abx w/ vancomycin per ID- Dr. Watson, f/u   -MRI left knee  -follow up blood cultures, ua  -decrease oxycodone to 2.5mg from 5mg, dc flexeril due to sedating effect  -pain mgt consult  -f/u ID and orthopedics  -c/w eliquis/plavix  -fall precaution  -for YESSICA once evaluation complete

## 2022-10-21 NOTE — CONSULT NOTE ADULT - SUBJECTIVE AND OBJECTIVE BOX
HPI:  87M, coming form home, declining in ambulatory status, with a PMHx of CAD, s/p stents, PAD on Plavix and Eliquis, DM, BPH, HTN BIBEMS for evaluation s/p fall and knee pain. Patient is limited historian, AOx1-2. Collateral obtained per chart review, called emergency contact listed in the chart Antonio Romero number is not active. Per son, the patient fell 2 days ago and hurt his L knee. Unknown if head-strike or LOC. Fall unwitnessed. Per the son, patient was hospitalized for 5-d more than 1week ago for COVID, and since then has had progressively worsening ambulation 2/2 knee pain. Per son, patient was discharged back to home with out any ambulatory assistive devices and services. Patient reports knee pain started while in hospital. No reported fever, chills, cp, sob, cough, vomiting, diarrhea, calf pain. No hx of DVT/PE. Pt does not recall if he hit his head or not. Unable to obtain ROS d/t mental status.  (19 Oct 2022 05:18)      PAST MEDICAL & SURGICAL HISTORY:  HTN (hypertension)      HLD (hyperlipidemia)      BPH (benign prostatic hypertrophy)      DM (diabetes mellitus)      PAD (peripheral artery disease)  s/p stent placement      CKD (chronic kidney disease)      S/P tonsillectomy      S/P peripheral artery angioplasty with stent placement          No Known Allergies      Meds:  acetaminophen     Tablet .. 1000 milliGRAM(s) Oral every 8 hours PRN  atorvastatin 40 milliGRAM(s) Oral at bedtime  finasteride 5 milliGRAM(s) Oral daily  lidocaine   4% Patch 1 Patch Transdermal daily  losartan 50 milliGRAM(s) Oral daily  metoprolol succinate ER 25 milliGRAM(s) Oral daily  oxyCODONE    IR 5 milliGRAM(s) Oral every 4 hours PRN  polyethylene glycol 3350 17 Gram(s) Oral daily  senna 2 Tablet(s) Oral at bedtime  tamsulosin 0.4 milliGRAM(s) Oral at bedtime  vancomycin  IVPB 1000 milliGRAM(s) IV Intermittent every 24 hours      SOCIAL HISTORY:  Smoker:  YES / NO        PACK YEARS:                         WHEN QUIT?  ETOH use:  YES / NO               FREQUENCY / QUANTITY:  Ilicit Drug use:  YES / NO  Occupation:  Assisted device use (Cane / Walker):  Live with:    FAMILY HISTORY:  Family history of lung cancer (Sibling)        VITALS:  Vital Signs Last 24 Hrs  T(C): 38.5 (21 Oct 2022 17:09), Max: 38.5 (21 Oct 2022 16:39)  T(F): 101.3 (21 Oct 2022 17:09), Max: 101.3 (21 Oct 2022 16:39)  HR: 72 (21 Oct 2022 17:09) (70 - 80)  BP: 158/54 (21 Oct 2022 17:09) (149/74 - 158/54)  BP(mean): --  RR: 20 (21 Oct 2022 17:09) (17 - 20)  SpO2: 96% (21 Oct 2022 17:09) (93% - 97%)    Parameters below as of 21 Oct 2022 17:09  Patient On (Oxygen Delivery Method): room air        LABS/DIAGNOSTIC TESTS:                          10.3   8.64  )-----------( 248      ( 21 Oct 2022 07:46 )             32.0     WBC Count: 8.64 K/uL (10-21 @ 07:46)  WBC Count: 7.46 K/uL (10-20 @ 08:09)  WBC Count: 6.91 K/uL (10-19 @ 10:23)      10-21    139  |  106  |  15  ----------------------------<  193<H>  3.8   |  25  |  1.05    Ca    8.9      21 Oct 2022 07:46  Phos  2.6     10-21  Mg     1.7     10-21                    LACTATE:    ABG -     CULTURES:   Clean Catch Clean Catch (Midstream)  10-19 @ 04:19   No growth  --  --          RADIOLOGY:< from: Xray Knee 4 Views, Left (10.18.22 @ 19:25) >  ACC: [id number] EXAM:  XR CHEST PORTABLE URGENT 1V                        ACC: [id number] EXAM:  XR KNEE COMP 4+ VIEWS LT                          PROCEDURE DATE:  10/18/2022          INTERPRETATION:  XR KNEE COMPLETE 4 VIEWS LEFT, XR CHEST URGENT    Clinical History: Left knee pain    Left knee 3 views      FINDINGS:    There is no fracture, dislocation, soft tissue swelling  Small suprapatellar joint effusion.  Moderate atherosclerotic change. The visualized vascular sent intact.  No degenerativechanges.  No radiopaque foreign body.    AP chest    FINDINGS: The heart size, mediastinum, hilum and aorta are within normal   limits. Mild atherosclerotic changes. Vague opacity right lower lobe may   represent confluence of shadows versus infiltrate. There is no pulmonary   venous congestion, pleural effusion or pneumothorax. The trachea is   midline. The bony structures are intact.    IMPRESSION:  1.  No fracture.  2.  Small suprapatellar left knee joint effusion.  3.  Vague opacity right lower lobe may represent confluence of shadows   versus infiltrate. Recommend correlation clinically and as needed a   follow-up examination.    --- End of Report ---             JOHNNIE MONDRAGON DO; Attending Radiologist  This document has been electronically signed. Oct 19 2022  3:29PM    < end of copied text >  ----------------------------------------------------------------------------------------------------------------------------------------------------------------------------------------------------------------------------------------------------------------------------------------------------------------    ACC: [id number] EXAM:  CT BRAIN                        ACC: [id number] EXAM:  CT CERVICAL SPINE                          PROCEDURE DATE:  10/18/2022          INTERPRETATION:  Noncontrast CT of the brain and cervical spine    CLINICAL INDICATION: Statuspost fall    TECHNIQUE: Axial CT scanning of the brain and cervical spine were   obtained without the administration of intravenous contrast.  Images were   reformatted in the sagittal and coronal planes.    COMPARISON: None available    FINDINGS:    CT brain:    No hydrocephalus, mass effect, midline shift, acute intracranial   hemorrhage, or brain edema.    No displaced calvarial fracture.    Bilateral ethmoid, sphenoid, and maxillary sinus mucosal thickening.   Air-fluid level in the right sphenoid sinus.    Mastoid air cells clear.    CT cervical spine:    No acute fracture or traumatic subluxation. No prevertebral soft tissue   swelling.    Vertebral body height and facet alignment are maintained. Grade 1   retrolisthesis of C5 on C6. Straightening of the normal cervical lordosis.    Alignment at the craniocervical junction unremarkable.    Disc space narrowing at C5-C6.    Multilevel degenerative changes.    Visualized lung apices clear. Visualized soft tissues unremarkable.    IMPRESSION:    CT brain:  No acute intracranial hemorrhage, brain edema, or mass effect.  No displaced calvarial fracture.  Air-fluid level in the right sphenoid sinus, correlate for the presence   of sinusitis.    CT cervical spine:  No acute fracture ortraumatic subluxation.  No prevertebral soft tissue swelling.  Degenerative changes.    --- End of Report ---            CASSANDRA GRAMAJO MD; Attending Radiologist  This document has been electronically signed. Oct 18 2022  8:52PM    < end of copied text >        ROS  [  ] UNABLE TO ELICIT               HPI:  87M, coming form home, declining in ambulatory status, with a PMHx of CAD, s/p stents, PAD on Plavix and Eliquis, DM, BPH, HTN BIBEMS for evaluation s/p fall and knee pain. Patient is limited historian, AOx1-2. Collateral obtained per chart review, called emergency contact listed in the chart Antonio Ruggiero and number is not active. Per son, the patient fell 2 days ago and hurt his L knee. Unknown if head-strike or LOC. Fall unwitnessed. Per the son, patient was hospitalized for 5-d more than 1week ago for COVID, and since then has had progressively worsening ambulation 2/2 knee pain. Per son, patient was discharged back to home with out any ambulatory assistive devices and services. Patient reports knee pain started while in hospital. No reported fever, chills, cp, sob, cough, vomiting, diarrhea, calf pain. No hx of DVT/PE. Pt does not recall if he hit his head or not. Unable to obtain ROS d/t mental status.  (19 Oct 2022 05:18)        History as above, asked to see this patient who fell and injured his left knee , he was found to have left knee swelling pain and erythema and fevers to 101.3 here, he was recently admitted about a week ago for COVID infection also. The patient received pain medication and is lethargic and sleepy and not waking up to answer any questions. He just had an arthrocentesis about 45 minutes ago and the results of the cell count , crystals and other results are all pending. I started him on Vancomycin Empirically for now and he had gotten a single dose of rocephin 2 days ago, no other antibiotics otherwise.            PAST MEDICAL & SURGICAL HISTORY:  HTN (hypertension)      HLD (hyperlipidemia)      BPH (benign prostatic hypertrophy)      DM (diabetes mellitus)      PAD (peripheral artery disease)  s/p stent placement      CKD (chronic kidney disease)      S/P tonsillectomy      S/P peripheral artery angioplasty with stent placement          No Known Allergies      Meds:  acetaminophen     Tablet .. 1000 milliGRAM(s) Oral every 8 hours PRN  atorvastatin 40 milliGRAM(s) Oral at bedtime  finasteride 5 milliGRAM(s) Oral daily  lidocaine   4% Patch 1 Patch Transdermal daily  losartan 50 milliGRAM(s) Oral daily  metoprolol succinate ER 25 milliGRAM(s) Oral daily  oxyCODONE    IR 5 milliGRAM(s) Oral every 4 hours PRN  polyethylene glycol 3350 17 Gram(s) Oral daily  senna 2 Tablet(s) Oral at bedtime  tamsulosin 0.4 milliGRAM(s) Oral at bedtime  vancomycin  IVPB 1000 milliGRAM(s) IV Intermittent every 24 hours      SOCIAL HISTORY: unknown    FAMILY HISTORY:  Family history of lung cancer (Sibling)        VITALS:  Vital Signs Last 24 Hrs  T(C): 38.5 (21 Oct 2022 17:09), Max: 38.5 (21 Oct 2022 16:39)  T(F): 101.3 (21 Oct 2022 17:09), Max: 101.3 (21 Oct 2022 16:39)  HR: 72 (21 Oct 2022 17:09) (70 - 80)  BP: 158/54 (21 Oct 2022 17:09) (149/74 - 158/54)  BP(mean): --  RR: 20 (21 Oct 2022 17:09) (17 - 20)  SpO2: 96% (21 Oct 2022 17:09) (93% - 97%)    Parameters below as of 21 Oct 2022 17:09  Patient On (Oxygen Delivery Method): room air        LABS/DIAGNOSTIC TESTS:                          10.3   8.64  )-----------( 248      ( 21 Oct 2022 07:46 )             32.0     WBC Count: 8.64 K/uL (10-21 @ 07:46)  WBC Count: 7.46 K/uL (10-20 @ 08:09)  WBC Count: 6.91 K/uL (10-19 @ 10:23)      10-21    139  |  106  |  15  ----------------------------<  193<H>  3.8   |  25  |  1.05    Ca    8.9      21 Oct 2022 07:46  Phos  2.6     10-21  Mg     1.7     10-21                    LACTATE:    ABG -     CULTURES:   Clean Catch Clean Catch (Midstream)  10-19 @ 04:19   No growth  --  --          RADIOLOGY:< from: Xray Knee 4 Views, Left (10.18.22 @ 19:25) >  ACC: [id number] EXAM:  XR CHEST PORTABLE URGENT 1V                        ACC: [id number] EXAM:  XR KNEE COMP 4+ VIEWS LT                          PROCEDURE DATE:  10/18/2022          INTERPRETATION:  XR KNEE COMPLETE 4 VIEWS LEFT, XR CHEST URGENT    Clinical History: Left knee pain    Left knee 3 views      FINDINGS:    There is no fracture, dislocation, soft tissue swelling  Small suprapatellar joint effusion.  Moderate atherosclerotic change. The visualized vascular sent intact.  No degenerativechanges.  No radiopaque foreign body.    AP chest    FINDINGS: The heart size, mediastinum, hilum and aorta are within normal   limits. Mild atherosclerotic changes. Vague opacity right lower lobe may   represent confluence of shadows versus infiltrate. There is no pulmonary   venous congestion, pleural effusion or pneumothorax. The trachea is   midline. The bony structures are intact.    IMPRESSION:  1.  No fracture.  2.  Small suprapatellar left knee joint effusion.  3.  Vague opacity right lower lobe may represent confluence of shadows   versus infiltrate. Recommend correlation clinically and as needed a   follow-up examination.    --- End of Report ---             JOHNNIE MONDRAGON DO; Attending Radiologist  This document has been electronically signed. Oct 19 2022  3:29PM    < end of copied text >  ----------------------------------------------------------------------------------------------------------------------------------------------------------------------------------------------------------------------------------------------------------------------------------------------------------------    ACC: [id number] EXAM:  CT BRAIN                        ACC: [id number] EXAM:  CT CERVICAL SPINE                          PROCEDURE DATE:  10/18/2022          INTERPRETATION:  Noncontrast CT of the brain and cervical spine    CLINICAL INDICATION: Statuspost fall    TECHNIQUE: Axial CT scanning of the brain and cervical spine were   obtained without the administration of intravenous contrast.  Images were   reformatted in the sagittal and coronal planes.    COMPARISON: None available    FINDINGS:    CT brain:    No hydrocephalus, mass effect, midline shift, acute intracranial   hemorrhage, or brain edema.    No displaced calvarial fracture.    Bilateral ethmoid, sphenoid, and maxillary sinus mucosal thickening.   Air-fluid level in the right sphenoid sinus.    Mastoid air cells clear.    CT cervical spine:    No acute fracture or traumatic subluxation. No prevertebral soft tissue   swelling.    Vertebral body height and facet alignment are maintained. Grade 1   retrolisthesis of C5 on C6. Straightening of the normal cervical lordosis.    Alignment at the craniocervical junction unremarkable.    Disc space narrowing at C5-C6.    Multilevel degenerative changes.    Visualized lung apices clear. Visualized soft tissues unremarkable.    IMPRESSION:    CT brain:  No acute intracranial hemorrhage, brain edema, or mass effect.  No displaced calvarial fracture.  Air-fluid level in the right sphenoid sinus, correlate for the presence   of sinusitis.    CT cervical spine:  No acute fracture ortraumatic subluxation.  No prevertebral soft tissue swelling.  Degenerative changes.    --- End of Report ---            CASSANDRA GRAMAJO MD; Attending Radiologist  This document has been electronically signed. Oct 18 2022  8:52PM    < end of copied text >        ROS  [ x ] UNABLE TO ELICIT

## 2022-10-21 NOTE — CONSULT NOTE ADULT - SUBJECTIVE AND OBJECTIVE BOX
Source of information: SWAPNA FINK, Chart review  Patient language: English  : n/a    HPI:  87M, coming form home, declining in ambulatory status, with a PMHx of CAD, s/p stents, PAD on Plavix and Eliquis, DM, BPH, HTN BIBEMS for evaluation s/p fall and knee pain. Patient is limited historian, AOx1-2. Collateral obtained per chart review, called emergency contact listed in the chart Antonio Fink and number is not active. Per son, the patient fell 2 days ago and hurt his L knee. Unknown if head-strike or LOC. Fall unwitnessed. Per the son, patient was hospitalized for 5-d more than 1week ago for COVID, and since then has had progressively worsening ambulation 2/2 knee pain. Per son, patient was discharged back to home with out any ambulatory assistive devices and services. Patient reports knee pain started while in hospital. No reported fever, chills, cp, sob, cough, vomiting, diarrhea, calf pain. No hx of DVT/PE. Pt does not recall if he hit his head or not. Unable to obtain ROS d/t mental status.  (19 Oct 2022 05:18)      Patient is a 87y old  Male who presents with a chief complaint of Fall and knee pain (20 Oct 2022 14:41)  . Pt is admitted for ..., being treated with .... Pain consulted for .... Pt seen and examined at bedside. Reports pain score ***  SCALE USED: (1-10 VNRS). Pt describes pain as .... radiating to... alleviated by pain medication... exacerbated by movement... Pt tolerating PO diet. Denies lethargy, nausea, vomiting, constipation, itchiness. Reports last BM ***. Patient stated goal for pain control: to be able to take deep breaths, get out of bed to chair and ambulate with tolerable pain control. Pt denies taking medications for pain at home.     PAST MEDICAL & SURGICAL HISTORY:  HTN (hypertension)      HLD (hyperlipidemia)      BPH (benign prostatic hypertrophy)      DM (diabetes mellitus)      PAD (peripheral artery disease)  s/p stent placement      CKD (chronic kidney disease)      S/P tonsillectomy      S/P peripheral artery angioplasty with stent placement          FAMILY HISTORY:  Family history of lung cancer (Sibling)        Social History:   [ ] Denies ETOH use, illicit drug use and smoking    Allergies    No Known Allergies    Intolerances        MEDICATIONS  (STANDING):  apixaban 5 milliGRAM(s) Oral every 12 hours  atorvastatin 40 milliGRAM(s) Oral at bedtime  clopidogrel Tablet 75 milliGRAM(s) Oral daily  cyclobenzaprine 5 milliGRAM(s) Oral three times a day  finasteride 5 milliGRAM(s) Oral daily  lidocaine   4% Patch 1 Patch Transdermal daily  losartan 50 milliGRAM(s) Oral daily  metoprolol succinate ER 25 milliGRAM(s) Oral daily  polyethylene glycol 3350 17 Gram(s) Oral daily  senna 2 Tablet(s) Oral at bedtime  tamsulosin 0.4 milliGRAM(s) Oral at bedtime    MEDICATIONS  (PRN):  acetaminophen     Tablet .. 1000 milliGRAM(s) Oral every 8 hours PRN Moderate Pain (4 - 6)  oxyCODONE    IR 5 milliGRAM(s) Oral every 4 hours PRN Severe Pain (7 - 10)      Vital Signs Last 24 Hrs  T(C): 37.2 (21 Oct 2022 14:05), Max: 38 (21 Oct 2022 04:54)  T(F): 98.9 (21 Oct 2022 14:05), Max: 100.4 (21 Oct 2022 04:54)  HR: 70 (21 Oct 2022 14:05) (70 - 80)  BP: 149/74 (21 Oct 2022 14:05) (149/74 - 152/59)  BP(mean): --  RR: 17 (21 Oct 2022 14:05) (17 - 18)  SpO2: 95% (21 Oct 2022 14:05) (93% - 97%)    Parameters below as of 21 Oct 2022 14:05  Patient On (Oxygen Delivery Method): room air        LABS: Reviewed.                          10.3   8.64  )-----------( 248      ( 21 Oct 2022 07:46 )             32.0     10-21    139  |  106  |  15  ----------------------------<  193<H>  3.8   |  25  |  1.05    Ca    8.9      21 Oct 2022 07:46  Phos  2.6     10-21  Mg     1.7     10-21            CAPILLARY BLOOD GLUCOSE            Radiology: Reviewed.     ORT Score -   Family Hx of substance abuse	Female	      Male  Alcohol 	                                           1                     3  Illegal drugs	                                   2                     3  Rx drugs                                           4 	                  4  Personal Hx of substance abuse		  Alcohol 	                                          3	                  3  Illegal drugs                                     4	                  4  Rx drugs                                            5 	                  5  Age between 16- 45 years	           1                     1  hx preadolescent sexual abuse	   3 	                  0  Psychological disease		  ADD, OCD, bipolar, schizophrenia   2	          2  Depression                                           1 	          1  Total: 0    a score of 3 or lower indicates low risk for opioid abuse		  a score of 4-7 indicates moderate risk for opioid abuse		  a score of 8 or higher indicates high risk for opioid abuse  	  4AT (Assessment test for delirium & cognitive impairment)  _________________________________________________________  [1] ALERTNESS  This includes patients who may be markedly drowsy (eg. difficult to rouse and/or obviously sleepy  during assessment) or agitated/hyperactive. Observe the patient. If asleep, attempt to wake with  speech or gentle touch on shoulder. Ask the patient to state their name and address to assist rating.  Normal (fully alert, but not agitated, throughout assessment) 0  Mild sleepiness for <10 seconds after waking, then normal 0  Clearly abnormal 4    [2] AMT4  Age, date of birth, place (name of the hospital or building), current year.  No mistakes 0  1 mistake 1  2 or more mistakes/untestable 2    [3] ATTENTION  Ask the patient: “Please tell me the months of the year in backwards order, starting at December.”  To assist initial understanding one prompt of “what is the month before December?” is permitted.  Months of the year backwards Achieves 7 months or more correctly 0  Starts but scores <7 months / refuses to start 1   Untestable (cannot start because unwell, drowsy, inattentive) 2    [4] ACUTE CHANGE OR FLUCTUATING COURSE  Evidence of significant change or fluctuation in: alertness, cognition, other mental function  (eg. paranoia, hallucinations) arising over the last 2 weeks and still evident in last 24hrs  No 0  Yes 4    4 or above: possible delirium +/- cognitive impairment  1-3: possible cognitive impairment  0: delirium or severe cognitive impairment unlikely (but delirium still possible if [4] information incomplete)    4AT SCORE: 0    REVIEW OF SYSTEMS:  CONSTITUTIONAL: No fever or fatigue  HEENT:  No difficulty hearing, no change in vision  NECK: No pain or stiffness  RESPIRATORY: No cough, wheezing, chills or hemoptysis; No shortness of breath  CARDIOVASCULAR: No chest pain, palpitations, dizziness, or leg swelling  GASTROINTESTINAL: No loss of appetite, decreased PO intake. No abdominal or epigastric pain. No nausea, vomiting; No diarrhea or constipation.   GENITOURINARY: No dysuria, frequency, hematuria, retention or incontinence  MUSCULOSKELETAL: No joint pain or swelling; No muscle, back, or extremity pain, no upper or lower motor strength weakness, no saddle anesthesia, bowel/bladder incontinence, no falls   NEURO: No headaches, No numbness/tingling b/l LE, No weakness  ENDOCRINE: No polyuria, polydipsia, heat or cold intolerance; No hair loss  PSYCHIATRIC: No depression, anxiety or difficulty sleeping    PHYSICAL EXAM:  GENERAL:  Alert & Oriented X4, cooperative, NAD, Good concentration. Speech is clear.   RESPIRATORY: Respirations even and unlabored. Clear to auscultation bilaterally; No rales, rhonchi, wheezing, or rubs  CARDIOVASCULAR: Normal S1/S2, regular rate and rhythm; No murmurs, rubs, or gallops. No JVD.   GASTROINTESTINAL:  Soft, Nontender, Nondistended; Bowel sounds present  PERIPHERAL VASCULAR:  Extremities warm without edema. 2+ Peripheral Pulses, No cyanosis, No calf tenderness  MUSCULOSKELETAL: Motor Strength 5/5 B/L upper and lower extremities; moves all extremities equally against gravity; ROM intact; negative SLR; No tenderness on palpation of all joints.   SKIN: Warm, dry, intact. No rashes, lesions, scars or wounds.     Risk factors associated with adverse outcomes related to opioid treatment  [ ]  Concurrent benzodiazepine use  [ ]  History/ Active substance use or alcohol use disorder  [ ] Psychiatric co-morbidity  [ ] Sleep apnea  [ ] COPD  [ ] BMI> 35  [ ] Liver dysfunction  [ ] Renal dysfunction  [ ] CHF  [ ] Smoker  [ ]  Age > 60 years    [ ]  [place]  Reviewed and Copied to Chart. See below.    Plan of care and goal oriented pain management treatment options were discussed with patient and /or primary care giver; all questions and concerns were addressed and care was aligned with patient's wishes.    Educated patient on goal oriented pain management treatment options        Source of information: SWAPNA FINK, Chart review  Patient language: Latvian  : n/a    HPI:  87M, coming form home, declining in ambulatory status, with a PMHx of CAD, s/p stents, PAD on Plavix and Eliquis, DM, BPH, HTN BIBEMS for evaluation s/p fall and knee pain. Patient is limited historian, AOx1-2. Collateral obtained per chart review, called emergency contact listed in the chart Antonio Fink and number is not active. Per son, the patient fell 2 days ago and hurt his L knee. Unknown if head-strike or LOC. Fall unwitnessed. Per the son, patient was hospitalized for 5-d more than 1week ago for COVID, and since then has had progressively worsening ambulation 2/2 knee pain. Per son, patient was discharged back to home with out any ambulatory assistive devices and services. Patient reports knee pain started while in hospital. No reported fever, chills, cp, sob, cough, vomiting, diarrhea, calf pain. No hx of DVT/PE. Pt does not recall if he hit his head or not. Unable to obtain ROS d/t mental status.  (19 Oct 2022 05:18)    Pt is admitted s/p Fall. Pain consulted for L knee pain. Pt seen and examined at bedside this afternoon. +Covid 10/18, Airborne precautions maintained. Patient found sitting in bed, awake, alert and oriented x 2. Reoriented to time. Patient is Latvian speaking. No  needed at this time. Pt reports pain score 8/10 on left knee, not tolerable at this time.  SCALE USED: (1-10 VNRS). Pt describes left knee pain as sharp, radiating to upper left thigh, minimal relief from pain medications, exacerbated by movement. Pt tolerating PO diet. Denies lethargy, nausea, vomiting, constipation, itchiness. Reports last BM 10/20. Patient stated goal for pain control: to be able to take deep breaths, get out of bed to chair and ambulate with tolerable pain control. Pt reports taking Tylenol for pain at home. Patient seen by Ortho, conservative treatment recommended    PAST MEDICAL & SURGICAL HISTORY:  HTN (hypertension)    HLD (hyperlipidemia)    BPH (benign prostatic hypertrophy)    DM (diabetes mellitus)    PAD (peripheral artery disease)  s/p stent placement    CKD (chronic kidney disease)    S/P tonsillectomy    S/P peripheral artery angioplasty with stent placement    FAMILY HISTORY:  Family history of lung cancer (Sibling)    Social History:   [x) Denies ETOH use, illicit drug use and smoking    Allergies    No Known Allergies    Intolerances    MEDICATIONS  (STANDING):  apixaban 5 milliGRAM(s) Oral every 12 hours  atorvastatin 40 milliGRAM(s) Oral at bedtime  clopidogrel Tablet 75 milliGRAM(s) Oral daily  cyclobenzaprine 5 milliGRAM(s) Oral three times a day  finasteride 5 milliGRAM(s) Oral daily  lidocaine   4% Patch 1 Patch Transdermal daily  losartan 50 milliGRAM(s) Oral daily  metoprolol succinate ER 25 milliGRAM(s) Oral daily  polyethylene glycol 3350 17 Gram(s) Oral daily  senna 2 Tablet(s) Oral at bedtime  tamsulosin 0.4 milliGRAM(s) Oral at bedtime    MEDICATIONS  (PRN):  acetaminophen     Tablet .. 1000 milliGRAM(s) Oral every 8 hours PRN Moderate Pain (4 - 6)  oxyCODONE    IR 5 milliGRAM(s) Oral every 4 hours PRN Severe Pain (7 - 10)    Vital Signs Last 24 Hrs  T(C): 37.2 (21 Oct 2022 14:05), Max: 38 (21 Oct 2022 04:54)  T(F): 98.9 (21 Oct 2022 14:05), Max: 100.4 (21 Oct 2022 04:54)  HR: 70 (21 Oct 2022 14:05) (70 - 80)  BP: 149/74 (21 Oct 2022 14:05) (149/74 - 152/59)  BP(mean): --  RR: 17 (21 Oct 2022 14:05) (17 - 18)  SpO2: 95% (21 Oct 2022 14:05) (93% - 97%)    Parameters below as of 21 Oct 2022 14:05  Patient On (Oxygen Delivery Method): room air    LABS: Reviewed.                          10.3   8.64  )-----------( 248      ( 21 Oct 2022 07:46 )             32.0     10-21    139  |  106  |  15  ----------------------------<  193<H>  3.8   |  25  |  1.05    Ca    8.9      21 Oct 2022 07:46  Phos  2.6     10-21  Mg     1.7     10-21    CAPILLARY BLOOD GLUCOSE    Radiology: Reviewed.   ACC: [id number] EXAM:  CT BRAIN                        ACC: [id number] EXAM:  CT CERVICAL SPINE                          PROCEDURE DATE:  10/18/2022      INTERPRETATION:  Noncontrast CT of the brain and cervical spine    CLINICAL INDICATION: Statuspost fall    TECHNIQUE: Axial CT scanning of the brain and cervical spine were   obtained without the administration of intravenous contrast.  Images were   reformatted in the sagittal and coronal planes.    COMPARISON: None available    FINDINGS:    CT brain:    No hydrocephalus, mass effect, midline shift, acute intracranial   hemorrhage, or brain edema.    No displaced calvarial fracture.    Bilateral ethmoid, sphenoid, and maxillary sinus mucosal thickening.   Air-fluid level in the right sphenoid sinus.    Mastoid air cells clear.    CT cervical spine:    No acute fracture or traumatic subluxation. No prevertebral soft tissue   swelling.    Vertebral body height and facet alignment are maintained. Grade 1   retrolisthesis of C5 on C6. Straightening of the normal cervical lordosis.    Alignment at the craniocervical junction unremarkable.    Disc space narrowing at C5-C6.    Multilevel degenerative changes.    Visualized lung apices clear. Visualized soft tissues unremarkable.    IMPRESSION:    CT brain:  No acute intracranial hemorrhage, brain edema, or mass effect.  No displaced calvarial fracture.  Air-fluid level in the right sphenoid sinus, correlate for the presence   of sinusitis.    CT cervical spine:  No acute fracture ortraumatic subluxation.  No prevertebral soft tissue swelling.  Degenerative changes.    --- End of Report ---    CASSANDRA GRAMAJO MD; Attending Radiologist  This document has been electronically signed. Oct 18 2022  8:52PM  ACC: [id number] EXAM:  CT BRAIN                        ACC: [id number] EXAM:  CT CERVICAL SPINE                          PROCEDURE DATE:  10/18/2022      INTERPRETATION:  Noncontrast CT of the brain and cervical spine    CLINICAL INDICATION: Statuspost fall    TECHNIQUE: Axial CT scanning of the brain and cervical spine were   obtained without the administration of intravenous contrast.  Images were   reformatted in the sagittal and coronal planes.    COMPARISON: None available    FINDINGS:    CT brain:    No hydrocephalus, mass effect, midline shift, acute intracranial   hemorrhage, or brain edema.    No displaced calvarial fracture.    Bilateral ethmoid, sphenoid, and maxillary sinus mucosal thickening.   Air-fluid level in the right sphenoid sinus.    Mastoid air cells clear.    CT cervical spine:    No acute fracture or traumatic subluxation. No prevertebral soft tissue   swelling.    Vertebral body height and facet alignment are maintained. Grade 1   retrolisthesis of C5 on C6. Straightening of the normal cervical lordosis.    Alignment at the craniocervical junction unremarkable.    Disc space narrowing at C5-C6.    Multilevel degenerative changes.    Visualized lung apices clear. Visualized soft tissues unremarkable.    IMPRESSION:    CT brain:  No acute intracranial hemorrhage, brain edema, or mass effect.  No displaced calvarial fracture.  Air-fluid level in the right sphenoid sinus, correlate for the presence   of sinusitis.    CT cervical spine:  No acute fracture ortraumatic subluxation.  No prevertebral soft tissue swelling.  Degenerative changes.    --- End of Report ---    CASSANDRA GRAMAJO MD; Attending Radiologist  This document has been electronically signed. Oct 18 2022  8:52PM    ACC: [id number] EXAM:  XR CHEST PORTABLE URGENT 1V                        ACC: [id number] EXAM:  XR KNEE COMP 4+ VIEWS LT                          PROCEDURE DATE:  10/18/2022      INTERPRETATION:  XR KNEE COMPLETE 4 VIEWS LEFT, XR CHEST URGENT    Clinical History: Left knee pain    Left knee 3 views    FINDINGS:    There is no fracture, dislocation, soft tissue swelling  Small suprapatellar joint effusion.  Moderate atherosclerotic change. The visualized vascular sent intact.  No degenerativechanges.  No radiopaque foreign body.    AP chest    FINDINGS: The heart size, mediastinum, hilum and aorta are within normal   limits. Mild atherosclerotic changes. Vague opacity right lower lobe may   represent confluence of shadows versus infiltrate. There is no pulmonary   venous congestion, pleural effusion or pneumothorax. The trachea is   midline. The bony structures are intact.    IMPRESSION:  1.  No fracture.  2.  Small suprapatellar left knee joint effusion.  3.  Vague opacity right lower lobe may represent confluence of shadows   versus infiltrate. Recommend correlation clinically and as needed a   follow-up examination.    --- End of Report ---     JOHNNIE MONDRAGON DO; Attending Radiologist  This document has been electronically signed. Oct 19 2022  3:29PM    ORT Score -   Family Hx of substance abuse	Female	      Male  Alcohol 	                                           1                     3  Illegal drugs	                                   2                     3  Rx drugs                                           4 	                  4  Personal Hx of substance abuse		  Alcohol 	                                          3	                  3  Illegal drugs                                     4	                  4  Rx drugs                                            5 	                  5  Age between 16- 45 years	           1                     1  hx preadolescent sexual abuse	   3 	                  0  Psychological disease		  ADD, OCD, bipolar, schizophrenia   2	          2  Depression                                           1 	          1  Total: 0    REVIEW OF SYSTEMS:  CONSTITUTIONAL: No fever or fatigue  HEENT:  No difficulty hearing, no change in vision  NECK: No pain or stiffness  RESPIRATORY: No cough, wheezing, chills or hemoptysis; No shortness of breath  CARDIOVASCULAR: No chest pain, palpitations, dizziness, or leg swelling  GASTROINTESTINAL: No loss of appetite, decreased PO intake. No abdominal or epigastric pain. No nausea, vomiting; No diarrhea or constipation.   GENITOURINARY: No dysuria, frequency, hematuria, retention or incontinence  MUSCULOSKELETAL: +left knee pain and swelling, +left upper thigh pain. No muscle, back, or extremity pain, no upper or lower motor strength weakness, no saddle anesthesia, bowel/bladder incontinence, + falls   NEURO: No headaches, No numbness/tingling b/l LE, No weakness  ENDOCRINE: No polyuria, polydipsia, heat or cold intolerance; No hair loss  PSYCHIATRIC: No depression, anxiety or difficulty sleeping    PHYSICAL EXAM:  GENERAL:  Alert & Oriented X2, reoriented to time, cooperative, NAD. Speech is clear. Latvian speaking  RESPIRATORY: Respirations even and unlabored. Clear to auscultation bilaterally; No rales, rhonchi, wheezing, or rubs  CARDIOVASCULAR: Normal S1/S2, regular rate and rhythm; No murmurs, rubs, or gallops. No JVD.   GASTROINTESTINAL:  Soft, Nontender, Nondistended; Bowel sounds present  PERIPHERAL VASCULAR:  Extremities warm without edema. 2+ Peripheral Pulses, No cyanosis, No calf tenderness  MUSCULOSKELETAL: Motor Strength 5/5 B/L upper and 3/4 lower extremities; moves all extremities equally against gravity; limited ROM decreased LLE; + tenderness on palpation of L knee, +large effusion left knee  SKIN: Warm, dry, intact. No rashes, lesions, scars or wounds.     Risk factors associated with adverse outcomes related to opioid treatment  [ ]  Concurrent benzodiazepine use  [ ]  History/ Active substance use or alcohol use disorder  [ ] Psychiatric co-morbidity  [ ] Sleep apnea  [ ] COPD  [ ] BMI> 35  [ ] Liver dysfunction  [ ] Renal dysfunction  [ ] CHF  [ ] Smoker  [x]  Age > 60 years    [x]  [place]  Reviewed and Copied to Chart. See below.    Plan of care and goal oriented pain management treatment options were discussed with patient and /or primary care giver; all questions and concerns were addressed and care was aligned with patient's wishes.    Educated patient on goal oriented pain management treatment options

## 2022-10-21 NOTE — PROGRESS NOTE ADULT - ASSESSMENT
87M, coming form home, declining in ambulatory status, with a PMHx of CAD, s/p stents, PAD on Plavix and Eliquis, DM, BPH, HTN BIBEMS for evaluation s/p fall and knee pain. Patient admitted s/p fall in the setting of weakness and ambulatory decline after recent hospitalization.

## 2022-10-22 LAB
ALBUMIN SERPL ELPH-MCNC: 2.3 G/DL — LOW (ref 3.5–5)
ALP SERPL-CCNC: 60 U/L — SIGNIFICANT CHANGE UP (ref 40–120)
ALT FLD-CCNC: 24 U/L DA — SIGNIFICANT CHANGE UP (ref 10–60)
ANION GAP SERPL CALC-SCNC: 10 MMOL/L — SIGNIFICANT CHANGE UP (ref 5–17)
APPEARANCE UR: CLEAR — SIGNIFICANT CHANGE UP
AST SERPL-CCNC: 22 U/L — SIGNIFICANT CHANGE UP (ref 10–40)
BACTERIA # UR AUTO: ABNORMAL /HPF
BASOPHILS # BLD AUTO: 0.02 K/UL — SIGNIFICANT CHANGE UP (ref 0–0.2)
BASOPHILS NFR BLD AUTO: 0.2 % — SIGNIFICANT CHANGE UP (ref 0–2)
BILIRUB SERPL-MCNC: 0.5 MG/DL — SIGNIFICANT CHANGE UP (ref 0.2–1.2)
BILIRUB UR-MCNC: NEGATIVE — SIGNIFICANT CHANGE UP
BUN SERPL-MCNC: 13 MG/DL — SIGNIFICANT CHANGE UP (ref 7–18)
CALCIUM SERPL-MCNC: 9.1 MG/DL — SIGNIFICANT CHANGE UP (ref 8.4–10.5)
CHLORIDE SERPL-SCNC: 105 MMOL/L — SIGNIFICANT CHANGE UP (ref 96–108)
CO2 SERPL-SCNC: 24 MMOL/L — SIGNIFICANT CHANGE UP (ref 22–31)
COLOR SPEC: YELLOW — SIGNIFICANT CHANGE UP
CREAT SERPL-MCNC: 0.89 MG/DL — SIGNIFICANT CHANGE UP (ref 0.5–1.3)
DIFF PNL FLD: ABNORMAL
EGFR: 84 ML/MIN/1.73M2 — SIGNIFICANT CHANGE UP
EOSINOPHIL # BLD AUTO: 0.16 K/UL — SIGNIFICANT CHANGE UP (ref 0–0.5)
EOSINOPHIL NFR BLD AUTO: 1.9 % — SIGNIFICANT CHANGE UP (ref 0–6)
EPI CELLS # UR: ABNORMAL /HPF
GLUCOSE SERPL-MCNC: 188 MG/DL — HIGH (ref 70–99)
GLUCOSE UR QL: 250
GRAM STN FLD: SIGNIFICANT CHANGE UP
HCT VFR BLD CALC: 32.4 % — LOW (ref 39–50)
HGB BLD-MCNC: 10.3 G/DL — LOW (ref 13–17)
IMM GRANULOCYTES NFR BLD AUTO: 0.5 % — SIGNIFICANT CHANGE UP (ref 0–0.9)
KETONES UR-MCNC: NEGATIVE — SIGNIFICANT CHANGE UP
LEUKOCYTE ESTERASE UR-ACNC: NEGATIVE — SIGNIFICANT CHANGE UP
LYMPHOCYTES # BLD AUTO: 0.74 K/UL — LOW (ref 1–3.3)
LYMPHOCYTES # BLD AUTO: 9 % — LOW (ref 13–44)
MAGNESIUM SERPL-MCNC: 1.8 MG/DL — SIGNIFICANT CHANGE UP (ref 1.6–2.6)
MCHC RBC-ENTMCNC: 27.2 PG — SIGNIFICANT CHANGE UP (ref 27–34)
MCHC RBC-ENTMCNC: 31.8 GM/DL — LOW (ref 32–36)
MCV RBC AUTO: 85.5 FL — SIGNIFICANT CHANGE UP (ref 80–100)
MONOCYTES # BLD AUTO: 0.62 K/UL — SIGNIFICANT CHANGE UP (ref 0–0.9)
MONOCYTES NFR BLD AUTO: 7.5 % — SIGNIFICANT CHANGE UP (ref 2–14)
NEUTROPHILS # BLD AUTO: 6.66 K/UL — SIGNIFICANT CHANGE UP (ref 1.8–7.4)
NEUTROPHILS NFR BLD AUTO: 80.9 % — HIGH (ref 43–77)
NITRITE UR-MCNC: NEGATIVE — SIGNIFICANT CHANGE UP
NRBC # BLD: 0 /100 WBCS — SIGNIFICANT CHANGE UP (ref 0–0)
PH UR: 6 — SIGNIFICANT CHANGE UP (ref 5–8)
PHOSPHATE SERPL-MCNC: 2.6 MG/DL — SIGNIFICANT CHANGE UP (ref 2.5–4.5)
PLATELET # BLD AUTO: 283 K/UL — SIGNIFICANT CHANGE UP (ref 150–400)
POTASSIUM SERPL-MCNC: 3.7 MMOL/L — SIGNIFICANT CHANGE UP (ref 3.5–5.3)
POTASSIUM SERPL-SCNC: 3.7 MMOL/L — SIGNIFICANT CHANGE UP (ref 3.5–5.3)
PROT SERPL-MCNC: 6.8 G/DL — SIGNIFICANT CHANGE UP (ref 6–8.3)
PROT UR-MCNC: 30 MG/DL
RBC # BLD: 3.79 M/UL — LOW (ref 4.2–5.8)
RBC # FLD: 14.2 % — SIGNIFICANT CHANGE UP (ref 10.3–14.5)
RBC CASTS # UR COMP ASSIST: ABNORMAL /HPF (ref 0–2)
SODIUM SERPL-SCNC: 139 MMOL/L — SIGNIFICANT CHANGE UP (ref 135–145)
SP GR SPEC: 1.02 — SIGNIFICANT CHANGE UP (ref 1.01–1.02)
SPECIMEN SOURCE: SIGNIFICANT CHANGE UP
SYNOVIAL CRYSTALS CLARITY: ABNORMAL
SYNOVIAL CRYSTALS COLOR: ABNORMAL
SYNOVIAL CRYSTALS ID: ABNORMAL
SYNOVIAL CRYSTALS TUBE: SIGNIFICANT CHANGE UP
UROBILINOGEN FLD QL: NEGATIVE — SIGNIFICANT CHANGE UP
WBC # BLD: 8.24 K/UL — SIGNIFICANT CHANGE UP (ref 3.8–10.5)
WBC # FLD AUTO: 8.24 K/UL — SIGNIFICANT CHANGE UP (ref 3.8–10.5)
WBC UR QL: SIGNIFICANT CHANGE UP /HPF (ref 0–5)

## 2022-10-22 PROCEDURE — 99232 SBSQ HOSP IP/OBS MODERATE 35: CPT | Mod: GC

## 2022-10-22 RX ORDER — COLCHICINE 0.6 MG
0.6 TABLET ORAL DAILY
Refills: 0 | Status: DISCONTINUED | OUTPATIENT
Start: 2022-10-22 | End: 2022-10-31

## 2022-10-22 RX ADMIN — TAMSULOSIN HYDROCHLORIDE 0.4 MILLIGRAM(S): 0.4 CAPSULE ORAL at 21:48

## 2022-10-22 RX ADMIN — Medication 1000 MILLIGRAM(S): at 17:58

## 2022-10-22 RX ADMIN — Medication 250 MILLIGRAM(S): at 16:58

## 2022-10-22 RX ADMIN — APIXABAN 5 MILLIGRAM(S): 2.5 TABLET, FILM COATED ORAL at 17:00

## 2022-10-22 RX ADMIN — Medication 1000 MILLIGRAM(S): at 16:58

## 2022-10-22 RX ADMIN — ATORVASTATIN CALCIUM 40 MILLIGRAM(S): 80 TABLET, FILM COATED ORAL at 21:58

## 2022-10-22 RX ADMIN — LOSARTAN POTASSIUM 50 MILLIGRAM(S): 100 TABLET, FILM COATED ORAL at 06:13

## 2022-10-22 RX ADMIN — SENNA PLUS 2 TABLET(S): 8.6 TABLET ORAL at 21:48

## 2022-10-22 RX ADMIN — APIXABAN 5 MILLIGRAM(S): 2.5 TABLET, FILM COATED ORAL at 06:14

## 2022-10-22 RX ADMIN — POLYETHYLENE GLYCOL 3350 17 GRAM(S): 17 POWDER, FOR SOLUTION ORAL at 13:03

## 2022-10-22 RX ADMIN — CLOPIDOGREL BISULFATE 75 MILLIGRAM(S): 75 TABLET, FILM COATED ORAL at 13:03

## 2022-10-22 RX ADMIN — Medication 25 MILLIGRAM(S): at 06:13

## 2022-10-22 RX ADMIN — LIDOCAINE 1 PATCH: 4 CREAM TOPICAL at 21:48

## 2022-10-22 RX ADMIN — LIDOCAINE 1 PATCH: 4 CREAM TOPICAL at 09:37

## 2022-10-22 RX ADMIN — FINASTERIDE 5 MILLIGRAM(S): 5 TABLET, FILM COATED ORAL at 13:03

## 2022-10-22 RX ADMIN — LIDOCAINE 1 PATCH: 4 CREAM TOPICAL at 06:57

## 2022-10-22 NOTE — PROGRESS NOTE ADULT - PROBLEM SELECTOR PLAN 2
s/p mechanical fall w/ left knee injury 2 days ago  likely in the setting of deconditioning d/t recent hospital admission for COVID infection, not DC with ambulatory devices or assisstance  noted to have progressively worsening ambulation   CTH no acute pathology, only significant for sinusitis  Afebrile with no WBC  s/p arthrocentesis in the ED but unable to collect enough fluid to send to the lab  concern for bursitis  PT consulted   Ortho consulted, f/u recs  -spiked fever, arthrocentesis done 10/21  -synovial fluid positive for monosodium urate crystal, negative cultures  - will be started on colchicine   -f/u MRI

## 2022-10-22 NOTE — PROGRESS NOTE ADULT - ATTENDING COMMENTS
Patient seen and evaluated at bedside.     Patient afebrile since yesterday. Still reports ongoing left knee pain with some improvements. Reports previous history of gouty attacks to knees, responsive to colchicine.     Arthrocentesis with monosodium urate crystals. Culture and gram stain with no growth.     A/P    86 y/o male with CAD, s/p stents, PAD on Plavix and Eliquis, DM, BPH, HTN recently hospitalized with COVID x 5 days more than 1 week ago- symptomatically improved but then fell at home, noted with Left knee effusion    1. post traumatic and inflammatory left knee effusion - Gout vs septic joint vs. pseudogout  2. acute left knee pain- inability to ambulate/weight bear  3. fever  4. h/o recent COVID infection  5. h/o CAD s/p stent  6. atrial fibrillation on eliquis  7. DM T II    - Start patient on colchicine, as likely acute gout flare  - will continue with empiric vancomycin while awaiting final culture reading. Although septic joint less likely. ID following   - f/u MRI left knee  - follow up blood cultures, ua  - decrease oxycodone to 2.5mg from 5mg, dc flexeril due to sedating effect  - pain mgt consult  - f/u ID and orthopedics  - c/w eliquis/plavix  - fall precaution  - for YESSICA once evaluation complete.

## 2022-10-22 NOTE — PROGRESS NOTE ADULT - SUBJECTIVE AND OBJECTIVE BOX
PGY-1 Progress Note discussed with attending    PAGER #: [phone]] TILL 5:00 PM  PLEASE CONTACT ON CALL TEAM:  - On Call Team (Please refer to Remberto) FROM 5:00 PM - 8:30PM  - Nightfloat Team FROM 8:30 -7:30 AM    OVERNIGHT EVENTS:   - Pt spiked a fever of 101.3 overnight. Pt seen at bedside, NAD, reports that knee pain is much better than yesterday after arthrocentesis. Pt reports a history of gout and has had similar pain before, but not as severe. Denies fevers, chills, CP, SOB, N/V/D, or abdominal pain.     REVIEW OF SYSTEMS:  CONSTITUTIONAL: No fever, weight loss, or fatigue  RESPIRATORY: No cough, wheezing, chills or hemoptysis; No shortness of breath  CARDIOVASCULAR: No chest pain, palpitations, dizziness, or leg swelling  GASTROINTESTINAL: No abdominal pain. No nausea, vomiting, or hematemesis; No diarrhea or constipation. No melena or hematochezia.  GENITOURINARY: No dysuria or hematuria, urinary frequency  NEUROLOGICAL: (+) weakness. No headaches, memory loss, numbness, or tremors  EXTREMITIES: (+) sharp pain in left knee that worsens with movement, improved from yesterday  SKIN: No itching, burning, rashes, or lesions     MEDICATIONS  (STANDING):  apixaban 5 milliGRAM(s) Oral every 12 hours  atorvastatin 40 milliGRAM(s) Oral at bedtime  clopidogrel Tablet 75 milliGRAM(s) Oral daily  finasteride 5 milliGRAM(s) Oral daily  lidocaine   4% Patch 1 Patch Transdermal daily  losartan 50 milliGRAM(s) Oral daily  metoprolol succinate ER 25 milliGRAM(s) Oral daily  polyethylene glycol 3350 17 Gram(s) Oral daily  senna 2 Tablet(s) Oral at bedtime  tamsulosin 0.4 milliGRAM(s) Oral at bedtime  vancomycin  IVPB 1000 milliGRAM(s) IV Intermittent every 24 hours    MEDICATIONS  (PRN):  acetaminophen     Tablet .. 1000 milliGRAM(s) Oral every 8 hours PRN Temp greater or equal to 38C (100.4F), Moderate Pain (4 - 6)  oxyCODONE    IR 2.5 milliGRAM(s) Oral every 6 hours PRN Moderate Pain (4 - 6)      Vital Signs Last 24 Hrs  T(C): 36.8 (22 Oct 2022 05:49), Max: 38.5 (21 Oct 2022 16:39)  T(F): 98.2 (22 Oct 2022 05:49), Max: 101.3 (21 Oct 2022 16:39)  HR: 70 (22 Oct 2022 05:49) (62 - 72)  BP: 159/55 (22 Oct 2022 05:49) (123/57 - 159/55)  BP(mean): --  RR: 20 (22 Oct 2022 05:49) (20 - 20)  SpO2: 97% (22 Oct 2022 05:49) (96% - 98%)    Parameters below as of 22 Oct 2022 05:49  Patient On (Oxygen Delivery Method): room air      GENERAL: NAD, lying in bed comfortably  HEAD:  Atraumatic, Normocephalic  EYES: EOMI, PERRLA, conjunctiva and sclera clear  ENT: Moist mucous membranes  NECK: Supple, No JVD  CHEST/LUNG: Clear to auscultation bilaterally; No rales, rhonchi, wheezing, or rubs. Unlabored respirations  HEART: Regular rate and rhythm; No murmurs, rubs, or gallops  ABDOMEN: Bowel sounds present; Soft, Nontender, Nondistended. No hepatomegally  EXTREMITIES:  2+ Peripheral Pulses, brisk capillary refill. No clubbing, cyanosis, or edema (+) decreased left knee effusion, no longer warm to palpation  NERVOUS SYSTEM:  Alert & Oriented X2, speech clear. No deficits   MSK: (+) limited ROM d/t LEFT knee pain   SKIN: No rashes or lesions                          10.3   8.24  )-----------( 283      ( 22 Oct 2022 07:26 )             32.4     10-22    139  |  105  |  13  ----------------------------<  188<H>  3.7   |  24  |  0.89    Ca    9.1      22 Oct 2022 07:26  Phos  2.6     10-22  Mg     1.8     10-22    TPro  6.8  /  Alb  2.3<L>  /  TBili  0.5  /  DBili  x   /  AST  22  /  ALT  24  /  AlkPhos  60  10-22    LIVER FUNCTIONS - ( 22 Oct 2022 07:26 )  Alb: 2.3 g/dL / Pro: 6.8 g/dL / ALK PHOS: 60 U/L / ALT: 24 U/L DA / AST: 22 U/L / GGT: x                     CAPILLARY BLOOD GLUCOSE          RADIOLOGY & ADDITIONAL TESTS:

## 2022-10-23 LAB
ANION GAP SERPL CALC-SCNC: 10 MMOL/L — SIGNIFICANT CHANGE UP (ref 5–17)
BUN SERPL-MCNC: 15 MG/DL — SIGNIFICANT CHANGE UP (ref 7–18)
CALCIUM SERPL-MCNC: 9.3 MG/DL — SIGNIFICANT CHANGE UP (ref 8.4–10.5)
CHLORIDE SERPL-SCNC: 104 MMOL/L — SIGNIFICANT CHANGE UP (ref 96–108)
CO2 SERPL-SCNC: 26 MMOL/L — SIGNIFICANT CHANGE UP (ref 22–31)
CREAT SERPL-MCNC: 1.02 MG/DL — SIGNIFICANT CHANGE UP (ref 0.5–1.3)
EGFR: 72 ML/MIN/1.73M2 — SIGNIFICANT CHANGE UP
GLUCOSE SERPL-MCNC: 207 MG/DL — HIGH (ref 70–99)
HCT VFR BLD CALC: 34 % — LOW (ref 39–50)
HGB BLD-MCNC: 10.9 G/DL — LOW (ref 13–17)
MAGNESIUM SERPL-MCNC: 1.9 MG/DL — SIGNIFICANT CHANGE UP (ref 1.6–2.6)
MCHC RBC-ENTMCNC: 27.7 PG — SIGNIFICANT CHANGE UP (ref 27–34)
MCHC RBC-ENTMCNC: 32.1 GM/DL — SIGNIFICANT CHANGE UP (ref 32–36)
MCV RBC AUTO: 86.3 FL — SIGNIFICANT CHANGE UP (ref 80–100)
NRBC # BLD: 0 /100 WBCS — SIGNIFICANT CHANGE UP (ref 0–0)
PHOSPHATE SERPL-MCNC: 3.4 MG/DL — SIGNIFICANT CHANGE UP (ref 2.5–4.5)
PLATELET # BLD AUTO: 289 K/UL — SIGNIFICANT CHANGE UP (ref 150–400)
POTASSIUM SERPL-MCNC: 4.3 MMOL/L — SIGNIFICANT CHANGE UP (ref 3.5–5.3)
POTASSIUM SERPL-SCNC: 4.3 MMOL/L — SIGNIFICANT CHANGE UP (ref 3.5–5.3)
RBC # BLD: 3.94 M/UL — LOW (ref 4.2–5.8)
RBC # FLD: 14.1 % — SIGNIFICANT CHANGE UP (ref 10.3–14.5)
SODIUM SERPL-SCNC: 140 MMOL/L — SIGNIFICANT CHANGE UP (ref 135–145)
WBC # BLD: 8.68 K/UL — SIGNIFICANT CHANGE UP (ref 3.8–10.5)
WBC # FLD AUTO: 8.68 K/UL — SIGNIFICANT CHANGE UP (ref 3.8–10.5)

## 2022-10-23 PROCEDURE — 99232 SBSQ HOSP IP/OBS MODERATE 35: CPT

## 2022-10-23 RX ADMIN — Medication 1000 MILLIGRAM(S): at 11:27

## 2022-10-23 RX ADMIN — CLOPIDOGREL BISULFATE 75 MILLIGRAM(S): 75 TABLET, FILM COATED ORAL at 11:27

## 2022-10-23 RX ADMIN — Medication 0.6 MILLIGRAM(S): at 11:23

## 2022-10-23 RX ADMIN — APIXABAN 5 MILLIGRAM(S): 2.5 TABLET, FILM COATED ORAL at 17:48

## 2022-10-23 RX ADMIN — Medication 1000 MILLIGRAM(S): at 12:27

## 2022-10-23 RX ADMIN — LIDOCAINE 1 PATCH: 4 CREAM TOPICAL at 07:46

## 2022-10-23 RX ADMIN — ATORVASTATIN CALCIUM 40 MILLIGRAM(S): 80 TABLET, FILM COATED ORAL at 22:24

## 2022-10-23 RX ADMIN — SENNA PLUS 2 TABLET(S): 8.6 TABLET ORAL at 21:54

## 2022-10-23 RX ADMIN — APIXABAN 5 MILLIGRAM(S): 2.5 TABLET, FILM COATED ORAL at 06:32

## 2022-10-23 RX ADMIN — LIDOCAINE 1 PATCH: 4 CREAM TOPICAL at 09:43

## 2022-10-23 RX ADMIN — LIDOCAINE 1 PATCH: 4 CREAM TOPICAL at 21:53

## 2022-10-23 RX ADMIN — TAMSULOSIN HYDROCHLORIDE 0.4 MILLIGRAM(S): 0.4 CAPSULE ORAL at 21:54

## 2022-10-23 RX ADMIN — Medication 25 MILLIGRAM(S): at 06:33

## 2022-10-23 RX ADMIN — LOSARTAN POTASSIUM 50 MILLIGRAM(S): 100 TABLET, FILM COATED ORAL at 06:33

## 2022-10-23 RX ADMIN — FINASTERIDE 5 MILLIGRAM(S): 5 TABLET, FILM COATED ORAL at 11:23

## 2022-10-23 NOTE — PROGRESS NOTE ADULT - ASSESSMENT
87M, coming form home, declining in ambulatory status, with a PMHx of CAD, s/p stents, PAD on Plavix and Eliquis, DM, BPH, HTN BIBEMS for evaluation s/p fall and knee pain. Patient admitted s/p fall in the setting of weakness and ambulatory decline after recent hospitalization

## 2022-10-23 NOTE — PROGRESS NOTE ADULT - SUBJECTIVE AND OBJECTIVE BOX
HPI:  87M, coming form home, declining in ambulatory status, with a PMHx of CAD, s/p stents, PAD on Plavix and Eliquis, DM, BPH, HTN BIBEMS for evaluation s/p fall and knee pain. Patient is limited historian, AOx1-2. Collateral obtained per chart review, called emergency contact listed in the chart Antonio Ruggiero and number is not active. Per son, the patient fell 2 days ago and hurt his L knee. Unknown if head-strike or LOC. Fall unwitnessed. Per the son, patient was hospitalized for 5-d more than 1week ago for COVID, and since then has had progressively worsening ambulation 2/2 knee pain. Per son, patient was discharged back to home with out any ambulatory assistive devices and services. Patient reports knee pain started while in hospital. No reported fever, chills, cp, sob, cough, vomiting, diarrhea, calf pain. No hx of DVT/PE. Pt does not recall if he hit his head or not. Unable to obtain ROS d/t mental status.  (19 Oct 2022 05:18)      Patient is a 87y old  Male who presents with a chief complaint of Fall and knee pain (22 Oct 2022 14:05)      INTERVAL HPI/OVERNIGHT EVENTS:  No acute overnight events. Patient seen and evaluated at bedside tomorrow. Reports marked improvement in knee pain since starting colchicine yesterday. Denies other symptoms.     T(C): 36.8 (10-23-22 @ 22:10), Max: 36.8 (10-23-22 @ 04:38)  HR: 76 (10-23-22 @ 22:10) (68 - 80)  BP: 160/60 (10-23-22 @ 22:10) (138/52 - 160/60)  RR: 20 (10-23-22 @ 22:10) (17 - 20)  SpO2: 96% (10-23-22 @ 22:10) (95% - 96%)  Wt(kg): --  I&O's Summary    22 Oct 2022 07:01  -  23 Oct 2022 07:00  --------------------------------------------------------  IN: 0 mL / OUT: 200 mL / NET: -200 mL    23 Oct 2022 07:01  -  23 Oct 2022 22:49  --------------------------------------------------------  IN: 0 mL / OUT: 150 mL / NET: -150 mL        REVIEW OF SYSTEMS: denies fever, chills, SOB, palpitations, chest pain, abdominal pain, nausea, vomitting, diarrhea, constipation, dizziness    MEDICATIONS  (STANDING):  apixaban 5 milliGRAM(s) Oral every 12 hours  atorvastatin 40 milliGRAM(s) Oral at bedtime  clopidogrel Tablet 75 milliGRAM(s) Oral daily  colchicine 0.6 milliGRAM(s) Oral daily  finasteride 5 milliGRAM(s) Oral daily  lidocaine   4% Patch 1 Patch Transdermal daily  losartan 50 milliGRAM(s) Oral daily  metoprolol succinate ER 25 milliGRAM(s) Oral daily  polyethylene glycol 3350 17 Gram(s) Oral daily  senna 2 Tablet(s) Oral at bedtime  tamsulosin 0.4 milliGRAM(s) Oral at bedtime    MEDICATIONS  (PRN):  acetaminophen     Tablet .. 1000 milliGRAM(s) Oral every 8 hours PRN Temp greater or equal to 38C (100.4F), Moderate Pain (4 - 6)  oxyCODONE    IR 2.5 milliGRAM(s) Oral every 6 hours PRN Moderate Pain (4 - 6)      PHYSICAL EXAM:  GENERAL: NAD, lying in bed comfortably  HEAD:  Atraumatic, Normocephalic  EYES: EOMI, PERRLA, conjunctiva and sclera clear  ENT: Moist mucous membranes  NECK: Supple, No JVD  CHEST/LUNG: Clear to auscultation bilaterally; No rales, rhonchi, wheezing, or rubs. Unlabored respirations  HEART: Regular rate and rhythm; No murmurs, rubs, or gallops  ABDOMEN: Bowel sounds present; Soft, Nontender, Nondistended. No hepatomegally  EXTREMITIES:  2+ Peripheral Pulses, brisk capillary refill. No clubbing, cyanosis, or edema (+) decreased left knee effusion, no longer warm to palpation.  NERVOUS SYSTEM:  Alert & Oriented X2, speech clear. No deficits   MSK: (+) limited ROM d/t LEFT knee pain   SKIN: No rashes or lesions    LABS:                        10.9   8.68  )-----------( 289      ( 23 Oct 2022 07:49 )             34.0     10-    140  |  104  |  15  ----------------------------<  207<H>  4.3   |  26  |  1.02    Ca    9.3      23 Oct 2022 07:49  Phos  3.4     10  Mg     1.9     10-23    TPro  6.8  /  Alb  2.3<L>  /  TBili  0.5  /  DBili  x   /  AST  22  /  ALT  24  /  AlkPhos  60  10-      Urinalysis Basic - ( 22 Oct 2022 03:34 )    Color: Yellow / Appearance: Clear / S.020 / pH: x  Gluc: x / Ketone: Negative  / Bili: Negative / Urobili: Negative   Blood: x / Protein: 30 mg/dL / Nitrite: Negative   Leuk Esterase: Negative / RBC: 25-50 /HPF / WBC 0-2 /HPF   Sq Epi: x / Non Sq Epi: Occasional /HPF / Bacteria: Few /HPF      CAPILLARY BLOOD GLUCOSE            Urinalysis Basic - ( 22 Oct 2022 03:34 )    Color: Yellow / Appearance: Clear / S.020 / pH: x  Gluc: x / Ketone: Negative  / Bili: Negative / Urobili: Negative   Blood: x / Protein: 30 mg/dL / Nitrite: Negative   Leuk Esterase: Negative / RBC: 25-50 /HPF / WBC 0-2 /HPF   Sq Epi: x / Non Sq Epi: Occasional /HPF / Bacteria: Few /HPF

## 2022-10-23 NOTE — PROGRESS NOTE ADULT - SUBJECTIVE AND OBJECTIVE BOX
87y Male    Meds:  vancomycin  IVPB 1000 milliGRAM(s) IV Intermittent every 24 hours    Allergies    No Known Allergies    Intolerances        VITALS:  Vital Signs Last 24 Hrs  T(C): 36.7 (23 Oct 2022 13:57), Max: 36.8 (23 Oct 2022 04:38)  T(F): 98 (23 Oct 2022 13:57), Max: 98.2 (23 Oct 2022 04:38)  HR: 80 (23 Oct 2022 13:57) (63 - 80)  BP: 148/49 (23 Oct 2022 13:57) (120/53 - 155/58)  BP(mean): --  RR: 17 (23 Oct 2022 13:57) (17 - 19)  SpO2: 96% (23 Oct 2022 13:57) (94% - 96%)    Parameters below as of 23 Oct 2022 13:57  Patient On (Oxygen Delivery Method): room air        LABS/DIAGNOSTIC TESTS:                          10.9   8.68  )-----------( 289      ( 23 Oct 2022 07:49 )             34.0         10-23    140  |  104  |  15  ----------------------------<  207<H>  4.3   |  26  |  1.02    Ca    9.3      23 Oct 2022 07:49  Phos  3.4     10-23  Mg     1.9     10-23    TPro  6.8  /  Alb  2.3<L>  /  TBili  0.5  /  DBili  x   /  AST  22  /  ALT  24  /  AlkPhos  60  10-22      LIVER FUNCTIONS - ( 22 Oct 2022 07:26 )  Alb: 2.3 g/dL / Pro: 6.8 g/dL / ALK PHOS: 60 U/L / ALT: 24 U/L DA / AST: 22 U/L / GGT: x             CULTURES: Knee Synovial Fluid- Left Knee  10-21 @ 18:50   No growth  --    polymorphonuclear leukocytes seen  No organisms seen  by cytocentrifuge      Clean Catch Clean Catch (Midstream)  10-19 @ 04:19   No growth  --  --            RADIOLOGY:      ROS:  [  ] UNABLE TO ELICIT 87y Male who is a little confused currently but is answering my questions appropriately. He has no fevers or chills, he denies having any diarrhea. He has no fevers or chills, his arthrocentesis shows him to have Monosodium Urate crystals and a negative culture and so consistent with acute gout and not septic arthritis. He was started on Colchicine and is his knee swelling and pain is much better.    Meds:  vancomycin  IVPB 1000 milliGRAM(s) IV Intermittent every 24 hours    Allergies    No Known Allergies    Intolerances        VITALS:  Vital Signs Last 24 Hrs  T(C): 36.7 (23 Oct 2022 13:57), Max: 36.8 (23 Oct 2022 04:38)  T(F): 98 (23 Oct 2022 13:57), Max: 98.2 (23 Oct 2022 04:38)  HR: 80 (23 Oct 2022 13:57) (63 - 80)  BP: 148/49 (23 Oct 2022 13:57) (120/53 - 155/58)  BP(mean): --  RR: 17 (23 Oct 2022 13:57) (17 - 19)  SpO2: 96% (23 Oct 2022 13:57) (94% - 96%)    Parameters below as of 23 Oct 2022 13:57  Patient On (Oxygen Delivery Method): room air        LABS/DIAGNOSTIC TESTS:                          10.9   8.68  )-----------( 289      ( 23 Oct 2022 07:49 )             34.0         10-23    140  |  104  |  15  ----------------------------<  207<H>  4.3   |  26  |  1.02    Ca    9.3      23 Oct 2022 07:49  Phos  3.4     10-23  Mg     1.9     10-23    TPro  6.8  /  Alb  2.3<L>  /  TBili  0.5  /  DBili  x   /  AST  22  /  ALT  24  /  AlkPhos  60  10-22      LIVER FUNCTIONS - ( 22 Oct 2022 07:26 )  Alb: 2.3 g/dL / Pro: 6.8 g/dL / ALK PHOS: 60 U/L / ALT: 24 U/L DA / AST: 22 U/L / GGT: x             CULTURES: Knee Synovial Fluid- Left Knee  10-21 @ 18:50   No growth  --    polymorphonuclear leukocytes seen  No organisms seen  by cytocentrifuge      Clean Catch Clean Catch (Midstream)  10-19 @ 04:19   No growth  --  --            RADIOLOGY:      ROS:  [  ] UNABLE TO ELICIT

## 2022-10-23 NOTE — PROGRESS NOTE ADULT - PROBLEM SELECTOR PLAN 1
s/p fall associated with weakness, no LOC, headache, dizziness, chest pain, SOB.  likely mechanical, could be due to deconditioning, weakness from recent hospital DC   CT head Non-contrast :  no acute he or stroke, , only significant for sinusitis  XR no fracture or fluid collection   Fall Precaution   f/u TSH , lipid profile , hgb a1c  PT consulted  Nutrition consulted  CM and SW consulted  Ortho consulted.

## 2022-10-23 NOTE — PROGRESS NOTE ADULT - PROBLEM SELECTOR PLAN 6
h/o DM on metformin  will hold oral dm meds  f/u A1c  c/w sliding scale  Adjust insulin as indicated  FS ACHS.

## 2022-10-23 NOTE — PROGRESS NOTE ADULT - ASSESSMENT
Fevers - resolved  Left knee gouty arthritis  COVID - 19 infection - asymptomatic      Plan - DC all antibiotics  Cont Colchicine  reconsult prn.

## 2022-10-23 NOTE — PROGRESS NOTE ADULT - MUSCULOSKELETAL COMMENTS
left knee swelling is much better , no warmth or erythema of left knee left knee pain is dramatically better

## 2022-10-23 NOTE — PROGRESS NOTE ADULT - PROBLEM SELECTOR PLAN 2
#Post traumatic and inflammatory left knee effusion  #Acute gout  Patient started on colchicine  Arthrocentesis with monosodium urate crystals  Cultures and gram stains without growth. d/c vancomycin.  f/u MRI results

## 2022-10-23 NOTE — PROGRESS NOTE ADULT - PROBLEM SELECTOR PLAN 4
COIVD positive over a weeks ago from another hospital admission  not in resp distress, saturating well on RA  symptomatic relief   confirm with infection for needed isolation.

## 2022-10-24 LAB
ANION GAP SERPL CALC-SCNC: 10 MMOL/L — SIGNIFICANT CHANGE UP (ref 5–17)
BUN SERPL-MCNC: 14 MG/DL — SIGNIFICANT CHANGE UP (ref 7–18)
CALCIUM SERPL-MCNC: 9.2 MG/DL — SIGNIFICANT CHANGE UP (ref 8.4–10.5)
CHLORIDE SERPL-SCNC: 106 MMOL/L — SIGNIFICANT CHANGE UP (ref 96–108)
CO2 SERPL-SCNC: 24 MMOL/L — SIGNIFICANT CHANGE UP (ref 22–31)
CREAT SERPL-MCNC: 0.93 MG/DL — SIGNIFICANT CHANGE UP (ref 0.5–1.3)
EGFR: 80 ML/MIN/1.73M2 — SIGNIFICANT CHANGE UP
GLUCOSE BLDC GLUCOMTR-MCNC: 232 MG/DL — HIGH (ref 70–99)
GLUCOSE BLDC GLUCOMTR-MCNC: 251 MG/DL — HIGH (ref 70–99)
GLUCOSE BLDC GLUCOMTR-MCNC: 309 MG/DL — HIGH (ref 70–99)
GLUCOSE BLDC GLUCOMTR-MCNC: 317 MG/DL — HIGH (ref 70–99)
GLUCOSE SERPL-MCNC: 227 MG/DL — HIGH (ref 70–99)
HCT VFR BLD CALC: 33 % — LOW (ref 39–50)
HGB BLD-MCNC: 10.5 G/DL — LOW (ref 13–17)
MAGNESIUM SERPL-MCNC: 1.8 MG/DL — SIGNIFICANT CHANGE UP (ref 1.6–2.6)
MCHC RBC-ENTMCNC: 26.9 PG — LOW (ref 27–34)
MCHC RBC-ENTMCNC: 31.8 GM/DL — LOW (ref 32–36)
MCV RBC AUTO: 84.4 FL — SIGNIFICANT CHANGE UP (ref 80–100)
NRBC # BLD: 0 /100 WBCS — SIGNIFICANT CHANGE UP (ref 0–0)
PHOSPHATE SERPL-MCNC: 3.1 MG/DL — SIGNIFICANT CHANGE UP (ref 2.5–4.5)
PLATELET # BLD AUTO: 322 K/UL — SIGNIFICANT CHANGE UP (ref 150–400)
POTASSIUM SERPL-MCNC: 3.7 MMOL/L — SIGNIFICANT CHANGE UP (ref 3.5–5.3)
POTASSIUM SERPL-SCNC: 3.7 MMOL/L — SIGNIFICANT CHANGE UP (ref 3.5–5.3)
RBC # BLD: 3.91 M/UL — LOW (ref 4.2–5.8)
RBC # FLD: 14.2 % — SIGNIFICANT CHANGE UP (ref 10.3–14.5)
SARS-COV-2 RNA SPEC QL NAA+PROBE: DETECTED
SODIUM SERPL-SCNC: 140 MMOL/L — SIGNIFICANT CHANGE UP (ref 135–145)
WBC # BLD: 7.83 K/UL — SIGNIFICANT CHANGE UP (ref 3.8–10.5)
WBC # FLD AUTO: 7.83 K/UL — SIGNIFICANT CHANGE UP (ref 3.8–10.5)

## 2022-10-24 PROCEDURE — 99232 SBSQ HOSP IP/OBS MODERATE 35: CPT | Mod: GC

## 2022-10-24 PROCEDURE — 99232 SBSQ HOSP IP/OBS MODERATE 35: CPT

## 2022-10-24 RX ORDER — OXYCODONE HYDROCHLORIDE 5 MG/1
2.5 TABLET ORAL EVERY 6 HOURS
Refills: 0 | Status: DISCONTINUED | OUTPATIENT
Start: 2022-10-24 | End: 2022-10-25

## 2022-10-24 RX ORDER — GLUCAGON INJECTION, SOLUTION 0.5 MG/.1ML
1 INJECTION, SOLUTION SUBCUTANEOUS ONCE
Refills: 0 | Status: DISCONTINUED | OUTPATIENT
Start: 2022-10-24 | End: 2022-10-31

## 2022-10-24 RX ORDER — DEXTROSE 50 % IN WATER 50 %
15 SYRINGE (ML) INTRAVENOUS ONCE
Refills: 0 | Status: DISCONTINUED | OUTPATIENT
Start: 2022-10-24 | End: 2022-10-31

## 2022-10-24 RX ORDER — SODIUM CHLORIDE 9 MG/ML
1000 INJECTION, SOLUTION INTRAVENOUS
Refills: 0 | Status: DISCONTINUED | OUTPATIENT
Start: 2022-10-24 | End: 2022-10-31

## 2022-10-24 RX ORDER — DEXTROSE 50 % IN WATER 50 %
12.5 SYRINGE (ML) INTRAVENOUS ONCE
Refills: 0 | Status: DISCONTINUED | OUTPATIENT
Start: 2022-10-24 | End: 2022-10-31

## 2022-10-24 RX ORDER — ACETAMINOPHEN 500 MG
1000 TABLET ORAL EVERY 8 HOURS
Refills: 0 | Status: COMPLETED | OUTPATIENT
Start: 2022-10-24 | End: 2022-10-27

## 2022-10-24 RX ORDER — INSULIN LISPRO 100/ML
VIAL (ML) SUBCUTANEOUS AT BEDTIME
Refills: 0 | Status: DISCONTINUED | OUTPATIENT
Start: 2022-10-24 | End: 2022-10-25

## 2022-10-24 RX ORDER — DEXTROSE 50 % IN WATER 50 %
25 SYRINGE (ML) INTRAVENOUS ONCE
Refills: 0 | Status: DISCONTINUED | OUTPATIENT
Start: 2022-10-24 | End: 2022-10-31

## 2022-10-24 RX ORDER — INSULIN LISPRO 100/ML
VIAL (ML) SUBCUTANEOUS
Refills: 0 | Status: DISCONTINUED | OUTPATIENT
Start: 2022-10-24 | End: 2022-10-25

## 2022-10-24 RX ADMIN — LIDOCAINE 1 PATCH: 4 CREAM TOPICAL at 07:48

## 2022-10-24 RX ADMIN — APIXABAN 5 MILLIGRAM(S): 2.5 TABLET, FILM COATED ORAL at 17:41

## 2022-10-24 RX ADMIN — Medication 20 MILLIGRAM(S): at 17:41

## 2022-10-24 RX ADMIN — ATORVASTATIN CALCIUM 40 MILLIGRAM(S): 80 TABLET, FILM COATED ORAL at 22:18

## 2022-10-24 RX ADMIN — LIDOCAINE 1 PATCH: 4 CREAM TOPICAL at 22:18

## 2022-10-24 RX ADMIN — TAMSULOSIN HYDROCHLORIDE 0.4 MILLIGRAM(S): 0.4 CAPSULE ORAL at 22:18

## 2022-10-24 RX ADMIN — APIXABAN 5 MILLIGRAM(S): 2.5 TABLET, FILM COATED ORAL at 05:07

## 2022-10-24 RX ADMIN — Medication 25 MILLIGRAM(S): at 05:07

## 2022-10-24 RX ADMIN — CLOPIDOGREL BISULFATE 75 MILLIGRAM(S): 75 TABLET, FILM COATED ORAL at 11:46

## 2022-10-24 RX ADMIN — OXYCODONE HYDROCHLORIDE 2.5 MILLIGRAM(S): 5 TABLET ORAL at 11:46

## 2022-10-24 RX ADMIN — Medication 1000 MILLIGRAM(S): at 22:18

## 2022-10-24 RX ADMIN — LIDOCAINE 1 PATCH: 4 CREAM TOPICAL at 10:29

## 2022-10-24 RX ADMIN — FINASTERIDE 5 MILLIGRAM(S): 5 TABLET, FILM COATED ORAL at 11:46

## 2022-10-24 RX ADMIN — Medication 1000 MILLIGRAM(S): at 23:10

## 2022-10-24 RX ADMIN — Medication 2: at 17:41

## 2022-10-24 RX ADMIN — SENNA PLUS 2 TABLET(S): 8.6 TABLET ORAL at 22:18

## 2022-10-24 RX ADMIN — OXYCODONE HYDROCHLORIDE 2.5 MILLIGRAM(S): 5 TABLET ORAL at 12:30

## 2022-10-24 RX ADMIN — LOSARTAN POTASSIUM 50 MILLIGRAM(S): 100 TABLET, FILM COATED ORAL at 05:07

## 2022-10-24 RX ADMIN — Medication 1000 MILLIGRAM(S): at 15:20

## 2022-10-24 RX ADMIN — POLYETHYLENE GLYCOL 3350 17 GRAM(S): 17 POWDER, FOR SOLUTION ORAL at 11:46

## 2022-10-24 RX ADMIN — Medication 0.6 MILLIGRAM(S): at 11:46

## 2022-10-24 RX ADMIN — Medication 3: at 08:22

## 2022-10-24 RX ADMIN — Medication 4: at 12:54

## 2022-10-24 RX ADMIN — Medication 1000 MILLIGRAM(S): at 14:25

## 2022-10-24 RX ADMIN — Medication 2: at 22:19

## 2022-10-24 NOTE — PROGRESS NOTE ADULT - SUBJECTIVE AND OBJECTIVE BOX
Source of information: SWAPNA FINK, Chart review  Patient language: Mexican/ Understands and speaks basic English   : n/a    HPI:  87M, coming form home, declining in ambulatory status, with a PMHx of CAD, s/p stents, PAD on Plavix and Eliquis, DM, BPH, HTN BIBEMS for evaluation s/p fall and knee pain. Patient is limited historian, AOx1-2. Collateral obtained per chart review, called emergency contact listed in the chart Antonio Fink and number is not active. Per son, the patient fell 2 days ago and hurt his L knee. Unknown if head-strike or LOC. Fall unwitnessed. Per the son, patient was hospitalized for 5-d more than 1week ago for COVID, and since then has had progressively worsening ambulation 2/2 knee pain. Per son, patient was discharged back to home with out any ambulatory assistive devices and services. Patient reports knee pain started while in hospital. No reported fever, chills, cp, sob, cough, vomiting, diarrhea, calf pain. No hx of DVT/PE. Pt does not recall if he hit his head or not. Unable to obtain ROS d/t mental status.  (19 Oct 2022 05:18)    Pt is admitted for s/p Fall and left knee pain. Dx left knee gout and joint effusion. S/p left knee arthrocentesis on 10/21. Patient seen by Ortho, conservative treatment recommended. Pain consulted for L knee pain. Pt seen and examined at bedside this morning. +Covid 10/18, Airborne precautions maintained. Patient laying in bed, lethargic, oriented x 2.  Pt reports left hip pain score 7-8/10. Reports pain started this morning. Describes pain as aching, sharp, radiating to left thigh and knee, not alleviated by current pain meds, exacerbated by movement and palpation. Pt also reports left knee Pt describes left knee pain as sharp, radiating to upper left hip, not alleviated by pain medications, exacerbated by movement and palpation. Pain a/w decreased ROM. Pt with poor appetite PO diet. Denies lethargy, nausea, vomiting, constipation, itchiness. Reports last BM 10/23. Patient stated goal for pain control: to be able to take deep breaths and turn in bed with tolerable pain control. Pt reports taking Tylenol for pain at home.     PAST MEDICAL & SURGICAL HISTORY:  HTN (hypertension)    HLD (hyperlipidemia)    BPH (benign prostatic hypertrophy)    DM (diabetes mellitus)    PAD (peripheral artery disease)  s/p stent placement    CKD (chronic kidney disease)    S/P tonsillectomy    S/P peripheral artery angioplasty with stent placement    FAMILY HISTORY:  Family history of lung cancer (Sibling)    Social History:   [x) Denies ETOH use, illicit drug use and smoking    Allergies    No Known Allergies    MEDICATIONS  (STANDING):  acetaminophen     Tablet .. 1000 milliGRAM(s) Oral every 8 hours  apixaban 5 milliGRAM(s) Oral every 12 hours  atorvastatin 40 milliGRAM(s) Oral at bedtime  clopidogrel Tablet 75 milliGRAM(s) Oral daily  colchicine 0.6 milliGRAM(s) Oral daily  dextrose 5%. 1000 milliLiter(s) (100 mL/Hr) IV Continuous <Continuous>  dextrose 5%. 1000 milliLiter(s) (50 mL/Hr) IV Continuous <Continuous>  dextrose 50% Injectable 25 Gram(s) IV Push once  dextrose 50% Injectable 12.5 Gram(s) IV Push once  dextrose 50% Injectable 25 Gram(s) IV Push once  finasteride 5 milliGRAM(s) Oral daily  glucagon  Injectable 1 milliGRAM(s) IntraMuscular once  insulin lispro (ADMELOG) corrective regimen sliding scale   SubCutaneous three times a day before meals  insulin lispro (ADMELOG) corrective regimen sliding scale   SubCutaneous at bedtime  lidocaine   4% Patch 1 Patch Transdermal daily  losartan 50 milliGRAM(s) Oral daily  metoprolol succinate ER 25 milliGRAM(s) Oral daily  polyethylene glycol 3350 17 Gram(s) Oral daily  senna 2 Tablet(s) Oral at bedtime  tamsulosin 0.4 milliGRAM(s) Oral at bedtime    MEDICATIONS  (PRN):  dextrose Oral Gel 15 Gram(s) Oral once PRN Blood Glucose LESS THAN 70 milliGRAM(s)/deciliter  oxyCODONE    IR 2.5 milliGRAM(s) Oral every 6 hours PRN Severe Pain (7 - 10)      Vital Signs Last 24 Hrs  T(C): 36.4 (24 Oct 2022 04:49), Max: 36.8 (23 Oct 2022 22:10)  T(F): 97.6 (24 Oct 2022 04:49), Max: 98.2 (23 Oct 2022 22:10)  HR: 79 (24 Oct 2022 04:49) (76 - 79)  BP: 174/64 (24 Oct 2022 04:49) (160/60 - 174/64)  BP(mean): --  RR: 18 (24 Oct 2022 04:49) (18 - 20)  SpO2: 98% (24 Oct 2022 04:49) (96% - 98%)    Parameters below as of 24 Oct 2022 04:49  Patient On (Oxygen Delivery Method): room air      COVID-19 PCR: Detected (24 Oct 2022 10:40)    LABS: Reviewed                          10.5   7.83  )-----------( 322      ( 24 Oct 2022 06:31 )             33.0     10-24    140  |  106  |  14  ----------------------------<  227<H>  3.7   |  24  |  0.93    Ca    9.2      24 Oct 2022 06:31  Phos  3.1     10-24  Mg     1.8     10-24    CAPILLARY BLOOD GLUCOSE      POCT Blood Glucose.: 317 mg/dL (24 Oct 2022 12:02)  POCT Blood Glucose.: 251 mg/dL (24 Oct 2022 07:42)  Radiology: Reviewed.   ACC: [id number] EXAM:  CT BRAIN                        ACC: [id number] EXAM:  CT CERVICAL SPINE                          PROCEDURE DATE:  10/18/2022      INTERPRETATION:  Noncontrast CT of the brain and cervical spine    CLINICAL INDICATION: Statuspost fall    TECHNIQUE: Axial CT scanning of the brain and cervical spine were   obtained without the administration of intravenous contrast.  Images were   reformatted in the sagittal and coronal planes.    COMPARISON: None available    FINDINGS:    CT brain:    No hydrocephalus, mass effect, midline shift, acute intracranial   hemorrhage, or brain edema.    No displaced calvarial fracture.    Bilateral ethmoid, sphenoid, and maxillary sinus mucosal thickening.   Air-fluid level in the right sphenoid sinus.    Mastoid air cells clear.    CT cervical spine:    No acute fracture or traumatic subluxation. No prevertebral soft tissue   swelling.    Vertebral body height and facet alignment are maintained. Grade 1   retrolisthesis of C5 on C6. Straightening of the normal cervical lordosis.    Alignment at the craniocervical junction unremarkable.    Disc space narrowing at C5-C6.    Multilevel degenerative changes.    Visualized lung apices clear. Visualized soft tissues unremarkable.    IMPRESSION:    CT brain:  No acute intracranial hemorrhage, brain edema, or mass effect.  No displaced calvarial fracture.  Air-fluid level in the right sphenoid sinus, correlate for the presence   of sinusitis.    CT cervical spine:  No acute fracture ortraumatic subluxation.  No prevertebral soft tissue swelling.  Degenerative changes.    --- End of Report ---    CASSANDRA GRAMAJO MD; Attending Radiologist  This document has been electronically signed. Oct 18 2022  8:52PM  ACC: [id number] EXAM:  CT BRAIN                        ACC: [id number] EXAM:  CT CERVICAL SPINE                          PROCEDURE DATE:  10/18/2022      INTERPRETATION:  Noncontrast CT of the brain and cervical spine    CLINICAL INDICATION: Statuspost fall    TECHNIQUE: Axial CT scanning of the brain and cervical spine were   obtained without the administration of intravenous contrast.  Images were   reformatted in the sagittal and coronal planes.    COMPARISON: None available    FINDINGS:    CT brain:    No hydrocephalus, mass effect, midline shift, acute intracranial   hemorrhage, or brain edema.    No displaced calvarial fracture.    Bilateral ethmoid, sphenoid, and maxillary sinus mucosal thickening.   Air-fluid level in the right sphenoid sinus.    Mastoid air cells clear.    CT cervical spine:    No acute fracture or traumatic subluxation. No prevertebral soft tissue   swelling.    Vertebral body height and facet alignment are maintained. Grade 1   retrolisthesis of C5 on C6. Straightening of the normal cervical lordosis.    Alignment at the craniocervical junction unremarkable.    Disc space narrowing at C5-C6.    Multilevel degenerative changes.    Visualized lung apices clear. Visualized soft tissues unremarkable.    IMPRESSION:    CT brain:  No acute intracranial hemorrhage, brain edema, or mass effect.  No displaced calvarial fracture.  Air-fluid level in the right sphenoid sinus, correlate for the presence   of sinusitis.    CT cervical spine:  No acute fracture ortraumatic subluxation.  No prevertebral soft tissue swelling.  Degenerative changes.    --- End of Report ---    CASSANDRA GRAMAJO MD; Attending Radiologist  This document has been electronically signed. Oct 18 2022  8:52PM    ACC: [id number] EXAM:  XR CHEST PORTABLE URGENT 1V                        ACC: [id number] EXAM:  XR KNEE COMP 4+ VIEWS LT                          PROCEDURE DATE:  10/18/2022      INTERPRETATION:  XR KNEE COMPLETE 4 VIEWS LEFT, XR CHEST URGENT    Clinical History: Left knee pain    Left knee 3 views    FINDINGS:    There is no fracture, dislocation, soft tissue swelling  Small suprapatellar joint effusion.  Moderate atherosclerotic change. The visualized vascular sent intact.  No degenerativechanges.  No radiopaque foreign body.    AP chest    FINDINGS: The heart size, mediastinum, hilum and aorta are within normal   limits. Mild atherosclerotic changes. Vague opacity right lower lobe may   represent confluence of shadows versus infiltrate. There is no pulmonary   venous congestion, pleural effusion or pneumothorax. The trachea is   midline. The bony structures are intact.    IMPRESSION:  1.  No fracture.  2.  Small suprapatellar left knee joint effusion.  3.  Vague opacity right lower lobe may represent confluence of shadows   versus infiltrate. Recommend correlation clinically and as needed a   follow-up examination.    --- End of Report ---     JOHNNIE MONDRAGON DO; Attending Radiologist  This document has been electronically signed. Oct 19 2022  3:29PM    ORT Score -   Family Hx of substance abuse	Female	      Male  Alcohol 	                                           1                     3  Illegal drugs	                                   2                     3  Rx drugs                                           4 	                  4  Personal Hx of substance abuse		  Alcohol 	                                          3	                  3  Illegal drugs                                     4	                  4  Rx drugs                                            5 	                  5  Age between 16- 45 years	           1                     1  hx preadolescent sexual abuse	   3 	                  0  Psychological disease		  ADD, OCD, bipolar, schizophrenia   2	          2  Depression                                           1 	          1  Total: 0    REVIEW OF SYSTEMS:  CONSTITUTIONAL: No fever or fatigue  HEENT:  No difficulty hearing, no change in vision  NECK: No pain or stiffness  RESPIRATORY: No cough, wheezing, chills or hemoptysis; No shortness of breath  CARDIOVASCULAR: No chest pain, palpitations, dizziness, or leg swelling  GASTROINTESTINAL: No loss of appetite, decreased PO intake. No abdominal or epigastric pain. No nausea, vomiting; No diarrhea or constipation.   GENITOURINARY: No dysuria, frequency, hematuria, retention or incontinence  MUSCULOSKELETAL: +left knee pain and swelling, +left upper thigh pain. No muscle, back, or extremity pain, no upper or lower motor strength weakness, no saddle anesthesia, bowel/bladder incontinence, + falls   NEURO: No headaches, No numbness/tingling b/l LE, No weakness    PHYSICAL EXAM:  GENERAL:  + lethargic, & Oriented X2, to name, place, cooperative, NAD. Speech is clear.   RESPIRATORY: Respirations even and unlabored. Clear to auscultation bilaterally; No rales, rhonchi, wheezing, or rubs  CARDIOVASCULAR: Normal S1/S2, regular rate and rhythm; No murmurs, rubs, or gallops. No JVD.   GASTROINTESTINAL:  Soft, Nontender, Nondistended; Bowel sounds present  PERIPHERAL VASCULAR:  Extremities warm with mild left knee edema edema. 2+ Peripheral Pulses, No cyanosis, No calf tenderness  MUSCULOSKELETAL: Motor Strength 5/5 B/L upper and 3/4 lower extremities;  limited ROM decreased LLE; + tenderness on palpation of L knee, +large effusion left knee  SKIN: Warm, dry, intact. No rashes, lesions, scars or wounds.     Risk factors associated with adverse outcomes related to opioid treatment  [ ]  Concurrent benzodiazepine use  [ ]  History/ Active substance use or alcohol use disorder  [ ] Psychiatric co-morbidity  [ ] Sleep apnea  [ ] COPD  [ ] BMI> 35  [ ] Liver dysfunction  [X ] Renal dysfunction  [ ] CHF  [ ] Smoker  [x]  Age > 60 years    [x]  [place]  Reviewed and Copied to Chart. See below.    Plan of care and goal oriented pain management treatment options were discussed with patient and /or primary care giver; all questions and concerns were addressed and care was aligned with patient's wishes.    Educated patient on goal oriented pain management treatment options     10-24-22 @ 14:00

## 2022-10-24 NOTE — PROGRESS NOTE ADULT - PROBLEM SELECTOR PLAN 1
s/p mechanical fall w/ left knee injury 2 days ago  likely in the setting of deconditioning d/t recent hospital admission for COVID infection, not DC with ambulatory devices or assistance, based on synovial fluid results likely Gout flare  noted to have progressively worsening ambulation   CTH no acute pathology, only significant for sinusitis  Afebrile with no WBC  -spiked fever, arthrocentesis done 10/21  -synovial fluid positive for monosodium urate crystal, negative cultures  -colchicine  -start prednisone 20mg PO

## 2022-10-24 NOTE — PROGRESS NOTE ADULT - PROBLEM SELECTOR PLAN 1
Pt with pain on left knee which is somatic in nature due to left knee joint effusion and acute gout. Per Ortho recommendations, will treat conservatively.  S/p left knee arthrocentesis on 10/21. High risk medications reviewed. Avoid polypharmacy. Avoid IV opioids. Avoid NSAIDs and benzodiazepines. Non-pharmacological sleep aides initiated. Non-opioid medications and non-pharmacological pain management measures initiated.  Opioid pain recommendations   - Change oxycodone 2.5 mg PO q 4 hours PRN severe pain. Monitor for sedation/ respiratory depression.   Non-opioid pain recommendations   - Continue colchicine 0.6mg PO daily per primary team.   - Acetaminophen 1 gram PO q 8 hours x 3 days (10/24-27). Monitor LFTs  - Continue Lidoderm 4% patch daily.   Bowel Regimen  - Continue Miralax 17G PO daily  - Continue Senna 2 tablets at bedtime for constipation  Mild pain   - Non-pharmacological pain treatment recommendations  - Warm/ Cool packs PRN   - Repositioning, imagery, relaxation, distraction.  - Physical therapy OOB if no contraindications   Recommendations discussed with primary team and RN.

## 2022-10-24 NOTE — PROGRESS NOTE ADULT - SUBJECTIVE AND OBJECTIVE BOX
PGY-1 Progress Note discussed with attending    PAGER #: [[phone]] TILL 5:00 PM  PLEASE CONTACT ON CALL TEAM:  - On Call Team (Please refer to Remberto) FROM 5:00 PM - 8:30PM  - Nightfloat Team FROM 8:30 -7:30 AM    CHIEF COMPLAINT & BRIEF HOSPITAL COURSE:    INTERVAL HPI/OVERNIGHT EVENTS:   MEDICATIONS  (STANDING):  acetaminophen     Tablet .. 1000 milliGRAM(s) Oral every 8 hours  apixaban 5 milliGRAM(s) Oral every 12 hours  atorvastatin 40 milliGRAM(s) Oral at bedtime  clopidogrel Tablet 75 milliGRAM(s) Oral daily  colchicine 0.6 milliGRAM(s) Oral daily  dextrose 5%. 1000 milliLiter(s) (100 mL/Hr) IV Continuous <Continuous>  dextrose 5%. 1000 milliLiter(s) (50 mL/Hr) IV Continuous <Continuous>  dextrose 50% Injectable 25 Gram(s) IV Push once  dextrose 50% Injectable 12.5 Gram(s) IV Push once  dextrose 50% Injectable 25 Gram(s) IV Push once  finasteride 5 milliGRAM(s) Oral daily  glucagon  Injectable 1 milliGRAM(s) IntraMuscular once  insulin lispro (ADMELOG) corrective regimen sliding scale   SubCutaneous three times a day before meals  insulin lispro (ADMELOG) corrective regimen sliding scale   SubCutaneous at bedtime  lidocaine   4% Patch 1 Patch Transdermal daily  losartan 50 milliGRAM(s) Oral daily  metoprolol succinate ER 25 milliGRAM(s) Oral daily  polyethylene glycol 3350 17 Gram(s) Oral daily  predniSONE   Tablet 20 milliGRAM(s) Oral daily  senna 2 Tablet(s) Oral at bedtime  tamsulosin 0.4 milliGRAM(s) Oral at bedtime    MEDICATIONS  (PRN):  dextrose Oral Gel 15 Gram(s) Oral once PRN Blood Glucose LESS THAN 70 milliGRAM(s)/deciliter  oxyCODONE    IR 2.5 milliGRAM(s) Oral every 6 hours PRN Severe Pain (7 - 10)      REVIEW OF SYSTEMS:  CONSTITUTIONAL: No fever, weight loss, or fatigue  RESPIRATORY: No cough, wheezing, chills or hemoptysis; No shortness of breath  CARDIOVASCULAR: No chest pain, palpitations, dizziness, or leg swelling  GASTROINTESTINAL: No abdominal pain. No nausea, vomiting, or hematemesis; No diarrhea or constipation. No melena or hematochezia.  GENITOURINARY: No dysuria or hematuria, urinary frequency  NEUROLOGICAL: No headaches, memory loss, loss of strength, numbness, or tremors  SKIN: No itching, burning, rashes, or lesions     Vital Signs Last 24 Hrs  T(C): 36.1 (24 Oct 2022 14:39), Max: 36.8 (23 Oct 2022 22:10)  T(F): 97 (24 Oct 2022 14:39), Max: 98.2 (23 Oct 2022 22:10)  HR: 86 (24 Oct 2022 14:39) (76 - 86)  BP: 152/62 (24 Oct 2022 14:39) (152/62 - 174/64)  BP(mean): --  RR: 17 (24 Oct 2022 14:39) (17 - 20)  SpO2: 97% (24 Oct 2022 14:39) (96% - 98%)    Parameters below as of 24 Oct 2022 14:39  Patient On (Oxygen Delivery Method): room air        PHYSICAL EXAMINATION:  GENERAL: NAD, well built  HEAD:  Atraumatic, Normocephalic  EYES:  conjunctiva and sclera clear  NECK: Supple, No JVD, Normal thyroid  CHEST/LUNG: Clear to auscultation. Clear to percussion bilaterally; No rales, rhonchi, wheezing, or rubs  HEART: Regular rate and rhythm; No murmurs, rubs, or gallops  ABDOMEN: Soft, Nontender, Nondistended; Bowel sounds present  NERVOUS SYSTEM:  Alert & Oriented X3,    EXTREMITIES:  2+ Peripheral Pulses, No clubbing, cyanosis, or edema  SKIN: warm dry                          10.5   7.83  )-----------( 322      ( 24 Oct 2022 06:31 )             33.0     10-24    140  |  106  |  14  ----------------------------<  227<H>  3.7   |  24  |  0.93    Ca    9.2      24 Oct 2022 06:31  Phos  3.1     10-24  Mg     1.8     10-24                CAPILLARY BLOOD GLUCOSE      RADIOLOGY & ADDITIONAL TESTS:                   PGY-1 Progress Note discussed with attending    PAGER #: [[phone]] TILL 5:00 PM  PLEASE CONTACT ON CALL TEAM:  - On Call Team (Please refer to Remberto) FROM 5:00 PM - 8:30PM  - Nightfloat Team FROM 8:30 -7:30 AM    CHIEF COMPLAINT & BRIEF HOSPITAL COURSE:    INTERVAL HPI/OVERNIGHT EVENTS:   MEDICATIONS  (STANDING):  acetaminophen     Tablet .. 1000 milliGRAM(s) Oral every 8 hours  apixaban 5 milliGRAM(s) Oral every 12 hours  atorvastatin 40 milliGRAM(s) Oral at bedtime  clopidogrel Tablet 75 milliGRAM(s) Oral daily  colchicine 0.6 milliGRAM(s) Oral daily  dextrose 5%. 1000 milliLiter(s) (100 mL/Hr) IV Continuous <Continuous>  dextrose 5%. 1000 milliLiter(s) (50 mL/Hr) IV Continuous <Continuous>  dextrose 50% Injectable 25 Gram(s) IV Push once  dextrose 50% Injectable 12.5 Gram(s) IV Push once  dextrose 50% Injectable 25 Gram(s) IV Push once  finasteride 5 milliGRAM(s) Oral daily  glucagon  Injectable 1 milliGRAM(s) IntraMuscular once  insulin lispro (ADMELOG) corrective regimen sliding scale   SubCutaneous three times a day before meals  insulin lispro (ADMELOG) corrective regimen sliding scale   SubCutaneous at bedtime  lidocaine   4% Patch 1 Patch Transdermal daily  losartan 50 milliGRAM(s) Oral daily  metoprolol succinate ER 25 milliGRAM(s) Oral daily  polyethylene glycol 3350 17 Gram(s) Oral daily  predniSONE   Tablet 20 milliGRAM(s) Oral daily  senna 2 Tablet(s) Oral at bedtime  tamsulosin 0.4 milliGRAM(s) Oral at bedtime    MEDICATIONS  (PRN):  dextrose Oral Gel 15 Gram(s) Oral once PRN Blood Glucose LESS THAN 70 milliGRAM(s)/deciliter  oxyCODONE    IR 2.5 milliGRAM(s) Oral every 6 hours PRN Severe Pain (7 - 10)      REVIEW OF SYSTEMS:  CONSTITUTIONAL: No fever, weight loss, or fatigue  RESPIRATORY: No cough, wheezing, chills or hemoptysis; No shortness of breath  CARDIOVASCULAR: No chest pain, palpitations, dizziness, or leg swelling  GASTROINTESTINAL: No abdominal pain. No nausea, vomiting, or hematemesis; No diarrhea or constipation. No melena or hematochezia.  GENITOURINARY: No dysuria or hematuria, urinary frequency  NEUROLOGICAL: No headaches, memory loss, loss of strength, numbness, or tremors  SKIN: No itching, burning, rashes, or lesions     Vital Signs Last 24 Hrs  T(C): 36.1 (24 Oct 2022 14:39), Max: 36.8 (23 Oct 2022 22:10)  T(F): 97 (24 Oct 2022 14:39), Max: 98.2 (23 Oct 2022 22:10)  HR: 86 (24 Oct 2022 14:39) (76 - 86)  BP: 152/62 (24 Oct 2022 14:39) (152/62 - 174/64)  BP(mean): --  RR: 17 (24 Oct 2022 14:39) (17 - 20)  SpO2: 97% (24 Oct 2022 14:39) (96% - 98%)    Parameters below as of 24 Oct 2022 14:39  Patient On (Oxygen Delivery Method): room air        PHYSICAL EXAMINATION:  GENERAL: NAD, well built  HEAD:  Atraumatic, Normocephalic  EYES:  conjunctiva and sclera clear  NECK: Supple, No JVD, Normal thyroid  CHEST/LUNG: Clear to auscultation.  No rales, rhonchi, wheezing, or rubs  HEART: Regular rate and rhythm; No murmurs, rubs, or gallops  ABDOMEN: Soft, Nontender, Nondistended; Bowel sounds present  NERVOUS SYSTEM:  Alert & Oriented X3,    EXTREMITIES:  2+ Peripheral Pulses, No clubbing, cyanosis, or edema  SKIN: warm dry                          10.5   7.83  )-----------( 322      ( 24 Oct 2022 06:31 )             33.0     10-24    140  |  106  |  14  ----------------------------<  227<H>  3.7   |  24  |  0.93    Ca    9.2      24 Oct 2022 06:31  Phos  3.1     10-24  Mg     1.8     10-24                CAPILLARY BLOOD GLUCOSE      RADIOLOGY & ADDITIONAL TESTS:

## 2022-10-24 NOTE — PROGRESS NOTE ADULT - PROBLEM SELECTOR PLAN 4
COIVD positive over a weeks ago from another hospital admission  not in resp distress, saturating well on RA  DC Isolation

## 2022-10-24 NOTE — PROGRESS NOTE ADULT - ATTENDING COMMENTS
a/p# Acute Gout attack  -will add prednisone as no s.s of infection to help with acute gout attack  -c/w colchicine  -hold plavox- will d/w family and patient as when the stents were placed. Patient also on Eliquis so will d/w risk /benefits   - plan for SNF Placement

## 2022-10-24 NOTE — PROGRESS NOTE ADULT - PROBLEM SELECTOR PLAN 2
s/p fall associated with weakness, no LOC, headache, dizziness, chest pain, SOB.  likely mechanical, could be due to deconditioning, weakness from recent hospital DC   CT head Non-contrast :  no acute hge or stroke, , only significant for sinusitis  XR no fracture or fluid collection   Fall Precaution   PT consulted  Nutrition consulted  CM and SW consulted  Ortho consulted: conservative management s/p fall associated with weakness, no LOC, headache, dizziness, chest pain, SOB.  likely mechanical, could be due to deconditioning, weakness from recent hospital DC   CT head Non-contrast :  no acute hge or stroke,only significant for sinusitis  XR no fracture or fluid collection   Fall Precaution   PT consulted  Nutrition consulted  CM and SW consulted  Ortho consulted: conservative management

## 2022-10-24 NOTE — PROGRESS NOTE ADULT - ASSESSMENT
Confidential Drug Utilization Report  Search Terms: Art Ruggiero, 1935Search Date: 10/21/2022 14:54:34 PM  The Drug Utilization Report below displays all of the controlled substance prescriptions, if any, that your patient has filled in the last twelve months. The information displayed on this report is compiled from pharmacy submissions to the Department, and accurately reflects the information as submitted by the pharmacies.    This report was requested by: Quyen Clay | Reference #: [id number]    There are no results for the search terms that you entered.

## 2022-10-25 DIAGNOSIS — M10.9 GOUT, UNSPECIFIED: ICD-10-CM

## 2022-10-25 LAB
A1C WITH ESTIMATED AVERAGE GLUCOSE RESULT: 7.6 % — HIGH (ref 4–5.6)
ANION GAP SERPL CALC-SCNC: 10 MMOL/L — SIGNIFICANT CHANGE UP (ref 5–17)
BUN SERPL-MCNC: 23 MG/DL — HIGH (ref 7–18)
CALCIUM SERPL-MCNC: 9.7 MG/DL — SIGNIFICANT CHANGE UP (ref 8.4–10.5)
CHLORIDE SERPL-SCNC: 107 MMOL/L — SIGNIFICANT CHANGE UP (ref 96–108)
CO2 SERPL-SCNC: 24 MMOL/L — SIGNIFICANT CHANGE UP (ref 22–31)
CREAT SERPL-MCNC: 1.19 MG/DL — SIGNIFICANT CHANGE UP (ref 0.5–1.3)
EGFR: 60 ML/MIN/1.73M2 — SIGNIFICANT CHANGE UP
ESTIMATED AVERAGE GLUCOSE: 171 MG/DL — HIGH (ref 68–114)
GLUCOSE BLDC GLUCOMTR-MCNC: 278 MG/DL — HIGH (ref 70–99)
GLUCOSE BLDC GLUCOMTR-MCNC: 287 MG/DL — HIGH (ref 70–99)
GLUCOSE BLDC GLUCOMTR-MCNC: 368 MG/DL — HIGH (ref 70–99)
GLUCOSE BLDC GLUCOMTR-MCNC: 500 MG/DL — CRITICAL HIGH (ref 70–99)
GLUCOSE BLDC GLUCOMTR-MCNC: 525 MG/DL — CRITICAL HIGH (ref 70–99)
GLUCOSE BLDC GLUCOMTR-MCNC: 549 MG/DL — CRITICAL HIGH (ref 70–99)
GLUCOSE BLDC GLUCOMTR-MCNC: 552 MG/DL — CRITICAL HIGH (ref 70–99)
GLUCOSE SERPL-MCNC: 279 MG/DL — HIGH (ref 70–99)
HCT VFR BLD CALC: 34.5 % — LOW (ref 39–50)
HGB BLD-MCNC: 11.1 G/DL — LOW (ref 13–17)
MCHC RBC-ENTMCNC: 27.1 PG — SIGNIFICANT CHANGE UP (ref 27–34)
MCHC RBC-ENTMCNC: 32.2 GM/DL — SIGNIFICANT CHANGE UP (ref 32–36)
MCV RBC AUTO: 84.1 FL — SIGNIFICANT CHANGE UP (ref 80–100)
NRBC # BLD: 0 /100 WBCS — SIGNIFICANT CHANGE UP (ref 0–0)
PLATELET # BLD AUTO: 366 K/UL — SIGNIFICANT CHANGE UP (ref 150–400)
POTASSIUM SERPL-MCNC: 4.4 MMOL/L — SIGNIFICANT CHANGE UP (ref 3.5–5.3)
POTASSIUM SERPL-SCNC: 4.4 MMOL/L — SIGNIFICANT CHANGE UP (ref 3.5–5.3)
RBC # BLD: 4.1 M/UL — LOW (ref 4.2–5.8)
RBC # FLD: 14.1 % — SIGNIFICANT CHANGE UP (ref 10.3–14.5)
SODIUM SERPL-SCNC: 141 MMOL/L — SIGNIFICANT CHANGE UP (ref 135–145)
WBC # BLD: 8.11 K/UL — SIGNIFICANT CHANGE UP (ref 3.8–10.5)
WBC # FLD AUTO: 8.11 K/UL — SIGNIFICANT CHANGE UP (ref 3.8–10.5)

## 2022-10-25 PROCEDURE — 99232 SBSQ HOSP IP/OBS MODERATE 35: CPT

## 2022-10-25 PROCEDURE — 99221 1ST HOSP IP/OBS SF/LOW 40: CPT

## 2022-10-25 PROCEDURE — 99232 SBSQ HOSP IP/OBS MODERATE 35: CPT | Mod: GC

## 2022-10-25 RX ORDER — INSULIN LISPRO 100/ML
8 VIAL (ML) SUBCUTANEOUS
Refills: 0 | Status: DISCONTINUED | OUTPATIENT
Start: 2022-10-25 | End: 2022-10-26

## 2022-10-25 RX ORDER — INSULIN GLARGINE 100 [IU]/ML
5 INJECTION, SOLUTION SUBCUTANEOUS AT BEDTIME
Refills: 0 | Status: DISCONTINUED | OUTPATIENT
Start: 2022-10-25 | End: 2022-10-25

## 2022-10-25 RX ORDER — INSULIN LISPRO 100/ML
10 VIAL (ML) SUBCUTANEOUS ONCE
Refills: 0 | Status: COMPLETED | OUTPATIENT
Start: 2022-10-25 | End: 2022-10-25

## 2022-10-25 RX ORDER — INSULIN LISPRO 100/ML
5 VIAL (ML) SUBCUTANEOUS ONCE
Refills: 0 | Status: COMPLETED | OUTPATIENT
Start: 2022-10-25 | End: 2022-10-25

## 2022-10-25 RX ORDER — INSULIN LISPRO 100/ML
VIAL (ML) SUBCUTANEOUS AT BEDTIME
Refills: 0 | Status: DISCONTINUED | OUTPATIENT
Start: 2022-10-25 | End: 2022-10-31

## 2022-10-25 RX ORDER — INSULIN LISPRO 100/ML
2 VIAL (ML) SUBCUTANEOUS
Refills: 0 | Status: DISCONTINUED | OUTPATIENT
Start: 2022-10-25 | End: 2022-10-25

## 2022-10-25 RX ORDER — LOSARTAN POTASSIUM 100 MG/1
75 TABLET, FILM COATED ORAL DAILY
Refills: 0 | Status: DISCONTINUED | OUTPATIENT
Start: 2022-10-25 | End: 2022-10-29

## 2022-10-25 RX ORDER — INSULIN LISPRO 100/ML
VIAL (ML) SUBCUTANEOUS
Refills: 0 | Status: DISCONTINUED | OUTPATIENT
Start: 2022-10-25 | End: 2022-10-28

## 2022-10-25 RX ORDER — INSULIN GLARGINE 100 [IU]/ML
15 INJECTION, SOLUTION SUBCUTANEOUS EVERY MORNING
Refills: 0 | Status: DISCONTINUED | OUTPATIENT
Start: 2022-10-26 | End: 2022-10-27

## 2022-10-25 RX ORDER — INSULIN GLARGINE 100 [IU]/ML
15 INJECTION, SOLUTION SUBCUTANEOUS ONCE
Refills: 0 | Status: COMPLETED | OUTPATIENT
Start: 2022-10-25 | End: 2022-10-25

## 2022-10-25 RX ADMIN — Medication 1000 MILLIGRAM(S): at 23:00

## 2022-10-25 RX ADMIN — Medication 1000 MILLIGRAM(S): at 07:00

## 2022-10-25 RX ADMIN — Medication 0.6 MILLIGRAM(S): at 12:16

## 2022-10-25 RX ADMIN — Medication 3: at 22:18

## 2022-10-25 RX ADMIN — Medication 1000 MILLIGRAM(S): at 13:45

## 2022-10-25 RX ADMIN — Medication 20 MILLIGRAM(S): at 06:19

## 2022-10-25 RX ADMIN — SENNA PLUS 2 TABLET(S): 8.6 TABLET ORAL at 22:04

## 2022-10-25 RX ADMIN — Medication 6: at 12:14

## 2022-10-25 RX ADMIN — Medication 2 UNIT(S): at 12:15

## 2022-10-25 RX ADMIN — Medication 1000 MILLIGRAM(S): at 22:04

## 2022-10-25 RX ADMIN — Medication 1000 MILLIGRAM(S): at 06:19

## 2022-10-25 RX ADMIN — Medication 5 UNIT(S): at 12:43

## 2022-10-25 RX ADMIN — Medication 10 UNIT(S): at 13:46

## 2022-10-25 RX ADMIN — Medication 1000 MILLIGRAM(S): at 14:22

## 2022-10-25 RX ADMIN — LIDOCAINE 1 PATCH: 4 CREAM TOPICAL at 10:03

## 2022-10-25 RX ADMIN — ATORVASTATIN CALCIUM 40 MILLIGRAM(S): 80 TABLET, FILM COATED ORAL at 22:04

## 2022-10-25 RX ADMIN — APIXABAN 5 MILLIGRAM(S): 2.5 TABLET, FILM COATED ORAL at 17:05

## 2022-10-25 RX ADMIN — Medication 8 UNIT(S): at 17:05

## 2022-10-25 RX ADMIN — LIDOCAINE 1 PATCH: 4 CREAM TOPICAL at 22:05

## 2022-10-25 RX ADMIN — Medication 25 MILLIGRAM(S): at 06:19

## 2022-10-25 RX ADMIN — LOSARTAN POTASSIUM 75 MILLIGRAM(S): 100 TABLET, FILM COATED ORAL at 13:44

## 2022-10-25 RX ADMIN — TAMSULOSIN HYDROCHLORIDE 0.4 MILLIGRAM(S): 0.4 CAPSULE ORAL at 22:04

## 2022-10-25 RX ADMIN — Medication 3: at 08:09

## 2022-10-25 RX ADMIN — FINASTERIDE 5 MILLIGRAM(S): 5 TABLET, FILM COATED ORAL at 12:16

## 2022-10-25 RX ADMIN — APIXABAN 5 MILLIGRAM(S): 2.5 TABLET, FILM COATED ORAL at 06:20

## 2022-10-25 RX ADMIN — LOSARTAN POTASSIUM 50 MILLIGRAM(S): 100 TABLET, FILM COATED ORAL at 06:20

## 2022-10-25 RX ADMIN — INSULIN GLARGINE 15 UNIT(S): 100 INJECTION, SOLUTION SUBCUTANEOUS at 17:03

## 2022-10-25 RX ADMIN — Medication 12: at 17:05

## 2022-10-25 RX ADMIN — LIDOCAINE 1 PATCH: 4 CREAM TOPICAL at 08:11

## 2022-10-25 NOTE — DIETITIAN INITIAL EVALUATION ADULT - OTHER INFO
admitted for knee pain s/p fall at home. has history of DM, CAD ,PAD, . ordered for DASH/TLC diet, given DM history , suggest To add  DM restrictions To DASH/TLC diet .

## 2022-10-25 NOTE — DIETITIAN INITIAL EVALUATION ADULT - ADD RECOMMEND
change diet To carbohydrate consistent , DASH/TLC given DM history. patient will tolerate diet with improved glycemic control

## 2022-10-25 NOTE — DIETITIAN INITIAL EVALUATION ADULT - ORAL INTAKE PTA/DIET HISTORY
unable to interview patient face to face as has covid-19 . will not Attempt To contact patient On extension in room as confused

## 2022-10-25 NOTE — PROGRESS NOTE ADULT - SUBJECTIVE AND OBJECTIVE BOX
Source of information: SWAPNA FINK, Chart review  Patient language: Cypriot/ Understands and speaks basic English   : n/a    HPI:  87M, coming form home, declining in ambulatory status, with a PMHx of CAD, s/p stents, PAD on Plavix and Eliquis, DM, BPH, HTN BIBEMS for evaluation s/p fall and knee pain. Patient is limited historian, AOx1-2. Collateral obtained per chart review, called emergency contact listed in the chart Antonio Fink and number is not active. Per son, the patient fell 2 days ago and hurt his L knee. Unknown if head-strike or LOC. Fall unwitnessed. Per the son, patient was hospitalized for 5-d more than 1week ago for COVID, and since then has had progressively worsening ambulation 2/2 knee pain. Per son, patient was discharged back to home with out any ambulatory assistive devices and services. Patient reports knee pain started while in hospital. No reported fever, chills, cp, sob, cough, vomiting, diarrhea, calf pain. No hx of DVT/PE. Pt does not recall if he hit his head or not. Unable to obtain ROS d/t mental status.  (19 Oct 2022 05:18)    Pt is admitted for s/p Fall and left knee pain. Dx left knee gout and joint effusion. S/p left knee arthrocentesis on 10/21. Patient seen by Ortho, conservative treatment recommended. Pain consulted for L knee pain. Pt seen and examined at bedside this morning. +Covid 10/18, Airborne precautions maintained. Patient laying in bed, somnolent, opens eyes briefly. Denies pain. Pt reports taking Tylenol for pain at home during prior encounter.     PAST MEDICAL & SURGICAL HISTORY:  HTN (hypertension)    HLD (hyperlipidemia)    BPH (benign prostatic hypertrophy)    DM (diabetes mellitus)    PAD (peripheral artery disease)  s/p stent placement    CKD (chronic kidney disease)    S/P tonsillectomy    S/P peripheral artery angioplasty with stent placement    FAMILY HISTORY:  Family history of lung cancer (Sibling)    Social History:   [x) Denies ETOH use, illicit drug use and smoking    Allergies    No Known Allergies    MEDICATIONS  (STANDING):  acetaminophen     Tablet .. 1000 milliGRAM(s) Oral every 8 hours  apixaban 5 milliGRAM(s) Oral every 12 hours  atorvastatin 40 milliGRAM(s) Oral at bedtime  colchicine 0.6 milliGRAM(s) Oral daily  dextrose 5%. 1000 milliLiter(s) (50 mL/Hr) IV Continuous <Continuous>  dextrose 5%. 1000 milliLiter(s) (100 mL/Hr) IV Continuous <Continuous>  dextrose 50% Injectable 25 Gram(s) IV Push once  dextrose 50% Injectable 12.5 Gram(s) IV Push once  dextrose 50% Injectable 25 Gram(s) IV Push once  finasteride 5 milliGRAM(s) Oral daily  glucagon  Injectable 1 milliGRAM(s) IntraMuscular once  insulin glargine Injectable (LANTUS) 5 Unit(s) SubCutaneous at bedtime  insulin lispro (ADMELOG) corrective regimen sliding scale   SubCutaneous three times a day before meals  insulin lispro (ADMELOG) corrective regimen sliding scale   SubCutaneous at bedtime  insulin lispro Injectable (ADMELOG) 2 Unit(s) SubCutaneous three times a day before meals  lidocaine   4% Patch 1 Patch Transdermal daily  losartan 75 milliGRAM(s) Oral daily  metoprolol succinate ER 25 milliGRAM(s) Oral daily  polyethylene glycol 3350 17 Gram(s) Oral daily  predniSONE   Tablet 20 milliGRAM(s) Oral daily  senna 2 Tablet(s) Oral at bedtime  tamsulosin 0.4 milliGRAM(s) Oral at bedtime    MEDICATIONS  (PRN):  dextrose Oral Gel 15 Gram(s) Oral once PRN Blood Glucose LESS THAN 70 milliGRAM(s)/deciliter  oxyCODONE    IR 2.5 milliGRAM(s) Oral every 6 hours PRN Severe Pain (7 - 10)      Vital Signs Last 24 Hrs  T(C): 36.3 (25 Oct 2022 05:46), Max: 36.4 (24 Oct 2022 20:11)  T(F): 97.3 (25 Oct 2022 05:46), Max: 97.6 (24 Oct 2022 20:11)  HR: 60 (25 Oct 2022 05:46) (60 - 89)  BP: 160/60 (25 Oct 2022 05:46) (152/62 - 163/58)  BP(mean): --  RR: 18 (25 Oct 2022 05:46) (17 - 18)  SpO2: 97% (25 Oct 2022 05:46) (97% - 97%)    Parameters below as of 25 Oct 2022 05:46  Patient On (Oxygen Delivery Method): room air      COVID-19 PCR: Detected (24 Oct 2022 10:40)    LABS: Reviewed                          11.1   8.11  )-----------( 366      ( 25 Oct 2022 07:42 )             34.5     10-25    141  |  107  |  23<H>  ----------------------------<  279<H>  4.4   |  24  |  1.19    Ca    9.7      25 Oct 2022 07:42  Phos  3.1     10-24  Mg     1.8     10-24    CAPILLARY BLOOD GLUCOSE    POCT Blood Glucose.: 287 mg/dL (25 Oct 2022 08:03)  POCT Blood Glucose.: 309 mg/dL (24 Oct 2022 22:14)  POCT Blood Glucose.: 278 mg/dL (24 Oct 2022 20:54)  POCT Blood Glucose.: 232 mg/dL (24 Oct 2022 17:05)  POCT Blood Glucose.: 317 mg/dL (24 Oct 2022 12:02)    Radiology: Reviewed.   ACC: [id number] EXAM:  CT BRAIN                        ACC: [id number] EXAM:  CT CERVICAL SPINE                          PROCEDURE DATE:  10/18/2022      INTERPRETATION:  Noncontrast CT of the brain and cervical spine    CLINICAL INDICATION: Statuspost fall    TECHNIQUE: Axial CT scanning of the brain and cervical spine were   obtained without the administration of intravenous contrast.  Images were   reformatted in the sagittal and coronal planes.    COMPARISON: None available    FINDINGS:    CT brain:    No hydrocephalus, mass effect, midline shift, acute intracranial   hemorrhage, or brain edema.    No displaced calvarial fracture.    Bilateral ethmoid, sphenoid, and maxillary sinus mucosal thickening.   Air-fluid level in the right sphenoid sinus.    Mastoid air cells clear.    CT cervical spine:    No acute fracture or traumatic subluxation. No prevertebral soft tissue   swelling.    Vertebral body height and facet alignment are maintained. Grade 1   retrolisthesis of C5 on C6. Straightening of the normal cervical lordosis.    Alignment at the craniocervical junction unremarkable.    Disc space narrowing at C5-C6.    Multilevel degenerative changes.    Visualized lung apices clear. Visualized soft tissues unremarkable.    IMPRESSION:    CT brain:  No acute intracranial hemorrhage, brain edema, or mass effect.  No displaced calvarial fracture.  Air-fluid level in the right sphenoid sinus, correlate for the presence   of sinusitis.    CT cervical spine:  No acute fracture ortraumatic subluxation.  No prevertebral soft tissue swelling.  Degenerative changes.    --- End of Report ---    CASSANDRA GRAMAJO MD; Attending Radiologist  This document has been electronically signed. Oct 18 2022  8:52PM  ACC: [id number] EXAM:  CT BRAIN                        ACC: [id number] EXAM:  CT CERVICAL SPINE                          PROCEDURE DATE:  10/18/2022      INTERPRETATION:  Noncontrast CT of the brain and cervical spine    CLINICAL INDICATION: Statuspost fall    TECHNIQUE: Axial CT scanning of the brain and cervical spine were   obtained without the administration of intravenous contrast.  Images were   reformatted in the sagittal and coronal planes.    COMPARISON: None available    FINDINGS:    CT brain:    No hydrocephalus, mass effect, midline shift, acute intracranial   hemorrhage, or brain edema.    No displaced calvarial fracture.    Bilateral ethmoid, sphenoid, and maxillary sinus mucosal thickening.   Air-fluid level in the right sphenoid sinus.    Mastoid air cells clear.    CT cervical spine:    No acute fracture or traumatic subluxation. No prevertebral soft tissue   swelling.    Vertebral body height and facet alignment are maintained. Grade 1   retrolisthesis of C5 on C6. Straightening of the normal cervical lordosis.    Alignment at the craniocervical junction unremarkable.    Disc space narrowing at C5-C6.    Multilevel degenerative changes.    Visualized lung apices clear. Visualized soft tissues unremarkable.    IMPRESSION:    CT brain:  No acute intracranial hemorrhage, brain edema, or mass effect.  No displaced calvarial fracture.  Air-fluid level in the right sphenoid sinus, correlate for the presence   of sinusitis.    CT cervical spine:  No acute fracture ortraumatic subluxation.  No prevertebral soft tissue swelling.  Degenerative changes.    --- End of Report ---    CASSANDRA GRAMAJO MD; Attending Radiologist  This document has been electronically signed. Oct 18 2022  8:52PM    ACC: [id number] EXAM:  XR CHEST PORTABLE URGENT 1V                        ACC: [id number] EXAM:  XR KNEE COMP 4+ VIEWS LT                          PROCEDURE DATE:  10/18/2022      INTERPRETATION:  XR KNEE COMPLETE 4 VIEWS LEFT, XR CHEST URGENT    Clinical History: Left knee pain    Left knee 3 views    FINDINGS:    There is no fracture, dislocation, soft tissue swelling  Small suprapatellar joint effusion.  Moderate atherosclerotic change. The visualized vascular sent intact.  No degenerativechanges.  No radiopaque foreign body.    AP chest    FINDINGS: The heart size, mediastinum, hilum and aorta are within normal   limits. Mild atherosclerotic changes. Vague opacity right lower lobe may   represent confluence of shadows versus infiltrate. There is no pulmonary   venous congestion, pleural effusion or pneumothorax. The trachea is   midline. The bony structures are intact.    IMPRESSION:  1.  No fracture.  2.  Small suprapatellar left knee joint effusion.  3.  Vague opacity right lower lobe may represent confluence of shadows   versus infiltrate. Recommend correlation clinically and as needed a   follow-up examination.    --- End of Report ---     JOHNNIE MONDRAGON DO; Attending Radiologist  This document has been electronically signed. Oct 19 2022  3:29PM    ORT Score -   Family Hx of substance abuse	Female	      Male  Alcohol 	                                           1                     3  Illegal drugs	                                   2                     3  Rx drugs                                           4 	                  4  Personal Hx of substance abuse		  Alcohol 	                                          3	                  3  Illegal drugs                                     4	                  4  Rx drugs                                            5 	                  5  Age between 16- 45 years	           1                     1  hx preadolescent sexual abuse	   3 	                  0  Psychological disease		  ADD, OCD, bipolar, schizophrenia   2	          2  Depression                                           1 	          1  Total: 0    REVIEW OF SYSTEMS:  unable to assess today due to somnolence. Denies pain.     PHYSICAL EXAM:  GENERAL:  + somnolent, opens eyes briefly.   RESPIRATORY: Respirations even and unlabored. Clear to auscultation bilaterally; No rales, rhonchi, wheezing, or rubs  CARDIOVASCULAR: Normal S1/S2, regular rate and rhythm; No murmurs, rubs, or gallops. No JVD.   GASTROINTESTINAL:  Soft, Nontender, Nondistended; Bowel sounds present  PERIPHERAL VASCULAR:  Extremities warm with mild left knee edema. 2+ Peripheral Pulses, No cyanosis, No calf tenderness  MUSCULOSKELETAL: unable to assess due to somnolence.   SKIN: Warm, dry, intact. No rashes, lesions, scars or wounds.     Risk factors associated with adverse outcomes related to opioid treatment  [ ]  Concurrent benzodiazepine use  [ ]  History/ Active substance use or alcohol use disorder  [ ] Psychiatric co-morbidity  [ ] Sleep apnea  [ ] COPD  [ ] BMI> 35  [ ] Liver dysfunction  [X ] Renal dysfunction  [ ] CHF  [ ] Smoker  [x]  Age > 60 years    [x]  [place]  Reviewed and Copied to Chart. See below.    Plan of care and goal oriented pain management treatment options were discussed with patient and /or primary care giver; all questions and concerns were addressed and care was aligned with patient's wishes.    Educated patient on goal oriented pain management treatment options     10-25-22 @ 11:35

## 2022-10-25 NOTE — PROGRESS NOTE ADULT - ASSESSMENT
Confidential Drug Utilization Report  Search Terms: Art Ruggiero, 1935Search Date: 10/21/2022 14:54:34 PM  The Drug Utilization Report below displays all of the controlled substance prescriptions, if any, that your patient has filled in the last twelve months. The information displayed on this report is compiled from pharmacy submissions to the Department, and accurately reflects the information as submitted by the pharmacies.    This report was requested by: Quyen Clay | Reference #: [id number]    There are no results for the search terms that you entered.

## 2022-10-25 NOTE — PROGRESS NOTE ADULT - PROBLEM SELECTOR PLAN 3
plan as above A1c 7.6%  Patient started on prednisone yesterday, blood sugars elevated to the 500s  c/w sliding scale; changed to moderate sliding  Lispro 16 units given  Lantus 15 units given this evening  Will do 15 units lantus in AM + 8 Units Admelog before meals  Adjust insulin as indicated  FS NICOLE Forbes consulted Dr. Almonte; appreciate recs

## 2022-10-25 NOTE — PROGRESS NOTE ADULT - PROBLEM SELECTOR PLAN 4
COIVD positive over a weeks ago from another hospital admission  not in resp distress, saturating well on RA  DC Isolation plan as above

## 2022-10-25 NOTE — PROGRESS NOTE ADULT - ATTENDING COMMENTS
knee pain improved after starting steroids. will c.w prednisone. Endo consult as blood glucose uncontrolled post starting steroids.  will d/w family and PCP tomorrow regarding plavix, c/w Eliquis for now

## 2022-10-25 NOTE — PROGRESS NOTE ADULT - ASSESSMENT
87M, coming form home, declining in ambulatory status, with a PMHx of CAD, s/p stents, PAD on Plavix and Eliquis, DM, BPH, HTN BIBEMS for evaluation s/p fall and knee pain. Patient admitted s/p fall in the setting of weakness and ambulatory decline after recent hospitalization.        87M, coming form home, declining in ambulatory status, with a PMHx of CAD, s/p stents, PAD on Plavix and Eliquis, DM, BPH, HTN BIBEMS for evaluation s/p fall and knee pain. Patient admitted s/p fall in the setting of weakness and ambulatory decline after recent hospitalization found to have acute gout flare.

## 2022-10-25 NOTE — PROGRESS NOTE ADULT - PROBLEM SELECTOR PLAN 6
h/o DM on metformin per sure script   will hold oral dm meds  f/u A1c  c/w sliding scale  Adjust insulin as indicated  FS ACHS takes plavix and Eliquis as per sure geoffrey script   c/w home meds

## 2022-10-25 NOTE — CONSULT NOTE ADULT - SUBJECTIVE AND OBJECTIVE BOX
ENDOCRINE INITIAL CONSULT NOTE:    HPI:  87 Male with a PMHx of CAD, s/p stents, PAD, T2DM, BPH, HTN BIBEMS for evaluation s/p fall and knee pain. Admitted for fall and acute gout of Knee. Patient received steroids for gouty attack and developed steroid induced hyperglycemia in setting of type 2 diabetes. Endocrinology is consulted for glycemic management    Patient seen at the bedside, sleeping, not interacting much. Only remembers that he has diabetes for 15 years. Most of the hx taken from the chart review. As per med rec patient was on metformin 500 mg daily and glimepiride 4 mg daily. No insulin prescriptions are seen. Unable to assess further hx.      Review of systems:  Unable to assess as patient is poor historian    PAST MEDICAL & SURGICAL HISTORY:  HTN (hypertension)  HLD (hyperlipidemia)  BPH (benign prostatic hypertrophy)  DM(diabetes mellitus)  PAD (peripheral artery disease) s/p stent placement  CKD (chronic kidney disease)  S/P tonsillectomy  S/P peripheral artery angioplasty with stent placement    FAMILY HISTORY:  Family history of lung cancer (Sibling)    Social History:  Unable to assess as patient is poor historian    MEDICATIONS  (STANDING):  acetaminophen     Tablet .. 1000 milliGRAM(s) Oral every 8 hours  apixaban 5 milliGRAM(s) Oral every 12 hours  atorvastatin 40 milliGRAM(s) Oral at bedtime  colchicine 0.6 milliGRAM(s) Oral daily  dextrose 5%. 1000 milliLiter(s) (50 mL/Hr) IV Continuous <Continuous>  dextrose 5%. 1000 milliLiter(s) (100 mL/Hr) IV Continuous <Continuous>  dextrose 50% Injectable 25 Gram(s) IV Push once  dextrose 50% Injectable 12.5 Gram(s) IV Push once  dextrose 50% Injectable 25 Gram(s) IV Push once  finasteride 5 milliGRAM(s) Oral daily  glucagon  Injectable 1 milliGRAM(s) IntraMuscular once  insulin glargine Injectable (LANTUS) 15 Unit(s) SubCutaneous once  insulin lispro (ADMELOG) corrective regimen sliding scale   SubCutaneous three times a day before meals  insulin lispro Injectable (ADMELOG) 8 Unit(s) SubCutaneous three times a day before meals  lidocaine   4% Patch 1 Patch Transdermal daily  losartan 75 milliGRAM(s) Oral daily  metoprolol succinate ER 25 milliGRAM(s) Oral daily  polyethylene glycol 3350 17 Gram(s) Oral daily  predniSONE   Tablet 20 milliGRAM(s) Oral daily  senna 2 Tablet(s) Oral at bedtime  tamsulosin 0.4 milliGRAM(s) Oral at bedtime    MEDICATIONS  (PRN):  dextrose Oral Gel 15 Gram(s) Oral once PRN Blood Glucose LESS THAN 70 milliGRAM(s)/deciliter    Physical Examination  Vital Signs Last 24 Hrs  T(C): 36.8 (25 Oct 2022 13:30), Max: 36.8 (25 Oct 2022 13:30)  T(F): 98.3 (25 Oct 2022 13:30), Max: 98.3 (25 Oct 2022 13:30)  HR: 68 (25 Oct 2022 13:30) (60 - 89)  BP: 120/54 (25 Oct 2022 13:30) (120/54 - 163/58)  BP(mean): --  RR: 16 (25 Oct 2022 13:30) (16 - 18)  SpO2: 99% (25 Oct 2022 13:30) (97% - 99%)    Constitutional: No acute distress, no ill- appearing, sleeping, arousable to verbal stimuli, not interactive.   Neck:  No thyromegaly, No thyroid nodules palpable, no LAD  Respiratory:  Respiratory effort normal, lungs clear to ausculation, without rales or rhonchi  Cardiovascular:  Regular heart rate, normal S1 and S2 sounds, without murmur, rub or gallop.  Gastrointestinal: Soft, non tender without hepatosplenomegaly and masses, Mild abdominal obesity  Extremities: no cyanosis, clubbing or edema, positive pedal pulses  Neurological:  Not Oriented to person, place and time    Labs:                      11.1   8.11  )-----------( 366      ( 25 Oct 2022 07:42 )             34.5   10-25  141  |  107  |  23<H>  ----------------------------<  279<H>  4.4   |  24  |  1.19  Ca    9.7      25 Oct 2022 07:42  Phos  3.1     10-24  Mg     1.8     10-24    CAPILLARY BLOOD GLUCOSE  POCT Blood Glucose.: 552 mg/dL (25 Oct 2022 16:36)  POCT Blood Glucose.: 549 mg/dL (25 Oct 2022 13:29)  POCT Blood Glucose.: 500 mg/dL (25 Oct 2022 12:03)  POCT Blood Glucose.: 525 mg/dL (25 Oct 2022 12:02)  POCT Blood Glucose.: 287 mg/dL (25 Oct 2022 08:03)  POCT Blood Glucose.: 309 mg/dL (24 Oct 2022 22:14)  POCT Blood Glucose.: 278 mg/dL (24 Oct 2022 20:54)  POCT Blood Glucose.: 232 mg/dL (24 Oct 2022 17:05)    Assessment and Plan:  87 Male with a PMHx of CAD, s/p stents, PAD, T2DM, BPH, HTN BIBEMS for evaluation s/p fall and knee pain. Admitted for fall and acute gout of Knee. Patient received steroids for gouty attack and developed steroid induced hyperglycemia in setting of type 2 diabetes. Endocrinology is consulted for glycemic management    1) Type 2 diabetes with hyperglycemia  2) Steroid induced hyperglycemia    HBA1C is 7.6 however may be a false value in setting of anemia  Patient has received Prednisone 20 mg last night and 20 mg today morning that may have led to hyperglycemia to 500s.   As per team, patient does not have a good appetite and not finishing all his meals  Renal function is intact   Adjust the insulin as below    Inpatient Recommendations:  Basal Insulin:   Start Glargine ( Lantus) 15 units once in the morning. Give one dose now due to severe hyperglycemia    Nutritional Insulin:  Agree with  Lispro (Admelog) 8 units with meals, but if BS drops in the evening to >200 points then decrease the lispro to 4 units with meals, Hold if NPO or eating <50% of meals    Correctional Insulin:  Agree with Moderate Lispro ( Admelog) correctional scale with meals and bedtime     Oral Diabetes Medications:  None in the hospital    Check the POC glucose with meals and bedtime    Discussed endocrine plan of care with primary team     Daisy Almonte MD   Endocrinology, Diabetes and Metabolism   Available on MS. teams

## 2022-10-25 NOTE — PROGRESS NOTE ADULT - PROBLEM SELECTOR PROBLEM 6
Returned Hannah's call and informed her that none of those medications are comparable with the Cialis post DVP.  I explained that the Cialis was to help increase blood flow to the surgical area and aid with healing and nerve regeneration to help with post DVP ED.  Hannah verbalized understanding and will go to the Cialis web site for the recommend coupon.   DM (diabetes mellitus) CAD (coronary artery disease)

## 2022-10-25 NOTE — PROGRESS NOTE ADULT - SUBJECTIVE AND OBJECTIVE BOX
PGY-1 Progress Note discussed with attending    PAGER #: [[phone]] TILL 5:00 PM  PLEASE CONTACT ON CALL TEAM:  - On Call Team (Please refer to Remberto) FROM 5:00 PM - 8:30PM  - Nightfloat Team FROM 8:30 -7:30 AM    CHIEF COMPLAINT & BRIEF HOSPITAL COURSE:    INTERVAL HPI/OVERNIGHT EVENTS:   MEDICATIONS  (STANDING):  acetaminophen     Tablet .. 1000 milliGRAM(s) Oral every 8 hours  apixaban 5 milliGRAM(s) Oral every 12 hours  atorvastatin 40 milliGRAM(s) Oral at bedtime  colchicine 0.6 milliGRAM(s) Oral daily  dextrose 5%. 1000 milliLiter(s) (50 mL/Hr) IV Continuous <Continuous>  dextrose 5%. 1000 milliLiter(s) (100 mL/Hr) IV Continuous <Continuous>  dextrose 50% Injectable 25 Gram(s) IV Push once  dextrose 50% Injectable 12.5 Gram(s) IV Push once  dextrose 50% Injectable 25 Gram(s) IV Push once  finasteride 5 milliGRAM(s) Oral daily  glucagon  Injectable 1 milliGRAM(s) IntraMuscular once  insulin lispro (ADMELOG) corrective regimen sliding scale   SubCutaneous three times a day before meals  insulin lispro (ADMELOG) corrective regimen sliding scale   SubCutaneous at bedtime  lidocaine   4% Patch 1 Patch Transdermal daily  losartan 50 milliGRAM(s) Oral daily  metoprolol succinate ER 25 milliGRAM(s) Oral daily  polyethylene glycol 3350 17 Gram(s) Oral daily  predniSONE   Tablet 20 milliGRAM(s) Oral daily  senna 2 Tablet(s) Oral at bedtime  tamsulosin 0.4 milliGRAM(s) Oral at bedtime    MEDICATIONS  (PRN):  dextrose Oral Gel 15 Gram(s) Oral once PRN Blood Glucose LESS THAN 70 milliGRAM(s)/deciliter  oxyCODONE    IR 2.5 milliGRAM(s) Oral every 6 hours PRN Severe Pain (7 - 10)      REVIEW OF SYSTEMS:  CONSTITUTIONAL: No fever, weight loss, or fatigue  RESPIRATORY: No cough, wheezing, chills or hemoptysis; No shortness of breath  CARDIOVASCULAR: No chest pain, palpitations, dizziness, or leg swelling  GASTROINTESTINAL: No abdominal pain. No nausea, vomiting, or hematemesis; No diarrhea or constipation. No melena or hematochezia.  GENITOURINARY: No dysuria or hematuria, urinary frequency  NEUROLOGICAL: No headaches, memory loss, loss of strength, numbness, or tremors  SKIN: No itching, burning, rashes, or lesions     Vital Signs Last 24 Hrs  T(C): 36.3 (25 Oct 2022 05:46), Max: 36.4 (24 Oct 2022 20:11)  T(F): 97.3 (25 Oct 2022 05:46), Max: 97.6 (24 Oct 2022 20:11)  HR: 60 (25 Oct 2022 05:46) (60 - 89)  BP: 160/60 (25 Oct 2022 05:46) (152/62 - 163/58)  BP(mean): --  RR: 18 (25 Oct 2022 05:46) (17 - 18)  SpO2: 97% (25 Oct 2022 05:46) (97% - 97%)    Parameters below as of 25 Oct 2022 05:46  Patient On (Oxygen Delivery Method): room air        PHYSICAL EXAMINATION:  GENERAL: NAD, well built  HEAD:  Atraumatic, Normocephalic  EYES:  conjunctiva and sclera clear  NECK: Supple, No JVD, Normal thyroid  CHEST/LUNG: Clear to auscultation. Clear to percussion bilaterally; No rales, rhonchi, wheezing, or rubs  HEART: Regular rate and rhythm; No murmurs, rubs, or gallops  ABDOMEN: Soft, Nontender, Nondistended; Bowel sounds present  NERVOUS SYSTEM:  Alert & Oriented X3,    EXTREMITIES:  2+ Peripheral Pulses, No clubbing, cyanosis, or edema  SKIN: warm dry                          11.1   8.11  )-----------( 366      ( 25 Oct 2022 07:42 )             34.5     10-25    141  |  107  |  23<H>  ----------------------------<  279<H>  4.4   |  24  |  1.19    Ca    9.7      25 Oct 2022 07:42  Phos  3.1     10-24  Mg     1.8     10-24                CAPILLARY BLOOD GLUCOSE      RADIOLOGY & ADDITIONAL TESTS:                   PGY-1 Progress Note discussed with attending    PAGER #: [[phone]] TILL 5:00 PM  PLEASE CONTACT ON CALL TEAM:  - On Call Team (Please refer to Remberto) FROM 5:00 PM - 8:30PM  - Nightfloat Team FROM 8:30 -7:30 AM      INTERVAL HPI/OVERNIGHT EVENTS: No acute overnight events  MEDICATIONS  (STANDING):  acetaminophen     Tablet .. 1000 milliGRAM(s) Oral every 8 hours  apixaban 5 milliGRAM(s) Oral every 12 hours  atorvastatin 40 milliGRAM(s) Oral at bedtime  colchicine 0.6 milliGRAM(s) Oral daily  dextrose 5%. 1000 milliLiter(s) (50 mL/Hr) IV Continuous <Continuous>  dextrose 5%. 1000 milliLiter(s) (100 mL/Hr) IV Continuous <Continuous>  dextrose 50% Injectable 25 Gram(s) IV Push once  dextrose 50% Injectable 12.5 Gram(s) IV Push once  dextrose 50% Injectable 25 Gram(s) IV Push once  finasteride 5 milliGRAM(s) Oral daily  glucagon  Injectable 1 milliGRAM(s) IntraMuscular once  insulin lispro (ADMELOG) corrective regimen sliding scale   SubCutaneous three times a day before meals  insulin lispro (ADMELOG) corrective regimen sliding scale   SubCutaneous at bedtime  lidocaine   4% Patch 1 Patch Transdermal daily  losartan 50 milliGRAM(s) Oral daily  metoprolol succinate ER 25 milliGRAM(s) Oral daily  polyethylene glycol 3350 17 Gram(s) Oral daily  predniSONE   Tablet 20 milliGRAM(s) Oral daily  senna 2 Tablet(s) Oral at bedtime  tamsulosin 0.4 milliGRAM(s) Oral at bedtime    MEDICATIONS  (PRN):  dextrose Oral Gel 15 Gram(s) Oral once PRN Blood Glucose LESS THAN 70 milliGRAM(s)/deciliter  oxyCODONE    IR 2.5 milliGRAM(s) Oral every 6 hours PRN Severe Pain (7 - 10)      REVIEW OF SYSTEMS:  CONSTITUTIONAL: No fever, weight loss, or fatigue  RESPIRATORY: No cough,No shortness of breath  CARDIOVASCULAR: No chest pain,   GASTROINTESTINAL: No abdominal pain. No nausea, vomiting, diarrhea or constipation.  GENITOURINARY: No dysuria or hematuria, urinary frequency  NEUROLOGICAL: No headaches, memory loss, loss of strength, numbness, or tremors  SKIN: No itching, burning, rashes, or lesions     Vital Signs Last 24 Hrs  T(C): 36.3 (25 Oct 2022 05:46), Max: 36.4 (24 Oct 2022 20:11)  T(F): 97.3 (25 Oct 2022 05:46), Max: 97.6 (24 Oct 2022 20:11)  HR: 60 (25 Oct 2022 05:46) (60 - 89)  BP: 160/60 (25 Oct 2022 05:46) (152/62 - 163/58)  BP(mean): --  RR: 18 (25 Oct 2022 05:46) (17 - 18)  SpO2: 97% (25 Oct 2022 05:46) (97% - 97%)    Parameters below as of 25 Oct 2022 05:46  Patient On (Oxygen Delivery Method): room air        PHYSICAL EXAMINATION:  GENERAL: NAD, elderly  HEAD:  Atraumatic, Normocephalic  EYES:  conjunctiva and sclera clear  CHEST/LUNG: Clear to auscultation. No rales, rhonchi, wheezing, or rubs  HEART: Regular rate and rhythm; No murmurs, rubs, or gallops  ABDOMEN: Soft, Nontender, Nondistended; Bowel sounds present  NERVOUS SYSTEM:  Alert & Oriented X3,    EXTREMITIES:  2+ Peripheral Pulses, No clubbing, cyanosis, or edema  SKIN: warm dry, bandage in place on Left Knee, no edema or warmth noted                          11.1   8.11  )-----------( 366      ( 25 Oct 2022 07:42 )             34.5     10-25    141  |  107  |  23<H>  ----------------------------<  279<H>  4.4   |  24  |  1.19    Ca    9.7      25 Oct 2022 07:42  Phos  3.1     10-24  Mg     1.8     10-24                CAPILLARY BLOOD GLUCOSE      RADIOLOGY & ADDITIONAL TESTS:

## 2022-10-25 NOTE — PROGRESS NOTE ADULT - PROBLEM SELECTOR PLAN 1
Pt with pain on left knee which is somatic in nature due to left knee joint effusion and acute gout. Per Ortho recommendations, will treat conservatively.  S/p left knee arthrocentesis on 10/21. High risk medications reviewed. Avoid polypharmacy. Avoid IV opioids. Avoid NSAIDs and benzodiazepines. Non-pharmacological sleep aides initiated. Non-opioid medications and non-pharmacological pain management measures initiated.  Opioid pain recommendations   - Discontinued oxycodone 2.5 mg PO q 4 hours PRN severe pain d/t somnolence. Monitor for sedation/ respiratory depression.   Non-opioid pain recommendations   - Continue colchicine 0.6mg PO daily per primary team.   - Continue Acetaminophen 1 gram PO q 8 hours x 3 days (10/24-27). Monitor LFTs  - Continue Lidoderm 4% patch daily.   Bowel Regimen  - Continue Miralax 17G PO daily  - Continue Senna 2 tablets at bedtime for constipation  Mild pain   - Non-pharmacological pain treatment recommendations  - Warm/ Cool packs PRN   - Repositioning, imagery, relaxation, distraction.  - Physical therapy OOB if no contraindications   Recommendations discussed with primary team and RN.

## 2022-10-25 NOTE — CHART NOTE - NSCHARTNOTEFT_GEN_A_CORE
Spoke with Son and Daughter on phone [phone], updated them on improvement of gout pain. They are frustrated based on hospital visitation policy for COVID isolation. According to them, patient was hospitalized at [facility] 2 weeks prior to presenting here earlier this month. And has been symptom free since this hospitalization. Explained to the patient that patient can be off isolation if they bring proof of patient testing positive at prior hospitalization. Also reiterated to family that patient is being transferred to [facility] soon and they will be able to visit him there.

## 2022-10-25 NOTE — PROGRESS NOTE ADULT - PROBLEM SELECTOR PLAN 2
s/p fall associated with weakness, no LOC, headache, dizziness, chest pain, SOB.  likely mechanical, could be due to deconditioning, weakness from recent hospital DC   CT head Non-contrast :  no acute hge or stroke,only significant for sinusitis  XR no fracture or fluid collection   Fall Precaution   PT consulted  Nutrition consulted  CM and SW consulted  Ortho consulted: conservative management s/p fall associated with weakness, no LOC, headache, dizziness, chest pain, SOB.  likely mechanical, could be due to deconditioning, weakness from recent hospital DC   CT head Non-contrast :  no acute hge or stroke,only significant for sinusitis  XR no fracture or fluid collection   Fall Precaution   PT consulted: YESSICA  Nutrition consulted  CM and SW consulted  Ortho consulted: conservative management

## 2022-10-25 NOTE — PROGRESS NOTE ADULT - PROBLEM SELECTOR PLAN 1
s/p mechanical fall w/ left knee injury 2 days ago  likely in the setting of deconditioning d/t recent hospital admission for COVID infection, not DC with ambulatory devices or assistance, based on synovial fluid results likely Gout flare  noted to have progressively worsening ambulation   CTH no acute pathology, only significant for sinusitis  Afebrile with no WBC  -spiked fever, arthrocentesis done 10/21  -synovial fluid positive for monosodium urate crystal, negative cultures  -colchicine  -start prednisone 20mg PO s/p mechanical fall w/ left knee injury 2 days ago  likely in the setting of deconditioning d/t recent hospital admission for COVID infection, not DC with ambulatory devices or assistance, based on synovial fluid results likely Gout flare  Afebrile with no WBC  -spiked fever, arthrocentesis done 10/21  -synovial fluid positive for monosodium urate crystal, negative cultures  -colchicine  -continue prednisone 20mg PO  -Pain better controlled today

## 2022-10-25 NOTE — CONSULT NOTE ADULT - TIME BILLING
reviewing records/charts/labs, interview and physical examination, coordination of care with primary team, management of severe hyperglycemia and documenting clinical information.

## 2022-10-25 NOTE — DIETITIAN INITIAL EVALUATION ADULT - PERTINENT LABORATORY DATA
10-25    141  |  107  |  23<H>  ----------------------------<  279<H>  4.4   |  24  |  1.19    Ca    9.7      25 Oct 2022 07:42  Phos  3.1     10-24  Mg     1.8     10-24    POCT Blood Glucose.: 549 mg/dL (10-25-22 @ 13:29)  A1C with Estimated Average Glucose Result: 7.6 % (10-25-22 @ 07:42)  A1C with Estimated Average Glucose Result: 6.8 % (06-28-22 @ 05:41)

## 2022-10-25 NOTE — PROGRESS NOTE ADULT - PROBLEM SELECTOR PLAN 5
takes plavix and Eliquis as per [facility] script   c/w home meds COIVD positive over a weeks ago from another hospital admission  not in resp distress, saturating well on RA  DC Isolation

## 2022-10-25 NOTE — DIETITIAN INITIAL EVALUATION ADULT - PERTINENT MEDS FT
MEDICATIONS  (STANDING):  acetaminophen     Tablet .. 1000 milliGRAM(s) Oral every 8 hours  apixaban 5 milliGRAM(s) Oral every 12 hours  atorvastatin 40 milliGRAM(s) Oral at bedtime  colchicine 0.6 milliGRAM(s) Oral daily  dextrose 5%. 1000 milliLiter(s) (50 mL/Hr) IV Continuous <Continuous>  dextrose 5%. 1000 milliLiter(s) (100 mL/Hr) IV Continuous <Continuous>  dextrose 50% Injectable 25 Gram(s) IV Push once  dextrose 50% Injectable 12.5 Gram(s) IV Push once  dextrose 50% Injectable 25 Gram(s) IV Push once  finasteride 5 milliGRAM(s) Oral daily  glucagon  Injectable 1 milliGRAM(s) IntraMuscular once  insulin glargine Injectable (LANTUS) 5 Unit(s) SubCutaneous at bedtime  insulin lispro (ADMELOG) corrective regimen sliding scale   SubCutaneous three times a day before meals  insulin lispro Injectable (ADMELOG) 8 Unit(s) SubCutaneous three times a day before meals  lidocaine   4% Patch 1 Patch Transdermal daily  losartan 75 milliGRAM(s) Oral daily  metoprolol succinate ER 25 milliGRAM(s) Oral daily  polyethylene glycol 3350 17 Gram(s) Oral daily  predniSONE   Tablet 20 milliGRAM(s) Oral daily  senna 2 Tablet(s) Oral at bedtime  tamsulosin 0.4 milliGRAM(s) Oral at bedtime    MEDICATIONS  (PRN):  dextrose Oral Gel 15 Gram(s) Oral once PRN Blood Glucose LESS THAN 70 milliGRAM(s)/deciliter

## 2022-10-26 ENCOUNTER — TRANSCRIPTION ENCOUNTER (OUTPATIENT)
Age: 87
End: 2022-10-26

## 2022-10-26 LAB
ALBUMIN SERPL ELPH-MCNC: 2.1 G/DL — LOW (ref 3.5–5)
ALP SERPL-CCNC: 58 U/L — SIGNIFICANT CHANGE UP (ref 40–120)
ALT FLD-CCNC: 28 U/L DA — SIGNIFICANT CHANGE UP (ref 10–60)
ANION GAP SERPL CALC-SCNC: 7 MMOL/L — SIGNIFICANT CHANGE UP (ref 5–17)
AST SERPL-CCNC: 27 U/L — SIGNIFICANT CHANGE UP (ref 10–40)
BILIRUB SERPL-MCNC: 0.3 MG/DL — SIGNIFICANT CHANGE UP (ref 0.2–1.2)
BUN SERPL-MCNC: 33 MG/DL — HIGH (ref 7–18)
CALCIUM SERPL-MCNC: 9.4 MG/DL — SIGNIFICANT CHANGE UP (ref 8.4–10.5)
CHLORIDE SERPL-SCNC: 110 MMOL/L — HIGH (ref 96–108)
CO2 SERPL-SCNC: 25 MMOL/L — SIGNIFICANT CHANGE UP (ref 22–31)
CREAT SERPL-MCNC: 1.18 MG/DL — SIGNIFICANT CHANGE UP (ref 0.5–1.3)
EGFR: 60 ML/MIN/1.73M2 — SIGNIFICANT CHANGE UP
GLUCOSE BLDC GLUCOMTR-MCNC: 159 MG/DL — HIGH (ref 70–99)
GLUCOSE BLDC GLUCOMTR-MCNC: 231 MG/DL — HIGH (ref 70–99)
GLUCOSE BLDC GLUCOMTR-MCNC: 231 MG/DL — HIGH (ref 70–99)
GLUCOSE BLDC GLUCOMTR-MCNC: 259 MG/DL — HIGH (ref 70–99)
GLUCOSE SERPL-MCNC: 187 MG/DL — HIGH (ref 70–99)
HCT VFR BLD CALC: 33.5 % — LOW (ref 39–50)
HGB BLD-MCNC: 10.6 G/DL — LOW (ref 13–17)
MAGNESIUM SERPL-MCNC: 2.2 MG/DL — SIGNIFICANT CHANGE UP (ref 1.6–2.6)
MCHC RBC-ENTMCNC: 26.9 PG — LOW (ref 27–34)
MCHC RBC-ENTMCNC: 31.6 GM/DL — LOW (ref 32–36)
MCV RBC AUTO: 85 FL — SIGNIFICANT CHANGE UP (ref 80–100)
NRBC # BLD: 0 /100 WBCS — SIGNIFICANT CHANGE UP (ref 0–0)
PHOSPHATE SERPL-MCNC: 3.5 MG/DL — SIGNIFICANT CHANGE UP (ref 2.5–4.5)
PLATELET # BLD AUTO: 333 K/UL — SIGNIFICANT CHANGE UP (ref 150–400)
POTASSIUM SERPL-MCNC: 4 MMOL/L — SIGNIFICANT CHANGE UP (ref 3.5–5.3)
POTASSIUM SERPL-SCNC: 4 MMOL/L — SIGNIFICANT CHANGE UP (ref 3.5–5.3)
PROT SERPL-MCNC: 7 G/DL — SIGNIFICANT CHANGE UP (ref 6–8.3)
RBC # BLD: 3.94 M/UL — LOW (ref 4.2–5.8)
RBC # FLD: 14.1 % — SIGNIFICANT CHANGE UP (ref 10.3–14.5)
SODIUM SERPL-SCNC: 142 MMOL/L — SIGNIFICANT CHANGE UP (ref 135–145)
WBC # BLD: 8.19 K/UL — SIGNIFICANT CHANGE UP (ref 3.8–10.5)
WBC # FLD AUTO: 8.19 K/UL — SIGNIFICANT CHANGE UP (ref 3.8–10.5)

## 2022-10-26 PROCEDURE — 99232 SBSQ HOSP IP/OBS MODERATE 35: CPT | Mod: GC

## 2022-10-26 RX ORDER — INSULIN LISPRO 100/ML
10 VIAL (ML) SUBCUTANEOUS
Refills: 0 | Status: DISCONTINUED | OUTPATIENT
Start: 2022-10-26 | End: 2022-10-28

## 2022-10-26 RX ORDER — CLOPIDOGREL BISULFATE 75 MG/1
75 TABLET, FILM COATED ORAL DAILY
Refills: 0 | Status: DISCONTINUED | OUTPATIENT
Start: 2022-10-26 | End: 2022-10-31

## 2022-10-26 RX ADMIN — Medication 4: at 11:34

## 2022-10-26 RX ADMIN — Medication 25 MILLIGRAM(S): at 06:02

## 2022-10-26 RX ADMIN — Medication 4: at 07:51

## 2022-10-26 RX ADMIN — POLYETHYLENE GLYCOL 3350 17 GRAM(S): 17 POWDER, FOR SOLUTION ORAL at 11:32

## 2022-10-26 RX ADMIN — FINASTERIDE 5 MILLIGRAM(S): 5 TABLET, FILM COATED ORAL at 11:25

## 2022-10-26 RX ADMIN — Medication 8 UNIT(S): at 11:33

## 2022-10-26 RX ADMIN — Medication 20 MILLIGRAM(S): at 06:04

## 2022-10-26 RX ADMIN — LIDOCAINE 1 PATCH: 4 CREAM TOPICAL at 21:42

## 2022-10-26 RX ADMIN — Medication 1000 MILLIGRAM(S): at 21:43

## 2022-10-26 RX ADMIN — ATORVASTATIN CALCIUM 40 MILLIGRAM(S): 80 TABLET, FILM COATED ORAL at 21:43

## 2022-10-26 RX ADMIN — Medication 6: at 16:48

## 2022-10-26 RX ADMIN — LIDOCAINE 1 PATCH: 4 CREAM TOPICAL at 07:37

## 2022-10-26 RX ADMIN — Medication 1000 MILLIGRAM(S): at 13:58

## 2022-10-26 RX ADMIN — TAMSULOSIN HYDROCHLORIDE 0.4 MILLIGRAM(S): 0.4 CAPSULE ORAL at 21:43

## 2022-10-26 RX ADMIN — Medication 1000 MILLIGRAM(S): at 14:35

## 2022-10-26 RX ADMIN — Medication 1000 MILLIGRAM(S): at 22:43

## 2022-10-26 RX ADMIN — Medication 1000 MILLIGRAM(S): at 06:04

## 2022-10-26 RX ADMIN — LOSARTAN POTASSIUM 75 MILLIGRAM(S): 100 TABLET, FILM COATED ORAL at 06:03

## 2022-10-26 RX ADMIN — SENNA PLUS 2 TABLET(S): 8.6 TABLET ORAL at 21:43

## 2022-10-26 RX ADMIN — Medication 8 UNIT(S): at 07:51

## 2022-10-26 RX ADMIN — CLOPIDOGREL BISULFATE 75 MILLIGRAM(S): 75 TABLET, FILM COATED ORAL at 16:53

## 2022-10-26 RX ADMIN — LIDOCAINE 1 PATCH: 4 CREAM TOPICAL at 11:00

## 2022-10-26 RX ADMIN — INSULIN GLARGINE 15 UNIT(S): 100 INJECTION, SOLUTION SUBCUTANEOUS at 07:52

## 2022-10-26 RX ADMIN — APIXABAN 5 MILLIGRAM(S): 2.5 TABLET, FILM COATED ORAL at 06:03

## 2022-10-26 RX ADMIN — Medication 0.6 MILLIGRAM(S): at 11:25

## 2022-10-26 RX ADMIN — Medication 10 UNIT(S): at 16:49

## 2022-10-26 RX ADMIN — APIXABAN 5 MILLIGRAM(S): 2.5 TABLET, FILM COATED ORAL at 17:00

## 2022-10-26 NOTE — PROGRESS NOTE ADULT - ATTENDING COMMENTS
pain controlled, will do prednisone for 2 more days. F/u endo recs , d/c contact isolation as old covid test received from outside hospital.   plavix added back after discussion with out-patient provider given recent PCI  plan for SNF  C/w rest of current medical mx

## 2022-10-26 NOTE — CHART NOTE - NSCHARTNOTEFT_GEN_A_CORE
Patient' daughter faxed copy of Positive COVID PCR from [facility] on October 11, 2022. Contacted Infection Control to confirm, patient asymptomatic, to come off isolation per COVID Protocol.

## 2022-10-26 NOTE — PROGRESS NOTE ADULT - PROBLEM SELECTOR PLAN 3
A1c 7.6%  Patient started on prednisone yesterday, blood sugars elevated to the 500s  c/w sliding scale; changed to moderate sliding  Adjust insulin as indicated  FS ACHS    Andrei consulted Dr. Almonte; appreciate recs  15 units lantus in AM + 10 Units Admelog before meals

## 2022-10-26 NOTE — PROGRESS NOTE ADULT - PROBLEM SELECTOR PLAN 1
s/p mechanical fall w/ left knee injury 2 days ago  likely in the setting of deconditioning d/t recent hospital admission for COVID infection, not DC with ambulatory devices or assistance, based on synovial fluid results likely Gout flare  Afebrile with no WBC  -spiked fever, arthrocentesis done 10/21  -synovial fluid positive for monosodium urate crystal, negative cultures  -colchicine  -continue prednisone 20mg PO  -Pain better controlled today

## 2022-10-26 NOTE — PROGRESS NOTE ADULT - ASSESSMENT
87M, coming form home, declining in ambulatory status, with a PMHx of CAD, s/p stents, PAD on Plavix and Eliquis, DM, BPH, HTN BIBEMS for evaluation s/p fall and knee pain. Patient admitted s/p fall in the setting of weakness and ambulatory decline after recent hospitalization found to have acute gout flare.

## 2022-10-26 NOTE — PROGRESS NOTE ADULT - SUBJECTIVE AND OBJECTIVE BOX
PGY-1 Progress Note discussed with attending    PAGER #: [[phone]] TILL 5:00 PM  PLEASE CONTACT ON CALL TEAM:  - On Call Team (Please refer to Remberto) FROM 5:00 PM - 8:30PM  - Nightfloat Team FROM 8:30 -7:30 AM    CHIEF COMPLAINT & BRIEF HOSPITAL COURSE:    INTERVAL HPI/OVERNIGHT EVENTS:   MEDICATIONS  (STANDING):  acetaminophen     Tablet .. 1000 milliGRAM(s) Oral every 8 hours  apixaban 5 milliGRAM(s) Oral every 12 hours  atorvastatin 40 milliGRAM(s) Oral at bedtime  colchicine 0.6 milliGRAM(s) Oral daily  dextrose 5%. 1000 milliLiter(s) (50 mL/Hr) IV Continuous <Continuous>  dextrose 5%. 1000 milliLiter(s) (100 mL/Hr) IV Continuous <Continuous>  dextrose 50% Injectable 25 Gram(s) IV Push once  dextrose 50% Injectable 12.5 Gram(s) IV Push once  dextrose 50% Injectable 25 Gram(s) IV Push once  finasteride 5 milliGRAM(s) Oral daily  glucagon  Injectable 1 milliGRAM(s) IntraMuscular once  insulin glargine Injectable (LANTUS) 15 Unit(s) SubCutaneous every morning  insulin lispro (ADMELOG) corrective regimen sliding scale   SubCutaneous at bedtime  insulin lispro (ADMELOG) corrective regimen sliding scale   SubCutaneous three times a day before meals  insulin lispro Injectable (ADMELOG) 10 Unit(s) SubCutaneous three times a day before meals  lidocaine   4% Patch 1 Patch Transdermal daily  losartan 75 milliGRAM(s) Oral daily  metoprolol succinate ER 25 milliGRAM(s) Oral daily  polyethylene glycol 3350 17 Gram(s) Oral daily  predniSONE   Tablet 20 milliGRAM(s) Oral daily  senna 2 Tablet(s) Oral at bedtime  tamsulosin 0.4 milliGRAM(s) Oral at bedtime    MEDICATIONS  (PRN):  dextrose Oral Gel 15 Gram(s) Oral once PRN Blood Glucose LESS THAN 70 milliGRAM(s)/deciliter      REVIEW OF SYSTEMS:  CONSTITUTIONAL: No fever, weight loss, or fatigue  RESPIRATORY:  No shortness of breath  CARDIOVASCULAR: No chest pain, palpitations, dizziness, or leg swelling  GASTROINTESTINAL: No abdominal pain. No nausea, vomiting, or diarrhea.  GENITOURINARY: No dysuria or hematuria, urinary frequency  NEUROLOGICAL: No headaches, memory loss, loss of strength, numbness, or tremors  SKIN: No itching, burning, rashes, or lesions     Vital Signs Last 24 Hrs  T(C): 36.9 (26 Oct 2022 14:09), Max: 36.9 (26 Oct 2022 14:09)  T(F): 98.4 (26 Oct 2022 14:09), Max: 98.4 (26 Oct 2022 14:09)  HR: 34 (26 Oct 2022 14:09) (34 - 60)  BP: 140/58 (26 Oct 2022 14:09) (130/53 - 146/59)  BP(mean): --  RR: 18 (26 Oct 2022 14:09) (16 - 18)  SpO2: 99% (26 Oct 2022 14:09) (95% - 99%)    Parameters below as of 26 Oct 2022 14:09  Patient On (Oxygen Delivery Method): room air        PHYSICAL EXAMINATION:  GENERAL: NAD, elderly, well nourished  HEAD:  Atraumatic, Normocephalic  EYES:  conjunctiva and sclera clear  CHEST/LUNG: Clear to auscultation. No rales, rhonchi, wheezing, or rubs  HEART: Regular rate and rhythm; No murmurs, rubs, or gallops  ABDOMEN: Soft, Nontender, Nondistended; Bowel sounds present  NERVOUS SYSTEM:  Alert & Oriented X3,    EXTREMITIES:  2+ Peripheral Pulses, No clubbing, cyanosis, or edema  SKIN: warm dry, bilateral knees are nonerythematous, nontender, and nonswollen                          10.6   8.19  )-----------( 333      ( 26 Oct 2022 07:40 )             33.5     10-26    142  |  110<H>  |  33<H>  ----------------------------<  187<H>  4.0   |  25  |  1.18    Ca    9.4      26 Oct 2022 07:40  Phos  3.5     10-26  Mg     2.2     10-26    TPro  7.0  /  Alb  2.1<L>  /  TBili  0.3  /  DBili  x   /  AST  27  /  ALT  28  /  AlkPhos  58  10-26    LIVER FUNCTIONS - ( 26 Oct 2022 07:40 )  Alb: 2.1 g/dL / Pro: 7.0 g/dL / ALK PHOS: 58 U/L / ALT: 28 U/L DA / AST: 27 U/L / GGT: x                   CAPILLARY BLOOD GLUCOSE      RADIOLOGY & ADDITIONAL TESTS:                   PGY-1 Progress Note discussed with attending    PAGER #: [[phone]] TILL 5:00 PM  PLEASE CONTACT ON CALL TEAM:  - On Call Team (Please refer to Remberto) FROM 5:00 PM - 8:30PM  - Nightfloat Team FROM 8:30 -7:30 AM      INTERVAL HPI/OVERNIGHT EVENTS: NAEON    MEDICATIONS  (STANDING):  acetaminophen     Tablet .. 1000 milliGRAM(s) Oral every 8 hours  apixaban 5 milliGRAM(s) Oral every 12 hours  atorvastatin 40 milliGRAM(s) Oral at bedtime  colchicine 0.6 milliGRAM(s) Oral daily  dextrose 5%. 1000 milliLiter(s) (50 mL/Hr) IV Continuous <Continuous>  dextrose 5%. 1000 milliLiter(s) (100 mL/Hr) IV Continuous <Continuous>  dextrose 50% Injectable 25 Gram(s) IV Push once  dextrose 50% Injectable 12.5 Gram(s) IV Push once  dextrose 50% Injectable 25 Gram(s) IV Push once  finasteride 5 milliGRAM(s) Oral daily  glucagon  Injectable 1 milliGRAM(s) IntraMuscular once  insulin glargine Injectable (LANTUS) 15 Unit(s) SubCutaneous every morning  insulin lispro (ADMELOG) corrective regimen sliding scale   SubCutaneous at bedtime  insulin lispro (ADMELOG) corrective regimen sliding scale   SubCutaneous three times a day before meals  insulin lispro Injectable (ADMELOG) 10 Unit(s) SubCutaneous three times a day before meals  lidocaine   4% Patch 1 Patch Transdermal daily  losartan 75 milliGRAM(s) Oral daily  metoprolol succinate ER 25 milliGRAM(s) Oral daily  polyethylene glycol 3350 17 Gram(s) Oral daily  predniSONE   Tablet 20 milliGRAM(s) Oral daily  senna 2 Tablet(s) Oral at bedtime  tamsulosin 0.4 milliGRAM(s) Oral at bedtime    MEDICATIONS  (PRN):  dextrose Oral Gel 15 Gram(s) Oral once PRN Blood Glucose LESS THAN 70 milliGRAM(s)/deciliter      REVIEW OF SYSTEMS:  CONSTITUTIONAL: No fever, weight loss, or fatigue  RESPIRATORY:  No shortness of breath  CARDIOVASCULAR: No chest pain, palpitations, dizziness, or leg swelling  GASTROINTESTINAL: No abdominal pain. No nausea, vomiting, or diarrhea.  GENITOURINARY: No dysuria or hematuria, urinary frequency  NEUROLOGICAL: No headaches, memory loss, loss of strength, numbness, or tremors  SKIN: No itching, burning, rashes, or lesions     Vital Signs Last 24 Hrs  T(C): 36.9 (26 Oct 2022 14:09), Max: 36.9 (26 Oct 2022 14:09)  T(F): 98.4 (26 Oct 2022 14:09), Max: 98.4 (26 Oct 2022 14:09)  HR: 34 (26 Oct 2022 14:09) (34 - 60)  BP: 140/58 (26 Oct 2022 14:09) (130/53 - 146/59)  BP(mean): --  RR: 18 (26 Oct 2022 14:09) (16 - 18)  SpO2: 99% (26 Oct 2022 14:09) (95% - 99%)    Parameters below as of 26 Oct 2022 14:09  Patient On (Oxygen Delivery Method): room air        PHYSICAL EXAMINATION:  GENERAL: NAD, elderly, well nourished  HEAD:  Atraumatic, Normocephalic  EYES:  conjunctiva and sclera clear  CHEST/LUNG: Clear to auscultation. No rales, rhonchi, wheezing, or rubs  HEART: Regular rate and rhythm; No murmurs, rubs, or gallops  ABDOMEN: Soft, Nontender, Nondistended; Bowel sounds present  NERVOUS SYSTEM:  Alert & Oriented X3,    EXTREMITIES:  2+ Peripheral Pulses, No clubbing, cyanosis, or edema  SKIN: warm dry, bilateral knees are nonerythematous, nontender, and nonswollen                          10.6   8.19  )-----------( 333      ( 26 Oct 2022 07:40 )             33.5     10-26    142  |  110<H>  |  33<H>  ----------------------------<  187<H>  4.0   |  25  |  1.18    Ca    9.4      26 Oct 2022 07:40  Phos  3.5     10-26  Mg     2.2     10-26    TPro  7.0  /  Alb  2.1<L>  /  TBili  0.3  /  DBili  x   /  AST  27  /  ALT  28  /  AlkPhos  58  10-26    LIVER FUNCTIONS - ( 26 Oct 2022 07:40 )  Alb: 2.1 g/dL / Pro: 7.0 g/dL / ALK PHOS: 58 U/L / ALT: 28 U/L DA / AST: 27 U/L / GGT: x                   CAPILLARY BLOOD GLUCOSE      RADIOLOGY & ADDITIONAL TESTS:

## 2022-10-26 NOTE — DISCHARGE NOTE PROVIDER - NSDCMRMEDTOKEN_GEN_ALL_CORE_FT
Aspirin Enteric Coated 81 mg oral delayed release tablet: 1 tab(s) orally once a day  ATORVASTATIN 40MG TAB:   clopidogrel 75 mg oral tablet: 1 tab(s) orally once a day  Eliquis 5 mg oral tablet: 1 tab(s) orally 2 times a day  ezetimibe 10 mg oral tablet: 1 tab(s) orally once a day   ezetimibe-simvastatin 10 mg-20 mg oral tablet: 1 tab(s) orally once a day   finasteride 5 mg oral tablet: 1 tab(s) orally once a day  glimepiride 4 mg oral tablet: 1 tab(s) orally once a day  losartan-hydrochlorothiazide 50 mg-12.5 mg oral tablet: 1 tab(s) orally once a day  metFORMIN 500 mg oral tablet: 2 tab(s) orally 2 times a day (with meals)  METOPROL SUC 25MG ER TAB:   Multiple Vitamins oral tablet: 1 tab(s) orally once a day  tamsulosin 0.4 mg oral capsule: 1 cap(s) orally once a day   atorvastatin 40 mg oral tablet: 1 tab(s) orally once a day  clopidogrel 75 mg oral tablet: 1 tab(s) orally once a day  Eliquis 5 mg oral tablet: 1 tab(s) orally 2 times a day  ezetimibe 10 mg oral tablet: 1 tab(s) orally once a day   ezetimibe-simvastatin 10 mg-20 mg oral tablet: 1 tab(s) orally once a day   finasteride 5 mg oral tablet: 1 tab(s) orally once a day  glimepiride 4 mg oral tablet: 1 tab(s) orally once a day  metFORMIN 500 mg oral tablet: 2 tab(s) orally 2 times a day (with meals)  metoprolol succinate 25 mg oral capsule, extended release: 1 cap(s) orally once a day  Multiple Vitamins oral tablet: 1 tab(s) orally once a day   allopurinol 100 mg oral tablet: 1 tab(s) orally once a day   atorvastatin 40 mg oral tablet: 1 tab(s) orally once a day  clopidogrel 75 mg oral tablet: 1 tab(s) orally once a day  colchicine 0.6 mg oral tablet: 1 tab(s) orally once a day   Eliquis 5 mg oral tablet: 1 tab(s) orally 2 times a day  ezetimibe 10 mg oral tablet: 1 tab(s) orally once a day   ezetimibe-simvastatin 10 mg-20 mg oral tablet: 1 tab(s) orally once a day   finasteride 5 mg oral tablet: 1 tab(s) orally once a day  glimepiride 4 mg oral tablet: 1 tab(s) orally once a day  metFORMIN 500 mg oral tablet: 2 tab(s) orally 2 times a day (with meals)  metoprolol succinate 25 mg oral capsule, extended release: 1 cap(s) orally once a day  Multiple Vitamins oral tablet: 1 tab(s) orally once a day   allopurinol 100 mg oral tablet: 1 tab(s) orally once a day   atorvastatin 40 mg oral tablet: 1 tab(s) orally once a day  clopidogrel 75 mg oral tablet: 1 tab(s) orally once a day  Eliquis 5 mg oral tablet: 1 tab(s) orally 2 times a day  ezetimibe 10 mg oral tablet: 1 tab(s) orally once a day   ezetimibe-simvastatin 10 mg-20 mg oral tablet: 1 tab(s) orally once a day   finasteride 5 mg oral tablet: 1 tab(s) orally once a day  glimepiride 4 mg oral tablet: 1 tab(s) orally once a day  metFORMIN 500 mg oral tablet: 2 tab(s) orally 2 times a day (with meals)  metoprolol succinate 25 mg oral capsule, extended release: 1 cap(s) orally once a day  Multiple Vitamins oral tablet: 1 tab(s) orally once a day

## 2022-10-26 NOTE — CHART NOTE - NSCHARTNOTEFT_GEN_A_CORE
Spoke with daughter, updated her on her father's condition and answered any questions she had. Daughter stated that her father had recent stent placements 6 months ago along with a history of stent restenosis, to be on clopidogrel and Eliquis for another 6 months. Follows Dr. Way as PCP and Dr. Melton outpatient for cardiology. Spoke with daughter, updated her on her father's condition and answered any questions she had. Daughter stated that her father had recent stent placements 6 months ago along with a history of stent restenosis, to be on clopidogrel and Eliquis for another 6 months. Follows Dr. Way as PCP and Dr. Melton outpatient for cardiology. Patient to come off isolation pending room availability on the unit.

## 2022-10-26 NOTE — PROGRESS NOTE ADULT - PROBLEM SELECTOR PLAN 6
takes plavix and Eliquis as per sure geoffrey script, patient had 2 stents recently placed July 2022 to be on Plavix and Eliquis for 6 months.    Follows Dr. Melton outpatient for cardiology  c/w [place] [facility]

## 2022-10-26 NOTE — DISCHARGE NOTE PROVIDER - HOSPITAL COURSE
87M, coming form home, declining in ambulatory status, with a PMHx of CAD, s/p stents, PAD on Plavix and Eliquis, DM, BPH, HTN BIBEMS for evaluation s/p fall and knee pain. Patient is limited historian, AOx1-2. Collateral obtained per chart review, called emergency contact listed in the chart Antonio Ruggiero and number is not active. Per son, the patient fell 2 days ago and hurt his L knee. Unknown if head-strike or LOC. Fall unwitnessed. Per the son, patient was hospitalized for 5-d more than 1week ago for COVID, and since then has had progressively worsening ambulation 2/2 knee pain. Per son, patient was discharged back to home with out any ambulatory assistive devices and services. Patient reports knee pain started while in hospital. No reported fever, chills, cp, sob, cough, vomiting, diarrhea, calf pain. No hx of DVT/PE. Pt does not recall if he hit his head or not. WBC trended down . CT head negative, arthrocentesis found to have ms crystals, patient knee pain likely in the setting of gout. Started on steroid taper until 10/28, pain and swelling resolved.    Pt is stable for discharge. Pt has been advised to follow up as outpatient. Case has akosua discussed with the attending. This is just a summary of the case. For further information please refer to pt. chart document. 87M, coming form home, declining in ambulatory status, with a PMHx of CAD, s/p stents, PAD on Plavix and Eliquis, DM, BPH, HTN BIBEMS for evaluation s/p fall and knee pain. Patient is limited historian, AOx1-2. Collateral obtained per chart review, called emergency contact listed in the chart Antonio Donahue is not active. Per son, the patient fell 2 days ago and hurt his L knee. Unknown if head-strike or LOC. Fall unwitnessed. Per the son, patient was hospitalized for 5-d more than 1week ago for COVID, and since then has had progressively worsening ambulation 2/2 knee pain. Per son, patient was discharged back to home with out any ambulatory assistive devices and services. Patient reports knee pain started while in hospital. No reported fever, chills, cp, sob, cough, vomiting, diarrhea, calf pain. No hx of DVT/PE. Pt does not recall if he hit his head or not. WBC trended down . CT head negative, arthrocentesis found to have ms crystals, patient knee pain likely in the setting of gout. Started on steroid taper until 10/28, pain and swelling resolved. Steroids were discontinued on 10/28. Patient will need colchicine and allopurinol renally dosed upon discharge.     Pt is stable for discharge. Pt has been advised to follow up as outpatient. Case has akosua discussed with the attending. This is just a summary of the case. For further information please refer to pt. chart document.

## 2022-10-26 NOTE — PROGRESS NOTE ADULT - PROBLEM SELECTOR PLAN 2
s/p fall associated with weakness, no LOC, headache, dizziness, chest pain, SOB.  likely mechanical, could be due to deconditioning, weakness from recent hospital DC   CT head Non-contrast :  no acute hge or stroke,only significant for sinusitis  XR no fracture or fluid collection   Fall Precaution   PT consulted: YESSICA  Nutrition consulted  CM and SW consulted  Ortho consulted: conservative management

## 2022-10-26 NOTE — DISCHARGE NOTE PROVIDER - NSDCCPCAREPLAN_GEN_ALL_CORE_FT
PRINCIPAL DISCHARGE DIAGNOSIS  Diagnosis: Acute gout of knee  Assessment and Plan of Treatment: You came in with left knee injury because you were weak from recent hospitalization. Infectious disease, orthopedics was consulted, and performed a biopsy of the fluid of the knee and found no infection, but monosodium urate crystals that are common on gout. We treated you with medications to reduce inflammation and your improved.  Gout is a common and complex form of arthritis that can affect anyone. It's characterized by sudden, severe attacks of pain, swelling, redness and tenderness in one or more joints, most often in the big toe.  An attack of gout can occur suddenly, often waking you up in the middle of the night with the sensation that your big toe is on fire. The affected joint is hot, swollen and so tender that even the weight of the bedsheet on it may seem intolerable.  Gout symptoms may come and go, but there are ways to manage symptoms and prevent flares.      SECONDARY DISCHARGE DIAGNOSES  Diagnosis: DM (diabetes mellitus)  Assessment and Plan of Treatment: Maintaining blood glucose level within normal range.  - You have a history of diabetes  - Your HbA1c is 7.6%  - You should continue to take your medication regimen regularly as prescribed  - Please follow up with your primary care provider/endocrinologist within a week of discharge.  - You need to continue monitoring your blood sugar levels closely.  - Please maintain healthy lifestyle by eating healthy diabetic regimen, weight loss and exercise regularly as tolerated.  Make sure you get your HgA1c checked every three months.  If you take oral diabetes medications, check your blood glucose two times a day.  If you take insulin, check your blood glucose before meals and at bedtime.  It's important not to skip any meals.  Keep a log of your blood glucose results and always take it with you to your doctor appointments.  Keep a list of your current medications including injectables and over the counter medications and bring this medication list with you to all your doctor appointments.  If you have not seen your ophthalmologist this year call for appointment.  Check your feet daily for redness, sores, or openings. Do not self treat. If no improvement in two days call your primary care physician for an appointment.  Low blood sugar (hypoglycemia) is a blood sugar below 70mg/dl. Check your blood sugar if you feel signs/symptoms of hypoglycemia. If your blood sugar is below 70 take 15 grams of carbohydrates (ex 4 oz of apple juice, 3-4 glucose tablets, or 4-6 oz of regular soda) wait 15 minutes and repeat blood sugar to make sure it comes up above 70.  If your blood sugar is above 70 and you are due for a meal, have a meal.  If you are not due for a meal have a snack.  This snack helps keeps your blood sugar at a safe range.    Diagnosis: Fall  Assessment and Plan of Treatment: You came in after a fall, we did a CT head which showed no bleeding in the brain. XRays did not show any fractures. We are sending you to rehab so you can train to get stronger. Please follow up with your PCP and continue to stay mobile.    Diagnosis: 2019 novel coronavirus disease (COVID-19)  Assessment and Plan of Treatment: You had COVID at a prior hospitalization and recovered. You did not need oxygen or additional steroids for COVID. Please follow up with your PCP.    Diagnosis: CAD (coronary artery disease)  Assessment and Plan of Treatment: You have a history of coronary artery disease with stents. Please continue taking your PLAVIX and APIXABAN and FOLLOW UP WITH DR. CABALLERO.     PRINCIPAL DISCHARGE DIAGNOSIS  Diagnosis: Acute gout of knee  Assessment and Plan of Treatment: You came in with left knee injury because you were weak from recent hospitalization. Infectious disease, orthopedics was consulted, and performed a biopsy of the fluid of the knee and found no infection, but monosodium urate crystals that are common on gout. We treated you with medications to reduce inflammation and your improved.  Gout is a common and complex form of arthritis that can affect anyone. It's characterized by sudden, severe attacks of pain, swelling, redness and tenderness in one or more joints, most often in the big toe.  An attack of gout can occur suddenly, often waking you up in the middle of the night with the sensation that your big toe is on fire. The affected joint is hot, swollen and so tender that even the weight of the bedsheet on it may seem intolerable.  Gout symptoms may come and go, but there are ways to manage symptoms and prevent flares.  PLEASE RETURN TO THE HOSPITAL IF YOU DEVELOP WORSENING JOINT PAINS, FEVERS OR CHILLS.      SECONDARY DISCHARGE DIAGNOSES  Diagnosis: Fall  Assessment and Plan of Treatment: You came in after a fall, we did a CT head which showed no bleeding in the brain. XRays did not show any fractures. We are sending you to rehab so you can train to get stronger. Please follow up with your PCP and continue to stay mobile.    Diagnosis: DM (diabetes mellitus)  Assessment and Plan of Treatment: You have a history of diabetes Your HbA1c or diabetic number  is 7.6%. You should continue to take your medication regimen regularly as prescribed.  Please follow up with your primary care provider/ endocrinologist within a week of discharge.You need to continue monitoring your blood sugar levels closely. Please maintain healthy lifestyle by eating healthy diabetic regimen, weight loss and exercise regularly as tolerated. Make sure you get your HbA1c checked every three months. If you take oral diabetes medications, check your blood glucose two times a day.If you take insulin, check your blood glucose before meals and at bedtime. PLEASE FOLLOW WITH YOUR PRIMARY CARE PROVIDER.    Diagnosis: 2019 novel coronavirus disease (COVID-19)  Assessment and Plan of Treatment: You had COVID at a prior hospitalization and recovered. You did not need oxygen or additional steroids for COVID. Please follow up with your PCP.    Diagnosis: CAD (coronary artery disease)  Assessment and Plan of Treatment: You have a history of coronary artery disease with stents. Please continue taking your PLAVIX and APIXABAN and FOLLOW UP WITH DR. CABALLERO, YOUR CARDIOLOGIST FOR FURTHER INSTRUCTION.     PRINCIPAL DISCHARGE DIAGNOSIS  Diagnosis: Acute gout of knee  Assessment and Plan of Treatment: You came in with left knee injury because you were weak from recent hospitalization. Infectious disease, orthopedics was consulted, and performed a biopsy of the fluid of the knee and found no infection, but monosodium urate crystals that are common on gout. We treated you with medications to reduce inflammation and your improved.  Gout is a common and complex form of arthritis that can affect anyone. It's characterized by sudden, severe attacks of pain, swelling, redness and tenderness in one or more joints, most often in the big toe.  An attack of gout can occur suddenly, often waking you up in the middle of the night with the sensation that your big toe is on fire. The affected joint is hot, swollen and so tender that even the weight of the bedsheet on it may seem intolerable.  Gout symptoms may come and go, but there are ways to manage symptoms and prevent flares.  PLEASE RETURN TO THE HOSPITAL IF YOU DEVELOP WORSENING JOINT PAINS, FEVERS OR CHILLS. PLEASE CONTINUE TO TAKE COLCHICINE 0.6MG ONE TABLET A DAY FOR TWO MORE DAYS. PLEASE CONTINUE TO TAKE ALLOPURINOL 100MG ONE TABLET A DAY FOR TWO WEEKS. PLEASE FOLLOW WITH YOUR PRIMARY CARE PROVIDER.      SECONDARY DISCHARGE DIAGNOSES  Diagnosis: Fall  Assessment and Plan of Treatment: You came in after a fall, we did a CT head which showed no bleeding in the brain. XRays did not show any fractures. We are sending you to rehab so you can train to get stronger. Please follow up with your PCP and continue to stay mobile.    Diagnosis: DM (diabetes mellitus)  Assessment and Plan of Treatment: You have a history of diabetes Your HbA1c or diabetic number  is 7.6%. You should continue to take your medication regimen regularly as prescribed.  Please follow up with your primary care provider/ endocrinologist within a week of discharge.You need to continue monitoring your blood sugar levels closely. Please maintain healthy lifestyle by eating healthy diabetic regimen, weight loss and exercise regularly as tolerated. Make sure you get your HbA1c checked every three months. If you take oral diabetes medications, check your blood glucose two times a day.If you take insulin, check your blood glucose before meals and at bedtime. PLEASE FOLLOW WITH YOUR PRIMARY CARE PROVIDER.    Diagnosis: 2019 novel coronavirus disease (COVID-19)  Assessment and Plan of Treatment: You had COVID at a prior hospitalization and recovered. You did not need oxygen or additional steroids for COVID. Please follow up with your PCP.    Diagnosis: CAD (coronary artery disease)  Assessment and Plan of Treatment: You have a history of coronary artery disease with stents. Please continue taking your PLAVIX and APIXABAN and FOLLOW UP WITH DR. CABALLERO, YOUR CARDIOLOGIST FOR FURTHER INSTRUCTION.     PRINCIPAL DISCHARGE DIAGNOSIS  Diagnosis: Acute gout of knee  Assessment and Plan of Treatment: You came in with left knee injury because you were weak from recent hospitalization. Infectious disease, orthopedics was consulted, and performed a biopsy of the fluid of the knee and found no infection, but monosodium urate crystals that are common on gout. We treated you with medications to reduce inflammation and your improved.  Gout is a common and complex form of arthritis that can affect anyone. It's characterized by sudden, severe attacks of pain, swelling, redness and tenderness in one or more joints, most often in the big toe.  An attack of gout can occur suddenly, often waking you up in the middle of the night with the sensation that your big toe is on fire. The affected joint is hot, swollen and so tender that even the weight of the bedsheet on it may seem intolerable.  Gout symptoms may come and go, but there are ways to manage symptoms and prevent flares.  PLEASE RETURN TO THE HOSPITAL IF YOU DEVELOP WORSENING JOINT PAINS, FEVERS OR CHILLS. PLEASE CONTINUE TO TAKE ALLOPURINOL 100MG ONE TABLET A DAY FOR TWO WEEKS. PLEASE FOLLOW WITH YOUR PRIMARY CARE PROVIDER.      SECONDARY DISCHARGE DIAGNOSES  Diagnosis: Fall  Assessment and Plan of Treatment: You came in after a fall, we did a CT head which showed no bleeding in the brain. XRays did not show any fractures. We are sending you to rehab so you can train to get stronger. Please follow up with your PCP and continue to stay mobile.    Diagnosis: DM (diabetes mellitus)  Assessment and Plan of Treatment: You have a history of diabetes Your HbA1c or diabetic number  is 7.6%. You should continue to take your medication regimen regularly as prescribed.  Please follow up with your primary care provider/ endocrinologist within a week of discharge.You need to continue monitoring your blood sugar levels closely. Please maintain healthy lifestyle by eating healthy diabetic regimen, weight loss and exercise regularly as tolerated. Make sure you get your HbA1c checked every three months. If you take oral diabetes medications, check your blood glucose two times a day.If you take insulin, check your blood glucose before meals and at bedtime. PLEASE FOLLOW WITH YOUR PRIMARY CARE PROVIDER.    Diagnosis: 2019 novel coronavirus disease (COVID-19)  Assessment and Plan of Treatment: You had COVID at a prior hospitalization and recovered. You did not need oxygen or additional steroids for COVID. Please follow up with your PCP.    Diagnosis: CAD (coronary artery disease)  Assessment and Plan of Treatment: You have a history of coronary artery disease with stents. Please continue taking your PLAVIX and APIXABAN and FOLLOW UP WITH DR. CABALLERO, YOUR CARDIOLOGIST FOR FURTHER INSTRUCTION.

## 2022-10-26 NOTE — DISCHARGE NOTE PROVIDER - CARE PROVIDER_API CALL
Martha Way)  Internal Medicine  [address] [facility], [address] [place] [address]  Phone: [phone]  Fax: [phone]  Scheduled Appointment: 11/16/2022

## 2022-10-27 LAB
ANION GAP SERPL CALC-SCNC: 11 MMOL/L — SIGNIFICANT CHANGE UP (ref 5–17)
BUN SERPL-MCNC: 38 MG/DL — HIGH (ref 7–18)
CALCIUM SERPL-MCNC: 9.6 MG/DL — SIGNIFICANT CHANGE UP (ref 8.4–10.5)
CHLORIDE SERPL-SCNC: 109 MMOL/L — HIGH (ref 96–108)
CO2 SERPL-SCNC: 24 MMOL/L — SIGNIFICANT CHANGE UP (ref 22–31)
CREAT SERPL-MCNC: 1.3 MG/DL — SIGNIFICANT CHANGE UP (ref 0.5–1.3)
D DIMER BLD IA.RAPID-MCNC: 186 NG/ML DDU — SIGNIFICANT CHANGE UP
EGFR: 54 ML/MIN/1.73M2 — LOW
GLUCOSE BLDC GLUCOMTR-MCNC: 129 MG/DL — HIGH (ref 70–99)
GLUCOSE BLDC GLUCOMTR-MCNC: 174 MG/DL — HIGH (ref 70–99)
GLUCOSE BLDC GLUCOMTR-MCNC: 311 MG/DL — HIGH (ref 70–99)
GLUCOSE BLDC GLUCOMTR-MCNC: 331 MG/DL — HIGH (ref 70–99)
GLUCOSE SERPL-MCNC: 123 MG/DL — HIGH (ref 70–99)
HCT VFR BLD CALC: 35 % — LOW (ref 39–50)
HGB BLD-MCNC: 11.3 G/DL — LOW (ref 13–17)
MCHC RBC-ENTMCNC: 27.8 PG — SIGNIFICANT CHANGE UP (ref 27–34)
MCHC RBC-ENTMCNC: 32.3 GM/DL — SIGNIFICANT CHANGE UP (ref 32–36)
MCV RBC AUTO: 86.2 FL — SIGNIFICANT CHANGE UP (ref 80–100)
NRBC # BLD: 0 /100 WBCS — SIGNIFICANT CHANGE UP (ref 0–0)
PLATELET # BLD AUTO: 314 K/UL — SIGNIFICANT CHANGE UP (ref 150–400)
POTASSIUM SERPL-MCNC: 3.8 MMOL/L — SIGNIFICANT CHANGE UP (ref 3.5–5.3)
POTASSIUM SERPL-SCNC: 3.8 MMOL/L — SIGNIFICANT CHANGE UP (ref 3.5–5.3)
RBC # BLD: 4.06 M/UL — LOW (ref 4.2–5.8)
RBC # FLD: 14.5 % — SIGNIFICANT CHANGE UP (ref 10.3–14.5)
SODIUM SERPL-SCNC: 144 MMOL/L — SIGNIFICANT CHANGE UP (ref 135–145)
WBC # BLD: 7.2 K/UL — SIGNIFICANT CHANGE UP (ref 3.8–10.5)
WBC # FLD AUTO: 7.2 K/UL — SIGNIFICANT CHANGE UP (ref 3.8–10.5)

## 2022-10-27 PROCEDURE — 99232 SBSQ HOSP IP/OBS MODERATE 35: CPT | Mod: GC

## 2022-10-27 RX ORDER — METOPROLOL TARTRATE 50 MG
0 TABLET ORAL
Qty: 0 | Refills: 0 | DISCHARGE

## 2022-10-27 RX ORDER — LOSARTAN/HYDROCHLOROTHIAZIDE 100MG-25MG
1 TABLET ORAL
Qty: 0 | Refills: 0 | DISCHARGE

## 2022-10-27 RX ORDER — INSULIN GLARGINE 100 [IU]/ML
10 INJECTION, SOLUTION SUBCUTANEOUS EVERY MORNING
Refills: 0 | Status: DISCONTINUED | OUTPATIENT
Start: 2022-10-27 | End: 2022-10-31

## 2022-10-27 RX ORDER — ASPIRIN/CALCIUM CARB/MAGNESIUM 324 MG
1 TABLET ORAL
Qty: 0 | Refills: 0 | DISCHARGE

## 2022-10-27 RX ORDER — ATORVASTATIN CALCIUM 80 MG/1
0 TABLET, FILM COATED ORAL
Qty: 0 | Refills: 0 | DISCHARGE

## 2022-10-27 RX ORDER — ACETAMINOPHEN 500 MG
1000 TABLET ORAL EVERY 8 HOURS
Refills: 0 | Status: DISCONTINUED | OUTPATIENT
Start: 2022-10-27 | End: 2022-10-28

## 2022-10-27 RX ADMIN — TAMSULOSIN HYDROCHLORIDE 0.4 MILLIGRAM(S): 0.4 CAPSULE ORAL at 22:12

## 2022-10-27 RX ADMIN — INSULIN GLARGINE 15 UNIT(S): 100 INJECTION, SOLUTION SUBCUTANEOUS at 08:15

## 2022-10-27 RX ADMIN — Medication 8: at 17:05

## 2022-10-27 RX ADMIN — CLOPIDOGREL BISULFATE 75 MILLIGRAM(S): 75 TABLET, FILM COATED ORAL at 12:00

## 2022-10-27 RX ADMIN — LOSARTAN POTASSIUM 75 MILLIGRAM(S): 100 TABLET, FILM COATED ORAL at 05:42

## 2022-10-27 RX ADMIN — LIDOCAINE 1 PATCH: 4 CREAM TOPICAL at 07:19

## 2022-10-27 RX ADMIN — APIXABAN 5 MILLIGRAM(S): 2.5 TABLET, FILM COATED ORAL at 05:41

## 2022-10-27 RX ADMIN — Medication 10 UNIT(S): at 17:06

## 2022-10-27 RX ADMIN — SENNA PLUS 2 TABLET(S): 8.6 TABLET ORAL at 22:12

## 2022-10-27 RX ADMIN — Medication 1000 MILLIGRAM(S): at 06:40

## 2022-10-27 RX ADMIN — ATORVASTATIN CALCIUM 40 MILLIGRAM(S): 80 TABLET, FILM COATED ORAL at 22:12

## 2022-10-27 RX ADMIN — Medication 25 MILLIGRAM(S): at 05:40

## 2022-10-27 RX ADMIN — LIDOCAINE 1 PATCH: 4 CREAM TOPICAL at 10:32

## 2022-10-27 RX ADMIN — Medication 20 MILLIGRAM(S): at 05:41

## 2022-10-27 RX ADMIN — Medication 8: at 12:02

## 2022-10-27 RX ADMIN — APIXABAN 5 MILLIGRAM(S): 2.5 TABLET, FILM COATED ORAL at 17:04

## 2022-10-27 RX ADMIN — LIDOCAINE 1 PATCH: 4 CREAM TOPICAL at 22:12

## 2022-10-27 RX ADMIN — Medication 0.6 MILLIGRAM(S): at 12:00

## 2022-10-27 RX ADMIN — Medication 10 UNIT(S): at 08:08

## 2022-10-27 RX ADMIN — POLYETHYLENE GLYCOL 3350 17 GRAM(S): 17 POWDER, FOR SOLUTION ORAL at 12:04

## 2022-10-27 RX ADMIN — FINASTERIDE 5 MILLIGRAM(S): 5 TABLET, FILM COATED ORAL at 12:00

## 2022-10-27 RX ADMIN — Medication 1000 MILLIGRAM(S): at 05:40

## 2022-10-27 RX ADMIN — Medication 10 UNIT(S): at 12:03

## 2022-10-27 NOTE — PROGRESS NOTE ADULT - PROBLEM SELECTOR PLAN 1
s/p mechanical fall w/ left knee injury 2 days ago  likely in the setting of deconditioning d/t recent hospital admission for COVID infection, not DC with ambulatory devices or assistance, based on synovial fluid results likely Gout flare  Afebrile with no WBC  -spiked fever, arthrocentesis done 10/21  -synovial fluid positive for monosodium urate crystal, negative cultures  -colchicine  -continue prednisone 20mg PO  -Pain better controlled today s/p mechanical fall w/ left knee injury 2 days ago  likely in the setting of deconditioning d/t recent hospital admission for COVID infection, not DC with ambulatory devices or assistance, based on synovial fluid results likely Gout flare  Afebrile with no WBC  -spiked fever, arthrocentesis done 10/21  -synovial fluid positive for monosodium urate crystal, negative cultures  -colchicine  -completed prednisone 20mg PO today  -Pain better controlled today

## 2022-10-27 NOTE — PROGRESS NOTE ADULT - PROBLEM SELECTOR PLAN 3
A1c 7.6%  Patient started on prednisone yesterday, blood sugars elevated to the 500s  c/w sliding scale; changed to moderate sliding  Adjust insulin as indicated  FS ACHS    Andrei consulted Dr. Almonte; appreciate recs  15 units lantus in AM + 10 Units Admelog before meals A1c 7.6%  Patient started on prednisone yesterday, blood sugars elevated to the 500s  c/w sliding scale; changed to moderate sliding  Adjust insulin as indicated  FS ACHS    Andrei consulted Dr. Almonte; appreciate recs  Today 15 units lantus in AM + 10 Units Admelog before meals  Will titrate down since steroid course has now completed  [ ] 10 Units Lantus + 10 Units Admelog A1c 7.6%  Patient started on prednisone yesterday, blood sugars elevated to the 500s  c/w sliding scale; changed to moderate sliding  Adjust insulin as indicated  FS ACHS    Andrei consulted Dr. Almonte; appreciate recs  Today 15 units lantus in AM + 10 Units Admelog before meals  Will titrate down since steroid course has now completed    [ ] 10 Units Lantus + 4 Units Admelog + Low Dose Sliding Scale Starting 10/28

## 2022-10-27 NOTE — PROGRESS NOTE ADULT - ATTENDING COMMENTS
no new complaints., will stop steroids. insulin adjusted as per recs as glucose better controlled. plan for dc to SNF awaiting auth/acceptance.

## 2022-10-27 NOTE — PROGRESS NOTE ADULT - SUBJECTIVE AND OBJECTIVE BOX
PGY-1 Progress Note discussed with attending    PAGER #: [[phone]] TILL 5:00 PM  PLEASE CONTACT ON CALL TEAM:  - On Call Team (Please refer to Remberto) FROM 5:00 PM - 8:30PM  - Nightfloat Team FROM 8:30 -7:30 AM    CHIEF COMPLAINT & BRIEF HOSPITAL COURSE:    INTERVAL HPI/OVERNIGHT EVENTS:   MEDICATIONS  (STANDING):  apixaban 5 milliGRAM(s) Oral every 12 hours  atorvastatin 40 milliGRAM(s) Oral at bedtime  clopidogrel Tablet 75 milliGRAM(s) Oral daily  colchicine 0.6 milliGRAM(s) Oral daily  dextrose 5%. 1000 milliLiter(s) (50 mL/Hr) IV Continuous <Continuous>  dextrose 5%. 1000 milliLiter(s) (100 mL/Hr) IV Continuous <Continuous>  dextrose 50% Injectable 25 Gram(s) IV Push once  dextrose 50% Injectable 12.5 Gram(s) IV Push once  dextrose 50% Injectable 25 Gram(s) IV Push once  finasteride 5 milliGRAM(s) Oral daily  glucagon  Injectable 1 milliGRAM(s) IntraMuscular once  insulin glargine Injectable (LANTUS) 15 Unit(s) SubCutaneous every morning  insulin lispro (ADMELOG) corrective regimen sliding scale   SubCutaneous at bedtime  insulin lispro (ADMELOG) corrective regimen sliding scale   SubCutaneous three times a day before meals  insulin lispro Injectable (ADMELOG) 10 Unit(s) SubCutaneous three times a day before meals  lidocaine   4% Patch 1 Patch Transdermal daily  losartan 75 milliGRAM(s) Oral daily  metoprolol succinate ER 25 milliGRAM(s) Oral daily  polyethylene glycol 3350 17 Gram(s) Oral daily  predniSONE   Tablet 20 milliGRAM(s) Oral daily  senna 2 Tablet(s) Oral at bedtime  tamsulosin 0.4 milliGRAM(s) Oral at bedtime    MEDICATIONS  (PRN):  acetaminophen     Tablet .. 1000 milliGRAM(s) Oral every 8 hours PRN Moderate Pain (4 - 6)  dextrose Oral Gel 15 Gram(s) Oral once PRN Blood Glucose LESS THAN 70 milliGRAM(s)/deciliter      REVIEW OF SYSTEMS:  CONSTITUTIONAL: No fever, weight loss, or fatigue  RESPIRATORY: No cough, wheezing, chills or hemoptysis; No shortness of breath  CARDIOVASCULAR: No chest pain, palpitations, dizziness, or leg swelling  GASTROINTESTINAL: No abdominal pain. No nausea, vomiting, or hematemesis; No diarrhea or constipation. No melena or hematochezia.  GENITOURINARY: No dysuria or hematuria, urinary frequency  NEUROLOGICAL: No headaches, memory loss, loss of strength, numbness, or tremors  SKIN: No itching, burning, rashes, or lesions     Vital Signs Last 24 Hrs  T(C): 36.6 (27 Oct 2022 05:21), Max: 36.9 (26 Oct 2022 14:09)  T(F): 97.8 (27 Oct 2022 05:21), Max: 98.4 (26 Oct 2022 14:09)  HR: 60 (27 Oct 2022 05:38) (34 - 60)  BP: 152/68 (27 Oct 2022 05:38) (131/48 - 152/68)  BP(mean): --  RR: 16 (27 Oct 2022 05:21) (16 - 18)  SpO2: 96% (27 Oct 2022 05:21) (96% - 99%)    Parameters below as of 27 Oct 2022 05:21  Patient On (Oxygen Delivery Method): room air        PHYSICAL EXAMINATION:  GENERAL: NAD, well built  HEAD:  Atraumatic, Normocephalic  EYES:  conjunctiva and sclera clear  NECK: Supple, No JVD, Normal thyroid  CHEST/LUNG: Clear to auscultation. Clear to percussion bilaterally; No rales, rhonchi, wheezing, or rubs  HEART: Regular rate and rhythm; No murmurs, rubs, or gallops  ABDOMEN: Soft, Nontender, Nondistended; Bowel sounds present  NERVOUS SYSTEM:  Alert & Oriented X3,    EXTREMITIES:  2+ Peripheral Pulses, No clubbing, cyanosis, or edema  SKIN: warm dry                          11.3   7.20  )-----------( 314      ( 27 Oct 2022 08:32 )             35.0     10-27    144  |  109<H>  |  38<H>  ----------------------------<  123<H>  3.8   |  24  |  1.30    Ca    9.6      27 Oct 2022 08:32  Phos  3.5     10-26  Mg     2.2     10-26    TPro  7.0  /  Alb  2.1<L>  /  TBili  0.3  /  DBili  x   /  AST  27  /  ALT  28  /  AlkPhos  58  10-26    LIVER FUNCTIONS - ( 26 Oct 2022 07:40 )  Alb: 2.1 g/dL / Pro: 7.0 g/dL / ALK PHOS: 58 U/L / ALT: 28 U/L DA / AST: 27 U/L / GGT: x                   CAPILLARY BLOOD GLUCOSE      RADIOLOGY & ADDITIONAL TESTS:                   PGY-1 Progress Note discussed with attending    PAGER #: [[phone]] TILL 5:00 PM  PLEASE CONTACT ON CALL TEAM:  - On Call Team (Please refer to Remberto) FROM 5:00 PM - 8:30PM  - Nightfloat Team FROM 8:30 -7:30 AM      INTERVAL HPI/OVERNIGHT EVENTS: NAEON    MEDICATIONS  (STANDING):  apixaban 5 milliGRAM(s) Oral every 12 hours  atorvastatin 40 milliGRAM(s) Oral at bedtime  clopidogrel Tablet 75 milliGRAM(s) Oral daily  colchicine 0.6 milliGRAM(s) Oral daily  dextrose 5%. 1000 milliLiter(s) (50 mL/Hr) IV Continuous <Continuous>  dextrose 5%. 1000 milliLiter(s) (100 mL/Hr) IV Continuous <Continuous>  dextrose 50% Injectable 25 Gram(s) IV Push once  dextrose 50% Injectable 12.5 Gram(s) IV Push once  dextrose 50% Injectable 25 Gram(s) IV Push once  finasteride 5 milliGRAM(s) Oral daily  glucagon  Injectable 1 milliGRAM(s) IntraMuscular once  insulin glargine Injectable (LANTUS) 15 Unit(s) SubCutaneous every morning  insulin lispro (ADMELOG) corrective regimen sliding scale   SubCutaneous at bedtime  insulin lispro (ADMELOG) corrective regimen sliding scale   SubCutaneous three times a day before meals  insulin lispro Injectable (ADMELOG) 10 Unit(s) SubCutaneous three times a day before meals  lidocaine   4% Patch 1 Patch Transdermal daily  losartan 75 milliGRAM(s) Oral daily  metoprolol succinate ER 25 milliGRAM(s) Oral daily  polyethylene glycol 3350 17 Gram(s) Oral daily  predniSONE   Tablet 20 milliGRAM(s) Oral daily  senna 2 Tablet(s) Oral at bedtime  tamsulosin 0.4 milliGRAM(s) Oral at bedtime    MEDICATIONS  (PRN):  acetaminophen     Tablet .. 1000 milliGRAM(s) Oral every 8 hours PRN Moderate Pain (4 - 6)  dextrose Oral Gel 15 Gram(s) Oral once PRN Blood Glucose LESS THAN 70 milliGRAM(s)/deciliter      REVIEW OF SYSTEMS:  CONSTITUTIONAL: No fever, weight loss, or fatigue  RESPIRATORY: No cough, wheezing, chills or hemoptysis; No shortness of breath  CARDIOVASCULAR: No chest pain, palpitations, dizziness, or leg swelling  GASTROINTESTINAL: No abdominal pain. No nausea, vomiting, No diarrhea or constipation  GENITOURINARY: No dysuria or hematuria, urinary frequency  NEUROLOGICAL: No headaches, memory loss, loss of strength, numbness, or tremors  SKIN: No itching, burning, rashes, or lesions     Vital Signs Last 24 Hrs  T(C): 36.6 (27 Oct 2022 05:21), Max: 36.9 (26 Oct 2022 14:09)  T(F): 97.8 (27 Oct 2022 05:21), Max: 98.4 (26 Oct 2022 14:09)  HR: 60 (27 Oct 2022 05:38) (34 - 60)  BP: 152/68 (27 Oct 2022 05:38) (131/48 - 152/68)  BP(mean): --  RR: 16 (27 Oct 2022 05:21) (16 - 18)  SpO2: 96% (27 Oct 2022 05:21) (96% - 99%)    Parameters below as of 27 Oct 2022 05:21  Patient On (Oxygen Delivery Method): room air    PHYSICAL EXAMINATION:  GENERAL: NAD, well nourished, elderly  HEAD:  Atraumatic, Normocephalic  EYES:  conjunctiva and sclera clear  CHEST/LUNG: Clear to auscultation.  No rales, rhonchi, wheezing, or rubs  HEART: Regular rate and rhythm; No murmurs, rubs, or gallops  ABDOMEN: Soft, Nontender, Nondistended; Bowel sounds present  NERVOUS SYSTEM:  Alert & Oriented X3,    EXTREMITIES:  2+ Peripheral Pulses, No clubbing, cyanosis, or edema, knees no longer swollen or erythematous  SKIN: warm dry                          11.3   7.20  )-----------( 314      ( 27 Oct 2022 08:32 )             35.0     10-27    144  |  109<H>  |  38<H>  ----------------------------<  123<H>  3.8   |  24  |  1.30    Ca    9.6      27 Oct 2022 08:32  Phos  3.5     10-26  Mg     2.2     10-26    TPro  7.0  /  Alb  2.1<L>  /  TBili  0.3  /  DBili  x   /  AST  27  /  ALT  28  /  AlkPhos  58  10-26    LIVER FUNCTIONS - ( 26 Oct 2022 07:40 )  Alb: 2.1 g/dL / Pro: 7.0 g/dL / ALK PHOS: 58 U/L / ALT: 28 U/L DA / AST: 27 U/L / GGT: x                   CAPILLARY BLOOD GLUCOSE      RADIOLOGY & ADDITIONAL TESTS:

## 2022-10-27 NOTE — PROGRESS NOTE ADULT - PROBLEM SELECTOR PLAN 6
takes plavix and Eliquis as per sure geoffrey script, patient had 2 stents recently placed July 2022 to be on Plavix and Eliquis for 6 months.    Follows Dr. Melton outpatient for cardiology  c/w [place] meds

## 2022-10-28 LAB
GLUCOSE BLDC GLUCOMTR-MCNC: 153 MG/DL — HIGH (ref 70–99)
GLUCOSE BLDC GLUCOMTR-MCNC: 156 MG/DL — HIGH (ref 70–99)
GLUCOSE BLDC GLUCOMTR-MCNC: 200 MG/DL — HIGH (ref 70–99)
GLUCOSE BLDC GLUCOMTR-MCNC: 304 MG/DL — HIGH (ref 70–99)
SARS-COV-2 RNA SPEC QL NAA+PROBE: DETECTED

## 2022-10-28 PROCEDURE — 99232 SBSQ HOSP IP/OBS MODERATE 35: CPT | Mod: GC

## 2022-10-28 RX ORDER — COLCHICINE 0.6 MG
1 TABLET ORAL
Qty: 3 | Refills: 0
Start: 2022-10-28 | End: 2022-10-30

## 2022-10-28 RX ORDER — ALLOPURINOL 300 MG
1 TABLET ORAL
Qty: 14 | Refills: 0
Start: 2022-10-28 | End: 2022-11-10

## 2022-10-28 RX ORDER — ACETAMINOPHEN 500 MG
650 TABLET ORAL EVERY 6 HOURS
Refills: 0 | Status: DISCONTINUED | OUTPATIENT
Start: 2022-10-28 | End: 2022-10-29

## 2022-10-28 RX ORDER — INSULIN LISPRO 100/ML
4 VIAL (ML) SUBCUTANEOUS
Refills: 0 | Status: DISCONTINUED | OUTPATIENT
Start: 2022-10-28 | End: 2022-10-31

## 2022-10-28 RX ADMIN — LIDOCAINE 1 PATCH: 4 CREAM TOPICAL at 21:17

## 2022-10-28 RX ADMIN — APIXABAN 5 MILLIGRAM(S): 2.5 TABLET, FILM COATED ORAL at 06:34

## 2022-10-28 RX ADMIN — SENNA PLUS 2 TABLET(S): 8.6 TABLET ORAL at 21:20

## 2022-10-28 RX ADMIN — Medication 1000 MILLIGRAM(S): at 06:33

## 2022-10-28 RX ADMIN — Medication 1000 MILLIGRAM(S): at 07:09

## 2022-10-28 RX ADMIN — INSULIN GLARGINE 10 UNIT(S): 100 INJECTION, SOLUTION SUBCUTANEOUS at 10:13

## 2022-10-28 RX ADMIN — Medication 0.6 MILLIGRAM(S): at 12:15

## 2022-10-28 RX ADMIN — Medication 4 UNIT(S): at 12:13

## 2022-10-28 RX ADMIN — CLOPIDOGREL BISULFATE 75 MILLIGRAM(S): 75 TABLET, FILM COATED ORAL at 12:19

## 2022-10-28 RX ADMIN — TAMSULOSIN HYDROCHLORIDE 0.4 MILLIGRAM(S): 0.4 CAPSULE ORAL at 21:18

## 2022-10-28 RX ADMIN — FINASTERIDE 5 MILLIGRAM(S): 5 TABLET, FILM COATED ORAL at 12:14

## 2022-10-28 RX ADMIN — ATORVASTATIN CALCIUM 40 MILLIGRAM(S): 80 TABLET, FILM COATED ORAL at 21:18

## 2022-10-28 RX ADMIN — Medication 25 MILLIGRAM(S): at 06:34

## 2022-10-28 RX ADMIN — LIDOCAINE 1 PATCH: 4 CREAM TOPICAL at 10:12

## 2022-10-28 RX ADMIN — Medication 650 MILLIGRAM(S): at 17:17

## 2022-10-28 RX ADMIN — Medication 4 UNIT(S): at 17:15

## 2022-10-28 RX ADMIN — Medication 650 MILLIGRAM(S): at 18:24

## 2022-10-28 RX ADMIN — POLYETHYLENE GLYCOL 3350 17 GRAM(S): 17 POWDER, FOR SOLUTION ORAL at 12:20

## 2022-10-28 RX ADMIN — APIXABAN 5 MILLIGRAM(S): 2.5 TABLET, FILM COATED ORAL at 17:16

## 2022-10-28 RX ADMIN — Medication 650 MILLIGRAM(S): at 23:30

## 2022-10-28 RX ADMIN — LIDOCAINE 1 PATCH: 4 CREAM TOPICAL at 07:25

## 2022-10-28 RX ADMIN — LOSARTAN POTASSIUM 75 MILLIGRAM(S): 100 TABLET, FILM COATED ORAL at 06:34

## 2022-10-28 NOTE — PROGRESS NOTE ADULT - PROBLEM SELECTOR PLAN 1
s/p mechanical fall w/ left knee injury 2 days prior to hospitalization  likely in the setting of deconditioning d/t recent hospital admission for COVID infection, not DC with ambulatory devices or assistance, based on synovial fluid results likely Gout flare  Afebrile with no WBC  -spiked fever, arthrocentesis done 10/21  -synovial fluid positive for monosodium urate crystal, negative cultures  -completed prednisone 20mg PO today  -Pain better controlled today    [ ] Continue home colchicine for two more days   [ ] DC with Allopurinol 100mg

## 2022-10-28 NOTE — PROGRESS NOTE ADULT - PROBLEM SELECTOR PLAN 3
A1c 7.6%  Patient started on prednisone yesterday, blood sugars elevated to the 500s  c/w sliding scale; changed to moderate sliding  Adjust insulin as indicated  FS ACHS    Titrated down since steroid course has now completed  Today 10 units lantus in AM + 4 Units Admelog before meals

## 2022-10-28 NOTE — PROGRESS NOTE ADULT - ATTENDING COMMENTS
c/o pain in knee last night- no swelling. will c/w colchicine while inpatient, add Allopurinol on discharge to prevent gout  plan for SNF placement- pending auth  c/w current medical mx

## 2022-10-28 NOTE — PROGRESS NOTE ADULT - SUBJECTIVE AND OBJECTIVE BOX
PGY-1 Progress Note discussed with attending    PAGER #: [[phone]] TILL 5:00 PM  PLEASE CONTACT ON CALL TEAM:  - On Call Team (Please refer to Remberto) FROM 5:00 PM - 8:30PM  - Nightfloat Team FROM 8:30 -7:30 AM    CHIEF COMPLAINT & BRIEF HOSPITAL COURSE:    INTERVAL HPI/OVERNIGHT EVENTS:   MEDICATIONS  (STANDING):  apixaban 5 milliGRAM(s) Oral every 12 hours  atorvastatin 40 milliGRAM(s) Oral at bedtime  clopidogrel Tablet 75 milliGRAM(s) Oral daily  colchicine 0.6 milliGRAM(s) Oral daily  dextrose 5%. 1000 milliLiter(s) (50 mL/Hr) IV Continuous <Continuous>  dextrose 5%. 1000 milliLiter(s) (100 mL/Hr) IV Continuous <Continuous>  dextrose 50% Injectable 25 Gram(s) IV Push once  dextrose 50% Injectable 12.5 Gram(s) IV Push once  dextrose 50% Injectable 25 Gram(s) IV Push once  finasteride 5 milliGRAM(s) Oral daily  glucagon  Injectable 1 milliGRAM(s) IntraMuscular once  insulin glargine Injectable (LANTUS) 10 Unit(s) SubCutaneous every morning  insulin lispro (ADMELOG) corrective regimen sliding scale   SubCutaneous at bedtime  insulin lispro Injectable (ADMELOG) 4 Unit(s) SubCutaneous three times a day before meals  lidocaine   4% Patch 1 Patch Transdermal daily  losartan 75 milliGRAM(s) Oral daily  metoprolol succinate ER 25 milliGRAM(s) Oral daily  polyethylene glycol 3350 17 Gram(s) Oral daily  senna 2 Tablet(s) Oral at bedtime  tamsulosin 0.4 milliGRAM(s) Oral at bedtime    MEDICATIONS  (PRN):  acetaminophen     Tablet .. 1000 milliGRAM(s) Oral every 8 hours PRN Moderate Pain (4 - 6)  dextrose Oral Gel 15 Gram(s) Oral once PRN Blood Glucose LESS THAN 70 milliGRAM(s)/deciliter      REVIEW OF SYSTEMS:  CONSTITUTIONAL: No fever, weight loss, or fatigue  RESPIRATORY: No cough, wheezing, chills or hemoptysis; No shortness of breath  CARDIOVASCULAR: No chest pain, palpitations, dizziness, or leg swelling  GASTROINTESTINAL: No abdominal pain. No nausea, vomiting, or diarrhea.  GENITOURINARY: No dysuria or hematuria, urinary frequency  NEUROLOGICAL: No headaches, memory loss, loss of strength, numbness, or tremors  SKIN: No itching, burning, rashes, or lesions     Vital Signs Last 24 Hrs  T(C): 36.2 (28 Oct 2022 05:42), Max: 36.2 (27 Oct 2022 14:01)  T(F): 97.2 (28 Oct 2022 05:42), Max: 97.2 (27 Oct 2022 14:01)  HR: 60 (28 Oct 2022 05:42) (57 - 60)  BP: 156/78 (28 Oct 2022 05:42) (137/73 - 156/78)  BP(mean): --  RR: 18 (28 Oct 2022 05:42) (16 - 18)  SpO2: 96% (28 Oct 2022 05:42) (96% - 96%)    Parameters below as of 28 Oct 2022 05:42  Patient On (Oxygen Delivery Method): room air        PHYSICAL EXAMINATION:  GENERAL: NAD, well built  HEAD:  Atraumatic, Normocephalic  EYES:  conjunctiva and sclera clear  NECK: Supple, No JVD, Normal thyroid  CHEST/LUNG: Clear to auscultation. No rales, rhonchi, wheezing, or rubs  HEART: Regular rate and rhythm; No murmurs, rubs, or gallops  ABDOMEN: Soft, Nontender, Nondistended; Bowel sounds present  NERVOUS SYSTEM:  Alert & Oriented X3,    EXTREMITIES:  2+ Peripheral Pulses, No clubbing, cyanosis, or edema  SKIN: warm dry                          11.3   7.20  )-----------( 314      ( 27 Oct 2022 08:32 )             35.0     10-27    144  |  109<H>  |  38<H>  ----------------------------<  123<H>  3.8   |  24  |  1.30    Ca    9.6      27 Oct 2022 08:32                CAPILLARY BLOOD GLUCOSE      RADIOLOGY & ADDITIONAL TESTS:                   PGY-1 Progress Note discussed with attending    PAGER #: [[phone]] TILL 5:00 PM  PLEASE CONTACT ON CALL TEAM:  - On Call Team (Please refer to Remberto) FROM 5:00 PM - 8:30PM  - Nightfloat Team FROM 8:30 -7:30 AM    INTERVAL HPI/OVERNIGHT EVENTS: NAEON     MEDICATIONS  (STANDING):  apixaban 5 milliGRAM(s) Oral every 12 hours  atorvastatin 40 milliGRAM(s) Oral at bedtime  clopidogrel Tablet 75 milliGRAM(s) Oral daily  colchicine 0.6 milliGRAM(s) Oral daily  dextrose 5%. 1000 milliLiter(s) (50 mL/Hr) IV Continuous <Continuous>  dextrose 5%. 1000 milliLiter(s) (100 mL/Hr) IV Continuous <Continuous>  dextrose 50% Injectable 25 Gram(s) IV Push once  dextrose 50% Injectable 12.5 Gram(s) IV Push once  dextrose 50% Injectable 25 Gram(s) IV Push once  finasteride 5 milliGRAM(s) Oral daily  glucagon  Injectable 1 milliGRAM(s) IntraMuscular once  insulin glargine Injectable (LANTUS) 10 Unit(s) SubCutaneous every morning  insulin lispro (ADMELOG) corrective regimen sliding scale   SubCutaneous at bedtime  insulin lispro Injectable (ADMELOG) 4 Unit(s) SubCutaneous three times a day before meals  lidocaine   4% Patch 1 Patch Transdermal daily  losartan 75 milliGRAM(s) Oral daily  metoprolol succinate ER 25 milliGRAM(s) Oral daily  polyethylene glycol 3350 17 Gram(s) Oral daily  senna 2 Tablet(s) Oral at bedtime  tamsulosin 0.4 milliGRAM(s) Oral at bedtime    MEDICATIONS  (PRN):  acetaminophen     Tablet .. 1000 milliGRAM(s) Oral every 8 hours PRN Moderate Pain (4 - 6)  dextrose Oral Gel 15 Gram(s) Oral once PRN Blood Glucose LESS THAN 70 milliGRAM(s)/deciliter      REVIEW OF SYSTEMS:  CONSTITUTIONAL: No fever, or fatigue  RESPIRATORY: No cough, wheezing, chills or hemoptysis; No shortness of breath  CARDIOVASCULAR: No chest pain, palpitations, dizziness, or leg swelling  GASTROINTESTINAL: No abdominal pain. No nausea, vomiting, or diarrhea.  GENITOURINARY: No dysuria or hematuria, urinary frequency  NEUROLOGICAL: No headaches, memory loss, loss of strength, numbness, or tremors  SKIN: No itching, burning, rashes, or lesions     Vital Signs Last 24 Hrs  T(C): 36.2 (28 Oct 2022 05:42), Max: 36.2 (27 Oct 2022 14:01)  T(F): 97.2 (28 Oct 2022 05:42), Max: 97.2 (27 Oct 2022 14:01)  HR: 60 (28 Oct 2022 05:42) (57 - 60)  BP: 156/78 (28 Oct 2022 05:42) (137/73 - 156/78)  BP(mean): --  RR: 18 (28 Oct 2022 05:42) (16 - 18)  SpO2: 96% (28 Oct 2022 05:42) (96% - 96%)    Parameters below as of 28 Oct 2022 05:42  Patient On (Oxygen Delivery Method): room air        PHYSICAL EXAMINATION:  GENERAL: NAD, well built  HEAD:  Atraumatic, Normocephalic  EYES:  conjunctiva and sclera clear  NECK: Supple, No JVD, Normal thyroid  CHEST/LUNG: Clear to auscultation. No rales, rhonchi, wheezing, or rubs  HEART: Regular rate and rhythm; No murmurs, rubs, or gallops  ABDOMEN: Soft, Nontender, Nondistended; Bowel sounds present  NERVOUS SYSTEM:  Alert & Oriented X3,    EXTREMITIES:  2+ Peripheral Pulses, No clubbing, cyanosis, or edema  SKIN: warm dry                          11.3   7.20  )-----------( 314      ( 27 Oct 2022 08:32 )             35.0     10-27    144  |  109<H>  |  38<H>  ----------------------------<  123<H>  3.8   |  24  |  1.30    Ca    9.6      27 Oct 2022 08:32                CAPILLARY BLOOD GLUCOSE      RADIOLOGY & ADDITIONAL TESTS:

## 2022-10-28 NOTE — CHART NOTE - NSCHARTNOTEFT_GEN_A_CORE
Spoke with Mr. Ruggiero, patient's son to update him on the discharge planning for patient. Informed that patient will stay the weekend pending [facility] authorization. Son expressed that father was in pain overnight, and does not receive his Tylenol because he does not express pain as much as he should. I changed Tylenol to standing for the next couple of days at a lower dose and increased frequency. Will follow over the weekend.

## 2022-10-28 NOTE — PROGRESS NOTE ADULT - PROBLEM SELECTOR PLAN 6
takes plavix and Eliquis as per sure geoffrey script, patient had 2 stents recently placed July 2022 to be on Plavix and Eliquis for 6 months.    Follows Dr. Melton outpatient for cardiology  c/w [place] meds

## 2022-10-29 LAB
GLUCOSE BLDC GLUCOMTR-MCNC: 136 MG/DL — HIGH (ref 70–99)
GLUCOSE BLDC GLUCOMTR-MCNC: 156 MG/DL — HIGH (ref 70–99)
GLUCOSE BLDC GLUCOMTR-MCNC: 189 MG/DL — HIGH (ref 70–99)
GLUCOSE BLDC GLUCOMTR-MCNC: 261 MG/DL — HIGH (ref 70–99)

## 2022-10-29 PROCEDURE — 99232 SBSQ HOSP IP/OBS MODERATE 35: CPT

## 2022-10-29 RX ORDER — LOSARTAN POTASSIUM 100 MG/1
100 TABLET, FILM COATED ORAL DAILY
Refills: 0 | Status: DISCONTINUED | OUTPATIENT
Start: 2022-10-29 | End: 2022-10-31

## 2022-10-29 RX ORDER — ACETAMINOPHEN 500 MG
1000 TABLET ORAL EVERY 8 HOURS
Refills: 0 | Status: COMPLETED | OUTPATIENT
Start: 2022-10-29 | End: 2022-10-31

## 2022-10-29 RX ADMIN — TAMSULOSIN HYDROCHLORIDE 0.4 MILLIGRAM(S): 0.4 CAPSULE ORAL at 21:20

## 2022-10-29 RX ADMIN — LOSARTAN POTASSIUM 100 MILLIGRAM(S): 100 TABLET, FILM COATED ORAL at 17:49

## 2022-10-29 RX ADMIN — LOSARTAN POTASSIUM 75 MILLIGRAM(S): 100 TABLET, FILM COATED ORAL at 06:04

## 2022-10-29 RX ADMIN — CLOPIDOGREL BISULFATE 75 MILLIGRAM(S): 75 TABLET, FILM COATED ORAL at 11:43

## 2022-10-29 RX ADMIN — Medication 4 UNIT(S): at 08:03

## 2022-10-29 RX ADMIN — APIXABAN 5 MILLIGRAM(S): 2.5 TABLET, FILM COATED ORAL at 17:50

## 2022-10-29 RX ADMIN — POLYETHYLENE GLYCOL 3350 17 GRAM(S): 17 POWDER, FOR SOLUTION ORAL at 11:44

## 2022-10-29 RX ADMIN — SENNA PLUS 2 TABLET(S): 8.6 TABLET ORAL at 21:21

## 2022-10-29 RX ADMIN — Medication 1000 MILLIGRAM(S): at 22:58

## 2022-10-29 RX ADMIN — Medication 25 MILLIGRAM(S): at 06:03

## 2022-10-29 RX ADMIN — Medication 650 MILLIGRAM(S): at 06:04

## 2022-10-29 RX ADMIN — FINASTERIDE 5 MILLIGRAM(S): 5 TABLET, FILM COATED ORAL at 11:42

## 2022-10-29 RX ADMIN — APIXABAN 5 MILLIGRAM(S): 2.5 TABLET, FILM COATED ORAL at 06:03

## 2022-10-29 RX ADMIN — INSULIN GLARGINE 10 UNIT(S): 100 INJECTION, SOLUTION SUBCUTANEOUS at 08:04

## 2022-10-29 RX ADMIN — Medication 650 MILLIGRAM(S): at 11:41

## 2022-10-29 RX ADMIN — Medication 650 MILLIGRAM(S): at 06:56

## 2022-10-29 RX ADMIN — Medication 1000 MILLIGRAM(S): at 21:20

## 2022-10-29 RX ADMIN — ATORVASTATIN CALCIUM 40 MILLIGRAM(S): 80 TABLET, FILM COATED ORAL at 21:20

## 2022-10-29 RX ADMIN — LIDOCAINE 1 PATCH: 4 CREAM TOPICAL at 08:20

## 2022-10-29 RX ADMIN — Medication 4 UNIT(S): at 17:49

## 2022-10-29 RX ADMIN — Medication 4 UNIT(S): at 11:44

## 2022-10-29 RX ADMIN — Medication 650 MILLIGRAM(S): at 12:48

## 2022-10-29 RX ADMIN — Medication 650 MILLIGRAM(S): at 00:30

## 2022-10-29 RX ADMIN — LIDOCAINE 1 PATCH: 4 CREAM TOPICAL at 21:21

## 2022-10-29 RX ADMIN — Medication 0.6 MILLIGRAM(S): at 11:42

## 2022-10-29 NOTE — PROGRESS NOTE ADULT - PROBLEM SELECTOR PLAN 8
h/o HTN unclear what BP patient still takes as per sure scripts  c/w lopressor 25 BID (per sure scripts takes toprol 25 qd)  will hold for parameters   monitor BP h/o HTN unclear what BP patient still takes as per sure scripts  c/w lopressor 25 BID (per sure scripts takes toprol 25 qd)  will hold for parameters   monitor BP  [ ] Patient hypertensive, will change Losartan 75PO Daily to 100mg PO Daily

## 2022-10-29 NOTE — PROGRESS NOTE ADULT - PROBLEM SELECTOR PLAN 2
s/p fall associated with weakness, no LOC, headache, dizziness, chest pain, SOB.  likely mechanical, could be due to deconditioning, weakness from recent hospital DC   CT head Non-contrast :  no acute hge or stroke,only significant for sinusitis  XR no fracture or fluid collection   Fall Precaution   PT consulted: YESS[facility]  Nutrition consulted  CM and SW consulted  Ortho consulted: conservative management    Pending [facility] for rehabilitation/reconditioning

## 2022-10-29 NOTE — PROGRESS NOTE ADULT - PROBLEM SELECTOR PLAN 1
s/p mechanical fall w/ left knee injury 2 days prior to hospitalization  likely in the setting of deconditioning d/t recent hospital admission for COVID infection, not DC with ambulatory devices or assistance, based on synovial fluid results likely Gout flare  Afebrile with no WBC  -spiked fever, arthrocentesis done 10/21  -synovial fluid positive for monosodium urate crystal, negative cultures  -completed prednisone 20mg PO today  -Pain better controlled today    [ ] Continue home colchicine for two more days   [ ] Discharge with Allopurinol 100mg  [ ] Standing Tylenol 650mg Q6 s/p mechanical fall w/ left knee injury 2 days prior to hospitalization  likely in the setting of deconditioning d/t recent hospital admission for COVID infection, not DC with ambulatory devices or assistance, based on synovial fluid results likely Gout flare  Afebrile with no WBC  -spiked fever, arthrocentesis done 10/21  -synovial fluid positive for monosodium urate crystal, negative cultures  -completed prednisone 20mg PO today  -Pain better controlled today    [ ] Continue home colchicine for two more days   [ ] Discharge with Allopurinol 100mg  [ ] Standing Tylenol 1g Q8: Patient still complains of pain, does not verbalize pain to nurses when ordered PO

## 2022-10-29 NOTE — PROGRESS NOTE ADULT - ATTENDING COMMENTS
Patient seen and examined. Case discussed with housestaff. Patient reports that he is doing alright, just frustrated about the noise one of his neighbors is making. At present, he denies any pain, but did endorse some slight pain in the left knee overnight for which the team has placed the patient on standing Tylenol as the patient does not always make his needs known to staff. Continue to wean insulin now that patient has completed his course of prednisone for acute gout flare. DC planning to [facility], [place] pending insurance authorization. Remaining care as noted above.

## 2022-10-29 NOTE — PROGRESS NOTE ADULT - SUBJECTIVE AND OBJECTIVE BOX
PGY-1 Progress Note discussed with attending    PAGER #: [[phone]] TILL 5:00 PM  PLEASE CONTACT ON CALL TEAM:  - On Call Team (Please refer to Remberto) FROM 5:00 PM - 8:30PM  - Nightfloat Team FROM 8:30 -7:30 AM    CHIEF COMPLAINT & BRIEF HOSPITAL COURSE:    INTERVAL HPI/OVERNIGHT EVENTS:   MEDICATIONS  (STANDING):  acetaminophen     Tablet .. 650 milliGRAM(s) Oral every 6 hours  apixaban 5 milliGRAM(s) Oral every 12 hours  atorvastatin 40 milliGRAM(s) Oral at bedtime  clopidogrel Tablet 75 milliGRAM(s) Oral daily  colchicine 0.6 milliGRAM(s) Oral daily  dextrose 5%. 1000 milliLiter(s) (50 mL/Hr) IV Continuous <Continuous>  dextrose 5%. 1000 milliLiter(s) (100 mL/Hr) IV Continuous <Continuous>  dextrose 50% Injectable 25 Gram(s) IV Push once  dextrose 50% Injectable 12.5 Gram(s) IV Push once  dextrose 50% Injectable 25 Gram(s) IV Push once  finasteride 5 milliGRAM(s) Oral daily  glucagon  Injectable 1 milliGRAM(s) IntraMuscular once  insulin glargine Injectable (LANTUS) 10 Unit(s) SubCutaneous every morning  insulin lispro (ADMELOG) corrective regimen sliding scale   SubCutaneous at bedtime  insulin lispro Injectable (ADMELOG) 4 Unit(s) SubCutaneous three times a day before meals  lidocaine   4% Patch 1 Patch Transdermal daily  losartan 75 milliGRAM(s) Oral daily  metoprolol succinate ER 25 milliGRAM(s) Oral daily  polyethylene glycol 3350 17 Gram(s) Oral daily  senna 2 Tablet(s) Oral at bedtime  tamsulosin 0.4 milliGRAM(s) Oral at bedtime    MEDICATIONS  (PRN):  dextrose Oral Gel 15 Gram(s) Oral once PRN Blood Glucose LESS THAN 70 milliGRAM(s)/deciliter      REVIEW OF SYSTEMS:  CONSTITUTIONAL: No fever, weight loss, or fatigue  RESPIRATORY: No cough, wheezing, chills or hemoptysis; No shortness of breath  CARDIOVASCULAR: No chest pain, palpitations, dizziness, or leg swelling  GASTROINTESTINAL: No abdominal pain. No nausea, vomiting, or diarrhea.  GENITOURINARY: No dysuria or hematuria, urinary frequency  NEUROLOGICAL: No headaches, memory loss, loss of strength, numbness, or tremors  SKIN: No itching, burning, rashes, or lesions     Vital Signs Last 24 Hrs  T(C): 36.3 (29 Oct 2022 05:17), Max: 36.3 (28 Oct 2022 13:19)  T(F): 97.4 (29 Oct 2022 05:17), Max: 97.4 (28 Oct 2022 13:19)  HR: 65 (29 Oct 2022 05:17) (58 - 66)  BP: 161/80 (29 Oct 2022 05:17) (132/66 - 161/80)  BP(mean): --  RR: 16 (29 Oct 2022 05:17) (16 - 19)  SpO2: 98% (29 Oct 2022 05:17) (95% - 98%)    Parameters below as of 29 Oct 2022 05:17  Patient On (Oxygen Delivery Method): room air        PHYSICAL EXAMINATION:  GENERAL: NAD, well built  HEAD:  Atraumatic, Normocephalic  EYES:  conjunctiva and sclera clear  NECK: Supple, No JVD, Normal thyroid  CHEST/LUNG: Clear to auscultation. No rales, rhonchi, wheezing, or rubs  HEART: Regular rate and rhythm; No murmurs, rubs, or gallops  ABDOMEN: Soft, Nontender, Nondistended; Bowel sounds present  NERVOUS SYSTEM:  Alert & Oriented X3,    EXTREMITIES:  2+ Peripheral Pulses, No clubbing, cyanosis, or edema  SKIN: warm dry                      CAPILLARY BLOOD GLUCOSE      RADIOLOGY & ADDITIONAL TESTS:                   PGY-1 Progress Note discussed with attending    PAGER #: [[phone]] TILL 5:00 PM  PLEASE CONTACT ON CALL TEAM:  - On Call Team (Please refer to Remberto) FROM 5:00 PM - 8:30PM  - Nightfloat Team FROM 8:30 -7:30 AM    INTERVAL HPI/OVERNIGHT EVENTS: NAEON, patient unable to sleep well due to noise in room    MEDICATIONS  (STANDING):  acetaminophen     Tablet .. 650 milliGRAM(s) Oral every 6 hours  apixaban 5 milliGRAM(s) Oral every 12 hours  atorvastatin 40 milliGRAM(s) Oral at bedtime  clopidogrel Tablet 75 milliGRAM(s) Oral daily  colchicine 0.6 milliGRAM(s) Oral daily  dextrose 5%. 1000 milliLiter(s) (50 mL/Hr) IV Continuous <Continuous>  dextrose 5%. 1000 milliLiter(s) (100 mL/Hr) IV Continuous <Continuous>  dextrose 50% Injectable 25 Gram(s) IV Push once  dextrose 50% Injectable 12.5 Gram(s) IV Push once  dextrose 50% Injectable 25 Gram(s) IV Push once  finasteride 5 milliGRAM(s) Oral daily  glucagon  Injectable 1 milliGRAM(s) IntraMuscular once  insulin glargine Injectable (LANTUS) 10 Unit(s) SubCutaneous every morning  insulin lispro (ADMELOG) corrective regimen sliding scale   SubCutaneous at bedtime  insulin lispro Injectable (ADMELOG) 4 Unit(s) SubCutaneous three times a day before meals  lidocaine   4% Patch 1 Patch Transdermal daily  losartan 75 milliGRAM(s) Oral daily  metoprolol succinate ER 25 milliGRAM(s) Oral daily  polyethylene glycol 3350 17 Gram(s) Oral daily  senna 2 Tablet(s) Oral at bedtime  tamsulosin 0.4 milliGRAM(s) Oral at bedtime    MEDICATIONS  (PRN):  dextrose Oral Gel 15 Gram(s) Oral once PRN Blood Glucose LESS THAN 70 milliGRAM(s)/deciliter      REVIEW OF SYSTEMS:  CONSTITUTIONAL: No fever, weight loss, or fatigue  RESPIRATORY: No cough, wheezing, chills or hemoptysis; No shortness of breath  CARDIOVASCULAR: No chest pain, palpitations, dizziness, or leg swelling  GASTROINTESTINAL: No abdominal pain. No nausea, vomiting, or diarrhea.  GENITOURINARY: No dysuria or hematuria, urinary frequency  NEUROLOGICAL: No headaches, memory loss, loss of strength, numbness, or tremors  SKIN: No itching, burning, rashes, or lesions, pain in right leg    Vital Signs Last 24 Hrs  T(C): 36.3 (29 Oct 2022 05:17), Max: 36.3 (28 Oct 2022 13:19)  T(F): 97.4 (29 Oct 2022 05:17), Max: 97.4 (28 Oct 2022 13:19)  HR: 65 (29 Oct 2022 05:17) (58 - 66)  BP: 161/80 (29 Oct 2022 05:17) (132/66 - 161/80)  BP(mean): --  RR: 16 (29 Oct 2022 05:17) (16 - 19)  SpO2: 98% (29 Oct 2022 05:17) (95% - 98%)    Parameters below as of 29 Oct 2022 05:17  Patient On (Oxygen Delivery Method): room air        PHYSICAL EXAMINATION:  GENERAL: NAD, well built  HEAD:  Atraumatic, Normocephalic  EYES:  conjunctiva and sclera clear  NECK: Supple, No JVD, Normal thyroid  CHEST/LUNG: Clear to auscultation. No rales, rhonchi, wheezing, or rubs  HEART: Regular rate and rhythm; No murmurs, rubs, or gallops  ABDOMEN: Soft, Nontender, Nondistended; Bowel sounds present  NERVOUS SYSTEM:  Alert & Oriented X3,    EXTREMITIES:  2+ Peripheral Pulses, No clubbing, cyanosis, or edema  SKIN: warm dry                      CAPILLARY BLOOD GLUCOSE      RADIOLOGY & ADDITIONAL TESTS:                   PGY-1 Progress Note discussed with attending    PAGER #: [[phone]] TILL 5:00 PM  PLEASE CONTACT ON CALL TEAM:  - On Call Team (Please refer to Remberto) FROM 5:00 PM - 8:30PM  - Nightfloat Team FROM 8:30 -7:30 AM    INTERVAL HPI/OVERNIGHT EVENTS: NAEON, patient unable to sleep well due to noise in room    MEDICATIONS  (STANDING):  acetaminophen     Tablet .. 650 milliGRAM(s) Oral every 6 hours  apixaban 5 milliGRAM(s) Oral every 12 hours  atorvastatin 40 milliGRAM(s) Oral at bedtime  clopidogrel Tablet 75 milliGRAM(s) Oral daily  colchicine 0.6 milliGRAM(s) Oral daily  dextrose 5%. 1000 milliLiter(s) (50 mL/Hr) IV Continuous <Continuous>  dextrose 5%. 1000 milliLiter(s) (100 mL/Hr) IV Continuous <Continuous>  dextrose 50% Injectable 25 Gram(s) IV Push once  dextrose 50% Injectable 12.5 Gram(s) IV Push once  dextrose 50% Injectable 25 Gram(s) IV Push once  finasteride 5 milliGRAM(s) Oral daily  glucagon  Injectable 1 milliGRAM(s) IntraMuscular once  insulin glargine Injectable (LANTUS) 10 Unit(s) SubCutaneous every morning  insulin lispro (ADMELOG) corrective regimen sliding scale   SubCutaneous at bedtime  insulin lispro Injectable (ADMELOG) 4 Unit(s) SubCutaneous three times a day before meals  lidocaine   4% Patch 1 Patch Transdermal daily  losartan 75 milliGRAM(s) Oral daily  metoprolol succinate ER 25 milliGRAM(s) Oral daily  polyethylene glycol 3350 17 Gram(s) Oral daily  senna 2 Tablet(s) Oral at bedtime  tamsulosin 0.4 milliGRAM(s) Oral at bedtime    MEDICATIONS  (PRN):  dextrose Oral Gel 15 Gram(s) Oral once PRN Blood Glucose LESS THAN 70 milliGRAM(s)/deciliter      REVIEW OF SYSTEMS:  CONSTITUTIONAL: No fever, weight loss, or fatigue  RESPIRATORY: No cough, wheezing, chills or hemoptysis; No shortness of breath  CARDIOVASCULAR: No chest pain, palpitations, dizziness, or leg swelling  GASTROINTESTINAL: No abdominal pain. No nausea, vomiting, or diarrhea.  GENITOURINARY: No dysuria or hematuria, urinary frequency  NEUROLOGICAL: No headaches, memory loss, loss of strength, numbness, or tremors  SKIN: No itching, burning, rashes, or lesions, cramps in right leg    Vital Signs Last 24 Hrs  T(C): 36.3 (29 Oct 2022 05:17), Max: 36.3 (28 Oct 2022 13:19)  T(F): 97.4 (29 Oct 2022 05:17), Max: 97.4 (28 Oct 2022 13:19)  HR: 65 (29 Oct 2022 05:17) (58 - 66)  BP: 161/80 (29 Oct 2022 05:17) (132/66 - 161/80)  BP(mean): --  RR: 16 (29 Oct 2022 05:17) (16 - 19)  SpO2: 98% (29 Oct 2022 05:17) (95% - 98%)    Parameters below as of 29 Oct 2022 05:17  Patient On (Oxygen Delivery Method): room air        PHYSICAL EXAMINATION:  GENERAL: NAD, elderly, well nourished  HEAD:  Atraumatic, Normocephalic  EYES:  conjunctiva and sclera clear  CHEST/LUNG: Clear to auscultation. No rales, rhonchi, wheezing, or rubs  HEART: Regular rate and rhythm; No murmurs, rubs, or gallops  ABDOMEN: Soft, Nontender, Nondistended; Bowel sounds present  NERVOUS SYSTEM:  Alert & Oriented X3,    EXTREMITIES:  2+ Peripheral Pulses, No clubbing, cyanosis, or edema  SKIN: warm dry, no erythema, no swelling noted on knees                      CAPILLARY BLOOD GLUCOSE      RADIOLOGY & ADDITIONAL TESTS:

## 2022-10-30 LAB
ALBUMIN SERPL ELPH-MCNC: 2.5 G/DL — LOW (ref 3.5–5)
ALP SERPL-CCNC: 56 U/L — SIGNIFICANT CHANGE UP (ref 40–120)
ALT FLD-CCNC: 30 U/L DA — SIGNIFICANT CHANGE UP (ref 10–60)
ANION GAP SERPL CALC-SCNC: 3 MMOL/L — LOW (ref 5–17)
AST SERPL-CCNC: 28 U/L — SIGNIFICANT CHANGE UP (ref 10–40)
BILIRUB SERPL-MCNC: 0.4 MG/DL — SIGNIFICANT CHANGE UP (ref 0.2–1.2)
BUN SERPL-MCNC: 25 MG/DL — HIGH (ref 7–18)
CALCIUM SERPL-MCNC: 9.1 MG/DL — SIGNIFICANT CHANGE UP (ref 8.4–10.5)
CHLORIDE SERPL-SCNC: 111 MMOL/L — HIGH (ref 96–108)
CO2 SERPL-SCNC: 26 MMOL/L — SIGNIFICANT CHANGE UP (ref 22–31)
CREAT SERPL-MCNC: 1.1 MG/DL — SIGNIFICANT CHANGE UP (ref 0.5–1.3)
EGFR: 66 ML/MIN/1.73M2 — SIGNIFICANT CHANGE UP
GLUCOSE BLDC GLUCOMTR-MCNC: 140 MG/DL — HIGH (ref 70–99)
GLUCOSE BLDC GLUCOMTR-MCNC: 178 MG/DL — HIGH (ref 70–99)
GLUCOSE BLDC GLUCOMTR-MCNC: 199 MG/DL — HIGH (ref 70–99)
GLUCOSE BLDC GLUCOMTR-MCNC: 223 MG/DL — HIGH (ref 70–99)
GLUCOSE SERPL-MCNC: 144 MG/DL — HIGH (ref 70–99)
HCT VFR BLD CALC: 40 % — SIGNIFICANT CHANGE UP (ref 39–50)
HGB BLD-MCNC: 12.4 G/DL — LOW (ref 13–17)
MCHC RBC-ENTMCNC: 26.8 PG — LOW (ref 27–34)
MCHC RBC-ENTMCNC: 31 GM/DL — LOW (ref 32–36)
MCV RBC AUTO: 86.4 FL — SIGNIFICANT CHANGE UP (ref 80–100)
NRBC # BLD: 0 /100 WBCS — SIGNIFICANT CHANGE UP (ref 0–0)
PLATELET # BLD AUTO: 256 K/UL — SIGNIFICANT CHANGE UP (ref 150–400)
POTASSIUM SERPL-MCNC: 4.3 MMOL/L — SIGNIFICANT CHANGE UP (ref 3.5–5.3)
POTASSIUM SERPL-SCNC: 4.3 MMOL/L — SIGNIFICANT CHANGE UP (ref 3.5–5.3)
PROT SERPL-MCNC: 6.7 G/DL — SIGNIFICANT CHANGE UP (ref 6–8.3)
RBC # BLD: 4.63 M/UL — SIGNIFICANT CHANGE UP (ref 4.2–5.8)
RBC # FLD: 14.3 % — SIGNIFICANT CHANGE UP (ref 10.3–14.5)
SODIUM SERPL-SCNC: 140 MMOL/L — SIGNIFICANT CHANGE UP (ref 135–145)
WBC # BLD: 6.35 K/UL — SIGNIFICANT CHANGE UP (ref 3.8–10.5)
WBC # FLD AUTO: 6.35 K/UL — SIGNIFICANT CHANGE UP (ref 3.8–10.5)

## 2022-10-30 RX ADMIN — LIDOCAINE 1 PATCH: 4 CREAM TOPICAL at 08:27

## 2022-10-30 RX ADMIN — Medication 4 UNIT(S): at 11:39

## 2022-10-30 RX ADMIN — Medication 1000 MILLIGRAM(S): at 14:43

## 2022-10-30 RX ADMIN — POLYETHYLENE GLYCOL 3350 17 GRAM(S): 17 POWDER, FOR SOLUTION ORAL at 11:38

## 2022-10-30 RX ADMIN — APIXABAN 5 MILLIGRAM(S): 2.5 TABLET, FILM COATED ORAL at 06:23

## 2022-10-30 RX ADMIN — SENNA PLUS 2 TABLET(S): 8.6 TABLET ORAL at 21:58

## 2022-10-30 RX ADMIN — Medication 1000 MILLIGRAM(S): at 22:07

## 2022-10-30 RX ADMIN — INSULIN GLARGINE 10 UNIT(S): 100 INJECTION, SOLUTION SUBCUTANEOUS at 08:11

## 2022-10-30 RX ADMIN — ATORVASTATIN CALCIUM 40 MILLIGRAM(S): 80 TABLET, FILM COATED ORAL at 21:58

## 2022-10-30 RX ADMIN — Medication 4 UNIT(S): at 08:11

## 2022-10-30 RX ADMIN — CLOPIDOGREL BISULFATE 75 MILLIGRAM(S): 75 TABLET, FILM COATED ORAL at 11:38

## 2022-10-30 RX ADMIN — Medication 4 UNIT(S): at 17:04

## 2022-10-30 RX ADMIN — Medication 1000 MILLIGRAM(S): at 06:22

## 2022-10-30 RX ADMIN — LIDOCAINE 1 PATCH: 4 CREAM TOPICAL at 21:58

## 2022-10-30 RX ADMIN — Medication 25 MILLIGRAM(S): at 06:23

## 2022-10-30 RX ADMIN — Medication 1000 MILLIGRAM(S): at 13:25

## 2022-10-30 RX ADMIN — Medication 1000 MILLIGRAM(S): at 21:58

## 2022-10-30 RX ADMIN — Medication 0.6 MILLIGRAM(S): at 11:38

## 2022-10-30 RX ADMIN — FINASTERIDE 5 MILLIGRAM(S): 5 TABLET, FILM COATED ORAL at 11:38

## 2022-10-30 RX ADMIN — Medication 1000 MILLIGRAM(S): at 06:42

## 2022-10-30 RX ADMIN — TAMSULOSIN HYDROCHLORIDE 0.4 MILLIGRAM(S): 0.4 CAPSULE ORAL at 21:58

## 2022-10-30 RX ADMIN — APIXABAN 5 MILLIGRAM(S): 2.5 TABLET, FILM COATED ORAL at 17:04

## 2022-10-31 ENCOUNTER — TRANSCRIPTION ENCOUNTER (OUTPATIENT)
Age: 87
End: 2022-10-31

## 2022-10-31 ENCOUNTER — APPOINTMENT (OUTPATIENT)
Dept: CARDIOLOGY | Facility: CLINIC | Age: 87
End: 2022-10-31

## 2022-10-31 VITALS
RESPIRATION RATE: 18 BRPM | SYSTOLIC BLOOD PRESSURE: 123 MMHG | OXYGEN SATURATION: 95 % | HEART RATE: 59 BPM | TEMPERATURE: 98 F | DIASTOLIC BLOOD PRESSURE: 70 MMHG

## 2022-10-31 LAB
ALBUMIN SERPL ELPH-MCNC: 2.6 G/DL — LOW (ref 3.5–5)
ALP SERPL-CCNC: 57 U/L — SIGNIFICANT CHANGE UP (ref 40–120)
ALT FLD-CCNC: 32 U/L DA — SIGNIFICANT CHANGE UP (ref 10–60)
ANION GAP SERPL CALC-SCNC: 5 MMOL/L — SIGNIFICANT CHANGE UP (ref 5–17)
AST SERPL-CCNC: 22 U/L — SIGNIFICANT CHANGE UP (ref 10–40)
BILIRUB SERPL-MCNC: 0.4 MG/DL — SIGNIFICANT CHANGE UP (ref 0.2–1.2)
BUN SERPL-MCNC: 21 MG/DL — HIGH (ref 7–18)
CALCIUM SERPL-MCNC: 9.1 MG/DL — SIGNIFICANT CHANGE UP (ref 8.4–10.5)
CHLORIDE SERPL-SCNC: 109 MMOL/L — HIGH (ref 96–108)
CO2 SERPL-SCNC: 26 MMOL/L — SIGNIFICANT CHANGE UP (ref 22–31)
CREAT SERPL-MCNC: 1.01 MG/DL — SIGNIFICANT CHANGE UP (ref 0.5–1.3)
EGFR: 73 ML/MIN/1.73M2 — SIGNIFICANT CHANGE UP
GLUCOSE BLDC GLUCOMTR-MCNC: 150 MG/DL — HIGH (ref 70–99)
GLUCOSE BLDC GLUCOMTR-MCNC: 202 MG/DL — HIGH (ref 70–99)
GLUCOSE BLDC GLUCOMTR-MCNC: 291 MG/DL — HIGH (ref 70–99)
GLUCOSE SERPL-MCNC: 177 MG/DL — HIGH (ref 70–99)
HCT VFR BLD CALC: 39.1 % — SIGNIFICANT CHANGE UP (ref 39–50)
HGB BLD-MCNC: 12.2 G/DL — LOW (ref 13–17)
MCHC RBC-ENTMCNC: 26.8 PG — LOW (ref 27–34)
MCHC RBC-ENTMCNC: 31.2 GM/DL — LOW (ref 32–36)
MCV RBC AUTO: 85.9 FL — SIGNIFICANT CHANGE UP (ref 80–100)
NRBC # BLD: 0 /100 WBCS — SIGNIFICANT CHANGE UP (ref 0–0)
PLATELET # BLD AUTO: 239 K/UL — SIGNIFICANT CHANGE UP (ref 150–400)
POTASSIUM SERPL-MCNC: 4.2 MMOL/L — SIGNIFICANT CHANGE UP (ref 3.5–5.3)
POTASSIUM SERPL-SCNC: 4.2 MMOL/L — SIGNIFICANT CHANGE UP (ref 3.5–5.3)
PROT SERPL-MCNC: 6.6 G/DL — SIGNIFICANT CHANGE UP (ref 6–8.3)
RBC # BLD: 4.55 M/UL — SIGNIFICANT CHANGE UP (ref 4.2–5.8)
RBC # FLD: 14.4 % — SIGNIFICANT CHANGE UP (ref 10.3–14.5)
SARS-COV-2 RNA SPEC QL NAA+PROBE: DETECTED
SODIUM SERPL-SCNC: 140 MMOL/L — SIGNIFICANT CHANGE UP (ref 135–145)
WBC # BLD: 7.18 K/UL — SIGNIFICANT CHANGE UP (ref 3.8–10.5)
WBC # FLD AUTO: 7.18 K/UL — SIGNIFICANT CHANGE UP (ref 3.8–10.5)

## 2022-10-31 PROCEDURE — 99239 HOSP IP/OBS DSCHRG MGMT >30: CPT | Mod: GC

## 2022-10-31 RX ORDER — ACETAMINOPHEN 500 MG
1000 TABLET ORAL EVERY 8 HOURS
Refills: 0 | Status: DISCONTINUED | OUTPATIENT
Start: 2022-10-31 | End: 2022-10-31

## 2022-10-31 RX ADMIN — Medication 25 MILLIGRAM(S): at 05:30

## 2022-10-31 RX ADMIN — APIXABAN 5 MILLIGRAM(S): 2.5 TABLET, FILM COATED ORAL at 17:33

## 2022-10-31 RX ADMIN — LOSARTAN POTASSIUM 100 MILLIGRAM(S): 100 TABLET, FILM COATED ORAL at 05:30

## 2022-10-31 RX ADMIN — Medication 0.6 MILLIGRAM(S): at 13:20

## 2022-10-31 RX ADMIN — LIDOCAINE 1 PATCH: 4 CREAM TOPICAL at 09:26

## 2022-10-31 RX ADMIN — INSULIN GLARGINE 10 UNIT(S): 100 INJECTION, SOLUTION SUBCUTANEOUS at 08:37

## 2022-10-31 RX ADMIN — FINASTERIDE 5 MILLIGRAM(S): 5 TABLET, FILM COATED ORAL at 13:20

## 2022-10-31 RX ADMIN — POLYETHYLENE GLYCOL 3350 17 GRAM(S): 17 POWDER, FOR SOLUTION ORAL at 13:19

## 2022-10-31 RX ADMIN — LIDOCAINE 1 PATCH: 4 CREAM TOPICAL at 07:49

## 2022-10-31 RX ADMIN — APIXABAN 5 MILLIGRAM(S): 2.5 TABLET, FILM COATED ORAL at 05:30

## 2022-10-31 RX ADMIN — Medication 1000 MILLIGRAM(S): at 05:31

## 2022-10-31 RX ADMIN — Medication 4 UNIT(S): at 08:23

## 2022-10-31 RX ADMIN — Medication 4 UNIT(S): at 17:34

## 2022-10-31 RX ADMIN — Medication 4 UNIT(S): at 12:13

## 2022-10-31 RX ADMIN — CLOPIDOGREL BISULFATE 75 MILLIGRAM(S): 75 TABLET, FILM COATED ORAL at 13:20

## 2022-10-31 NOTE — DISCHARGE NOTE NURSING/CASE MANAGEMENT/SOCIAL WORK - NSDCPEFALRISK_GEN_ALL_CORE
For information on Fall & Injury Prevention, visit: [email or web address]  [email or web address] OR  https://www.cdc.gov/steadi/patient.html

## 2022-10-31 NOTE — PROGRESS NOTE ADULT - PROBLEM SELECTOR PLAN 3
A1c 7.6%  Patient started on prednisone yesterday, blood sugars elevated to the 500s  c/w sliding scale; changed to moderate sliding  Adjust insulin as indicated  FS ACHS    Titrated down since steroid course has now completed  Today 10 units lantus in AM + 4 Units Admelog before meals A1c 7.6%  Patient started on prednisone yesterday, blood sugars elevated to the 500s  c/w sliding scale; changed to moderate sliding  Adjust insulin as indicated  FS ACHS    Titrated down since steroid course has now completed  now 10 units lantus in AM + 4 Units Admelog before meals

## 2022-10-31 NOTE — PROGRESS NOTE ADULT - PROBLEM SELECTOR PLAN 7
takes finasteride and flomax per [facility]   c/w home meds
takes finasteride and flomax  c/w home meds.
takes finasteride and flomax per [facility]   c/w home meds

## 2022-10-31 NOTE — PROGRESS NOTE ADULT - PROBLEM SELECTOR PROBLEM 7
BPH (benign prostatic hyperplasia)

## 2022-10-31 NOTE — PROGRESS NOTE ADULT - PROBLEM SELECTOR PLAN 1
s/p mechanical fall w/ left knee injury 2 days prior to hospitalization  likely in the setting of deconditioning d/t recent hospital admission for COVID infection, not DC with ambulatory devices or assistance, based on synovial fluid results likely Gout flare  Afebrile with no WBC  -spiked fever, arthrocentesis done 10/21  -synovial fluid positive for monosodium urate crystal, negative cultures  -completed prednisone 20mg PO today  -Pain better controlled today    [ ] Continue home colchicine for two more days   [ ] Discharge with Allopurinol 100mg  [ ] Standing Tylenol 1g Q8: Patient still complains of pain, does not verbalize pain to nurses when ordered PO s/p mechanical fall w/ left knee injury 2 days prior to hospitalization  likely in the setting of deconditioning d/t recent hospital admission for COVID infection, not DC with ambulatory devices or assistance, based on synovial fluid results likely Gout flare  Afebrile with no WBC  -spiked fever, arthrocentesis done 10/21  -synovial fluid positive for monosodium urate crystal, negative cultures  -completed prednisone 20mg PO today  -Pain better controlled today    DC colchicine  Start Allopurinol 100mg s/p mechanical fall w/ left knee injury 2 days prior to hospitalization  likely in the setting of deconditioning d/t recent hospital admission for COVID infection, not DC with ambulatory devices or assistance, based on synovial fluid results likely Gout flare  Afebrile with no WBC  -synovial fluid positive for monosodium urate crystal, negative cultures  -completed prednisone   -Pain better controlled today  DC colchicine  Start Allopurinol 100mg

## 2022-10-31 NOTE — PROGRESS NOTE ADULT - PROBLEM SELECTOR PLAN 6
takes plavix and Eliquis as per [facility], patient had 2 stents recently placed July 2022 to be on Plavix and Eliquis for 6 months.    Follows Dr. Melton outpatient for cardiology  c/w [place] [facility]

## 2022-10-31 NOTE — PROGRESS NOTE ADULT - PROBLEM SELECTOR PLAN 8
h/o HTN unclear what BP patient still takes as per [facility]  c/w lopressor 25 BID (per [facility] takes toprol 25 qd)  will hold for parameters   monitor BP  [ ] Patient hypertensive, will change Losartan 75PO Daily to 100mg PO Daily h/o HTN unclear what BP patient still takes as per [facility]  c/w lopressor 25 BID (per [facility] takes toprol 25 qd)  will hold for parameters   monitor BP  Losartan 75PO Daily now changed to 100mg PO Daily

## 2022-10-31 NOTE — PROGRESS NOTE ADULT - SUBJECTIVE AND OBJECTIVE BOX
PGY-1 Progress Note discussed with attending    PAGER #: [[phone]] TILL 5:00 PM  PLEASE CONTACT ON CALL TEAM:  - On Call Team (Please refer to Remberto) FROM 5:00 PM - 8:30PM  - Nightfloat Team FROM 8:30 -7:30 AM    CHIEF COMPLAINT & BRIEF HOSPITAL COURSE:  87M, coming form home, declining in ambulatory status, with a PMHx of CAD, s/p stents, PAD on Plavix and Eliquis, DM, BPH, HTN BIBEMS for evaluation s/p fall and knee pain. Patient admitted s/p fall in the setting of weakness and ambulatory decline after recent hospitalization found to have acute gout flare.    INTERVAL HPI/OVERNIGHT EVENTS:   Patient seen and examined at bedside. No acute events overnight. No complaints today.    MEDICATIONS  (STANDING):  apixaban 5 milliGRAM(s) Oral every 12 hours  atorvastatin 40 milliGRAM(s) Oral at bedtime  clopidogrel Tablet 75 milliGRAM(s) Oral daily  colchicine 0.6 milliGRAM(s) Oral daily  dextrose 5%. 1000 milliLiter(s) (50 mL/Hr) IV Continuous <Continuous>  dextrose 5%. 1000 milliLiter(s) (100 mL/Hr) IV Continuous <Continuous>  dextrose 50% Injectable 25 Gram(s) IV Push once  dextrose 50% Injectable 12.5 Gram(s) IV Push once  dextrose 50% Injectable 25 Gram(s) IV Push once  finasteride 5 milliGRAM(s) Oral daily  glucagon  Injectable 1 milliGRAM(s) IntraMuscular once  insulin glargine Injectable (LANTUS) 10 Unit(s) SubCutaneous every morning  insulin lispro (ADMELOG) corrective regimen sliding scale   SubCutaneous at bedtime  insulin lispro Injectable (ADMELOG) 4 Unit(s) SubCutaneous three times a day before meals  lidocaine   4% Patch 1 Patch Transdermal daily  losartan 100 milliGRAM(s) Oral daily  metoprolol succinate ER 25 milliGRAM(s) Oral daily  polyethylene glycol 3350 17 Gram(s) Oral daily  senna 2 Tablet(s) Oral at bedtime  tamsulosin 0.4 milliGRAM(s) Oral at bedtime    MEDICATIONS  (PRN):  acetaminophen     Tablet .. 1000 milliGRAM(s) Oral every 8 hours PRN Moderate Pain (4 - 6)  dextrose Oral Gel 15 Gram(s) Oral once PRN Blood Glucose LESS THAN 70 milliGRAM(s)/deciliter      REVIEW OF SYSTEMS:  CONSTITUTIONAL: No fever, weight loss, or fatigue  RESPIRATORY: No cough, wheezing, chills or hemoptysis; No shortness of breath  CARDIOVASCULAR: No chest pain, palpitations, dizziness, or leg swelling  GASTROINTESTINAL: No abdominal pain. No nausea, vomiting, or hematemesis; No diarrhea or constipation. No melena or hematochezia.  GENITOURINARY: No dysuria or hematuria, urinary frequency  NEUROLOGICAL: No headaches, memory loss, loss of strength, numbness, or tremors  SKIN: No itching, burning, rashes, or lesions     Vital Signs Last 24 Hrs  T(C): 36.8 (31 Oct 2022 05:16), Max: 36.8 (31 Oct 2022 05:16)  T(F): 98.2 (31 Oct 2022 05:16), Max: 98.2 (31 Oct 2022 05:16)  HR: 55 (31 Oct 2022 05:16) (55 - 62)  BP: 141/70 (31 Oct 2022 05:16) (117/61 - 141/70)  BP(mean): --  RR: 18 (31 Oct 2022 05:16) (18 - 20)  SpO2: 96% (31 Oct 2022 05:16) (96% - 96%)    Parameters below as of 31 Oct 2022 05:16  Patient On (Oxygen Delivery Method): room air        PHYSICAL EXAMINATION:  GENERAL: NAD, well built  HEAD:  Atraumatic, Normocephalic  EYES:  conjunctiva and sclera clear  NECK: Supple, No JVD, Normal thyroid  CHEST/LUNG: Clear to auscultation. Clear to percussion bilaterally; No rales, rhonchi, wheezing, or rubs  HEART: Regular rate and rhythm; No murmurs, rubs, or gallops  ABDOMEN: Soft, Nontender, Nondistended; Bowel sounds present  NERVOUS SYSTEM:  Alert & Oriented X3,    EXTREMITIES:  2+ Peripheral Pulses, No clubbing, cyanosis, or edema  SKIN: warm dry                          12.2   7.18  )-----------( 239      ( 31 Oct 2022 07:40 )             39.1     10-31    140  |  109<H>  |  21<H>  ----------------------------<  177<H>  4.2   |  26  |  1.01    Ca    9.1      31 Oct 2022 07:40    TPro  6.6  /  Alb  2.6<L>  /  TBili  0.4  /  DBili  x   /  AST  22  /  ALT  32  /  AlkPhos  57  10-31    LIVER FUNCTIONS - ( 31 Oct 2022 07:40 )  Alb: 2.6 g/dL / Pro: 6.6 g/dL / ALK PHOS: 57 U/L / ALT: 32 U/L DA / AST: 22 U/L / GGT: x                   CAPILLARY BLOOD GLUCOSE      RADIOLOGY & ADDITIONAL TESTS:                   PGY-1 Progress Note discussed with attending    PAGER #: [[phone]] TILL 5:00 PM  PLEASE CONTACT ON CALL TEAM:  - On Call Team (Please refer to Remberto) FROM 5:00 PM - 8:30PM  - Nightfloat Team FROM 8:30 -7:30 AM    CHIEF COMPLAINT & BRIEF HOSPITAL COURSE:  87M, coming form home, declining in ambulatory status, with a PMHx of CAD, s/p stents, PAD on Plavix and Eliquis, DM, BPH, HTN BIBEMS for evaluation s/p fall and knee pain. Patient admitted s/p fall in the setting of weakness and ambulatory decline after recent hospitalization found to have acute gout flare.    INTERVAL HPI/OVERNIGHT EVENTS:   Patient seen and examined at bedside. No acute events overnight. No complaints today.    MEDICATIONS  (STANDING):  apixaban 5 milliGRAM(s) Oral every 12 hours  atorvastatin 40 milliGRAM(s) Oral at bedtime  clopidogrel Tablet 75 milliGRAM(s) Oral daily  colchicine 0.6 milliGRAM(s) Oral daily  dextrose 5%. 1000 milliLiter(s) (50 mL/Hr) IV Continuous <Continuous>  dextrose 5%. 1000 milliLiter(s) (100 mL/Hr) IV Continuous <Continuous>  dextrose 50% Injectable 25 Gram(s) IV Push once  dextrose 50% Injectable 12.5 Gram(s) IV Push once  dextrose 50% Injectable 25 Gram(s) IV Push once  finasteride 5 milliGRAM(s) Oral daily  glucagon  Injectable 1 milliGRAM(s) IntraMuscular once  insulin glargine Injectable (LANTUS) 10 Unit(s) SubCutaneous every morning  insulin lispro (ADMELOG) corrective regimen sliding scale   SubCutaneous at bedtime  insulin lispro Injectable (ADMELOG) 4 Unit(s) SubCutaneous three times a day before meals  lidocaine   4% Patch 1 Patch Transdermal daily  losartan 100 milliGRAM(s) Oral daily  metoprolol succinate ER 25 milliGRAM(s) Oral daily  polyethylene glycol 3350 17 Gram(s) Oral daily  senna 2 Tablet(s) Oral at bedtime  tamsulosin 0.4 milliGRAM(s) Oral at bedtime    MEDICATIONS  (PRN):  acetaminophen     Tablet .. 1000 milliGRAM(s) Oral every 8 hours PRN Moderate Pain (4 - 6)  dextrose Oral Gel 15 Gram(s) Oral once PRN Blood Glucose LESS THAN 70 milliGRAM(s)/deciliter      REVIEW OF SYSTEMS:  CONSTITUTIONAL: No fever, weight loss, or fatigue  RESPIRATORY: No cough, wheezing, chills or hemoptysis; No shortness of breath  CARDIOVASCULAR: No chest pain, palpitations, dizziness, or leg swelling  GASTROINTESTINAL: No abdominal pain. No nausea, vomiting, or hematemesis; No diarrhea or constipation. No melena or hematochezia.  GENITOURINARY: No dysuria or hematuria, urinary frequency  NEUROLOGICAL: No headaches, memory loss, loss of strength, numbness, or tremors  SKIN: No itching, burning, rashes, or lesions     Vital Signs Last 24 Hrs  T(C): 36.8 (31 Oct 2022 05:16), Max: 36.8 (31 Oct 2022 05:16)  T(F): 98.2 (31 Oct 2022 05:16), Max: 98.2 (31 Oct 2022 05:16)  HR: 55 (31 Oct 2022 05:16) (55 - 62)  BP: 141/70 (31 Oct 2022 05:16) (117/61 - 141/70)  BP(mean): --  RR: 18 (31 Oct 2022 05:16) (18 - 20)  SpO2: 96% (31 Oct 2022 05:16) (96% - 96%)    Parameters below as of 31 Oct 2022 05:16  Patient On (Oxygen Delivery Method): room air        PHYSICAL EXAMINATION:  GENERAL: NAD, elderly, well nourished  HEAD:  Atraumatic, Normocephalic  EYES:  conjunctiva and sclera clear  CHEST/LUNG: Clear to auscultation. No rales, rhonchi, wheezing, or rubs  HEART: Regular rate and rhythm; No murmurs, rubs, or gallops  ABDOMEN: Soft, Nontender, Nondistended; Bowel sounds present  NERVOUS SYSTEM:  Alert & Oriented X3,    EXTREMITIES:  2+ Peripheral Pulses, No clubbing, cyanosis, or edema  SKIN: warm dry, no erythema, no swelling noted on knees                          12.2   7.18  )-----------( 239      ( 31 Oct 2022 07:40 )             39.1     10-31    140  |  109<H>  |  21<H>  ----------------------------<  177<H>  4.2   |  26  |  1.01    Ca    9.1      31 Oct 2022 07:40    TPro  6.6  /  Alb  2.6<L>  /  TBili  0.4  /  DBili  x   /  AST  22  /  ALT  32  /  AlkPhos  57  10-31    LIVER FUNCTIONS - ( 31 Oct 2022 07:40 )  Alb: 2.6 g/dL / Pro: 6.6 g/dL / ALK PHOS: 57 U/L / ALT: 32 U/L DA / AST: 22 U/L / GGT: x                   CAPILLARY BLOOD GLUCOSE      RADIOLOGY & ADDITIONAL TESTS:                   PGY-1 Progress Note discussed with attending    PAGER #: [[phone]] TILL 5:00 PM  PLEASE CONTACT ON CALL TEAM:  - On Call Team (Please refer to Remberto) FROM 5:00 PM - 8:30PM  - Nightfloat Team FROM 8:30 -7:30 AM    CHIEF COMPLAINT & BRIEF HOSPITAL COURSE:  87M, coming form home, declining in ambulatory status, with a PMHx of CAD, s/p stents, PAD on Plavix and Eliquis, DM, BPH, HTN BIBEMS for evaluation s/p fall and knee pain. Patient admitted s/p fall in the setting of weakness and ambulatory decline after recent hospitalization found to have acute gout flare.     INTERVAL HPI/OVERNIGHT EVENTS:   Patient seen and examined at bedside. No acute events overnight. No complaints today.    MEDICATIONS  (STANDING):  apixaban 5 milliGRAM(s) Oral every 12 hours  atorvastatin 40 milliGRAM(s) Oral at bedtime  clopidogrel Tablet 75 milliGRAM(s) Oral daily  colchicine 0.6 milliGRAM(s) Oral daily  dextrose 5%. 1000 milliLiter(s) (50 mL/Hr) IV Continuous <Continuous>  dextrose 5%. 1000 milliLiter(s) (100 mL/Hr) IV Continuous <Continuous>  dextrose 50% Injectable 25 Gram(s) IV Push once  dextrose 50% Injectable 12.5 Gram(s) IV Push once  dextrose 50% Injectable 25 Gram(s) IV Push once  finasteride 5 milliGRAM(s) Oral daily  glucagon  Injectable 1 milliGRAM(s) IntraMuscular once  insulin glargine Injectable (LANTUS) 10 Unit(s) SubCutaneous every morning  insulin lispro (ADMELOG) corrective regimen sliding scale   SubCutaneous at bedtime  insulin lispro Injectable (ADMELOG) 4 Unit(s) SubCutaneous three times a day before meals  lidocaine   4% Patch 1 Patch Transdermal daily  losartan 100 milliGRAM(s) Oral daily  metoprolol succinate ER 25 milliGRAM(s) Oral daily  polyethylene glycol 3350 17 Gram(s) Oral daily  senna 2 Tablet(s) Oral at bedtime  tamsulosin 0.4 milliGRAM(s) Oral at bedtime    MEDICATIONS  (PRN):  acetaminophen     Tablet .. 1000 milliGRAM(s) Oral every 8 hours PRN Moderate Pain (4 - 6)  dextrose Oral Gel 15 Gram(s) Oral once PRN Blood Glucose LESS THAN 70 milliGRAM(s)/deciliter      REVIEW OF SYSTEMS:  CONSTITUTIONAL: No fever, weight loss, or fatigue  RESPIRATORY: No cough, wheezing, chills or hemoptysis; No shortness of breath  CARDIOVASCULAR: No chest pain, palpitations, dizziness, or leg swelling  GASTROINTESTINAL: No abdominal pain. No nausea, vomiting, or hematemesis; No diarrhea or constipation. No melena or hematochezia.  GENITOURINARY: No dysuria or hematuria, urinary frequency  NEUROLOGICAL: No headaches, memory loss, loss of strength, numbness, or tremors  SKIN: No itching, burning, rashes, or lesions     Vital Signs Last 24 Hrs  T(C): 36.8 (31 Oct 2022 05:16), Max: 36.8 (31 Oct 2022 05:16)  T(F): 98.2 (31 Oct 2022 05:16), Max: 98.2 (31 Oct 2022 05:16)  HR: 55 (31 Oct 2022 05:16) (55 - 62)  BP: 141/70 (31 Oct 2022 05:16) (117/61 - 141/70)  BP(mean): --  RR: 18 (31 Oct 2022 05:16) (18 - 20)  SpO2: 96% (31 Oct 2022 05:16) (96% - 96%)    Parameters below as of 31 Oct 2022 05:16  Patient On (Oxygen Delivery Method): room air        PHYSICAL EXAMINATION:  GENERAL: NAD, elderly, well nourished  HEAD:  Atraumatic, Normocephalic  EYES:  conjunctiva and sclera clear  CHEST/LUNG: Clear to auscultation. No rales, rhonchi, wheezing, or rubs  HEART: Regular rate and rhythm; No murmurs, rubs, or gallops  ABDOMEN: Soft, Nontender, Nondistended; Bowel sounds present  NERVOUS SYSTEM:  Alert & Oriented X3,    EXTREMITIES:  2+ Peripheral Pulses, No clubbing, cyanosis, or edema  SKIN: warm dry, no erythema, no swelling noted on knees                          12.2   7.18  )-----------( 239      ( 31 Oct 2022 07:40 )             39.1     10-31    140  |  109<H>  |  21<H>  ----------------------------<  177<H>  4.2   |  26  |  1.01    Ca    9.1      31 Oct 2022 07:40    TPro  6.6  /  Alb  2.6<L>  /  TBili  0.4  /  DBili  x   /  AST  22  /  ALT  32  /  AlkPhos  57  10-31    LIVER FUNCTIONS - ( 31 Oct 2022 07:40 )  Alb: 2.6 g/dL / Pro: 6.6 g/dL / ALK PHOS: 57 U/L / ALT: 32 U/L DA / AST: 22 U/L / GGT: x                   CAPILLARY BLOOD GLUCOSE      RADIOLOGY & ADDITIONAL TESTS:

## 2022-10-31 NOTE — DISCHARGE NOTE NURSING/CASE MANAGEMENT/SOCIAL WORK - PATIENT PORTAL LINK FT
You can access the FollowMyHealth Patient Portal offered by [facility] by registering at the following website: [email or web address]. By joining [email or web address]’s FollowMyHealth portal, you will also be able to view your health information using other applications (apps) compatible with our system.

## 2022-10-31 NOTE — PROGRESS NOTE ADULT - PROVIDER SPECIALTY LIST ADULT
Internal Medicine
Infectious Disease
Internal Medicine
Orthopedics
Pain Medicine
Internal Medicine
Pain Medicine
Internal Medicine

## 2022-11-02 ENCOUNTER — NON-APPOINTMENT (OUTPATIENT)
Age: 87
End: 2022-11-02

## 2022-11-04 LAB
CULTURE RESULTS: SIGNIFICANT CHANGE UP
SPECIMEN SOURCE: SIGNIFICANT CHANGE UP

## 2022-11-08 PROCEDURE — 72125 CT NECK SPINE W/O DYE: CPT | Mod: MA

## 2022-11-08 PROCEDURE — 73564 X-RAY EXAM KNEE 4 OR MORE: CPT

## 2022-11-08 PROCEDURE — 85025 COMPLETE CBC W/AUTO DIFF WBC: CPT

## 2022-11-08 PROCEDURE — 87637 SARSCOV2&INF A&B&RSV AMP PRB: CPT

## 2022-11-08 PROCEDURE — 87205 SMEAR GRAM STAIN: CPT

## 2022-11-08 PROCEDURE — 83036 HEMOGLOBIN GLYCOSYLATED A1C: CPT

## 2022-11-08 PROCEDURE — 71045 X-RAY EXAM CHEST 1 VIEW: CPT

## 2022-11-08 PROCEDURE — 87635 SARS-COV-2 COVID-19 AMP PRB: CPT

## 2022-11-08 PROCEDURE — 92610 EVALUATE SWALLOWING FUNCTION: CPT

## 2022-11-08 PROCEDURE — 86140 C-REACTIVE PROTEIN: CPT

## 2022-11-08 PROCEDURE — 85730 THROMBOPLASTIN TIME PARTIAL: CPT

## 2022-11-08 PROCEDURE — 93005 ELECTROCARDIOGRAM TRACING: CPT

## 2022-11-08 PROCEDURE — 87070 CULTURE OTHR SPECIMN AEROBIC: CPT

## 2022-11-08 PROCEDURE — 87075 CULTR BACTERIA EXCEPT BLOOD: CPT

## 2022-11-08 PROCEDURE — 83735 ASSAY OF MAGNESIUM: CPT

## 2022-11-08 PROCEDURE — 82962 GLUCOSE BLOOD TEST: CPT

## 2022-11-08 PROCEDURE — 84100 ASSAY OF PHOSPHORUS: CPT

## 2022-11-08 PROCEDURE — 20610 DRAIN/INJ JOINT/BURSA W/O US: CPT | Mod: LT

## 2022-11-08 PROCEDURE — 89060 EXAM SYNOVIAL FLUID CRYSTALS: CPT

## 2022-11-08 PROCEDURE — 80048 BASIC METABOLIC PNL TOTAL CA: CPT

## 2022-11-08 PROCEDURE — 87086 URINE CULTURE/COLONY COUNT: CPT

## 2022-11-08 PROCEDURE — 89051 BODY FLUID CELL COUNT: CPT

## 2022-11-08 PROCEDURE — 85379 FIBRIN DEGRADATION QUANT: CPT

## 2022-11-08 PROCEDURE — 97161 PT EVAL LOW COMPLEX 20 MIN: CPT

## 2022-11-08 PROCEDURE — 85652 RBC SED RATE AUTOMATED: CPT

## 2022-11-08 PROCEDURE — 93971 EXTREMITY STUDY: CPT

## 2022-11-08 PROCEDURE — 97116 GAIT TRAINING THERAPY: CPT

## 2022-11-08 PROCEDURE — 36415 COLL VENOUS BLD VENIPUNCTURE: CPT

## 2022-11-08 PROCEDURE — 70450 CT HEAD/BRAIN W/O DYE: CPT | Mod: MA

## 2022-11-08 PROCEDURE — 80053 COMPREHEN METABOLIC PANEL: CPT

## 2022-11-08 PROCEDURE — 97530 THERAPEUTIC ACTIVITIES: CPT

## 2022-11-08 PROCEDURE — 81001 URINALYSIS AUTO W/SCOPE: CPT

## 2022-11-08 PROCEDURE — 85027 COMPLETE CBC AUTOMATED: CPT

## 2022-11-08 PROCEDURE — 99285 EMERGENCY DEPT VISIT HI MDM: CPT

## 2022-11-08 PROCEDURE — 85610 PROTHROMBIN TIME: CPT

## 2022-11-14 ENCOUNTER — INPATIENT (INPATIENT)
Facility: HOSPITAL | Age: 87
LOS: 6 days | Discharge: NURSING, REHAB OR LONG-TERM CARE | End: 2022-11-21
Attending: STUDENT IN AN ORGANIZED HEALTH CARE EDUCATION/TRAINING PROGRAM | Admitting: STUDENT IN AN ORGANIZED HEALTH CARE EDUCATION/TRAINING PROGRAM

## 2022-11-14 VITALS
RESPIRATION RATE: 18 BRPM | DIASTOLIC BLOOD PRESSURE: 74 MMHG | HEIGHT: 70 IN | HEART RATE: 72 BPM | TEMPERATURE: 98 F | SYSTOLIC BLOOD PRESSURE: 140 MMHG | OXYGEN SATURATION: 98 %

## 2022-11-14 DIAGNOSIS — Z95.820 PERIPHERAL VASCULAR ANGIOPLASTY STATUS WITH IMPLANTS AND GRAFTS: Chronic | ICD-10-CM

## 2022-11-14 DIAGNOSIS — Z90.89 ACQUIRED ABSENCE OF OTHER ORGANS: Chronic | ICD-10-CM

## 2022-11-14 LAB
ALBUMIN SERPL ELPH-MCNC: 2.8 G/DL — LOW (ref 3.3–5)
ALP SERPL-CCNC: 86 U/L — SIGNIFICANT CHANGE UP (ref 40–120)
ALT FLD-CCNC: 15 U/L — SIGNIFICANT CHANGE UP (ref 4–41)
ANION GAP SERPL CALC-SCNC: 13 MMOL/L — SIGNIFICANT CHANGE UP (ref 7–14)
AST SERPL-CCNC: 30 U/L — SIGNIFICANT CHANGE UP (ref 4–40)
B-OH-BUTYR SERPL-SCNC: <0 MMOL/L — SIGNIFICANT CHANGE UP (ref 0–0.4)
BASE EXCESS BLDV CALC-SCNC: 4 MMOL/L — HIGH (ref -2–3)
BASOPHILS # BLD AUTO: 0.04 K/UL — SIGNIFICANT CHANGE UP (ref 0–0.2)
BASOPHILS NFR BLD AUTO: 0.6 % — SIGNIFICANT CHANGE UP (ref 0–2)
BILIRUB SERPL-MCNC: 0.3 MG/DL — SIGNIFICANT CHANGE UP (ref 0.2–1.2)
BLOOD GAS VENOUS COMPREHENSIVE RESULT: SIGNIFICANT CHANGE UP
BUN SERPL-MCNC: 16 MG/DL — SIGNIFICANT CHANGE UP (ref 7–23)
CALCIUM SERPL-MCNC: 9.1 MG/DL — SIGNIFICANT CHANGE UP (ref 8.4–10.5)
CHLORIDE BLDV-SCNC: 100 MMOL/L — SIGNIFICANT CHANGE UP (ref 96–108)
CHLORIDE SERPL-SCNC: 98 MMOL/L — SIGNIFICANT CHANGE UP (ref 98–107)
CO2 BLDV-SCNC: 32.2 MMOL/L — HIGH (ref 22–26)
CO2 SERPL-SCNC: 23 MMOL/L — SIGNIFICANT CHANGE UP (ref 22–31)
CREAT SERPL-MCNC: 1.05 MG/DL — SIGNIFICANT CHANGE UP (ref 0.5–1.3)
EGFR: 70 ML/MIN/1.73M2 — SIGNIFICANT CHANGE UP
EOSINOPHIL # BLD AUTO: 0.28 K/UL — SIGNIFICANT CHANGE UP (ref 0–0.5)
EOSINOPHIL NFR BLD AUTO: 4.3 % — SIGNIFICANT CHANGE UP (ref 0–6)
GAS PNL BLDV: 134 MMOL/L — LOW (ref 136–145)
GLUCOSE BLDV-MCNC: 326 MG/DL — HIGH (ref 70–99)
GLUCOSE SERPL-MCNC: 342 MG/DL — HIGH (ref 70–99)
HCO3 BLDV-SCNC: 30 MMOL/L — HIGH (ref 22–29)
HCT VFR BLD CALC: 35.6 % — LOW (ref 39–50)
HCT VFR BLDA CALC: 33 % — LOW (ref 39–51)
HGB BLD CALC-MCNC: 11.1 G/DL — LOW (ref 13–17)
HGB BLD-MCNC: 11.3 G/DL — LOW (ref 13–17)
IANC: 4.37 K/UL — SIGNIFICANT CHANGE UP (ref 1.8–7.4)
IMM GRANULOCYTES NFR BLD AUTO: 0.3 % — SIGNIFICANT CHANGE UP (ref 0–0.9)
LACTATE BLDV-MCNC: 2.1 MMOL/L — HIGH (ref 0.5–2)
LYMPHOCYTES # BLD AUTO: 1.24 K/UL — SIGNIFICANT CHANGE UP (ref 1–3.3)
LYMPHOCYTES # BLD AUTO: 19.1 % — SIGNIFICANT CHANGE UP (ref 13–44)
MAGNESIUM SERPL-MCNC: 1.6 MG/DL — SIGNIFICANT CHANGE UP (ref 1.6–2.6)
MCHC RBC-ENTMCNC: 26.9 PG — LOW (ref 27–34)
MCHC RBC-ENTMCNC: 31.7 GM/DL — LOW (ref 32–36)
MCV RBC AUTO: 84.8 FL — SIGNIFICANT CHANGE UP (ref 80–100)
MONOCYTES # BLD AUTO: 0.55 K/UL — SIGNIFICANT CHANGE UP (ref 0–0.9)
MONOCYTES NFR BLD AUTO: 8.5 % — SIGNIFICANT CHANGE UP (ref 2–14)
NEUTROPHILS # BLD AUTO: 4.37 K/UL — SIGNIFICANT CHANGE UP (ref 1.8–7.4)
NEUTROPHILS NFR BLD AUTO: 67.2 % — SIGNIFICANT CHANGE UP (ref 43–77)
NRBC # BLD: 0 /100 WBCS — SIGNIFICANT CHANGE UP (ref 0–0)
NRBC # FLD: 0 K/UL — SIGNIFICANT CHANGE UP (ref 0–0)
PCO2 BLDV: 54 MMHG — SIGNIFICANT CHANGE UP (ref 42–55)
PH BLDV: 7.36 — SIGNIFICANT CHANGE UP (ref 7.32–7.43)
PHOSPHATE SERPL-MCNC: 3 MG/DL — SIGNIFICANT CHANGE UP (ref 2.5–4.5)
PLATELET # BLD AUTO: 205 K/UL — SIGNIFICANT CHANGE UP (ref 150–400)
PO2 BLDV: <20 MMHG — SIGNIFICANT CHANGE UP
POTASSIUM BLDV-SCNC: 4.5 MMOL/L — SIGNIFICANT CHANGE UP (ref 3.5–5.1)
POTASSIUM SERPL-MCNC: 5.4 MMOL/L — HIGH (ref 3.5–5.3)
POTASSIUM SERPL-SCNC: 5.4 MMOL/L — HIGH (ref 3.5–5.3)
PROT SERPL-MCNC: 6.7 G/DL — SIGNIFICANT CHANGE UP (ref 6–8.3)
RBC # BLD: 4.2 M/UL — SIGNIFICANT CHANGE UP (ref 4.2–5.8)
RBC # FLD: 14.8 % — HIGH (ref 10.3–14.5)
SAO2 % BLDV: 23 % — SIGNIFICANT CHANGE UP
SODIUM SERPL-SCNC: 134 MMOL/L — LOW (ref 135–145)
WBC # BLD: 6.5 K/UL — SIGNIFICANT CHANGE UP (ref 3.8–10.5)
WBC # FLD AUTO: 6.5 K/UL — SIGNIFICANT CHANGE UP (ref 3.8–10.5)

## 2022-11-14 PROCEDURE — 71045 X-RAY EXAM CHEST 1 VIEW: CPT | Mod: 26

## 2022-11-14 PROCEDURE — 99285 EMERGENCY DEPT VISIT HI MDM: CPT

## 2022-11-14 RX ORDER — SODIUM CHLORIDE 9 MG/ML
1000 INJECTION, SOLUTION INTRAVENOUS ONCE
Refills: 0 | Status: COMPLETED | OUTPATIENT
Start: 2022-11-14 | End: 2022-11-14

## 2022-11-14 RX ADMIN — SODIUM CHLORIDE 1000 MILLILITER(S): 9 INJECTION, SOLUTION INTRAVENOUS at 22:18

## 2022-11-14 NOTE — ED ADULT NURSE NOTE - OBJECTIVE STATEMENT
FLOAT:: 86 yo M AAOx2 (person, situation) received to room 2 p/w b/l foot pain "for a while" now unable to ambulate, #20 placed to R hand, pending CXR, report given to primary RN

## 2022-11-14 NOTE — ED PROVIDER NOTE - PHYSICAL EXAMINATION
Vital signs reviewed  GENERAL: Patient nontoxic appearing, NAD  HEAD: NCAT  EYES: Anicteric  ENT: MMM  NECK: Supple, non tender  RESPIRATORY: Normal respiratory effort. CTA B/L. No wheezing, rales, rhonchi  CARDIOVASCULAR: Regular rate and rhythm  ABDOMEN: Soft. Nondistended. Nontender. No guarding or rebound. No CVA tenderness.  MUSCULOSKELETAL/EXTREMITIES: no leg edema. 2+dp pulse. no joint tenderness or swelling noted. HIP FROM and strength 5/5. knee FROM 5/5 strength   SKIN:  Warm and dry  NEURO: AAOx3. No gross FND.  PSYCHIATRIC: Cooperative. Affect appropriate.

## 2022-11-14 NOTE — ED PROVIDER NOTE - CLINICAL SUMMARY MEDICAL DECISION MAKING FREE TEXT BOX
87y M p/w pain in b/l feet, undergoing outpatient rehab currently, couldn't attend due to worsening pain in feet. EMS called, fsg was high, patient complaint with meds. vitals stable, no acute findings on exam. likely no fx, dislocation, infection, gout noted. likely diabetic neuropathy vs chronic arterial insuffiencey, will need labs to eval and likely admission for further pt and pain control.

## 2022-11-14 NOTE — ED ADULT NURSE REASSESSMENT NOTE - NS ED NURSE REASSESS COMMENT FT1
Pt tolerated transfusion , no reactions noted, post cbc sent, vs per flow sheet, pt awaiting transport.

## 2022-11-14 NOTE — ED PROVIDER NOTE - NS ED ROS FT
Constitutional: No fever, chills.  Eyes:  No visual changes  ENMT:  No neck pain  Cardiac:  No chest pain  Respiratory:  No cough, SOB  GI:  No nausea, vomiting, diarrhea, abdominal pain.  :  No dysuria, hematuria  MS:  +foot pain +falls   Neuro:  No headache or lightheadedness  Skin:  No skin rash  Except as documented in the HPI,  all other systems are negative.

## 2022-11-14 NOTE — ED PROVIDER NOTE - ATTENDING CONTRIBUTION TO CARE
I have personally performed a face to face medical and diagnostic evaluation of the patient. I have discussed with and reviewed the Resident's note and agree with the History, ROS, Physical Exam and MDM unless otherwise indicated. A brief summary of my personal evaluation and impression can be found below.    87y M  CAD, s/p stents, PAD on Plavix and Eliquis, DM on glimepiride, BPH, HTN p/w pain in b/l feet x few days. pt in outpt rehab x 2 weeks due to knee pain and poor ambulation. knee pain improved, now endorsing b/l pain in soles of feet. feel like pins and needles. pain worsens with ambulation. patient stood up 2 days ago and fell onto buttock due to unable to stand - no other injuries during fall. No head injury/LOC. On exam, VSS pt neurovascularly intact in the b/l LE. FS>400 in triage. Pain likely secondary to diabetic neuropathy, given DM likely uncontrolled based on FS. Will obtain labs to assess for HHS/DKA but unlikely based on current clinical appearance. Will need admission given pt is falling and not ambulating at baseline 2/2 to symptoms. Andrea Torres, ED Attending

## 2022-11-14 NOTE — ED PROVIDER NOTE - OBJECTIVE STATEMENT
87y M  CAD, s/p stents, PAD on Plavix and Eliquis, DM on glimepiride, BPH, HTN p/w pain in b/l feet x 87y M  CAD, s/p stents, PAD on Plavix and Eliquis, DM on glimepiride, BPH, HTN p/w pain in b/l feet x few days. pt in rehab x 2 weeks due to knee pain and poor amublation. knee pain improved, now endorsing b/l pain in soles of feet. feel like pins and needles. pain worsens with ambulation. patient stood up 2 days ago and fell onto buttock due to unable to stand.  otherwise no fever, cp, sob, vomiting, diarrhea, abd pain, numbness, swelling, head trauma, wounds

## 2022-11-15 DIAGNOSIS — E11.9 TYPE 2 DIABETES MELLITUS WITHOUT COMPLICATIONS: ICD-10-CM

## 2022-11-15 DIAGNOSIS — I25.10 ATHEROSCLEROTIC HEART DISEASE OF NATIVE CORONARY ARTERY WITHOUT ANGINA PECTORIS: ICD-10-CM

## 2022-11-15 DIAGNOSIS — I10 ESSENTIAL (PRIMARY) HYPERTENSION: ICD-10-CM

## 2022-11-15 DIAGNOSIS — N40.0 BENIGN PROSTATIC HYPERPLASIA WITHOUT LOWER URINARY TRACT SYMPTOMS: ICD-10-CM

## 2022-11-15 DIAGNOSIS — E78.5 HYPERLIPIDEMIA, UNSPECIFIED: ICD-10-CM

## 2022-11-15 DIAGNOSIS — M79.671 PAIN IN RIGHT FOOT: ICD-10-CM

## 2022-11-15 LAB
ANION GAP SERPL CALC-SCNC: 14 MMOL/L — SIGNIFICANT CHANGE UP (ref 7–14)
APPEARANCE UR: CLEAR — SIGNIFICANT CHANGE UP
BILIRUB UR-MCNC: NEGATIVE — SIGNIFICANT CHANGE UP
BUN SERPL-MCNC: 12 MG/DL — SIGNIFICANT CHANGE UP (ref 7–23)
CALCIUM SERPL-MCNC: 9.1 MG/DL — SIGNIFICANT CHANGE UP (ref 8.4–10.5)
CHLORIDE SERPL-SCNC: 100 MMOL/L — SIGNIFICANT CHANGE UP (ref 98–107)
CO2 SERPL-SCNC: 21 MMOL/L — LOW (ref 22–31)
COLOR SPEC: SIGNIFICANT CHANGE UP
CREAT SERPL-MCNC: 0.92 MG/DL — SIGNIFICANT CHANGE UP (ref 0.5–1.3)
DIFF PNL FLD: NEGATIVE — SIGNIFICANT CHANGE UP
EGFR: 82 ML/MIN/1.73M2 — SIGNIFICANT CHANGE UP
GLUCOSE BLDC GLUCOMTR-MCNC: 183 MG/DL — HIGH (ref 70–99)
GLUCOSE BLDC GLUCOMTR-MCNC: 263 MG/DL — HIGH (ref 70–99)
GLUCOSE BLDC GLUCOMTR-MCNC: 288 MG/DL — HIGH (ref 70–99)
GLUCOSE BLDC GLUCOMTR-MCNC: 314 MG/DL — HIGH (ref 70–99)
GLUCOSE BLDC GLUCOMTR-MCNC: 319 MG/DL — HIGH (ref 70–99)
GLUCOSE BLDC GLUCOMTR-MCNC: 334 MG/DL — HIGH (ref 70–99)
GLUCOSE SERPL-MCNC: 340 MG/DL — HIGH (ref 70–99)
GLUCOSE UR QL: ABNORMAL
HCT VFR BLD CALC: 38 % — LOW (ref 39–50)
HGB BLD-MCNC: 11.9 G/DL — LOW (ref 13–17)
KETONES UR-MCNC: NEGATIVE — SIGNIFICANT CHANGE UP
LEUKOCYTE ESTERASE UR-ACNC: NEGATIVE — SIGNIFICANT CHANGE UP
MAGNESIUM SERPL-MCNC: 1.6 MG/DL — SIGNIFICANT CHANGE UP (ref 1.6–2.6)
MCHC RBC-ENTMCNC: 27.4 PG — SIGNIFICANT CHANGE UP (ref 27–34)
MCHC RBC-ENTMCNC: 31.3 GM/DL — LOW (ref 32–36)
MCV RBC AUTO: 87.6 FL — SIGNIFICANT CHANGE UP (ref 80–100)
NITRITE UR-MCNC: NEGATIVE — SIGNIFICANT CHANGE UP
NRBC # BLD: 0 /100 WBCS — SIGNIFICANT CHANGE UP (ref 0–0)
NRBC # FLD: 0 K/UL — SIGNIFICANT CHANGE UP (ref 0–0)
PH UR: 6.5 — SIGNIFICANT CHANGE UP (ref 5–8)
PHOSPHATE SERPL-MCNC: 2.5 MG/DL — SIGNIFICANT CHANGE UP (ref 2.5–4.5)
PLATELET # BLD AUTO: 178 K/UL — SIGNIFICANT CHANGE UP (ref 150–400)
POTASSIUM SERPL-MCNC: 4.4 MMOL/L — SIGNIFICANT CHANGE UP (ref 3.5–5.3)
POTASSIUM SERPL-SCNC: 4.4 MMOL/L — SIGNIFICANT CHANGE UP (ref 3.5–5.3)
PROT UR-MCNC: NEGATIVE — SIGNIFICANT CHANGE UP
RBC # BLD: 4.34 M/UL — SIGNIFICANT CHANGE UP (ref 4.2–5.8)
RBC # FLD: 14.8 % — HIGH (ref 10.3–14.5)
SARS-COV-2 RNA SPEC QL NAA+PROBE: SIGNIFICANT CHANGE UP
SODIUM SERPL-SCNC: 135 MMOL/L — SIGNIFICANT CHANGE UP (ref 135–145)
SP GR SPEC: 1.01 — SIGNIFICANT CHANGE UP (ref 1.01–1.05)
URATE SERPL-MCNC: 2.3 MG/DL — LOW (ref 3.4–8.8)
UROBILINOGEN FLD QL: SIGNIFICANT CHANGE UP
WBC # BLD: 5.56 K/UL — SIGNIFICANT CHANGE UP (ref 3.8–10.5)
WBC # FLD AUTO: 5.56 K/UL — SIGNIFICANT CHANGE UP (ref 3.8–10.5)

## 2022-11-15 PROCEDURE — 12345: CPT | Mod: NC,GC

## 2022-11-15 PROCEDURE — 99223 1ST HOSP IP/OBS HIGH 75: CPT

## 2022-11-15 RX ORDER — METOPROLOL TARTRATE 50 MG
25 TABLET ORAL DAILY
Refills: 0 | Status: DISCONTINUED | OUTPATIENT
Start: 2022-11-15 | End: 2022-11-21

## 2022-11-15 RX ORDER — DEXTROSE 50 % IN WATER 50 %
25 SYRINGE (ML) INTRAVENOUS ONCE
Refills: 0 | Status: DISCONTINUED | OUTPATIENT
Start: 2022-11-15 | End: 2022-11-21

## 2022-11-15 RX ORDER — SODIUM CHLORIDE 9 MG/ML
1000 INJECTION, SOLUTION INTRAVENOUS
Refills: 0 | Status: DISCONTINUED | OUTPATIENT
Start: 2022-11-15 | End: 2022-11-21

## 2022-11-15 RX ORDER — LANOLIN ALCOHOL/MO/W.PET/CERES
3 CREAM (GRAM) TOPICAL AT BEDTIME
Refills: 0 | Status: DISCONTINUED | OUTPATIENT
Start: 2022-11-15 | End: 2022-11-21

## 2022-11-15 RX ORDER — DEXTROSE 50 % IN WATER 50 %
15 SYRINGE (ML) INTRAVENOUS ONCE
Refills: 0 | Status: DISCONTINUED | OUTPATIENT
Start: 2022-11-15 | End: 2022-11-21

## 2022-11-15 RX ORDER — GABAPENTIN 400 MG/1
100 CAPSULE ORAL ONCE
Refills: 0 | Status: COMPLETED | OUTPATIENT
Start: 2022-11-15 | End: 2022-11-15

## 2022-11-15 RX ORDER — INFLUENZA VIRUS VACCINE 15; 15; 15; 15 UG/.5ML; UG/.5ML; UG/.5ML; UG/.5ML
0.7 SUSPENSION INTRAMUSCULAR ONCE
Refills: 0 | Status: DISCONTINUED | OUTPATIENT
Start: 2022-11-15 | End: 2022-11-21

## 2022-11-15 RX ORDER — INSULIN LISPRO 100/ML
VIAL (ML) SUBCUTANEOUS
Refills: 0 | Status: DISCONTINUED | OUTPATIENT
Start: 2022-11-15 | End: 2022-11-21

## 2022-11-15 RX ORDER — DEXTROSE 50 % IN WATER 50 %
12.5 SYRINGE (ML) INTRAVENOUS ONCE
Refills: 0 | Status: DISCONTINUED | OUTPATIENT
Start: 2022-11-15 | End: 2022-11-21

## 2022-11-15 RX ORDER — FINASTERIDE 5 MG/1
5 TABLET, FILM COATED ORAL DAILY
Refills: 0 | Status: DISCONTINUED | OUTPATIENT
Start: 2022-11-15 | End: 2022-11-21

## 2022-11-15 RX ORDER — INSULIN LISPRO 100/ML
3 VIAL (ML) SUBCUTANEOUS
Refills: 0 | Status: DISCONTINUED | OUTPATIENT
Start: 2022-11-15 | End: 2022-11-16

## 2022-11-15 RX ORDER — GABAPENTIN 400 MG/1
100 CAPSULE ORAL AT BEDTIME
Refills: 0 | Status: DISCONTINUED | OUTPATIENT
Start: 2022-11-15 | End: 2022-11-21

## 2022-11-15 RX ORDER — APIXABAN 2.5 MG/1
5 TABLET, FILM COATED ORAL
Refills: 0 | Status: DISCONTINUED | OUTPATIENT
Start: 2022-11-15 | End: 2022-11-21

## 2022-11-15 RX ORDER — APIXABAN 2.5 MG/1
5 TABLET, FILM COATED ORAL ONCE
Refills: 0 | Status: COMPLETED | OUTPATIENT
Start: 2022-11-15 | End: 2022-11-15

## 2022-11-15 RX ORDER — ACETAMINOPHEN 500 MG
650 TABLET ORAL EVERY 6 HOURS
Refills: 0 | Status: DISCONTINUED | OUTPATIENT
Start: 2022-11-15 | End: 2022-11-21

## 2022-11-15 RX ORDER — CLOPIDOGREL BISULFATE 75 MG/1
75 TABLET, FILM COATED ORAL DAILY
Refills: 0 | Status: DISCONTINUED | OUTPATIENT
Start: 2022-11-15 | End: 2022-11-21

## 2022-11-15 RX ORDER — INSULIN LISPRO 100/ML
VIAL (ML) SUBCUTANEOUS AT BEDTIME
Refills: 0 | Status: DISCONTINUED | OUTPATIENT
Start: 2022-11-15 | End: 2022-11-21

## 2022-11-15 RX ORDER — METFORMIN HYDROCHLORIDE 850 MG/1
2 TABLET ORAL
Qty: 0 | Refills: 0 | DISCHARGE

## 2022-11-15 RX ORDER — ALLOPURINOL 300 MG
100 TABLET ORAL DAILY
Refills: 0 | Status: DISCONTINUED | OUTPATIENT
Start: 2022-11-15 | End: 2022-11-21

## 2022-11-15 RX ORDER — INSULIN GLARGINE 100 [IU]/ML
8 INJECTION, SOLUTION SUBCUTANEOUS AT BEDTIME
Refills: 0 | Status: DISCONTINUED | OUTPATIENT
Start: 2022-11-15 | End: 2022-11-16

## 2022-11-15 RX ORDER — ONDANSETRON 8 MG/1
4 TABLET, FILM COATED ORAL EVERY 8 HOURS
Refills: 0 | Status: DISCONTINUED | OUTPATIENT
Start: 2022-11-15 | End: 2022-11-21

## 2022-11-15 RX ORDER — GLUCAGON INJECTION, SOLUTION 0.5 MG/.1ML
1 INJECTION, SOLUTION SUBCUTANEOUS ONCE
Refills: 0 | Status: DISCONTINUED | OUTPATIENT
Start: 2022-11-15 | End: 2022-11-21

## 2022-11-15 RX ORDER — ATORVASTATIN CALCIUM 80 MG/1
40 TABLET, FILM COATED ORAL AT BEDTIME
Refills: 0 | Status: DISCONTINUED | OUTPATIENT
Start: 2022-11-15 | End: 2022-11-21

## 2022-11-15 RX ADMIN — GABAPENTIN 100 MILLIGRAM(S): 400 CAPSULE ORAL at 02:42

## 2022-11-15 RX ADMIN — APIXABAN 5 MILLIGRAM(S): 2.5 TABLET, FILM COATED ORAL at 02:42

## 2022-11-15 RX ADMIN — Medication 100 MILLIGRAM(S): at 11:33

## 2022-11-15 RX ADMIN — FINASTERIDE 5 MILLIGRAM(S): 5 TABLET, FILM COATED ORAL at 11:34

## 2022-11-15 RX ADMIN — ATORVASTATIN CALCIUM 40 MILLIGRAM(S): 80 TABLET, FILM COATED ORAL at 22:02

## 2022-11-15 RX ADMIN — Medication 25 MILLIGRAM(S): at 05:50

## 2022-11-15 RX ADMIN — GABAPENTIN 100 MILLIGRAM(S): 400 CAPSULE ORAL at 22:01

## 2022-11-15 RX ADMIN — Medication 3 UNIT(S): at 18:05

## 2022-11-15 RX ADMIN — Medication 1 TABLET(S): at 11:34

## 2022-11-15 RX ADMIN — Medication 650 MILLIGRAM(S): at 23:02

## 2022-11-15 RX ADMIN — Medication 4: at 12:56

## 2022-11-15 RX ADMIN — Medication 1: at 18:05

## 2022-11-15 RX ADMIN — CLOPIDOGREL BISULFATE 75 MILLIGRAM(S): 75 TABLET, FILM COATED ORAL at 11:33

## 2022-11-15 RX ADMIN — Medication 650 MILLIGRAM(S): at 22:02

## 2022-11-15 RX ADMIN — Medication 4: at 09:19

## 2022-11-15 RX ADMIN — APIXABAN 5 MILLIGRAM(S): 2.5 TABLET, FILM COATED ORAL at 18:04

## 2022-11-15 RX ADMIN — Medication 3 MILLIGRAM(S): at 22:02

## 2022-11-15 RX ADMIN — INSULIN GLARGINE 8 UNIT(S): 100 INJECTION, SOLUTION SUBCUTANEOUS at 22:01

## 2022-11-15 RX ADMIN — Medication 1: at 22:00

## 2022-11-15 RX ADMIN — Medication 3 UNIT(S): at 12:55

## 2022-11-15 NOTE — PATIENT PROFILE ADULT - FALL HARM RISK - HARM RISK INTERVENTIONS

## 2022-11-15 NOTE — PROGRESS NOTE ADULT - PROBLEM SELECTOR PLAN 2
Chronic moderate exacerbation   /73  Continue home metoprolol 25mg daily -Continue home metoprolol 25mg daily

## 2022-11-15 NOTE — H&P ADULT - NSHPLABSRESULTS_GEN_ALL_CORE
labs reviewed                        11.3   6.50  )-----------( 205      ( 2022 22:10 )             35.6       11-14    134<L>  |  98  |  16  ----------------------------<  342<H>  5.4<H>   |  23  |  1.05    Ca    9.1      2022 22:10  Phos  3.0       Mg     1.60         TPro  6.7  /  Alb  2.8<L>  /  TBili  0.3  /  DBili  x   /  AST  30  /  ALT  15  /  AlkPhos  86        22:10 - VBG - pH: 7.36  | pCO2: 54    | pO2: <20   | Lactate: 2.1        Urinalysis Basic - ( 15 Nov 2022 00:15 )    Color: Light Yellow / Appearance: Clear / S.014 / pH: x  Gluc: x / Ketone: Negative  / Bili: Negative / Urobili: <2 mg/dL   Blood: x / Protein: Negative / Nitrite: Negative   Leuk Esterase: Negative / RBC: x / WBC x   Sq Epi: x / Non Sq Epi: x / Bacteria: x      CXR interpreted by myself: clear lungs

## 2022-11-15 NOTE — PROGRESS NOTE ADULT - SUBJECTIVE AND OBJECTIVE BOX
Catia Proctor PGY3  [phone]    Patient is a 87y old  Male who presents with a chief complaint of plantar foot pain (15 Nov 2022 03:55)      SUBJECTIVE / OVERNIGHT EVENTS:  Patient seen and examined at bedside.    Other Review of Systems Negative.    MEDICATIONS  (STANDING):  allopurinol 100 milliGRAM(s) Oral daily  apixaban 5 milliGRAM(s) Oral two times a day  atorvastatin 40 milliGRAM(s) Oral at bedtime  clopidogrel Tablet 75 milliGRAM(s) Oral daily  dextrose 5%. 1000 milliLiter(s) (100 mL/Hr) IV Continuous <Continuous>  dextrose 5%. 1000 milliLiter(s) (50 mL/Hr) IV Continuous <Continuous>  dextrose 50% Injectable 25 Gram(s) IV Push once  dextrose 50% Injectable 12.5 Gram(s) IV Push once  dextrose 50% Injectable 25 Gram(s) IV Push once  finasteride 5 milliGRAM(s) Oral daily  gabapentin 100 milliGRAM(s) Oral at bedtime  glucagon  Injectable 1 milliGRAM(s) IntraMuscular once  influenza  Vaccine (HIGH DOSE) 0.7 milliLiter(s) IntraMuscular once  insulin lispro (ADMELOG) corrective regimen sliding scale   SubCutaneous three times a day before meals  insulin lispro (ADMELOG) corrective regimen sliding scale   SubCutaneous at bedtime  metoprolol succinate ER 25 milliGRAM(s) Oral daily  multivitamin 1 Tablet(s) Oral daily    MEDICATIONS  (PRN):  acetaminophen     Tablet .. 650 milliGRAM(s) Oral every 6 hours PRN Temp greater or equal to 38C (100.4F), Mild Pain (1 - 3)  aluminum hydroxide/magnesium hydroxide/simethicone Suspension 30 milliLiter(s) Oral every 4 hours PRN Dyspepsia  dextrose Oral Gel 15 Gram(s) Oral once PRN Blood Glucose LESS THAN 70 milliGRAM(s)/deciliter  melatonin 3 milliGRAM(s) Oral at bedtime PRN Insomnia  ondansetron Injectable 4 milliGRAM(s) IV Push every 8 hours PRN Nausea and/or Vomiting      OBJECTIVE:    Vital Signs Last 24 Hrs  T(C): 36.9 (15 Nov 2022 05:18), Max: 36.9 (15 Nov 2022 05:18)  T(F): 98.4 (15 Nov 2022 05:18), Max: 98.4 (15 Nov 2022 05:18)  HR: 59 (15 Nov 2022 05:18) (59 - 88)  BP: 142/63 (15 Nov 2022 05:18) (135/74 - 161/90)  BP(mean): --  RR: 17 (15 Nov 2022 05:18) (17 - 18)  SpO2: 97% (15 Nov 2022 05:18) (97% - 100%)    Parameters below as of 15 Nov 2022 05:18  Patient On (Oxygen Delivery Method): room air        CAPILLARY BLOOD GLUCOSE      POCT Blood Glucose.: 334 mg/dL (15 Nov 2022 08:48)  POCT Blood Glucose.: 263 mg/dL (15 Nov 2022 04:53)  POCT Blood Glucose.: 319 mg/dL (15 Nov 2022 03:00)  POCT Blood Glucose.: 443 mg/dL (2022 19:15)    I&O's Summary      PHYSICAL EXAM:  GENERAL: NAD, well-developed  HEAD:  Atraumatic, Normocephalic  EYES: EOMI, PERRLA, conjunctiva and sclera clear  NECK: Supple, No JVD  CHEST/LUNG: Clear to auscultation bilaterally; No wheeze  HEART: Regular rate and rhythm; No murmurs, rubs, or gallops  ABDOMEN: Soft, Nontender, Nondistended; Bowel sounds present  EXTREMITIES:  2+ Peripheral Pulses, No clubbing, cyanosis, or edema  PSYCH: AAOx3  NEUROLOGY: non-focal  SKIN: No rashes or lesions    LABS:                        11.3   6.50  )-----------( 205      ( 2022 22:10 )             35.6     Auto Eosinophil # 0.28  / Auto Eosinophil % 4.3   / Auto Neutrophil # 4.37  / Auto Neutrophil % 67.2  / BANDS % x        11-14    134<L>  |  98  |  16  ----------------------------<  342<H>  5.4<H>   |  23  |  1.05    Ca    9.1      2022 22:10  Mg     1.60     -  Phos  3.0     -14  TPro  6.7  /  Alb  2.8<L>  /  TBili  0.3  /  DBili  x   /  AST  30  /  ALT  15  /  AlkPhos  86  -14          Urinalysis Basic - ( 15 Nov 2022 00:15 )    Color: Light Yellow / Appearance: Clear / S.014 / pH: x  Gluc: x / Ketone: Negative  / Bili: Negative / Urobili: <2 mg/dL   Blood: x / Protein: Negative / Nitrite: Negative   Leuk Esterase: Negative / RBC: x / WBC x   Sq Epi: x / Non Sq Epi: x / Bacteria: x            ABG:     VBG: ( 22:10 ) - VBG - pH: 7.36  | pCO2: 54    | pO2: <20   | Lactate: 2.1          Care Discussed with Consultants/Other Providers:    RADIOLOGY & ADDITIONAL TESTS:  (Imaging Personally Reviewed)       Catia Proctor PGY3  [phone]    Patient is a 87y old  Male who presents with a chief complaint of plantar foot pain (15 Nov 2022 03:55)      SUBJECTIVE / OVERNIGHT EVENTS:  Patient seen and examined at bedside.  No events overnight. Endorsing bilateral foot pain on both dorsal and plantar surfaces. No fevers, chills, SOB.     Other Review of Systems Negative.    MEDICATIONS  (STANDING):  allopurinol 100 milliGRAM(s) Oral daily  apixaban 5 milliGRAM(s) Oral two times a day  atorvastatin 40 milliGRAM(s) Oral at bedtime  clopidogrel Tablet 75 milliGRAM(s) Oral daily  dextrose 5%. 1000 milliLiter(s) (100 mL/Hr) IV Continuous <Continuous>  dextrose 5%. 1000 milliLiter(s) (50 mL/Hr) IV Continuous <Continuous>  dextrose 50% Injectable 25 Gram(s) IV Push once  dextrose 50% Injectable 12.5 Gram(s) IV Push once  dextrose 50% Injectable 25 Gram(s) IV Push once  finasteride 5 milliGRAM(s) Oral daily  gabapentin 100 milliGRAM(s) Oral at bedtime  glucagon  Injectable 1 milliGRAM(s) IntraMuscular once  influenza  Vaccine (HIGH DOSE) 0.7 milliLiter(s) IntraMuscular once  insulin lispro (ADMELOG) corrective regimen sliding scale   SubCutaneous three times a day before meals  insulin lispro (ADMELOG) corrective regimen sliding scale   SubCutaneous at bedtime  metoprolol succinate ER 25 milliGRAM(s) Oral daily  multivitamin 1 Tablet(s) Oral daily    MEDICATIONS  (PRN):  acetaminophen     Tablet .. 650 milliGRAM(s) Oral every 6 hours PRN Temp greater or equal to 38C (100.4F), Mild Pain (1 - 3)  aluminum hydroxide/magnesium hydroxide/simethicone Suspension 30 milliLiter(s) Oral every 4 hours PRN Dyspepsia  dextrose Oral Gel 15 Gram(s) Oral once PRN Blood Glucose LESS THAN 70 milliGRAM(s)/deciliter  melatonin 3 milliGRAM(s) Oral at bedtime PRN Insomnia  ondansetron Injectable 4 milliGRAM(s) IV Push every 8 hours PRN Nausea and/or Vomiting      OBJECTIVE:    Vital Signs Last 24 Hrs  T(C): 36.9 (15 Nov 2022 05:18), Max: 36.9 (15 Nov 2022 05:18)  T(F): 98.4 (15 Nov 2022 05:18), Max: 98.4 (15 Nov 2022 05:18)  HR: 59 (15 Nov 2022 05:18) (59 - 88)  BP: 142/63 (15 Nov 2022 05:18) (135/74 - 161/90)  BP(mean): --  RR: 17 (15 Nov 2022 05:18) (17 - 18)  SpO2: 97% (15 Nov 2022 05:18) (97% - 100%)    Parameters below as of 15 Nov 2022 05:18  Patient On (Oxygen Delivery Method): room air        CAPILLARY BLOOD GLUCOSE      POCT Blood Glucose.: 334 mg/dL (15 Nov 2022 08:48)  POCT Blood Glucose.: 263 mg/dL (15 Nov 2022 04:53)  POCT Blood Glucose.: 319 mg/dL (15 Nov 2022 03:00)  POCT Blood Glucose.: 443 mg/dL (2022 19:15)    I&O's Summary      PHYSICAL EXAM:  GENERAL: NAD, well-developed  HEAD:  Atraumatic, Normocephalic  EYES: EOMI, PERRLA, conjunctiva and sclera clear  NECK: Supple, No JVD  CHEST/LUNG: Clear to auscultation bilaterally; No wheeze  HEART: Regular rate and rhythm; No murmurs, rubs, or gallops  ABDOMEN: Soft, Nontender, Nondistended; Bowel sounds present  EXTREMITIES:  2+ Peripheral Pulses,. Feet TTP on plantar surfaces bilaterally Full ROM and strength.   PSYCH: AAOx3  NEUROLOGY: non-focal  SKIN: No rashes or lesions    LABS:                        11.3   6.50  )-----------( 205      ( 2022 22:10 )             35.6     Auto Eosinophil # 0.28  / Auto Eosinophil % 4.3   / Auto Neutrophil # 4.37  / Auto Neutrophil % 67.2  / BANDS % x        11-14    134<L>  |  98  |  16  ----------------------------<  342<H>  5.4<H>   |  23  |  1.05    Ca    9.1      2022 22:10  Mg     1.60       Phos  3.0     -  TPro  6.7  /  Alb  2.8<L>  /  TBili  0.3  /  DBili  x   /  AST  30  /  ALT  15  /  AlkPhos  86  11-14          Urinalysis Basic - ( 15 Nov 2022 00:15 )    Color: Light Yellow / Appearance: Clear / S.014 / pH: x  Gluc: x / Ketone: Negative  / Bili: Negative / Urobili: <2 mg/dL   Blood: x / Protein: Negative / Nitrite: Negative   Leuk Esterase: Negative / RBC: x / WBC x   Sq Epi: x / Non Sq Epi: x / Bacteria: x            ABG:     VBG: ( 22:10 ) - VBG - pH: 7.36  | pCO2: 54    | pO2: <20   | Lactate: 2.1          Care Discussed with Consultants/Other Providers:    RADIOLOGY & ADDITIONAL TESTS:  (Imaging Personally Reviewed)

## 2022-11-15 NOTE — H&P ADULT - NSHPPHYSICALEXAM_GEN_ALL_CORE
PHYSICAL EXAM:  GENERAL: NAD, well-developed, well-nourished  HEAD:  Atraumatic, Normocephalic  EYES: EOMI, PERRL, conjunctiva and sclera clear  NECK: Supple, No JVD  CHEST/LUNG: Clear to auscultation bilaterally; No wheezes, rales or rhonchi  HEART: Regular rate and rhythm; No murmurs, rubs, or gallops, (+)S1, S2  ABDOMEN: Soft, Nontender, Nondistended; Normal Bowel sounds   EXTREMITIES:  2+ Peripheral Pulses, No clubbing, cyanosis, or edema, tenderness to palpation of the plantar aspect of bilateral feet  PSYCH: normal mood and affect  NEUROLOGY: AAOx3, non-focal  SKIN: No rashes or lesions

## 2022-11-15 NOTE — H&P ADULT - PROBLEM SELECTOR PLAN 1
acute on chronic   Progressively worsening bilateral foot pain  On exam tender to bilateral plantar aspects  from the way the pt described the pain vs examintion differentials include plantar fascitis and peripheral neuropathy  Pt reports some relief with gabapentin- will continue 100mg nightly  PT consult for exercises for plantar fascitis- otherwise symptomatic treatment

## 2022-11-15 NOTE — H&P ADULT - NSHPREVIEWOFSYSTEMS_GEN_ALL_CORE
REVIEW OF SYSTEMS:    CONSTITUTIONAL: No weakness, fevers or chills  EYES/ENT: No visual changes;  No dysphagia; No sore throat; No rhinorrhea; No sinus pain/pressure  NECK: No pain or stiffness  RESPIRATORY: No cough, wheezing, hemoptysis; No shortness of breath  CARDIOVASCULAR: No chest pain or palpitations; No lower extremity edema  GASTROINTESTINAL: No abdominal or epigastric pain. No nausea, vomiting, or hematemesis; No diarrhea or constipation. No melena or hematochezia.  GENITOURINARY: No dysuria, frequency or hematuria  NEUROLOGICAL: No numbness or weakness  MSK: ambulates without assistance per patient + bilateral plantar pins and needles  SKIN: No itching, burning, rashes, or lesions   All other review of systems is negative unless indicated above.

## 2022-11-15 NOTE — H&P ADULT - HISTORY OF PRESENT ILLNESS
87 yr old male with a pmh ofCAD s/p stent with most recent July 2022, PAD on Plavix and Eliquis, T2DM on glimepiride, BPH, HTN who presents with bilateral plantar foot pain for 5-6 months. Reports the pain has been worsening and resulting in him not being able to stand due to the pain. When asked to describe the pain he describes it as a burning ( to ED he noted pins and needle),   Pt currently is at rehab following a discharge on 10/31 for gout of the knee.   Denies  headache, dizziness, chest pain, palpitations, SOB, abdominal pain, diarrhea/constipation, urinary symptoms.  Vitals: T 98.3, HR 67, /73, RR 18 satting 100% RA

## 2022-11-15 NOTE — PROGRESS NOTE ADULT - ATTENDING COMMENTS
Late entry. Patient seen and examined, plan discussed with Team 8 on 11/15.    87 yr old male presenting with bilateral plantar foot pain     Today feels alright. Still has pain when pressed on the plantar side of feet. Denies fever, chills, sob, cp. Denies trauma to the feet.    #b/l foot pain: possibly multifactorial: plantar fascitis and neuropathic pain from DM. PT eval and DM management. Started on gabapentin. Will check xray   #DM: c/w lantus 8u qhs and admelog 3u TIDAC, uptitrate as needed    Rest as above.  Discussed with Dr. Proctor.

## 2022-11-15 NOTE — ED ADULT NURSE REASSESSMENT NOTE - NS ED NURSE REASSESS COMMENT FT1
Handoff receive pt in bed A*Ox3, NAD, 20g in right wrist, 18g in left forearm, call bell in reach pt medicated, vs per flow sheet.

## 2022-11-15 NOTE — PATIENT PROFILE ADULT - FUNCTIONAL ASSESSMENT - BASIC MOBILITY 6.
3-calculated by average/Not able to assess (calculate score using [facility] averaging method)

## 2022-11-15 NOTE — PROGRESS NOTE ADULT - PROBLEM SELECTOR PLAN 3
Chronic moderate exacerbation    Hold home glimepiride  On his last admission he was on insulin likely secondary to steroid use 2/2 gout  Monitor on LDCS and diabetic diet and adjust accordingly -Hold home glimepiride  -c/w lantus 8U at bedtime, admelog 10TID, ISS -Hold home glimepiride  -c/w lantus 8U at bedtime, admelog 3TID, ISS

## 2022-11-15 NOTE — PROGRESS NOTE ADULT - PROBLEM SELECTOR PLAN 1
acute on chronic   Progressively worsening bilateral foot pain  On exam tender to bilateral plantar aspects  from the way the pt described the pain vs examintion differentials include plantar fascitis and peripheral neuropathy  Pt reports some relief with gabapentin- will continue 100mg nightly  PT consult for exercises for plantar fascitis- otherwise symptomatic treatment -Progressively worsening bilateral foot pain  -c/w gabapentin 100mg nightly  -PT consult  -bilateral xrays

## 2022-11-15 NOTE — H&P ADULT - PROBLEM SELECTOR PLAN 3
Chronic moderate exacerbation    Hold home glimepiride  On his last admission he was on insulin likely secondary to steroid use 2/2 gout  Monitor on LDCS and diabetic diet and adjust accordingly

## 2022-11-16 LAB
GLUCOSE BLDC GLUCOMTR-MCNC: 183 MG/DL — HIGH (ref 70–99)
GLUCOSE BLDC GLUCOMTR-MCNC: 192 MG/DL — HIGH (ref 70–99)
GLUCOSE BLDC GLUCOMTR-MCNC: 265 MG/DL — HIGH (ref 70–99)
GLUCOSE BLDC GLUCOMTR-MCNC: 356 MG/DL — HIGH (ref 70–99)

## 2022-11-16 PROCEDURE — 73502 X-RAY EXAM HIP UNI 2-3 VIEWS: CPT | Mod: 26,LT

## 2022-11-16 PROCEDURE — 99233 SBSQ HOSP IP/OBS HIGH 50: CPT | Mod: GC

## 2022-11-16 PROCEDURE — 73630 X-RAY EXAM OF FOOT: CPT | Mod: 26,50

## 2022-11-16 RX ORDER — INSULIN LISPRO 100/ML
5 VIAL (ML) SUBCUTANEOUS
Refills: 0 | Status: DISCONTINUED | OUTPATIENT
Start: 2022-11-16 | End: 2022-11-18

## 2022-11-16 RX ORDER — INSULIN GLARGINE 100 [IU]/ML
10 INJECTION, SOLUTION SUBCUTANEOUS AT BEDTIME
Refills: 0 | Status: DISCONTINUED | OUTPATIENT
Start: 2022-11-16 | End: 2022-11-18

## 2022-11-16 RX ORDER — INSULIN GLARGINE 100 [IU]/ML
10 INJECTION, SOLUTION SUBCUTANEOUS AT BEDTIME
Refills: 0 | Status: DISCONTINUED | OUTPATIENT
Start: 2022-11-16 | End: 2022-11-16

## 2022-11-16 RX ORDER — INSULIN GLARGINE 100 [IU]/ML
12 INJECTION, SOLUTION SUBCUTANEOUS AT BEDTIME
Refills: 0 | Status: DISCONTINUED | OUTPATIENT
Start: 2022-11-16 | End: 2022-11-16

## 2022-11-16 RX ADMIN — INSULIN GLARGINE 10 UNIT(S): 100 INJECTION, SOLUTION SUBCUTANEOUS at 22:07

## 2022-11-16 RX ADMIN — Medication 650 MILLIGRAM(S): at 18:55

## 2022-11-16 RX ADMIN — Medication 1 TABLET(S): at 12:37

## 2022-11-16 RX ADMIN — APIXABAN 5 MILLIGRAM(S): 2.5 TABLET, FILM COATED ORAL at 17:52

## 2022-11-16 RX ADMIN — Medication 100 MILLIGRAM(S): at 12:38

## 2022-11-16 RX ADMIN — Medication 3 UNIT(S): at 08:58

## 2022-11-16 RX ADMIN — APIXABAN 5 MILLIGRAM(S): 2.5 TABLET, FILM COATED ORAL at 05:19

## 2022-11-16 RX ADMIN — FINASTERIDE 5 MILLIGRAM(S): 5 TABLET, FILM COATED ORAL at 12:37

## 2022-11-16 RX ADMIN — Medication 650 MILLIGRAM(S): at 10:15

## 2022-11-16 RX ADMIN — GABAPENTIN 100 MILLIGRAM(S): 400 CAPSULE ORAL at 21:28

## 2022-11-16 RX ADMIN — Medication 650 MILLIGRAM(S): at 17:55

## 2022-11-16 RX ADMIN — CLOPIDOGREL BISULFATE 75 MILLIGRAM(S): 75 TABLET, FILM COATED ORAL at 12:38

## 2022-11-16 RX ADMIN — Medication 25 MILLIGRAM(S): at 05:19

## 2022-11-16 RX ADMIN — Medication 650 MILLIGRAM(S): at 09:16

## 2022-11-16 RX ADMIN — Medication 1: at 08:58

## 2022-11-16 RX ADMIN — Medication 5 UNIT(S): at 17:52

## 2022-11-16 RX ADMIN — Medication 5: at 13:01

## 2022-11-16 RX ADMIN — ATORVASTATIN CALCIUM 40 MILLIGRAM(S): 80 TABLET, FILM COATED ORAL at 21:27

## 2022-11-16 RX ADMIN — Medication 3 UNIT(S): at 13:01

## 2022-11-16 RX ADMIN — Medication 3: at 17:51

## 2022-11-16 NOTE — PROGRESS NOTE ADULT - PROBLEM SELECTOR PLAN 6
Chronic stable  Stent placed July 2022  Continue atorvastatin, plavix, eliquis Chronic stable  Stent placed July 2022  Continue atorvastatin, plavix, eliquis (for PVD)

## 2022-11-16 NOTE — PROGRESS NOTE ADULT - PROBLEM SELECTOR PLAN 1
-Progressively worsening bilateral foot pain  -c/w gabapentin 100mg nightly  -PT consult  -bilateral xrays

## 2022-11-16 NOTE — PROGRESS NOTE ADULT - SUBJECTIVE AND OBJECTIVE BOX
Catia Proctor PGY3  [phone]    Patient is a 87y old  Male who presents with a chief complaint of plantar foot pain (15 Nov 2022 09:03)      SUBJECTIVE / OVERNIGHT EVENTS:  Patient seen and examined at bedside.    Other Review of Systems Negative.    MEDICATIONS  (STANDING):  allopurinol 100 milliGRAM(s) Oral daily  apixaban 5 milliGRAM(s) Oral two times a day  atorvastatin 40 milliGRAM(s) Oral at bedtime  clopidogrel Tablet 75 milliGRAM(s) Oral daily  dextrose 5%. 1000 milliLiter(s) (100 mL/Hr) IV Continuous <Continuous>  dextrose 5%. 1000 milliLiter(s) (50 mL/Hr) IV Continuous <Continuous>  dextrose 50% Injectable 25 Gram(s) IV Push once  dextrose 50% Injectable 12.5 Gram(s) IV Push once  dextrose 50% Injectable 25 Gram(s) IV Push once  finasteride 5 milliGRAM(s) Oral daily  gabapentin 100 milliGRAM(s) Oral at bedtime  glucagon  Injectable 1 milliGRAM(s) IntraMuscular once  influenza  Vaccine (HIGH DOSE) 0.7 milliLiter(s) IntraMuscular once  insulin glargine Injectable (LANTUS) 8 Unit(s) SubCutaneous at bedtime  insulin lispro (ADMELOG) corrective regimen sliding scale   SubCutaneous three times a day before meals  insulin lispro (ADMELOG) corrective regimen sliding scale   SubCutaneous at bedtime  insulin lispro Injectable (ADMELOG) 3 Unit(s) SubCutaneous three times a day before meals  metoprolol succinate ER 25 milliGRAM(s) Oral daily  multivitamin 1 Tablet(s) Oral daily    MEDICATIONS  (PRN):  acetaminophen     Tablet .. 650 milliGRAM(s) Oral every 6 hours PRN Temp greater or equal to 38C (100.4F), Mild Pain (1 - 3)  aluminum hydroxide/magnesium hydroxide/simethicone Suspension 30 milliLiter(s) Oral every 4 hours PRN Dyspepsia  dextrose Oral Gel 15 Gram(s) Oral once PRN Blood Glucose LESS THAN 70 milliGRAM(s)/deciliter  melatonin 3 milliGRAM(s) Oral at bedtime PRN Insomnia  ondansetron Injectable 4 milliGRAM(s) IV Push every 8 hours PRN Nausea and/or Vomiting      OBJECTIVE:    Vital Signs Last 24 Hrs  T(C): 36.4 (2022 05:15), Max: 37.1 (15 Nov 2022 21:58)  T(F): 97.5 (2022 05:15), Max: 98.7 (15 Nov 2022 21:58)  HR: 58 (2022 05:15) (58 - 70)  BP: 140/85 (2022 05:15) (140/85 - 142/52)  BP(mean): --  RR: 17 (2022 05:15) (17 - 17)  SpO2: 100% (2022 05:15) (98% - 100%)    Parameters below as of 2022 05:15  Patient On (Oxygen Delivery Method): room air        CAPILLARY BLOOD GLUCOSE      POCT Blood Glucose.: 288 mg/dL (15 Nov 2022 21:21)  POCT Blood Glucose.: 183 mg/dL (15 Nov 2022 17:41)  POCT Blood Glucose.: 314 mg/dL (15 Nov 2022 12:25)  POCT Blood Glucose.: 334 mg/dL (15 Nov 2022 08:48)    I&O's Summary    15 Nov 2022 07:01  -  2022 07:00  --------------------------------------------------------  IN: 520 mL / OUT: 430 mL / NET: 90 mL        PHYSICAL EXAM:  GENERAL: NAD, well-developed  HEAD:  Atraumatic, Normocephalic  EYES: EOMI, PERRLA, conjunctiva and sclera clear  NECK: Supple, No JVD  CHEST/LUNG: Clear to auscultation bilaterally; No wheeze  HEART: Regular rate and rhythm; No murmurs, rubs, or gallops  ABDOMEN: Soft, Nontender, Nondistended; Bowel sounds present  EXTREMITIES:  2+ Peripheral Pulses, No clubbing, cyanosis, or edema  PSYCH: AAOx3  NEUROLOGY: non-focal  SKIN: No rashes or lesions    LABS:                        11.9   5.56  )-----------( 178      ( 15 Nov 2022 11:20 )             38.0     Auto Eosinophil # x     / Auto Eosinophil % x     / Auto Neutrophil # x     / Auto Neutrophil % x     / BANDS % x                            11.3   6.50  )-----------( 205      ( 2022 22:10 )             35.6     Auto Eosinophil # 0.28  / Auto Eosinophil % 4.3   / Auto Neutrophil # 4.37  / Auto Neutrophil % 67.2  / BANDS % x        11-15    135  |  100  |  12  ----------------------------<  340<H>  4.4   |  21<L>  |  0.92      134<L>  |  98  |  16  ----------------------------<  342<H>  5.4<H>   |  23  |  1.05    Ca    9.1      15 Nov 2022 11:20  Mg     1.60     11-15  Phos  2.5     11-15  TPro  6.7  /  Alb  2.8<L>  /  TBili  0.3  /  DBili  x   /  AST  30  /  ALT  15  /  AlkPhos  86            Urinalysis Basic - ( 15 Nov 2022 00:15 )    Color: Light Yellow / Appearance: Clear / S.014 / pH: x  Gluc: x / Ketone: Negative  / Bili: Negative / Urobili: <2 mg/dL   Blood: x / Protein: Negative / Nitrite: Negative   Leuk Esterase: Negative / RBC: x / WBC x   Sq Epi: x / Non Sq Epi: x / Bacteria: x            ABG:     VBG:       Care Discussed with Consultants/Other Providers:    RADIOLOGY & ADDITIONAL TESTS:  (Imaging Personally Reviewed)       Catia Proctor PGY3  [phone]    Patient is a 87y old  Male who presents with a chief complaint of plantar foot pain (15 Nov 2022 09:03)      SUBJECTIVE / OVERNIGHT EVENTS:  Patient seen and examined at bedside.  No acute events overnight. Pending foot xrays and PT eval. Feels pain is well-controlled.     Other Review of Systems Negative.    MEDICATIONS  (STANDING):  allopurinol 100 milliGRAM(s) Oral daily  apixaban 5 milliGRAM(s) Oral two times a day  atorvastatin 40 milliGRAM(s) Oral at bedtime  clopidogrel Tablet 75 milliGRAM(s) Oral daily  dextrose 5%. 1000 milliLiter(s) (100 mL/Hr) IV Continuous <Continuous>  dextrose 5%. 1000 milliLiter(s) (50 mL/Hr) IV Continuous <Continuous>  dextrose 50% Injectable 25 Gram(s) IV Push once  dextrose 50% Injectable 12.5 Gram(s) IV Push once  dextrose 50% Injectable 25 Gram(s) IV Push once  finasteride 5 milliGRAM(s) Oral daily  gabapentin 100 milliGRAM(s) Oral at bedtime  glucagon  Injectable 1 milliGRAM(s) IntraMuscular once  influenza  Vaccine (HIGH DOSE) 0.7 milliLiter(s) IntraMuscular once  insulin glargine Injectable (LANTUS) 8 Unit(s) SubCutaneous at bedtime  insulin lispro (ADMELOG) corrective regimen sliding scale   SubCutaneous three times a day before meals  insulin lispro (ADMELOG) corrective regimen sliding scale   SubCutaneous at bedtime  insulin lispro Injectable (ADMELOG) 3 Unit(s) SubCutaneous three times a day before meals  metoprolol succinate ER 25 milliGRAM(s) Oral daily  multivitamin 1 Tablet(s) Oral daily    MEDICATIONS  (PRN):  acetaminophen     Tablet .. 650 milliGRAM(s) Oral every 6 hours PRN Temp greater or equal to 38C (100.4F), Mild Pain (1 - 3)  aluminum hydroxide/magnesium hydroxide/simethicone Suspension 30 milliLiter(s) Oral every 4 hours PRN Dyspepsia  dextrose Oral Gel 15 Gram(s) Oral once PRN Blood Glucose LESS THAN 70 milliGRAM(s)/deciliter  melatonin 3 milliGRAM(s) Oral at bedtime PRN Insomnia  ondansetron Injectable 4 milliGRAM(s) IV Push every 8 hours PRN Nausea and/or Vomiting      OBJECTIVE:    Vital Signs Last 24 Hrs  T(C): 36.4 (2022 05:15), Max: 37.1 (15 Nov 2022 21:58)  T(F): 97.5 (2022 05:15), Max: 98.7 (15 Nov 2022 21:58)  HR: 58 (2022 05:15) (58 - 70)  BP: 140/85 (2022 05:15) (140/85 - 142/52)  BP(mean): --  RR: 17 (2022 05:15) (17 - 17)  SpO2: 100% (2022 05:15) (98% - 100%)    Parameters below as of 2022 05:15  Patient On (Oxygen Delivery Method): room air        CAPILLARY BLOOD GLUCOSE      POCT Blood Glucose.: 288 mg/dL (15 Nov 2022 21:21)  POCT Blood Glucose.: 183 mg/dL (15 Nov 2022 17:41)  POCT Blood Glucose.: 314 mg/dL (15 Nov 2022 12:25)  POCT Blood Glucose.: 334 mg/dL (15 Nov 2022 08:48)    I&O's Summary    15 Nov 2022 07:01  -  2022 07:00  --------------------------------------------------------  IN: 520 mL / OUT: 430 mL / NET: 90 mL        PHYSICAL EXAM:  GENERAL: NAD, well-developed  HEAD:  Atraumatic, Normocephalic  EYES: EOMI, PERRLA, conjunctiva and sclera clear  NECK: Supple, No JVD  CHEST/LUNG: Clear to auscultation bilaterally; No wheeze  HEART: Regular rate and rhythm; No murmurs, rubs, or gallops  ABDOMEN: Soft, Nontender, Nondistended; Bowel sounds present  EXTREMITIES:  2+ Peripheral Pulses, No clubbing, cyanosis, or edema  PSYCH: AAOx3  NEUROLOGY: non-focal  SKIN: No rashes or lesions    LABS:                        11.9   5.56  )-----------( 178      ( 15 Nov 2022 11:20 )             38.0     Auto Eosinophil # x     / Auto Eosinophil % x     / Auto Neutrophil # x     / Auto Neutrophil % x     / BANDS % x                            11.3   6.50  )-----------( 205      ( 2022 22:10 )             35.6     Auto Eosinophil # 0.28  / Auto Eosinophil % 4.3   / Auto Neutrophil # 4.37  / Auto Neutrophil % 67.2  / BANDS % x        11-15    135  |  100  |  12  ----------------------------<  340<H>  4.4   |  21<L>  |  0.92  14    134<L>  |  98  |  16  ----------------------------<  342<H>  5.4<H>   |  23  |  1.05    Ca    9.1      15 Nov 2022 11:20  Mg     1.60     -15  Phos  2.5     -15  TPro  6.7  /  Alb  2.8<L>  /  TBili  0.3  /  DBili  x   /  AST  30  /  ALT  15  /  AlkPhos  86  11-14          Urinalysis Basic - ( 15 Nov 2022 00:15 )    Color: Light Yellow / Appearance: Clear / S.014 / pH: x  Gluc: x / Ketone: Negative  / Bili: Negative / Urobili: <2 mg/dL   Blood: x / Protein: Negative / Nitrite: Negative   Leuk Esterase: Negative / RBC: x / WBC x   Sq Epi: x / Non Sq Epi: x / Bacteria: x            ABG:     VBG:       Care Discussed with Consultants/Other Providers:    RADIOLOGY & ADDITIONAL TESTS:  (Imaging Personally Reviewed)

## 2022-11-16 NOTE — PROGRESS NOTE ADULT - PROBLEM SELECTOR PLAN 3
-Hold home glimepiride  -c/w lantus 8U at bedtime, admelog 10TID, ISS -Hold home glimepiride  -uptirate lantus 8U to 10U at bedtime, admelog 3U to 5U TID, ISS

## 2022-11-16 NOTE — PROGRESS NOTE ADULT - ATTENDING COMMENTS
87 yr old male presenting with bilateral plantar foot pain     Today feels better, denies pain in his feet. Nontender to palpation to the plantar aspect of feet today.    #b/l foot pain: possibly multifactorial: plantar fascitis and neuropathic pain from DM. PT eval and DM management. c/w gabapentin. xray negative for fractures  #DM: uptitrate lantus 8u qhs to 10u and admelog 3u TIDAC to 5u    Rest as above.  Discussed with Dr. Proctor.

## 2022-11-17 ENCOUNTER — TRANSCRIPTION ENCOUNTER (OUTPATIENT)
Age: 87
End: 2022-11-17

## 2022-11-17 LAB
GLUCOSE BLDC GLUCOMTR-MCNC: 116 MG/DL — HIGH (ref 70–99)
GLUCOSE BLDC GLUCOMTR-MCNC: 174 MG/DL — HIGH (ref 70–99)
GLUCOSE BLDC GLUCOMTR-MCNC: 241 MG/DL — HIGH (ref 70–99)
GLUCOSE BLDC GLUCOMTR-MCNC: 242 MG/DL — HIGH (ref 70–99)

## 2022-11-17 PROCEDURE — 99233 SBSQ HOSP IP/OBS HIGH 50: CPT | Mod: GC

## 2022-11-17 RX ORDER — POLYETHYLENE GLYCOL 3350 17 G/17G
17 POWDER, FOR SOLUTION ORAL
Refills: 0 | Status: DISCONTINUED | OUTPATIENT
Start: 2022-11-17 | End: 2022-11-18

## 2022-11-17 RX ORDER — SENNA PLUS 8.6 MG/1
2 TABLET ORAL AT BEDTIME
Refills: 0 | Status: DISCONTINUED | OUTPATIENT
Start: 2022-11-17 | End: 2022-11-21

## 2022-11-17 RX ADMIN — Medication 5 UNIT(S): at 12:32

## 2022-11-17 RX ADMIN — INSULIN GLARGINE 10 UNIT(S): 100 INJECTION, SOLUTION SUBCUTANEOUS at 22:14

## 2022-11-17 RX ADMIN — POLYETHYLENE GLYCOL 3350 17 GRAM(S): 17 POWDER, FOR SOLUTION ORAL at 18:02

## 2022-11-17 RX ADMIN — ATORVASTATIN CALCIUM 40 MILLIGRAM(S): 80 TABLET, FILM COATED ORAL at 22:01

## 2022-11-17 RX ADMIN — Medication 25 MILLIGRAM(S): at 05:07

## 2022-11-17 RX ADMIN — Medication 2: at 18:02

## 2022-11-17 RX ADMIN — Medication 2: at 12:32

## 2022-11-17 RX ADMIN — Medication 650 MILLIGRAM(S): at 20:48

## 2022-11-17 RX ADMIN — APIXABAN 5 MILLIGRAM(S): 2.5 TABLET, FILM COATED ORAL at 18:01

## 2022-11-17 RX ADMIN — CLOPIDOGREL BISULFATE 75 MILLIGRAM(S): 75 TABLET, FILM COATED ORAL at 12:31

## 2022-11-17 RX ADMIN — GABAPENTIN 100 MILLIGRAM(S): 400 CAPSULE ORAL at 22:01

## 2022-11-17 RX ADMIN — APIXABAN 5 MILLIGRAM(S): 2.5 TABLET, FILM COATED ORAL at 05:07

## 2022-11-17 RX ADMIN — Medication 5 UNIT(S): at 18:03

## 2022-11-17 RX ADMIN — FINASTERIDE 5 MILLIGRAM(S): 5 TABLET, FILM COATED ORAL at 12:31

## 2022-11-17 RX ADMIN — Medication 1 TABLET(S): at 12:30

## 2022-11-17 RX ADMIN — Medication 5 UNIT(S): at 09:02

## 2022-11-17 RX ADMIN — Medication 100 MILLIGRAM(S): at 12:31

## 2022-11-17 RX ADMIN — SENNA PLUS 2 TABLET(S): 8.6 TABLET ORAL at 22:13

## 2022-11-17 RX ADMIN — Medication 650 MILLIGRAM(S): at 21:48

## 2022-11-17 NOTE — DISCHARGE NOTE PROVIDER - CARE PROVIDER_API CALL
Martha Way)  Internal Medicine  [address] [facility], [address]rd [place] [address]  Phone: [phone]  Fax: [phone]  Follow Up Time: 2 weeks

## 2022-11-17 NOTE — DISCHARGE NOTE PROVIDER - ATTENDING DISCHARGE PHYSICAL EXAMINATION:
patient seen and examined today (11/21/22) patient has no acute complaints. pain is better with current pain regimens. no acute pathology found on imaging. Patient is stable for d/c to [facility] today.

## 2022-11-17 NOTE — PHYSICAL THERAPY INITIAL EVALUATION ADULT - GAIT DEVIATIONS NOTED, PT EVAL
decreased cara/increased time in double stance/decreased step length/decreased stride length/decreased weight-shifting ability

## 2022-11-17 NOTE — PROGRESS NOTE ADULT - ATTENDING COMMENTS
87 yr old male presenting with bilateral plantar foot pain     Today feels good, denies pain in his feet. Nontender to palpation to the plantar aspect of feet.    #b/l foot pain: possibly multifactorial: plantar fascitis and neuropathic pain from DM. PT eval and DM management. c/w gabapentin. xray negative for fractures. PT eval  #DM: c/w ISS and insulin, uptitrated as needed    Rest as above.  Discussed with Dr. Proctor.

## 2022-11-17 NOTE — PROGRESS NOTE ADULT - PROBLEM SELECTOR PLAN 3
-Hold home glimepiride  -uptirate lantus 8U to 10U at bedtime, admelog 3U to 5U TID, ISS -Hold home glimepiride  -c/w iss, uptitrate lantus and premeal insulin as needed

## 2022-11-17 NOTE — PROGRESS NOTE ADULT - PROBLEM SELECTOR PLAN 1
-Progressively worsening bilateral foot pain  -c/w gabapentin 100mg nightly  -PT consult  -bilateral xrays -Progressively worsening bilateral foot pain  -c/w gabapentin 100mg nightly  -PT consult  -bilateral xrays negative for fracture

## 2022-11-17 NOTE — DISCHARGE NOTE PROVIDER - NSDCFUSCHEDAPPT_GEN_ALL_CORE_FT
[facility]  VASCULAR [address]  Scheduled Appointment: 12/02/2022    Shay Pratt  [facility]  VASCULAR [address]  Scheduled Appointment: 12/02/2022    Martha Way  [facility]  INTMED [address]  Scheduled Appointment: 12/06/2022

## 2022-11-17 NOTE — PHYSICAL THERAPY INITIAL EVALUATION ADULT - PERTINENT HX OF CURRENT PROBLEM, REHAB EVAL
Pt. admitted for bilateral foot pain. Per radiology reports, x-rays bilateral foot and left hip revealed no fractures or dislocations.

## 2022-11-17 NOTE — DISCHARGE NOTE PROVIDER - NSDCMRMEDTOKEN_GEN_ALL_CORE_FT
allopurinol 100 mg oral tablet: 1 tab(s) orally once a day   atorvastatin 40 mg oral tablet: 1 tab(s) orally once a day  Eliquis 5 mg oral tablet: 1 tab(s) orally 2 times a day  ezetimibe 10 mg oral tablet: 1 tab(s) orally once a day   finasteride 5 mg oral tablet: 1 tab(s) orally once a day  glimepiride 4 mg oral tablet: 1 tab(s) orally once a day  metoprolol succinate 25 mg oral capsule, extended release: 1 cap(s) orally once a day  Multiple Vitamins oral tablet: 1 tab(s) orally once a day  Plavix 75 mg oral tablet: 1 tab(s) orally once a day  probenecid-colchicine 500 mg-0.5 mg oral tablet: 1 tab(s) orally once a day (at bedtime)   allopurinol 100 mg oral tablet: 1 tab(s) orally once a day   atorvastatin 40 mg oral tablet: 1 tab(s) orally once a day  Eliquis 5 mg oral tablet: 1 tab(s) orally 2 times a day  ezetimibe 10 mg oral tablet: 1 tab(s) orally once a day   finasteride 5 mg oral tablet: 1 tab(s) orally once a day  gabapentin 100 mg oral capsule: 1 cap(s) orally once a day (at bedtime)  glimepiride 4 mg oral tablet: 1 tab(s) orally once a day  metoprolol succinate 25 mg oral capsule, extended release: 1 cap(s) orally once a day  Multiple Vitamins oral tablet: 1 tab(s) orally once a day  Plavix 75 mg oral tablet: 1 tab(s) orally once a day  probenecid-colchicine 500 mg-0.5 mg oral tablet: 1 tab(s) orally once a day (at bedtime)   allopurinol 100 mg oral tablet: 1 tab(s) orally once a day   atorvastatin 40 mg oral tablet: 1 tab(s) orally once a day  Eliquis 5 mg oral tablet: 1 tab(s) orally 2 times a day  ezetimibe 10 mg oral tablet: 1 tab(s) orally once a day   finasteride 5 mg oral tablet: 1 tab(s) orally once a day  gabapentin 100 mg oral capsule: 1 cap(s) orally once a day (at bedtime)  glimepiride 4 mg oral tablet: 1 tab(s) orally once a day  metFORMIN 500 mg oral tablet: 2 tab(s) orally 2 times a day  metoprolol succinate 25 mg oral capsule, extended release: 1 cap(s) orally once a day  Multiple Vitamins oral tablet: 1 tab(s) orally once a day  Plavix 75 mg oral tablet: 1 tab(s) orally once a day  probenecid-colchicine 500 mg-0.5 mg oral tablet: 1 tab(s) orally once a day (at bedtime)

## 2022-11-17 NOTE — PHYSICAL THERAPY INITIAL EVALUATION ADULT - ADDITIONAL COMMENTS
Pt. admitted from short term rehab facility.     Pt. was left in bed post PT Evaluation, no apparent distress, call bell within reach.

## 2022-11-17 NOTE — DISCHARGE NOTE PROVIDER - NSDCCPCAREPLAN_GEN_ALL_CORE_FT
Forms in Dr. Patel Buckle to be signed. PRINCIPAL DISCHARGE DIAGNOSIS  Diagnosis: Bilateral foot pain  Assessment and Plan of Treatment: You presented with foot pain. You had xrays which showed no breaks or fractures. You were started on a medicine called gabapenin which helps with neuropathic pain. Please continue this medicine. Follow up with your primary care doctor.

## 2022-11-17 NOTE — PROGRESS NOTE ADULT - SUBJECTIVE AND OBJECTIVE BOX
Catia Proctor PGY3  [phone]    Patient is a 87y old  Male who presents with a chief complaint of plantar foot pain (16 Nov 2022 08:19)      SUBJECTIVE / OVERNIGHT EVENTS:  Patient seen and examined at bedside.    Other Review of Systems Negative.    MEDICATIONS  (STANDING):  allopurinol 100 milliGRAM(s) Oral daily  apixaban 5 milliGRAM(s) Oral two times a day  atorvastatin 40 milliGRAM(s) Oral at bedtime  clopidogrel Tablet 75 milliGRAM(s) Oral daily  dextrose 5%. 1000 milliLiter(s) (100 mL/Hr) IV Continuous <Continuous>  dextrose 5%. 1000 milliLiter(s) (50 mL/Hr) IV Continuous <Continuous>  dextrose 50% Injectable 25 Gram(s) IV Push once  dextrose 50% Injectable 12.5 Gram(s) IV Push once  dextrose 50% Injectable 25 Gram(s) IV Push once  finasteride 5 milliGRAM(s) Oral daily  gabapentin 100 milliGRAM(s) Oral at bedtime  glucagon  Injectable 1 milliGRAM(s) IntraMuscular once  influenza  Vaccine (HIGH DOSE) 0.7 milliLiter(s) IntraMuscular once  insulin glargine Injectable (LANTUS) 10 Unit(s) SubCutaneous at bedtime  insulin lispro (ADMELOG) corrective regimen sliding scale   SubCutaneous three times a day before meals  insulin lispro (ADMELOG) corrective regimen sliding scale   SubCutaneous at bedtime  insulin lispro Injectable (ADMELOG) 5 Unit(s) SubCutaneous three times a day with meals  metoprolol succinate ER 25 milliGRAM(s) Oral daily  multivitamin 1 Tablet(s) Oral daily    MEDICATIONS  (PRN):  acetaminophen     Tablet .. 650 milliGRAM(s) Oral every 6 hours PRN Temp greater or equal to 38C (100.4F), Mild Pain (1 - 3)  aluminum hydroxide/magnesium hydroxide/simethicone Suspension 30 milliLiter(s) Oral every 4 hours PRN Dyspepsia  dextrose Oral Gel 15 Gram(s) Oral once PRN Blood Glucose LESS THAN 70 milliGRAM(s)/deciliter  melatonin 3 milliGRAM(s) Oral at bedtime PRN Insomnia  ondansetron Injectable 4 milliGRAM(s) IV Push every 8 hours PRN Nausea and/or Vomiting      OBJECTIVE:    Vital Signs Last 24 Hrs  T(C): 36.8 (17 Nov 2022 05:00), Max: 36.8 (17 Nov 2022 05:00)  T(F): 98.3 (17 Nov 2022 05:00), Max: 98.3 (17 Nov 2022 05:00)  HR: 58 (17 Nov 2022 05:00) (58 - 62)  BP: 146/56 (17 Nov 2022 05:00) (128/58 - 146/56)  BP(mean): --  RR: 17 (17 Nov 2022 05:00) (17 - 18)  SpO2: 100% (17 Nov 2022 05:00) (98% - 100%)    Parameters below as of 17 Nov 2022 05:00  Patient On (Oxygen Delivery Method): room air        CAPILLARY BLOOD GLUCOSE      POCT Blood Glucose.: 183 mg/dL (16 Nov 2022 21:26)  POCT Blood Glucose.: 265 mg/dL (16 Nov 2022 17:24)  POCT Blood Glucose.: 356 mg/dL (16 Nov 2022 12:13)  POCT Blood Glucose.: 192 mg/dL (16 Nov 2022 08:41)    I&O's Summary    16 Nov 2022 07:01  -  17 Nov 2022 07:00  --------------------------------------------------------  IN: 370 mL / OUT: 220 mL / NET: 150 mL        PHYSICAL EXAM:  GENERAL: NAD, well-developed  HEAD:  Atraumatic, Normocephalic  EYES: EOMI, PERRLA, conjunctiva and sclera clear  NECK: Supple, No JVD  CHEST/LUNG: Clear to auscultation bilaterally; No wheeze  HEART: Regular rate and rhythm; No murmurs, rubs, or gallops  ABDOMEN: Soft, Nontender, Nondistended; Bowel sounds present  EXTREMITIES:  2+ Peripheral Pulses, No clubbing, cyanosis, or edema  PSYCH: AAOx3  NEUROLOGY: non-focal  SKIN: No rashes or lesions    LABS:                        11.9   5.56  )-----------( 178      ( 15 Nov 2022 11:20 )             38.0     Auto Eosinophil # x     / Auto Eosinophil % x     / Auto Neutrophil # x     / Auto Neutrophil % x     / BANDS % x        11-15    135  |  100  |  12  ----------------------------<  340<H>  4.4   |  21<L>  |  0.92    Ca    9.1      15 Nov 2022 11:20  Mg     1.60     11-15  Phos  2.5     11-15                  ABG:     VBG:       Care Discussed with Consultants/Other Providers:    RADIOLOGY & ADDITIONAL TESTS:  (Imaging Personally Reviewed)       Catia Proctor PGY3  [phone]    Patient is a 87y old  Male who presents with a chief complaint of plantar foot pain (16 Nov 2022 08:19)      SUBJECTIVE / OVERNIGHT EVENTS:  Patient seen and examined at bedside.  Feels foot pain is improved. Xrays with no fractures. PT eval pending.      Other Review of Systems Negative.    MEDICATIONS  (STANDING):  allopurinol 100 milliGRAM(s) Oral daily  apixaban 5 milliGRAM(s) Oral two times a day  atorvastatin 40 milliGRAM(s) Oral at bedtime  clopidogrel Tablet 75 milliGRAM(s) Oral daily  dextrose 5%. 1000 milliLiter(s) (100 mL/Hr) IV Continuous <Continuous>  dextrose 5%. 1000 milliLiter(s) (50 mL/Hr) IV Continuous <Continuous>  dextrose 50% Injectable 25 Gram(s) IV Push once  dextrose 50% Injectable 12.5 Gram(s) IV Push once  dextrose 50% Injectable 25 Gram(s) IV Push once  finasteride 5 milliGRAM(s) Oral daily  gabapentin 100 milliGRAM(s) Oral at bedtime  glucagon  Injectable 1 milliGRAM(s) IntraMuscular once  influenza  Vaccine (HIGH DOSE) 0.7 milliLiter(s) IntraMuscular once  insulin glargine Injectable (LANTUS) 10 Unit(s) SubCutaneous at bedtime  insulin lispro (ADMELOG) corrective regimen sliding scale   SubCutaneous three times a day before meals  insulin lispro (ADMELOG) corrective regimen sliding scale   SubCutaneous at bedtime  insulin lispro Injectable (ADMELOG) 5 Unit(s) SubCutaneous three times a day with meals  metoprolol succinate ER 25 milliGRAM(s) Oral daily  multivitamin 1 Tablet(s) Oral daily    MEDICATIONS  (PRN):  acetaminophen     Tablet .. 650 milliGRAM(s) Oral every 6 hours PRN Temp greater or equal to 38C (100.4F), Mild Pain (1 - 3)  aluminum hydroxide/magnesium hydroxide/simethicone Suspension 30 milliLiter(s) Oral every 4 hours PRN Dyspepsia  dextrose Oral Gel 15 Gram(s) Oral once PRN Blood Glucose LESS THAN 70 milliGRAM(s)/deciliter  melatonin 3 milliGRAM(s) Oral at bedtime PRN Insomnia  ondansetron Injectable 4 milliGRAM(s) IV Push every 8 hours PRN Nausea and/or Vomiting      OBJECTIVE:    Vital Signs Last 24 Hrs  T(C): 36.8 (17 Nov 2022 05:00), Max: 36.8 (17 Nov 2022 05:00)  T(F): 98.3 (17 Nov 2022 05:00), Max: 98.3 (17 Nov 2022 05:00)  HR: 58 (17 Nov 2022 05:00) (58 - 62)  BP: 146/56 (17 Nov 2022 05:00) (128/58 - 146/56)  BP(mean): --  RR: 17 (17 Nov 2022 05:00) (17 - 18)  SpO2: 100% (17 Nov 2022 05:00) (98% - 100%)    Parameters below as of 17 Nov 2022 05:00  Patient On (Oxygen Delivery Method): room air        CAPILLARY BLOOD GLUCOSE      POCT Blood Glucose.: 183 mg/dL (16 Nov 2022 21:26)  POCT Blood Glucose.: 265 mg/dL (16 Nov 2022 17:24)  POCT Blood Glucose.: 356 mg/dL (16 Nov 2022 12:13)  POCT Blood Glucose.: 192 mg/dL (16 Nov 2022 08:41)    I&O's Summary    16 Nov 2022 07:01  -  17 Nov 2022 07:00  --------------------------------------------------------  IN: 370 mL / OUT: 220 mL / NET: 150 mL        PHYSICAL EXAM:  GENERAL: NAD, well-developed  HEAD:  Atraumatic, Normocephalic  EYES: EOMI, PERRLA, conjunctiva and sclera clear  NECK: Supple, No JVD  CHEST/LUNG: Clear to auscultation bilaterally; No wheeze  HEART: Regular rate and rhythm; No murmurs, rubs, or gallops  ABDOMEN: Soft, Nontender, Nondistended; Bowel sounds present  EXTREMITIES:  2+ Peripheral Pulses, No clubbing, cyanosis, or edema  PSYCH: AAOx3  NEUROLOGY: non-focal  SKIN: No rashes or lesions    LABS:                        11.9   5.56  )-----------( 178      ( 15 Nov 2022 11:20 )             38.0     Auto Eosinophil # x     / Auto Eosinophil % x     / Auto Neutrophil # x     / Auto Neutrophil % x     / BANDS % x        11-15    135  |  100  |  12  ----------------------------<  340<H>  4.4   |  21<L>  |  0.92    Ca    9.1      15 Nov 2022 11:20  Mg     1.60     11-15  Phos  2.5     11-15                  ABG:     VBG:       Care Discussed with Consultants/Other Providers:    RADIOLOGY & ADDITIONAL TESTS:  (Imaging Personally Reviewed)

## 2022-11-17 NOTE — DISCHARGE NOTE PROVIDER - HOSPITAL COURSE
87 yr old male with a pmh ofCAD s/p stent with most recent July 2022, PAD on Plavix and Eliquis, T2DM on glimepiride, BPH, HTN who presents with bilateral plantar foot pain for 5-6 months. Reports the pain has been worsening and resulting in him not being able to stand due to the pain. When asked to describe the pain he describes it as a burning ( to ED he noted pins and needle),   Pt currently is at rehab following a discharge on 10/31 for gout of the knee.   Denies  headache, dizziness, chest pain, palpitations, SOB, abdominal pain, diarrhea/constipation, urinary symptoms.  Vitals: T 98.3, HR 67, /73, RR 18 satting 100% RA    Hospital course: Patient had bilateral foot xrays which were negative for any fractures. He was started on gabapentin, which helped his pain. He was evaluated by physical therapy, who recommended [facility]. He is stable for discharge back to [facility].

## 2022-11-18 LAB
GLUCOSE BLDC GLUCOMTR-MCNC: 134 MG/DL — HIGH (ref 70–99)
GLUCOSE BLDC GLUCOMTR-MCNC: 203 MG/DL — HIGH (ref 70–99)
GLUCOSE BLDC GLUCOMTR-MCNC: 282 MG/DL — HIGH (ref 70–99)
GLUCOSE BLDC GLUCOMTR-MCNC: 301 MG/DL — HIGH (ref 70–99)

## 2022-11-18 PROCEDURE — 99232 SBSQ HOSP IP/OBS MODERATE 35: CPT | Mod: GC

## 2022-11-18 RX ORDER — POLYETHYLENE GLYCOL 3350 17 G/17G
17 POWDER, FOR SOLUTION ORAL
Refills: 0 | Status: DISCONTINUED | OUTPATIENT
Start: 2022-11-18 | End: 2022-11-21

## 2022-11-18 RX ORDER — INSULIN GLARGINE 100 [IU]/ML
12 INJECTION, SOLUTION SUBCUTANEOUS AT BEDTIME
Refills: 0 | Status: DISCONTINUED | OUTPATIENT
Start: 2022-11-18 | End: 2022-11-20

## 2022-11-18 RX ORDER — INSULIN LISPRO 100/ML
6 VIAL (ML) SUBCUTANEOUS
Refills: 0 | Status: DISCONTINUED | OUTPATIENT
Start: 2022-11-18 | End: 2022-11-19

## 2022-11-18 RX ADMIN — Medication 1 TABLET(S): at 12:48

## 2022-11-18 RX ADMIN — ATORVASTATIN CALCIUM 40 MILLIGRAM(S): 80 TABLET, FILM COATED ORAL at 21:53

## 2022-11-18 RX ADMIN — Medication 25 MILLIGRAM(S): at 05:29

## 2022-11-18 RX ADMIN — Medication 2: at 22:26

## 2022-11-18 RX ADMIN — Medication 5 UNIT(S): at 18:31

## 2022-11-18 RX ADMIN — APIXABAN 5 MILLIGRAM(S): 2.5 TABLET, FILM COATED ORAL at 18:31

## 2022-11-18 RX ADMIN — GABAPENTIN 100 MILLIGRAM(S): 400 CAPSULE ORAL at 21:53

## 2022-11-18 RX ADMIN — POLYETHYLENE GLYCOL 3350 17 GRAM(S): 17 POWDER, FOR SOLUTION ORAL at 05:29

## 2022-11-18 RX ADMIN — CLOPIDOGREL BISULFATE 75 MILLIGRAM(S): 75 TABLET, FILM COATED ORAL at 12:47

## 2022-11-18 RX ADMIN — Medication 5 UNIT(S): at 12:47

## 2022-11-18 RX ADMIN — Medication 100 MILLIGRAM(S): at 12:48

## 2022-11-18 RX ADMIN — Medication 2: at 12:46

## 2022-11-18 RX ADMIN — SENNA PLUS 2 TABLET(S): 8.6 TABLET ORAL at 21:53

## 2022-11-18 RX ADMIN — FINASTERIDE 5 MILLIGRAM(S): 5 TABLET, FILM COATED ORAL at 12:47

## 2022-11-18 RX ADMIN — APIXABAN 5 MILLIGRAM(S): 2.5 TABLET, FILM COATED ORAL at 05:29

## 2022-11-18 RX ADMIN — Medication 5 UNIT(S): at 08:44

## 2022-11-18 RX ADMIN — Medication 3: at 18:31

## 2022-11-18 RX ADMIN — INSULIN GLARGINE 12 UNIT(S): 100 INJECTION, SOLUTION SUBCUTANEOUS at 22:27

## 2022-11-18 NOTE — PROGRESS NOTE ADULT - PROBLEM SELECTOR PLAN 1
-Progressively worsening bilateral foot pain  -c/w gabapentin 100mg nightly  -PT consult  -bilateral xrays negative for fracture -Progressively worsening bilateral foot pain  -c/w gabapentin 100mg nightly  -PT consult; recommend rehab  -bilateral xrays negative for fracture

## 2022-11-18 NOTE — PROGRESS NOTE ADULT - SUBJECTIVE AND OBJECTIVE BOX
Anil Banks MD   Internal Medicine, PGY 1  Contact via TEAMS.     SUBJECTIVE / OVERNIGHT EVENTS:  - Pt seen and examined at bedside  - CHEMO    MEDICATIONS  (STANDING):  allopurinol 100 milliGRAM(s) Oral daily  apixaban 5 milliGRAM(s) Oral two times a day  atorvastatin 40 milliGRAM(s) Oral at bedtime  clopidogrel Tablet 75 milliGRAM(s) Oral daily  dextrose 5%. 1000 milliLiter(s) (100 mL/Hr) IV Continuous <Continuous>  dextrose 5%. 1000 milliLiter(s) (50 mL/Hr) IV Continuous <Continuous>  dextrose 50% Injectable 25 Gram(s) IV Push once  dextrose 50% Injectable 12.5 Gram(s) IV Push once  dextrose 50% Injectable 25 Gram(s) IV Push once  finasteride 5 milliGRAM(s) Oral daily  gabapentin 100 milliGRAM(s) Oral at bedtime  glucagon  Injectable 1 milliGRAM(s) IntraMuscular once  influenza  Vaccine (HIGH DOSE) 0.7 milliLiter(s) IntraMuscular once  insulin glargine Injectable (LANTUS) 10 Unit(s) SubCutaneous at bedtime  insulin lispro (ADMELOG) corrective regimen sliding scale   SubCutaneous three times a day before meals  insulin lispro (ADMELOG) corrective regimen sliding scale   SubCutaneous at bedtime  insulin lispro Injectable (ADMELOG) 5 Unit(s) SubCutaneous three times a day with meals  metoprolol succinate ER 25 milliGRAM(s) Oral daily  multivitamin 1 Tablet(s) Oral daily  polyethylene glycol 3350 17 Gram(s) Oral two times a day  senna 2 Tablet(s) Oral at bedtime    MEDICATIONS  (PRN):  acetaminophen     Tablet .. 650 milliGRAM(s) Oral every 6 hours PRN Temp greater or equal to 38C (100.4F), Mild Pain (1 - 3)  aluminum hydroxide/magnesium hydroxide/simethicone Suspension 30 milliLiter(s) Oral every 4 hours PRN Dyspepsia  dextrose Oral Gel 15 Gram(s) Oral once PRN Blood Glucose LESS THAN 70 milliGRAM(s)/deciliter  melatonin 3 milliGRAM(s) Oral at bedtime PRN Insomnia  ondansetron Injectable 4 milliGRAM(s) IV Push every 8 hours PRN Nausea and/or Vomiting          PHYSICAL EXAM:  Vital Signs Last 24 Hrs  T(C): 36.6 (18 Nov 2022 05:27), Max: 36.8 (17 Nov 2022 22:14)  T(F): 97.9 (18 Nov 2022 05:27), Max: 98.3 (17 Nov 2022 22:14)  HR: 85 (18 Nov 2022 05:27) (60 - 85)  BP: 131/73 (18 Nov 2022 05:27) (128/63 - 134/71)  BP(mean): --  RR: 16 (18 Nov 2022 05:27) (16 - 18)  SpO2: 100% (18 Nov 2022 05:27) (97% - 100%)    Parameters below as of 18 Nov 2022 05:27  Patient On (Oxygen Delivery Method): room air        CAPILLARY BLOOD GLUCOSE      POCT Blood Glucose.: 174 mg/dL (17 Nov 2022 21:19)  POCT Blood Glucose.: 242 mg/dL (17 Nov 2022 17:45)  POCT Blood Glucose.: 241 mg/dL (17 Nov 2022 12:10)  POCT Blood Glucose.: 116 mg/dL (17 Nov 2022 08:33)    I&O's Summary      CONSTITUTIONAL: NAD, well-developed  RESPIRATORY: Normal respiratory effort; lungs are clear to auscultation bilaterally  CARDIOVASCULAR: Regular rate and rhythm, normal S1 and S2, no murmur/rub/gallop; No lower extremity edema; Peripheral pulses are 2+ bilaterally  ABDOMEN: Nontender to palpation, normoactive bowel sounds, no rebound/guarding; No hepatosplenomegaly  MUSCLOSKELETAL: no clubbing or cyanosis of digits; no joint swelling or tenderness to palpation  PSYCH: A+O to person, place, and time; affect appropriate    LABS:                      IMAGING:    [X] All pertinent imaging reviewed by me Anil Banks MD   Internal Medicine, PGY 1  Contact via TEAMS.     SUBJECTIVE / OVERNIGHT EVENTS:  - Pt seen and examined at bedside  - SUZANNAON  - Pt states b/l plantar foot pain greatly improved. States he had BM this AM. Denies fever, chills, N/V, SOB, chest pain, abdominal pain, dysuria, hematuria, HA or visual changes.     MEDICATIONS  (STANDING):  allopurinol 100 milliGRAM(s) Oral daily  apixaban 5 milliGRAM(s) Oral two times a day  atorvastatin 40 milliGRAM(s) Oral at bedtime  clopidogrel Tablet 75 milliGRAM(s) Oral daily  dextrose 5%. 1000 milliLiter(s) (100 mL/Hr) IV Continuous <Continuous>  dextrose 5%. 1000 milliLiter(s) (50 mL/Hr) IV Continuous <Continuous>  dextrose 50% Injectable 25 Gram(s) IV Push once  dextrose 50% Injectable 12.5 Gram(s) IV Push once  dextrose 50% Injectable 25 Gram(s) IV Push once  finasteride 5 milliGRAM(s) Oral daily  gabapentin 100 milliGRAM(s) Oral at bedtime  glucagon  Injectable 1 milliGRAM(s) IntraMuscular once  influenza  Vaccine (HIGH DOSE) 0.7 milliLiter(s) IntraMuscular once  insulin glargine Injectable (LANTUS) 10 Unit(s) SubCutaneous at bedtime  insulin lispro (ADMELOG) corrective regimen sliding scale   SubCutaneous three times a day before meals  insulin lispro (ADMELOG) corrective regimen sliding scale   SubCutaneous at bedtime  insulin lispro Injectable (ADMELOG) 5 Unit(s) SubCutaneous three times a day with meals  metoprolol succinate ER 25 milliGRAM(s) Oral daily  multivitamin 1 Tablet(s) Oral daily  polyethylene glycol 3350 17 Gram(s) Oral two times a day  senna 2 Tablet(s) Oral at bedtime    MEDICATIONS  (PRN):  acetaminophen     Tablet .. 650 milliGRAM(s) Oral every 6 hours PRN Temp greater or equal to 38C (100.4F), Mild Pain (1 - 3)  aluminum hydroxide/magnesium hydroxide/simethicone Suspension 30 milliLiter(s) Oral every 4 hours PRN Dyspepsia  dextrose Oral Gel 15 Gram(s) Oral once PRN Blood Glucose LESS THAN 70 milliGRAM(s)/deciliter  melatonin 3 milliGRAM(s) Oral at bedtime PRN Insomnia  ondansetron Injectable 4 milliGRAM(s) IV Push every 8 hours PRN Nausea and/or Vomiting          PHYSICAL EXAM:  Vital Signs Last 24 Hrs  T(C): 36.6 (18 Nov 2022 05:27), Max: 36.8 (17 Nov 2022 22:14)  T(F): 97.9 (18 Nov 2022 05:27), Max: 98.3 (17 Nov 2022 22:14)  HR: 85 (18 Nov 2022 05:27) (60 - 85)  BP: 131/73 (18 Nov 2022 05:27) (128/63 - 134/71)  BP(mean): --  RR: 16 (18 Nov 2022 05:27) (16 - 18)  SpO2: 100% (18 Nov 2022 05:27) (97% - 100%)    Parameters below as of 18 Nov 2022 05:27  Patient On (Oxygen Delivery Method): room air        CAPILLARY BLOOD GLUCOSE      POCT Blood Glucose.: 174 mg/dL (17 Nov 2022 21:19)  POCT Blood Glucose.: 242 mg/dL (17 Nov 2022 17:45)  POCT Blood Glucose.: 241 mg/dL (17 Nov 2022 12:10)  POCT Blood Glucose.: 116 mg/dL (17 Nov 2022 08:33)    I&O's Summary      CONSTITUTIONAL: NAD  RESPIRATORY: Normal respiratory effort; lungs are clear to auscultation bilaterally  CARDIOVASCULAR: Regular rate and rhythm, normal S1 and S2, no murmur/rub/gallop; No lower extremity edema; Peripheral pulses are 2+ bilaterally  ABDOMEN: Nontender to palpation, normoactive bowel sounds, no rebound/guarding; No hepatosplenomegaly  MUSCLOSKELETAL: no clubbing or cyanosis of digits; no joint swelling or tenderness to palpation, full ROM b/l LE   NEURO: no focal deficits   PSYCH: A+O to person, place, and time; affect appropriate    LABS:                      IMAGING:    [X] All pertinent imaging reviewed by me

## 2022-11-18 NOTE — PROGRESS NOTE ADULT - ATTENDING COMMENTS
87 yr old male presenting with bilateral plantar foot pain     Today feels good, denies pain in his feet. Nontender to palpation to the plantar aspect of feet. Ambulated with PT yesterday with walker.    #b/l foot pain: possibly multifactorial: plantar fascitis and neuropathic pain from DM. Now improving. PT eval and DM management. c/w gabapentin. xray negative for fractures. PT eval--recommended rehab. Pending rehab placement  #DM: c/w ISS and insulin, uptitrated as needed    Rest as above.  Pending prior auth for [facility]. Possibly Monday.  Discussed with Dr. Banks.

## 2022-11-19 LAB
GLUCOSE BLDC GLUCOMTR-MCNC: 158 MG/DL — HIGH (ref 70–99)
GLUCOSE BLDC GLUCOMTR-MCNC: 257 MG/DL — HIGH (ref 70–99)
GLUCOSE BLDC GLUCOMTR-MCNC: 270 MG/DL — HIGH (ref 70–99)
GLUCOSE BLDC GLUCOMTR-MCNC: 319 MG/DL — HIGH (ref 70–99)

## 2022-11-19 PROCEDURE — 99232 SBSQ HOSP IP/OBS MODERATE 35: CPT | Mod: GC

## 2022-11-19 RX ORDER — INSULIN LISPRO 100/ML
7 VIAL (ML) SUBCUTANEOUS
Refills: 0 | Status: DISCONTINUED | OUTPATIENT
Start: 2022-11-19 | End: 2022-11-21

## 2022-11-19 RX ADMIN — GABAPENTIN 100 MILLIGRAM(S): 400 CAPSULE ORAL at 21:34

## 2022-11-19 RX ADMIN — Medication 1 TABLET(S): at 11:08

## 2022-11-19 RX ADMIN — APIXABAN 5 MILLIGRAM(S): 2.5 TABLET, FILM COATED ORAL at 17:29

## 2022-11-19 RX ADMIN — SENNA PLUS 2 TABLET(S): 8.6 TABLET ORAL at 21:34

## 2022-11-19 RX ADMIN — APIXABAN 5 MILLIGRAM(S): 2.5 TABLET, FILM COATED ORAL at 06:10

## 2022-11-19 RX ADMIN — INSULIN GLARGINE 12 UNIT(S): 100 INJECTION, SOLUTION SUBCUTANEOUS at 22:28

## 2022-11-19 RX ADMIN — ATORVASTATIN CALCIUM 40 MILLIGRAM(S): 80 TABLET, FILM COATED ORAL at 21:34

## 2022-11-19 RX ADMIN — Medication 25 MILLIGRAM(S): at 06:10

## 2022-11-19 RX ADMIN — Medication 3: at 18:03

## 2022-11-19 RX ADMIN — Medication 6 UNIT(S): at 13:00

## 2022-11-19 RX ADMIN — Medication 2: at 22:25

## 2022-11-19 RX ADMIN — Medication 3: at 12:59

## 2022-11-19 RX ADMIN — CLOPIDOGREL BISULFATE 75 MILLIGRAM(S): 75 TABLET, FILM COATED ORAL at 11:09

## 2022-11-19 RX ADMIN — Medication 1: at 09:16

## 2022-11-19 RX ADMIN — FINASTERIDE 5 MILLIGRAM(S): 5 TABLET, FILM COATED ORAL at 11:09

## 2022-11-19 RX ADMIN — Medication 100 MILLIGRAM(S): at 11:09

## 2022-11-19 RX ADMIN — Medication 7 UNIT(S): at 18:05

## 2022-11-19 NOTE — PROGRESS NOTE ADULT - ATTENDING COMMENTS
87 yr old male presenting with bilateral plantar foot pain  Neuropathic pain  planning to return to [facility] Patient seen and examined with team  Agree with above a/p by Dr Proctor  87 yr old male presenting with bilateral plantar foot pain  Pe nad resting in bed- no foot pain at this time  Vital Signs Last 24 Hrs  T(C): 37.1 (19 Nov 2022 12:08), Max: 37.7 (18 Nov 2022 22:22)  T(F): 98.8 (19 Nov 2022 12:08), Max: 99.8 (18 Nov 2022 22:22)  HR: 81 (19 Nov 2022 12:08) (75 - 91)  BP: 153/68 (19 Nov 2022 12:08) (145/61 - 153/68)  BP(mean): --  RR: 18 (19 Nov 2022 12:08) (17 - 18)  SpO2: 100%  room air  Lungs cta, cor rrr, abd soft n/t, ext neg e/c/c  A/p  # Neuropathic foot pain sec to diabetes- c/w Neurontin.  planning to return to [facility]  plan d/w patient and team

## 2022-11-19 NOTE — PROVIDER CONTACT NOTE (OTHER) - ACTION/TREATMENT ORDERED:
MD Catia Proctor notified and made aware. Will continue to monitor. MD Caren Castañeda notified and made aware. Will continue to monitor.

## 2022-11-19 NOTE — PROGRESS NOTE ADULT - PROBLEM SELECTOR PLAN 1
-Progressively worsening bilateral foot pain  -c/w gabapentin 100mg nightly  -PT consult; recommend rehab  -bilateral xrays negative for fracture

## 2022-11-19 NOTE — PROGRESS NOTE ADULT - SUBJECTIVE AND OBJECTIVE BOX
Catia Proctor PGY3  [phone]    Patient is a 87y old  Male who presents with a chief complaint of plantar foot pain (18 Nov 2022 07:37)      SUBJECTIVE / OVERNIGHT EVENTS:  Patient seen and examined at bedside.    Other Review of Systems Negative.    MEDICATIONS  (STANDING):  allopurinol 100 milliGRAM(s) Oral daily  apixaban 5 milliGRAM(s) Oral two times a day  atorvastatin 40 milliGRAM(s) Oral at bedtime  clopidogrel Tablet 75 milliGRAM(s) Oral daily  dextrose 5%. 1000 milliLiter(s) (100 mL/Hr) IV Continuous <Continuous>  dextrose 5%. 1000 milliLiter(s) (50 mL/Hr) IV Continuous <Continuous>  dextrose 50% Injectable 25 Gram(s) IV Push once  dextrose 50% Injectable 12.5 Gram(s) IV Push once  dextrose 50% Injectable 25 Gram(s) IV Push once  finasteride 5 milliGRAM(s) Oral daily  gabapentin 100 milliGRAM(s) Oral at bedtime  glucagon  Injectable 1 milliGRAM(s) IntraMuscular once  influenza  Vaccine (HIGH DOSE) 0.7 milliLiter(s) IntraMuscular once  insulin glargine Injectable (LANTUS) 12 Unit(s) SubCutaneous at bedtime  insulin lispro (ADMELOG) corrective regimen sliding scale   SubCutaneous three times a day before meals  insulin lispro (ADMELOG) corrective regimen sliding scale   SubCutaneous at bedtime  insulin lispro Injectable (ADMELOG) 6 Unit(s) SubCutaneous three times a day with meals  metoprolol succinate ER 25 milliGRAM(s) Oral daily  multivitamin 1 Tablet(s) Oral daily  senna 2 Tablet(s) Oral at bedtime    MEDICATIONS  (PRN):  acetaminophen     Tablet .. 650 milliGRAM(s) Oral every 6 hours PRN Temp greater or equal to 38C (100.4F), Mild Pain (1 - 3)  aluminum hydroxide/magnesium hydroxide/simethicone Suspension 30 milliLiter(s) Oral every 4 hours PRN Dyspepsia  dextrose Oral Gel 15 Gram(s) Oral once PRN Blood Glucose LESS THAN 70 milliGRAM(s)/deciliter  melatonin 3 milliGRAM(s) Oral at bedtime PRN Insomnia  ondansetron Injectable 4 milliGRAM(s) IV Push every 8 hours PRN Nausea and/or Vomiting  polyethylene glycol 3350 17 Gram(s) Oral two times a day PRN Constipation      OBJECTIVE:    Vital Signs Last 24 Hrs  T(C): 36.8 (19 Nov 2022 06:00), Max: 37.7 (18 Nov 2022 22:22)  T(F): 98.2 (19 Nov 2022 06:00), Max: 99.8 (18 Nov 2022 22:22)  HR: 91 (19 Nov 2022 06:00) (74 - 91)  BP: 145/61 (19 Nov 2022 06:00) (145/61 - 152/65)  BP(mean): --  RR: 17 (19 Nov 2022 06:00) (17 - 17)  SpO2: 100% (19 Nov 2022 06:00) (100% - 100%)    Parameters below as of 19 Nov 2022 06:00  Patient On (Oxygen Delivery Method): room air        CAPILLARY BLOOD GLUCOSE      POCT Blood Glucose.: 158 mg/dL (19 Nov 2022 08:27)  POCT Blood Glucose.: 301 mg/dL (18 Nov 2022 22:14)  POCT Blood Glucose.: 282 mg/dL (18 Nov 2022 17:38)  POCT Blood Glucose.: 203 mg/dL (18 Nov 2022 12:43)    I&O's Summary    18 Nov 2022 07:01  -  19 Nov 2022 07:00  --------------------------------------------------------  IN: 0 mL / OUT: 950 mL / NET: -950 mL        PHYSICAL EXAM:  GENERAL: NAD, well-developed  HEAD:  Atraumatic, Normocephalic  EYES: EOMI, PERRLA, conjunctiva and sclera clear  NECK: Supple, No JVD  CHEST/LUNG: Clear to auscultation bilaterally; No wheeze  HEART: Regular rate and rhythm; No murmurs, rubs, or gallops  ABDOMEN: Soft, Nontender, Nondistended; Bowel sounds present  EXTREMITIES:  2+ Peripheral Pulses, No clubbing, cyanosis, or edema  PSYCH: AAOx3  NEUROLOGY: non-focal  SKIN: No rashes or lesions    LABS:                      ABG:     VBG:       Care Discussed with Consultants/Other Providers:    RADIOLOGY & ADDITIONAL TESTS:  (Imaging Personally Reviewed)       Catia Proctor PGY3  [phone]    Patient is a 87y old  Male who presents with a chief complaint of plantar foot pain (18 Nov 2022 07:37)      SUBJECTIVE / OVERNIGHT EVENTS:  Patient seen and examined at bedside.  No events overnight. Endorsing mild b/l foot pain, able to ambulate w PT.     Other Review of Systems Negative.    MEDICATIONS  (STANDING):  allopurinol 100 milliGRAM(s) Oral daily  apixaban 5 milliGRAM(s) Oral two times a day  atorvastatin 40 milliGRAM(s) Oral at bedtime  clopidogrel Tablet 75 milliGRAM(s) Oral daily  dextrose 5%. 1000 milliLiter(s) (100 mL/Hr) IV Continuous <Continuous>  dextrose 5%. 1000 milliLiter(s) (50 mL/Hr) IV Continuous <Continuous>  dextrose 50% Injectable 25 Gram(s) IV Push once  dextrose 50% Injectable 12.5 Gram(s) IV Push once  dextrose 50% Injectable 25 Gram(s) IV Push once  finasteride 5 milliGRAM(s) Oral daily  gabapentin 100 milliGRAM(s) Oral at bedtime  glucagon  Injectable 1 milliGRAM(s) IntraMuscular once  influenza  Vaccine (HIGH DOSE) 0.7 milliLiter(s) IntraMuscular once  insulin glargine Injectable (LANTUS) 12 Unit(s) SubCutaneous at bedtime  insulin lispro (ADMELOG) corrective regimen sliding scale   SubCutaneous three times a day before meals  insulin lispro (ADMELOG) corrective regimen sliding scale   SubCutaneous at bedtime  insulin lispro Injectable (ADMELOG) 6 Unit(s) SubCutaneous three times a day with meals  metoprolol succinate ER 25 milliGRAM(s) Oral daily  multivitamin 1 Tablet(s) Oral daily  senna 2 Tablet(s) Oral at bedtime    MEDICATIONS  (PRN):  acetaminophen     Tablet .. 650 milliGRAM(s) Oral every 6 hours PRN Temp greater or equal to 38C (100.4F), Mild Pain (1 - 3)  aluminum hydroxide/magnesium hydroxide/simethicone Suspension 30 milliLiter(s) Oral every 4 hours PRN Dyspepsia  dextrose Oral Gel 15 Gram(s) Oral once PRN Blood Glucose LESS THAN 70 milliGRAM(s)/deciliter  melatonin 3 milliGRAM(s) Oral at bedtime PRN Insomnia  ondansetron Injectable 4 milliGRAM(s) IV Push every 8 hours PRN Nausea and/or Vomiting  polyethylene glycol 3350 17 Gram(s) Oral two times a day PRN Constipation      OBJECTIVE:    Vital Signs Last 24 Hrs  T(C): 36.8 (19 Nov 2022 06:00), Max: 37.7 (18 Nov 2022 22:22)  T(F): 98.2 (19 Nov 2022 06:00), Max: 99.8 (18 Nov 2022 22:22)  HR: 91 (19 Nov 2022 06:00) (74 - 91)  BP: 145/61 (19 Nov 2022 06:00) (145/61 - 152/65)  BP(mean): --  RR: 17 (19 Nov 2022 06:00) (17 - 17)  SpO2: 100% (19 Nov 2022 06:00) (100% - 100%)    Parameters below as of 19 Nov 2022 06:00  Patient On (Oxygen Delivery Method): room air        CAPILLARY BLOOD GLUCOSE      POCT Blood Glucose.: 158 mg/dL (19 Nov 2022 08:27)  POCT Blood Glucose.: 301 mg/dL (18 Nov 2022 22:14)  POCT Blood Glucose.: 282 mg/dL (18 Nov 2022 17:38)  POCT Blood Glucose.: 203 mg/dL (18 Nov 2022 12:43)    I&O's Summary    18 Nov 2022 07:01  -  19 Nov 2022 07:00  --------------------------------------------------------  IN: 0 mL / OUT: 950 mL / NET: -950 mL        PHYSICAL EXAM:  GENERAL: NAD, well-developed  HEAD:  Atraumatic, Normocephalic  EYES: EOMI, PERRLA, conjunctiva and sclera clear  NECK: Supple, No JVD  CHEST/LUNG: Clear to auscultation bilaterally; No wheeze  HEART: Regular rate and rhythm; No murmurs, rubs, or gallops  ABDOMEN: Soft, Nontender, Nondistended; Bowel sounds present  EXTREMITIES:  2+ Peripheral Pulses, No clubbing, cyanosis, or edema  PSYCH: AAOx3  NEUROLOGY: non-focal  SKIN: No rashes or lesions    LABS:                      ABG:     VBG:       Care Discussed with Consultants/Other Providers:    RADIOLOGY & ADDITIONAL TESTS:  (Imaging Personally Reviewed)

## 2022-11-20 LAB
GLUCOSE BLDC GLUCOMTR-MCNC: 205 MG/DL — HIGH (ref 70–99)
GLUCOSE BLDC GLUCOMTR-MCNC: 217 MG/DL — HIGH (ref 70–99)
GLUCOSE BLDC GLUCOMTR-MCNC: 230 MG/DL — HIGH (ref 70–99)
GLUCOSE BLDC GLUCOMTR-MCNC: 273 MG/DL — HIGH (ref 70–99)

## 2022-11-20 PROCEDURE — 99232 SBSQ HOSP IP/OBS MODERATE 35: CPT | Mod: GC

## 2022-11-20 RX ORDER — INSULIN GLARGINE 100 [IU]/ML
13 INJECTION, SOLUTION SUBCUTANEOUS AT BEDTIME
Refills: 0 | Status: DISCONTINUED | OUTPATIENT
Start: 2022-11-20 | End: 2022-11-20

## 2022-11-20 RX ORDER — INSULIN GLARGINE 100 [IU]/ML
14 INJECTION, SOLUTION SUBCUTANEOUS AT BEDTIME
Refills: 0 | Status: DISCONTINUED | OUTPATIENT
Start: 2022-11-20 | End: 2022-11-21

## 2022-11-20 RX ADMIN — Medication 7 UNIT(S): at 12:57

## 2022-11-20 RX ADMIN — CLOPIDOGREL BISULFATE 75 MILLIGRAM(S): 75 TABLET, FILM COATED ORAL at 12:27

## 2022-11-20 RX ADMIN — GABAPENTIN 100 MILLIGRAM(S): 400 CAPSULE ORAL at 22:06

## 2022-11-20 RX ADMIN — Medication 2: at 17:57

## 2022-11-20 RX ADMIN — Medication 1 TABLET(S): at 12:27

## 2022-11-20 RX ADMIN — Medication 2: at 08:45

## 2022-11-20 RX ADMIN — Medication 7 UNIT(S): at 17:56

## 2022-11-20 RX ADMIN — SENNA PLUS 2 TABLET(S): 8.6 TABLET ORAL at 22:05

## 2022-11-20 RX ADMIN — APIXABAN 5 MILLIGRAM(S): 2.5 TABLET, FILM COATED ORAL at 17:55

## 2022-11-20 RX ADMIN — Medication 100 MILLIGRAM(S): at 12:27

## 2022-11-20 RX ADMIN — Medication 7 UNIT(S): at 08:44

## 2022-11-20 RX ADMIN — INSULIN GLARGINE 14 UNIT(S): 100 INJECTION, SOLUTION SUBCUTANEOUS at 22:06

## 2022-11-20 RX ADMIN — Medication 25 MILLIGRAM(S): at 05:53

## 2022-11-20 RX ADMIN — APIXABAN 5 MILLIGRAM(S): 2.5 TABLET, FILM COATED ORAL at 05:53

## 2022-11-20 RX ADMIN — ATORVASTATIN CALCIUM 40 MILLIGRAM(S): 80 TABLET, FILM COATED ORAL at 22:05

## 2022-11-20 RX ADMIN — Medication 3: at 12:57

## 2022-11-20 RX ADMIN — FINASTERIDE 5 MILLIGRAM(S): 5 TABLET, FILM COATED ORAL at 12:27

## 2022-11-20 NOTE — PROGRESS NOTE ADULT - ATTENDING COMMENTS
no Improved Fs on lantus 13 u qhs  Awaiting Rehab Patient seen and examined with team  Agree with above a/p by Dr Proctor  87 yr old male presenting with bilateral plantar foot pain  Pe nad resting in bed- no foot pain at this time  Vital Signs Last 24 Hrs  T(C): 36.9 (20 Nov 2022 05:13), Max: 37.2 (19 Nov 2022 22:21)  T(F): 98.5 (20 Nov 2022 05:13), Max: 99 (19 Nov 2022 22:21)  HR: 68 (20 Nov 2022 05:13) (68 - 80)  BP: 136/74 (20 Nov 2022 05:13) (136/74 - 143/48)  BP(mean): --  RR: 18 (20 Nov 2022 05:13) (18 - 18)  SpO2: 100% (20 Nov 2022 05:13) (97% - 100%)    Parameters below as of 20 Nov 2022 05:13  Patient On (Oxygen Delivery Method): room air    Lungs cta, cor rrr, abd soft n/t, ext neg e/c/c  A/p  # Neuropathic foot pain sec to diabetes- c/w Neurontin.  # DM Fs improved with lantus at 13 u qhs with premeals and ss coverage  planning to return to [facility]  plan d/w patient and team

## 2022-11-20 NOTE — PROGRESS NOTE ADULT - SUBJECTIVE AND OBJECTIVE BOX
Catia Proctor PGY3  [phone]    Patient is a 87y old  Male who presents with a chief complaint of plantar foot pain (19 Nov 2022 08:42)      SUBJECTIVE / OVERNIGHT EVENTS:  Patient seen and examined at bedside.    Other Review of Systems Negative.    MEDICATIONS  (STANDING):  allopurinol 100 milliGRAM(s) Oral daily  apixaban 5 milliGRAM(s) Oral two times a day  atorvastatin 40 milliGRAM(s) Oral at bedtime  clopidogrel Tablet 75 milliGRAM(s) Oral daily  dextrose 5%. 1000 milliLiter(s) (50 mL/Hr) IV Continuous <Continuous>  dextrose 5%. 1000 milliLiter(s) (100 mL/Hr) IV Continuous <Continuous>  dextrose 50% Injectable 25 Gram(s) IV Push once  dextrose 50% Injectable 12.5 Gram(s) IV Push once  dextrose 50% Injectable 25 Gram(s) IV Push once  finasteride 5 milliGRAM(s) Oral daily  gabapentin 100 milliGRAM(s) Oral at bedtime  glucagon  Injectable 1 milliGRAM(s) IntraMuscular once  influenza  Vaccine (HIGH DOSE) 0.7 milliLiter(s) IntraMuscular once  insulin glargine Injectable (LANTUS) 12 Unit(s) SubCutaneous at bedtime  insulin lispro (ADMELOG) corrective regimen sliding scale   SubCutaneous three times a day before meals  insulin lispro (ADMELOG) corrective regimen sliding scale   SubCutaneous at bedtime  insulin lispro Injectable (ADMELOG) 7 Unit(s) SubCutaneous three times a day before meals  metoprolol succinate ER 25 milliGRAM(s) Oral daily  multivitamin 1 Tablet(s) Oral daily  senna 2 Tablet(s) Oral at bedtime    MEDICATIONS  (PRN):  acetaminophen     Tablet .. 650 milliGRAM(s) Oral every 6 hours PRN Temp greater or equal to 38C (100.4F), Mild Pain (1 - 3)  aluminum hydroxide/magnesium hydroxide/simethicone Suspension 30 milliLiter(s) Oral every 4 hours PRN Dyspepsia  dextrose Oral Gel 15 Gram(s) Oral once PRN Blood Glucose LESS THAN 70 milliGRAM(s)/deciliter  melatonin 3 milliGRAM(s) Oral at bedtime PRN Insomnia  ondansetron Injectable 4 milliGRAM(s) IV Push every 8 hours PRN Nausea and/or Vomiting  polyethylene glycol 3350 17 Gram(s) Oral two times a day PRN Constipation      OBJECTIVE:    Vital Signs Last 24 Hrs  T(C): 36.9 (20 Nov 2022 05:13), Max: 37.2 (19 Nov 2022 22:21)  T(F): 98.5 (20 Nov 2022 05:13), Max: 99 (19 Nov 2022 22:21)  HR: 68 (20 Nov 2022 05:13) (68 - 81)  BP: 136/74 (20 Nov 2022 05:13) (136/74 - 153/68)  BP(mean): --  RR: 18 (20 Nov 2022 05:13) (18 - 18)  SpO2: 100% (20 Nov 2022 05:13) (97% - 100%)    Parameters below as of 20 Nov 2022 05:13  Patient On (Oxygen Delivery Method): room air        CAPILLARY BLOOD GLUCOSE      POCT Blood Glucose.: 319 mg/dL (19 Nov 2022 21:59)  POCT Blood Glucose.: 270 mg/dL (19 Nov 2022 17:49)  POCT Blood Glucose.: 257 mg/dL (19 Nov 2022 12:28)  POCT Blood Glucose.: 158 mg/dL (19 Nov 2022 08:27)    I&O's Summary      PHYSICAL EXAM:  GENERAL: NAD, well-developed  HEAD:  Atraumatic, Normocephalic  EYES: EOMI, PERRLA, conjunctiva and sclera clear  NECK: Supple, No JVD  CHEST/LUNG: Clear to auscultation bilaterally; No wheeze  HEART: Regular rate and rhythm; No murmurs, rubs, or gallops  ABDOMEN: Soft, Nontender, Nondistended; Bowel sounds present  EXTREMITIES:  2+ Peripheral Pulses, No clubbing, cyanosis, or edema  PSYCH: AAOx3  NEUROLOGY: non-focal  SKIN: No rashes or lesions    LABS:                      ABG:     VBG:       Care Discussed with Consultants/Other Providers:    RADIOLOGY & ADDITIONAL TESTS:  (Imaging Personally Reviewed)       Catia Proctor PGY3  [phone]    Patient is a 87y old  Male who presents with a chief complaint of plantar foot pain (19 Nov 2022 08:42)      SUBJECTIVE / OVERNIGHT EVENTS:  Patient seen and examined at bedside.  No events overnight. Denying any complaints this AM. Pending [facility].    Other Review of Systems Negative.    MEDICATIONS  (STANDING):  allopurinol 100 milliGRAM(s) Oral daily  apixaban 5 milliGRAM(s) Oral two times a day  atorvastatin 40 milliGRAM(s) Oral at bedtime  clopidogrel Tablet 75 milliGRAM(s) Oral daily  dextrose 5%. 1000 milliLiter(s) (50 mL/Hr) IV Continuous <Continuous>  dextrose 5%. 1000 milliLiter(s) (100 mL/Hr) IV Continuous <Continuous>  dextrose 50% Injectable 25 Gram(s) IV Push once  dextrose 50% Injectable 12.5 Gram(s) IV Push once  dextrose 50% Injectable 25 Gram(s) IV Push once  finasteride 5 milliGRAM(s) Oral daily  gabapentin 100 milliGRAM(s) Oral at bedtime  glucagon  Injectable 1 milliGRAM(s) IntraMuscular once  influenza  Vaccine (HIGH DOSE) 0.7 milliLiter(s) IntraMuscular once  insulin glargine Injectable (LANTUS) 12 Unit(s) SubCutaneous at bedtime  insulin lispro (ADMELOG) corrective regimen sliding scale   SubCutaneous three times a day before meals  insulin lispro (ADMELOG) corrective regimen sliding scale   SubCutaneous at bedtime  insulin lispro Injectable (ADMELOG) 7 Unit(s) SubCutaneous three times a day before meals  metoprolol succinate ER 25 milliGRAM(s) Oral daily  multivitamin 1 Tablet(s) Oral daily  senna 2 Tablet(s) Oral at bedtime    MEDICATIONS  (PRN):  acetaminophen     Tablet .. 650 milliGRAM(s) Oral every 6 hours PRN Temp greater or equal to 38C (100.4F), Mild Pain (1 - 3)  aluminum hydroxide/magnesium hydroxide/simethicone Suspension 30 milliLiter(s) Oral every 4 hours PRN Dyspepsia  dextrose Oral Gel 15 Gram(s) Oral once PRN Blood Glucose LESS THAN 70 milliGRAM(s)/deciliter  melatonin 3 milliGRAM(s) Oral at bedtime PRN Insomnia  ondansetron Injectable 4 milliGRAM(s) IV Push every 8 hours PRN Nausea and/or Vomiting  polyethylene glycol 3350 17 Gram(s) Oral two times a day PRN Constipation      OBJECTIVE:    Vital Signs Last 24 Hrs  T(C): 36.9 (20 Nov 2022 05:13), Max: 37.2 (19 Nov 2022 22:21)  T(F): 98.5 (20 Nov 2022 05:13), Max: 99 (19 Nov 2022 22:21)  HR: 68 (20 Nov 2022 05:13) (68 - 81)  BP: 136/74 (20 Nov 2022 05:13) (136/74 - 153/68)  BP(mean): --  RR: 18 (20 Nov 2022 05:13) (18 - 18)  SpO2: 100% (20 Nov 2022 05:13) (97% - 100%)    Parameters below as of 20 Nov 2022 05:13  Patient On (Oxygen Delivery Method): room air        CAPILLARY BLOOD GLUCOSE      POCT Blood Glucose.: 319 mg/dL (19 Nov 2022 21:59)  POCT Blood Glucose.: 270 mg/dL (19 Nov 2022 17:49)  POCT Blood Glucose.: 257 mg/dL (19 Nov 2022 12:28)  POCT Blood Glucose.: 158 mg/dL (19 Nov 2022 08:27)    I&O's Summary      PHYSICAL EXAM:  GENERAL: NAD, well-developed  HEAD:  Atraumatic, Normocephalic  EYES: EOMI, PERRLA, conjunctiva and sclera clear  NECK: Supple, No JVD  CHEST/LUNG: Clear to auscultation bilaterally; No wheeze  HEART: Regular rate and rhythm; No murmurs, rubs, or gallops  ABDOMEN: Soft, Nontender, Nondistended; Bowel sounds present  EXTREMITIES:  2+ Peripheral Pulses, No clubbing, cyanosis, or edema  PSYCH: AAOx3  NEUROLOGY: non-focal  SKIN: No rashes or lesions    LABS:                      ABG:     VBG:       Care Discussed with Consultants/Other Providers:    RADIOLOGY & ADDITIONAL TESTS:  (Imaging Personally Reviewed)

## 2022-11-20 NOTE — PROGRESS NOTE ADULT - ASSESSMENT
87 yr old male presenting with bilateral plantar foot pain 87 yr old male presenting with bilateral plantar foot pain.

## 2022-11-21 ENCOUNTER — TRANSCRIPTION ENCOUNTER (OUTPATIENT)
Age: 87
End: 2022-11-21

## 2022-11-21 VITALS
OXYGEN SATURATION: 96 % | HEART RATE: 68 BPM | TEMPERATURE: 99 F | DIASTOLIC BLOOD PRESSURE: 57 MMHG | SYSTOLIC BLOOD PRESSURE: 128 MMHG | RESPIRATION RATE: 17 BRPM

## 2022-11-21 DIAGNOSIS — Z29.9 ENCOUNTER FOR PROPHYLACTIC MEASURES, UNSPECIFIED: ICD-10-CM

## 2022-11-21 LAB
GLUCOSE BLDC GLUCOMTR-MCNC: 153 MG/DL — HIGH (ref 70–99)
GLUCOSE BLDC GLUCOMTR-MCNC: 304 MG/DL — HIGH (ref 70–99)
SARS-COV-2 RNA SPEC QL NAA+PROBE: SIGNIFICANT CHANGE UP

## 2022-11-21 PROCEDURE — 99239 HOSP IP/OBS DSCHRG MGMT >30: CPT | Mod: GC

## 2022-11-21 RX ORDER — GABAPENTIN 400 MG/1
1 CAPSULE ORAL
Qty: 0 | Refills: 0 | DISCHARGE
Start: 2022-11-21

## 2022-11-21 RX ORDER — GABAPENTIN 400 MG/1
1 CAPSULE ORAL
Qty: 30 | Refills: 0
Start: 2022-11-21 | End: 2022-12-20

## 2022-11-21 RX ORDER — METFORMIN HYDROCHLORIDE 850 MG/1
1 TABLET ORAL
Qty: 0 | Refills: 0 | DISCHARGE

## 2022-11-21 RX ADMIN — Medication 650 MILLIGRAM(S): at 13:12

## 2022-11-21 RX ADMIN — FINASTERIDE 5 MILLIGRAM(S): 5 TABLET, FILM COATED ORAL at 12:43

## 2022-11-21 RX ADMIN — Medication 650 MILLIGRAM(S): at 12:42

## 2022-11-21 RX ADMIN — Medication 4: at 12:44

## 2022-11-21 RX ADMIN — Medication 7 UNIT(S): at 12:43

## 2022-11-21 RX ADMIN — Medication 1: at 09:02

## 2022-11-21 RX ADMIN — Medication 7 UNIT(S): at 09:02

## 2022-11-21 RX ADMIN — Medication 100 MILLIGRAM(S): at 12:43

## 2022-11-21 RX ADMIN — Medication 25 MILLIGRAM(S): at 05:57

## 2022-11-21 RX ADMIN — CLOPIDOGREL BISULFATE 75 MILLIGRAM(S): 75 TABLET, FILM COATED ORAL at 12:43

## 2022-11-21 RX ADMIN — APIXABAN 5 MILLIGRAM(S): 2.5 TABLET, FILM COATED ORAL at 05:57

## 2022-11-21 RX ADMIN — Medication 1 TABLET(S): at 12:43

## 2022-11-21 NOTE — DISCHARGE NOTE NURSING/CASE MANAGEMENT/SOCIAL WORK - NSDCPEFALRISK_GEN_ALL_CORE
Diane Werner 62  INPATIENT OCCUPATIONAL THERAPY  [facility] 7K  DAILY NOTE    Time:  Time In: 0800  Time Out: 0825  Timed Code Treatment Minutes: 25 Minutes  Minutes: 25    Date: 2017  Patient Name: Randy Melton,   Gender: male      Room: [facility]23/023-A  MRN: [id number]  : 1968  (54 y.o.)  Referring Practitioner: Dr. Jennifer Em  Diagnosis: lumbosacral spinal stenosis   Additional Pertinent Hx: Pt s/p LUMBAR LAMINECTOMY WITH PSF L3-S1 WITH PLIF L5-S1 WITH SOLERA 5.5, CAPSTONE ALVAREZ WITH DBM LOCAL BONE GRAFTING on 17. Pt with increased blood loss during surgery resulting in transfer to ICU. Pt with previous back surgery approx 4 years ago. Restrictions/Precautions:  Restrictions/Precautions: General Precautions  Position Activity Restriction  Spinal Precautions: No Bending, No Lifting, No Twisting  Required Braces or Orthoses  Spinal: Lumbar Corset  Spinal Other: on when up    Past Medical History:   Diagnosis Date    Concussion     Depression     GERD (gastroesophageal reflux disease)     Heartburn     Hyperlipidemia     Osteoarthritis     Prolonged emergence from general anesthesia      Past Surgical History:   Procedure Laterality Date    COLONOSCOPY  2014    HARDWARE REMOVAL      Left side    HARDWARE REMOVAL  2013    Right Side    LAMINECTOMY      LUMBAR FUSION N/A 2017    LUMBAR LAMINECTOMY WITH PSF L3-S1 WITH PLIF L5-S1 WITH SOLERA 5.5, CAPSTONE ALVAREZ WITH DBM LOCAL BONE GRAFTING performed by Feliciano Garza MD at [address] [place] [address]      Both feet    SPINAL FUSION      L3-4 L4-5 [address]    VASECTOMY           Subjective  Subjective: Patient plesant and cooperative, requested to use the bathroom upon arrival. Stating he lost hearing in his left ear after surgery.    Comments: Reviwed and educated patient on back precautions   Overall Orientation Status: Within Normal Limits    Pain:  Pain Assessment  Patient Currently in Pain: Yes (current with meds)  Pain Level: 7  Pain Location: Back  Pain Orientation: Lower    Objective  ADL  Grooming: Contact guard assistance (at sink to wash hands)  Toileting: Stand by assistance     Bed mobility  Supine to Sit: Contact guard assistance  Scooting: Stand by assistance    Transfers  Sit to stand: Contact guard assistance (From EOB <> STS utilizing grab bars)  Stand to sit: Contact guard assistance (From STS to recliner chair good hand placement)  Toilet Transfers  Toilet - Technique: Ambulating  Equipment Used: Grab bars  Toilet Transfer: Contact guard assistance    Balance  Sitting Balance: Stand by assistance (Sitting EOB )  Standing Balance: Contact guard assistance     Activity: in prep to ambulate     Functional Mobility  Functional - Mobility Device: Rolling Walker  Activity: To/from bathroom  Assist Level: Contact guard assistance  Functional Mobility Comments: in room at a slow pace, steady, no LOB      Activity Tolerance:  Activity Tolerance: Patient limited by fatigue    Assessment:  Performance deficits / Impairments: Decreased ADL status, Decreased balance  Prognosis: Good  Discharge Recommendations: Home with assist PRN    Patient Education:  Patient Education: OT POC, back precautions, toileting  Barriers to Learning: none     Equipment Recommendations:  Equipment Needed: No    Safety:  Safety Devices in place: Yes  Type of devices:  All fall risk precautions in place, Gait belt, Nurse notified, Call light within reach, Left in bed    Plan:  Times per week: 6x  Current Treatment Recommendations: Balance Training, Patient/Caregiver Education & Training, Self-Care / ADL    Goals:  Patient goals : go home with spouse     Short term goals  Time Frame for Short term goals: 2 weeks   Short term goal 1: Pt to complete LB ADL tasks while maintaining back precautions with less tahn min A   Short term goal 2: Pt to complete dynamic standing > 3 min reaching outside base of support with CGA and no LOB in prep for showering tasks   Short term goal 3: Pt to be educated on back precautions and dmeo follow through during all ADL tasks.    Long term goals  Time Frame for Long term goals : not est d/t ELOS     AM-PAC Inpatient Daily Activity Raw Score: 17  AM-PAC Inpatient ADL T-Scale Score : 37.26  ADL Inpatient CMS 0-100% Score: 50.11  ADL Inpatient CMS G-Code Modifier : CK For information on Fall & Injury Prevention, visit: [email or web address] OR  [email or web address] OR  https://www.cdc.gov/steadi/patient.html

## 2022-11-21 NOTE — PROGRESS NOTE ADULT - REASON FOR ADMISSION
plantar foot pain

## 2022-11-21 NOTE — PROGRESS NOTE ADULT - PROBLEM SELECTOR PLAN 5
Chronic stable  Continue finasteride and tamsulosin

## 2022-11-21 NOTE — PROGRESS NOTE ADULT - PROBLEM SELECTOR PLAN 4
Chronic stable  continue atorvastatin, not zetia on formulary

## 2022-11-21 NOTE — PROGRESS NOTE ADULT - SUBJECTIVE AND OBJECTIVE BOX
%%%%INCOMPLETE NOTE%%%%%     Dr. Jl Palma PGY3    SWAPNA FINK  87y  MRN: [id number]    Subjective:    Patient is a 87y old  Male who presents with a chief complaint of plantar foot pain (20 Nov 2022 07:30)      MEDICATIONS  (STANDING):  allopurinol 100 milliGRAM(s) Oral daily  apixaban 5 milliGRAM(s) Oral two times a day  atorvastatin 40 milliGRAM(s) Oral at bedtime  clopidogrel Tablet 75 milliGRAM(s) Oral daily  dextrose 5%. 1000 milliLiter(s) (100 mL/Hr) IV Continuous <Continuous>  dextrose 5%. 1000 milliLiter(s) (50 mL/Hr) IV Continuous <Continuous>  dextrose 50% Injectable 25 Gram(s) IV Push once  dextrose 50% Injectable 12.5 Gram(s) IV Push once  dextrose 50% Injectable 25 Gram(s) IV Push once  finasteride 5 milliGRAM(s) Oral daily  gabapentin 100 milliGRAM(s) Oral at bedtime  glucagon  Injectable 1 milliGRAM(s) IntraMuscular once  influenza  Vaccine (HIGH DOSE) 0.7 milliLiter(s) IntraMuscular once  insulin glargine Injectable (LANTUS) 14 Unit(s) SubCutaneous at bedtime  insulin lispro (ADMELOG) corrective regimen sliding scale   SubCutaneous three times a day before meals  insulin lispro (ADMELOG) corrective regimen sliding scale   SubCutaneous at bedtime  insulin lispro Injectable (ADMELOG) 7 Unit(s) SubCutaneous three times a day before meals  metoprolol succinate ER 25 milliGRAM(s) Oral daily  multivitamin 1 Tablet(s) Oral daily  senna 2 Tablet(s) Oral at bedtime    MEDICATIONS  (PRN):  acetaminophen     Tablet .. 650 milliGRAM(s) Oral every 6 hours PRN Temp greater or equal to 38C (100.4F), Mild Pain (1 - 3)  aluminum hydroxide/magnesium hydroxide/simethicone Suspension 30 milliLiter(s) Oral every 4 hours PRN Dyspepsia  dextrose Oral Gel 15 Gram(s) Oral once PRN Blood Glucose LESS THAN 70 milliGRAM(s)/deciliter  melatonin 3 milliGRAM(s) Oral at bedtime PRN Insomnia  ondansetron Injectable 4 milliGRAM(s) IV Push every 8 hours PRN Nausea and/or Vomiting  polyethylene glycol 3350 17 Gram(s) Oral two times a day PRN Constipation        Objective:    Vitals: Vital Signs Last 24 Hrs  T(C): 37.6 (11-20-22 @ 21:22), Max: 37.6 (11-20-22 @ 21:22)  T(F): 99.6 (11-20-22 @ 21:22), Max: 99.6 (11-20-22 @ 21:22)  HR: 74 (11-20-22 @ 21:22) (73 - 74)  BP: 138/58 (11-20-22 @ 21:22) (136/51 - 138/58)  BP(mean): --  RR: 17 (11-20-22 @ 21:22) (16 - 17)  SpO2: 97% (11-20-22 @ 21:22) (97% - 99%)            I&O's Summary      PHYSICAL EXAM:  GENERAL: NAD, well-groomed, well-developed  HEAD:  Atraumatic, Normocephalic  EYES: EOMI, PERRLA, conjunctiva and sclera clear  ENMT: No tonsillar erythema, exudates, or enlargement; Moist mucous membranes, Good dentition, No lesions  NECK: Supple, No JVD, Normal thyroid  CHEST/LUNG: Clear to auscultation bilaterally; No rales, rhonchi, wheezing, or rubs  HEART: Regular rate and rhythm; No murmurs, rubs, or gallops  ABDOMEN: Soft, Nontender, Nondistended; Bowel sounds present  EXTREMITIES:  2+ Peripheral Pulses, No clubbing, cyanosis, or edema  LYMPH: No lymphadenopathy noted  SKIN: No rashes or lesions  NERVOUS SYSTEM:  Alert & Oriented X4, Good concentration  PSYCH: Normal Affect. Speaking in Full Sentences. Laying in bed comfortably; not agitated     LABS:    Hgb Trend: 11.9<--, 11.3<--        Creatinine Trend: 0.92<--, 1.05<--, 1.01<--, 1.10<--, 1.30<--, 1.18<--                    CAPILLARY BLOOD GLUCOSE      POCT Blood Glucose.: 230 mg/dL (20 Nov 2022 22:00)  POCT Blood Glucose.: 217 mg/dL (20 Nov 2022 17:56)  POCT Blood Glucose.: 273 mg/dL (20 Nov 2022 12:14)  POCT Blood Glucose.: 205 mg/dL (20 Nov 2022 08:40)     %%%%INCOMPLETE NOTE%%%%%     Dr. Jl Palma PGY3    SWAPNA FINK  87y  MRN: [id number]    Subjective:    Patient is a 87y old  Male who presents with a chief complaint of plantar foot pain (20 Nov 2022 07:30)  Spoke with Patient at Bedside. No acute events overnight. No active complaints. Patient denies Ha/F/N/V/CP/SOB/Abd Pain/D/Dysuria/Swelling    MEDICATIONS  (STANDING):  allopurinol 100 milliGRAM(s) Oral daily  apixaban 5 milliGRAM(s) Oral two times a day  atorvastatin 40 milliGRAM(s) Oral at bedtime  clopidogrel Tablet 75 milliGRAM(s) Oral daily  dextrose 5%. 1000 milliLiter(s) (100 mL/Hr) IV Continuous <Continuous>  dextrose 5%. 1000 milliLiter(s) (50 mL/Hr) IV Continuous <Continuous>  dextrose 50% Injectable 25 Gram(s) IV Push once  dextrose 50% Injectable 12.5 Gram(s) IV Push once  dextrose 50% Injectable 25 Gram(s) IV Push once  finasteride 5 milliGRAM(s) Oral daily  gabapentin 100 milliGRAM(s) Oral at bedtime  glucagon  Injectable 1 milliGRAM(s) IntraMuscular once  influenza  Vaccine (HIGH DOSE) 0.7 milliLiter(s) IntraMuscular once  insulin glargine Injectable (LANTUS) 14 Unit(s) SubCutaneous at bedtime  insulin lispro (ADMELOG) corrective regimen sliding scale   SubCutaneous three times a day before meals  insulin lispro (ADMELOG) corrective regimen sliding scale   SubCutaneous at bedtime  insulin lispro Injectable (ADMELOG) 7 Unit(s) SubCutaneous three times a day before meals  metoprolol succinate ER 25 milliGRAM(s) Oral daily  multivitamin 1 Tablet(s) Oral daily  senna 2 Tablet(s) Oral at bedtime    MEDICATIONS  (PRN):  acetaminophen     Tablet .. 650 milliGRAM(s) Oral every 6 hours PRN Temp greater or equal to 38C (100.4F), Mild Pain (1 - 3)  aluminum hydroxide/magnesium hydroxide/simethicone Suspension 30 milliLiter(s) Oral every 4 hours PRN Dyspepsia  dextrose Oral Gel 15 Gram(s) Oral once PRN Blood Glucose LESS THAN 70 milliGRAM(s)/deciliter  melatonin 3 milliGRAM(s) Oral at bedtime PRN Insomnia  ondansetron Injectable 4 milliGRAM(s) IV Push every 8 hours PRN Nausea and/or Vomiting  polyethylene glycol 3350 17 Gram(s) Oral two times a day PRN Constipation        Objective:    Vitals: Vital Signs Last 24 Hrs  T(C): 37.6 (11-20-22 @ 21:22), Max: 37.6 (11-20-22 @ 21:22)  T(F): 99.6 (11-20-22 @ 21:22), Max: 99.6 (11-20-22 @ 21:22)  HR: 74 (11-20-22 @ 21:22) (73 - 74)  BP: 138/58 (11-20-22 @ 21:22) (136/51 - 138/58)  BP(mean): --  RR: 17 (11-20-22 @ 21:22) (16 - 17)  SpO2: 97% (11-20-22 @ 21:22) (97% - 99%)            I&O's Summary      PHYSICAL EXAM:  GENERAL: NAD, well-developed  HEAD:  Atraumatic, Normocephalic  EYES: EOMI, PERRLA, conjunctiva and sclera clear  NECK: Supple, No JVD  CHEST/LUNG: Clear to auscultation bilaterally; No wheeze  HEART: Regular rate and rhythm; No murmurs, rubs, or gallops  ABDOMEN: Soft, Nontender, Nondistended; Bowel sounds present  EXTREMITIES:  2+ Peripheral Pulses, No clubbing, cyanosis, or edema  PSYCH: AAOx3  NEUROLOGY: non-focal  SKIN: No rashes or lesions    LABS:    Hgb Trend: 11.9<--, 11.3<--        Creatinine Trend: 0.92<--, 1.05<--, 1.01<--, 1.10<--, 1.30<--, 1.18<--                    CAPILLARY BLOOD GLUCOSE      POCT Blood Glucose.: 230 mg/dL (20 Nov 2022 22:00)  POCT Blood Glucose.: 217 mg/dL (20 Nov 2022 17:56)  POCT Blood Glucose.: 273 mg/dL (20 Nov 2022 12:14)  POCT Blood Glucose.: 205 mg/dL (20 Nov 2022 08:40)     Dr. Jl Palma PGY3    SWAPNA FINK  87y  MRN: [id number]    Subjective:    Patient is a 87y old  Male who presents with a chief complaint of plantar foot pain (20 Nov 2022 07:30)  Spoke with Patient at Bedside. No acute events overnight. No active complaints. Patient denies Ha/F/N/V/CP/SOB/Abd Pain/D/Dysuria/Swelling    MEDICATIONS  (STANDING):  allopurinol 100 milliGRAM(s) Oral daily  apixaban 5 milliGRAM(s) Oral two times a day  atorvastatin 40 milliGRAM(s) Oral at bedtime  clopidogrel Tablet 75 milliGRAM(s) Oral daily  dextrose 5%. 1000 milliLiter(s) (100 mL/Hr) IV Continuous <Continuous>  dextrose 5%. 1000 milliLiter(s) (50 mL/Hr) IV Continuous <Continuous>  dextrose 50% Injectable 25 Gram(s) IV Push once  dextrose 50% Injectable 12.5 Gram(s) IV Push once  dextrose 50% Injectable 25 Gram(s) IV Push once  finasteride 5 milliGRAM(s) Oral daily  gabapentin 100 milliGRAM(s) Oral at bedtime  glucagon  Injectable 1 milliGRAM(s) IntraMuscular once  influenza  Vaccine (HIGH DOSE) 0.7 milliLiter(s) IntraMuscular once  insulin glargine Injectable (LANTUS) 14 Unit(s) SubCutaneous at bedtime  insulin lispro (ADMELOG) corrective regimen sliding scale   SubCutaneous three times a day before meals  insulin lispro (ADMELOG) corrective regimen sliding scale   SubCutaneous at bedtime  insulin lispro Injectable (ADMELOG) 7 Unit(s) SubCutaneous three times a day before meals  metoprolol succinate ER 25 milliGRAM(s) Oral daily  multivitamin 1 Tablet(s) Oral daily  senna 2 Tablet(s) Oral at bedtime    MEDICATIONS  (PRN):  acetaminophen     Tablet .. 650 milliGRAM(s) Oral every 6 hours PRN Temp greater or equal to 38C (100.4F), Mild Pain (1 - 3)  aluminum hydroxide/magnesium hydroxide/simethicone Suspension 30 milliLiter(s) Oral every 4 hours PRN Dyspepsia  dextrose Oral Gel 15 Gram(s) Oral once PRN Blood Glucose LESS THAN 70 milliGRAM(s)/deciliter  melatonin 3 milliGRAM(s) Oral at bedtime PRN Insomnia  ondansetron Injectable 4 milliGRAM(s) IV Push every 8 hours PRN Nausea and/or Vomiting  polyethylene glycol 3350 17 Gram(s) Oral two times a day PRN Constipation        Objective:    Vitals: Vital Signs Last 24 Hrs  T(C): 37.6 (11-20-22 @ 21:22), Max: 37.6 (11-20-22 @ 21:22)  T(F): 99.6 (11-20-22 @ 21:22), Max: 99.6 (11-20-22 @ 21:22)  HR: 74 (11-20-22 @ 21:22) (73 - 74)  BP: 138/58 (11-20-22 @ 21:22) (136/51 - 138/58)  BP(mean): --  RR: 17 (11-20-22 @ 21:22) (16 - 17)  SpO2: 97% (11-20-22 @ 21:22) (97% - 99%)            I&O's Summary      PHYSICAL EXAM:  GENERAL: NAD, well-developed  HEAD:  Atraumatic, Normocephalic  EYES: EOMI, PERRLA, conjunctiva and sclera clear  NECK: Supple, No JVD  CHEST/LUNG: Clear to auscultation bilaterally; No wheeze  HEART: Regular rate and rhythm; No murmurs, rubs, or gallops  ABDOMEN: Soft, Nontender, Nondistended; Bowel sounds present  EXTREMITIES:  2+ Peripheral Pulses, No clubbing, cyanosis, or edema  PSYCH: AAOx3  NEUROLOGY: non-focal  SKIN: No rashes or lesions    LABS:    Hgb Trend: 11.9<--, 11.3<--        Creatinine Trend: 0.92<--, 1.05<--, 1.01<--, 1.10<--, 1.30<--, 1.18<--                    CAPILLARY BLOOD GLUCOSE      POCT Blood Glucose.: 230 mg/dL (20 Nov 2022 22:00)  POCT Blood Glucose.: 217 mg/dL (20 Nov 2022 17:56)  POCT Blood Glucose.: 273 mg/dL (20 Nov 2022 12:14)  POCT Blood Glucose.: 205 mg/dL (20 Nov 2022 08:40)

## 2022-11-21 NOTE — PROGRESS NOTE ADULT - ASSESSMENT
87 yr old male presenting with bilateral plantar foot pain. 87M Hx CAD s/p stent July 2022, PAD on Plavix and Eliquis, T2DM on glimepiride, BPH, HTN p/w bilateral plantar foot pain.

## 2022-11-21 NOTE — DISCHARGE NOTE NURSING/CASE MANAGEMENT/SOCIAL WORK - PATIENT PORTAL LINK FT
You can access the FollowMyHealth Patient Portal offered by [facility] by registering at the following website: [email or web address]. By joining [facility]’s FollowMyHealth portal, you will also be able to view your health information using other applications (apps) compatible with our system.

## 2022-11-21 NOTE — PROGRESS NOTE ADULT - PROBLEM SELECTOR PROBLEM 5
BPH (benign prostatic hypertrophy)

## 2022-11-21 NOTE — PROGRESS NOTE ADULT - PROBLEM SELECTOR PROBLEM 3
Type 2 diabetes mellitus treated without insulin

## 2022-11-21 NOTE — PROGRESS NOTE ADULT - PROBLEM SELECTOR PROBLEM 1
Bilateral foot pain

## 2022-11-22 ENCOUNTER — NON-APPOINTMENT (OUTPATIENT)
Age: 87
End: 2022-11-22

## 2022-12-02 ENCOUNTER — APPOINTMENT (OUTPATIENT)
Dept: VASCULAR SURGERY | Facility: CLINIC | Age: 87
End: 2022-12-02

## 2022-12-06 ENCOUNTER — APPOINTMENT (OUTPATIENT)
Dept: INTERNAL MEDICINE | Facility: CLINIC | Age: 87
End: 2022-12-06

## 2022-12-13 ENCOUNTER — APPOINTMENT (OUTPATIENT)
Dept: CARDIOLOGY | Facility: CLINIC | Age: 87
End: 2022-12-13

## 2022-12-14 ENCOUNTER — TRANSCRIPTION ENCOUNTER (OUTPATIENT)
Age: 87
End: 2022-12-14

## 2023-01-11 ENCOUNTER — APPOINTMENT (OUTPATIENT)
Dept: INTERNAL MEDICINE | Facility: CLINIC | Age: 88
End: 2023-01-11
Payer: MEDICARE

## 2023-01-11 VITALS
BODY MASS INDEX: 20.44 KG/M2 | TEMPERATURE: 97.9 F | DIASTOLIC BLOOD PRESSURE: 74 MMHG | RESPIRATION RATE: 16 BRPM | OXYGEN SATURATION: 98 % | HEIGHT: 69 IN | HEART RATE: 60 BPM | SYSTOLIC BLOOD PRESSURE: 138 MMHG | WEIGHT: 138 LBS

## 2023-01-11 PROCEDURE — 99214 OFFICE O/P EST MOD 30 MIN: CPT

## 2023-01-11 RX ORDER — TRIAMCINOLONE ACETONIDE 1 MG/G
0.1 CREAM TOPICAL
Qty: 80 | Refills: 0 | Status: DISCONTINUED | COMMUNITY
Start: 2021-01-25 | End: 2023-01-11

## 2023-01-11 RX ORDER — METFORMIN ER 500 MG 500 MG/1
500 TABLET ORAL
Qty: 360 | Refills: 3 | Status: DISCONTINUED | COMMUNITY
Start: 2018-01-31 | End: 2023-01-11

## 2023-01-11 RX ORDER — DOXYCYCLINE HYCLATE 100 MG/1
100 CAPSULE ORAL
Qty: 14 | Refills: 0 | Status: DISCONTINUED | COMMUNITY
Start: 2022-10-08 | End: 2023-01-11

## 2023-01-11 RX ORDER — DICLOFENAC SODIUM 16.05 MG/ML
1.5 SOLUTION TOPICAL
Qty: 1 | Refills: 0 | Status: DISCONTINUED | COMMUNITY
Start: 2019-04-22 | End: 2023-01-11

## 2023-01-11 RX ORDER — HYALURONATE SODIUM 20 MG/2 ML
20 SYRINGE (ML) INTRAARTICULAR
Qty: 1 | Refills: 0 | Status: DISCONTINUED | OUTPATIENT
Start: 2019-04-22 | End: 2023-01-11

## 2023-01-11 RX ORDER — PREDNISONE 10 MG/1
10 TABLET ORAL
Qty: 10 | Refills: 0 | Status: DISCONTINUED | COMMUNITY
Start: 2021-02-15 | End: 2023-01-11

## 2023-01-11 RX ORDER — PAPAVERINE/PHENTOLAMINE/WATER 30-1MG/ML
30-1 VIAL (ML) INTRACAVERNOSAL
Qty: 5 | Refills: 5 | Status: DISCONTINUED | COMMUNITY
Start: 2019-07-17 | End: 2023-01-11

## 2023-01-11 RX ORDER — LINAGLIPTIN 5 MG/1
5 TABLET, FILM COATED ORAL DAILY
Qty: 1 | Refills: 3 | Status: DISCONTINUED | COMMUNITY
Start: 2017-02-01 | End: 2023-01-11

## 2023-01-11 RX ORDER — METHYLPREDNISOLONE 4 MG/1
4 TABLET ORAL
Qty: 1 | Refills: 0 | Status: DISCONTINUED | COMMUNITY
Start: 2021-01-22 | End: 2023-01-11

## 2023-01-11 NOTE — HISTORY OF PRESENT ILLNESS
[de-identified] : 86 yo was seen in [facility] on 11/15/22 and d/c-ed to rehab. on 11/21/22 for b/l burning plantar foot pain,inability to stand and ambulate.x-rays were neg for Fx,pt was started on Gabapentin and returned to rehab.\par -previously h/o gout of the ?knee\par - admission for few days to  in 11/22 with COVID19 infection,poss.Pn.was Rxed with?,improved\par -h/o  apical/septal MI,with christopher-infarction ischemia,reduced  EF,underwent coronary angio and PCI to mLAD and RCA due to high degree stenosis.was d/c-ed on ASA for 3 weeks,now continues on Plavix,Eliquis.\par currently feels better,denies c.p.,sob,palpitations,PND,dizziness,syncope.\par -h/o long standing PAD,st.p.successful revascularization procedures\par -h/oT2D,last A1C 6,8%,denies hypoglycemia Sx's,\par -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 on 11/21/22 for b/l burning plantar foot pain,inability to stand and ambulate.x-rays were neg for Fx,pt was started on Gabapentin and returned to rehab.\par -previously seen for  gout of the R.foot,on Allopurinol\par - admission for few days to  in 11/22 with COVID19 infection,poss.Pn.was Rxed with?,improved\par -2021 h/o  apical/septal MI,with christopher-infarction ischemia,reduced  EF,underwent coronary angio and PCI to mLAD and RCA due to high degree stenosis.was d/c-ed on ASA for 3 weeks,now continues on Plavix,Eliquis.currently feels better,denies c.p.,sob,palpitations,PND,dizziness,syncope.\par -h/o long standing PAD,st.p.successful revascularization procedures\par -h/oT2D,last A1C 6,8%,denies hypoglycemia Sx's,\par -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

## 2023-01-13 ENCOUNTER — APPOINTMENT (OUTPATIENT)
Dept: VASCULAR SURGERY | Facility: CLINIC | Age: 88
End: 2023-01-13
Payer: MEDICARE

## 2023-01-13 PROCEDURE — 93925 LOWER EXTREMITY STUDY: CPT

## 2023-01-13 PROCEDURE — 99214 OFFICE O/P EST MOD 30 MIN: CPT

## 2023-01-16 NOTE — PHYSICAL EXAM
[JVD] : no jugular venous distention  [2+] : left 2+ [Ankle Swelling (On Exam)] : not present [Varicose Veins Of Lower Extremities] : not present [] : not present [No Rash or Lesion] : No rash or lesion [Skin Ulcer] : no ulcer [Alert] : alert [Calm] : calm [de-identified] : appears well

## 2023-01-16 NOTE — ASSESSMENT
[FreeTextEntry1] : 87 yo male with history of dm, htn, hld, presents for follow up of pad with claudications s/p  b/l sfa stents and recent right lower extremity angiogram with atherectomy and angioplasty of in-stent stenosis. \par \par \par Patient reports significant improvement of symptoms.  No rest pain or claudication.\par \par Continue aspirin and Eliquis.\par pt to follow up in 3 months with repeat duplex

## 2023-01-17 DIAGNOSIS — G47.00 INSOMNIA, UNSPECIFIED: ICD-10-CM

## 2023-01-23 ENCOUNTER — TRANSCRIPTION ENCOUNTER (OUTPATIENT)
Age: 88
End: 2023-01-23

## 2023-01-24 ENCOUNTER — TRANSCRIPTION ENCOUNTER (OUTPATIENT)
Age: 88
End: 2023-01-24

## 2023-01-25 RX ORDER — APIXABAN 5 MG/1
5 TABLET, FILM COATED ORAL
Qty: 360 | Refills: 3 | Status: DISCONTINUED | COMMUNITY
Start: 2021-08-11 | End: 2023-01-25

## 2023-01-26 ENCOUNTER — TRANSCRIPTION ENCOUNTER (OUTPATIENT)
Age: 88
End: 2023-01-26

## 2023-02-01 ENCOUNTER — NON-APPOINTMENT (OUTPATIENT)
Age: 88
End: 2023-02-01

## 2023-02-27 ENCOUNTER — APPOINTMENT (OUTPATIENT)
Dept: CARDIOLOGY | Facility: CLINIC | Age: 88
End: 2023-02-27

## 2023-03-17 ENCOUNTER — APPOINTMENT (OUTPATIENT)
Dept: UROLOGY | Facility: CLINIC | Age: 88
End: 2023-03-17

## 2023-04-14 ENCOUNTER — APPOINTMENT (OUTPATIENT)
Dept: VASCULAR SURGERY | Facility: CLINIC | Age: 88
End: 2023-04-14

## 2023-04-14 ENCOUNTER — APPOINTMENT (OUTPATIENT)
Dept: VASCULAR SURGERY | Facility: CLINIC | Age: 88
End: 2023-04-14
Payer: MEDICARE

## 2023-04-14 PROCEDURE — 93925 LOWER EXTREMITY STUDY: CPT

## 2023-04-20 ENCOUNTER — TRANSCRIPTION ENCOUNTER (OUTPATIENT)
Age: 88
End: 2023-04-20

## 2023-04-26 ENCOUNTER — APPOINTMENT (OUTPATIENT)
Dept: INTERNAL MEDICINE | Facility: CLINIC | Age: 88
End: 2023-04-26
Payer: MEDICARE

## 2023-04-26 VITALS
TEMPERATURE: 97 F | HEIGHT: 69 IN | SYSTOLIC BLOOD PRESSURE: 181 MMHG | DIASTOLIC BLOOD PRESSURE: 76 MMHG | HEART RATE: 73 BPM | OXYGEN SATURATION: 98 % | RESPIRATION RATE: 16 BRPM | WEIGHT: 145 LBS | BODY MASS INDEX: 21.48 KG/M2

## 2023-04-26 DIAGNOSIS — N18.9 CHRONIC KIDNEY DISEASE, UNSPECIFIED: ICD-10-CM

## 2023-04-26 PROCEDURE — 99213 OFFICE O/P EST LOW 20 MIN: CPT

## 2023-04-26 NOTE — HISTORY OF PRESENT ILLNESS
[Family Member] : family member [FreeTextEntry1] : frequent gout attacks [de-identified] : 88 yo c/o recurrent gout attacks,involving his R.1st toe.on Allopurinol 100 mg qd for prevention and on Colchicine 0,6 mg for acute attacks.\par 2021 h/o  apical/septal MI,with christopher-infarction ischemia,reduced  EF,underwent coronary angio and PCI to mLAD and RCA due to high degree stenosis.was d/c-ed on ASA for 3 weeks,now continues on Plavix,Eliquis.currently feels better,denies c.p.,sob,palpitations,PND,dizziness,syncope.\par -h/o long standing PAD,st.p.successful revascularization procedures\par -h/oT2D,last A1C 6,8%,denies hypoglycemia Sx's,\par -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 on 11/21/22 for b/l burning plantar foot pain,inability to stand and ambulate.x-rays were neg for Fx,pt was started on Gabapentin and returned to rehab.\par -previously seen for  gout of the R.foot,on Allopurinol\par - admission for few days to  in 11/22 with COVID19 infection,poss.Pn.was Rxed with?,improved\par -2021 h/o  apical/septal MI,with christopher-infarction ischemia,reduced  EF,underwent coronary angio and PCI to mLAD and RCA due to high degree stenosis.was d/c-ed on ASA for 3 weeks,now continues on Plavix,Eliquis.currently feels better,denies c.p.,sob,palpitations,PND,dizziness,syncope.\par -h/o long standing PAD,st.p.successful revascularization procedures\par -h/oT2D,last A1C 6,8%,denies hypoglycemia Sx's,\par -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

## 2023-04-26 NOTE — ASSESSMENT
[FreeTextEntry1] : 88 yo with gout,on diet and meds\par HTN,not ideally controlled ,resume Losartan 50 mg\par labs today,meds refilled.\par pt to f/u with card.,vascular,podiatry\par

## 2023-04-26 NOTE — COUNSELING
[Fall prevention counseling provided] : Fall prevention counseling provided [Good understanding] : Patient has a good understanding of disease, goals and obesity follow-up plan [FreeTextEntry2] : gout diet\par low salt\par ADA diet

## 2023-04-27 ENCOUNTER — LABORATORY RESULT (OUTPATIENT)
Age: 88
End: 2023-04-27

## 2023-04-27 DIAGNOSIS — R89.9 UNSPECIFIED ABNORMAL FINDING IN SPECIMENS FROM OTHER ORGANS, SYSTEMS AND TISSUES: ICD-10-CM

## 2023-04-30 ENCOUNTER — LABORATORY RESULT (OUTPATIENT)
Age: 88
End: 2023-04-30

## 2023-04-30 PROBLEM — R89.9 ABNORMAL LABORATORY TEST: Status: ACTIVE | Noted: 2023-04-30

## 2023-04-30 LAB
ALBUMIN SERPL ELPH-MCNC: 4.2 G/DL
ALP BLD-CCNC: 64 U/L
ALT SERPL-CCNC: 6 U/L
ANION GAP SERPL CALC-SCNC: 14 MMOL/L
APPEARANCE: ABNORMAL
AST SERPL-CCNC: 15 U/L
BASOPHILS # BLD AUTO: 0.05 K/UL
BASOPHILS NFR BLD AUTO: 0.7 %
BILIRUB SERPL-MCNC: 0.4 MG/DL
BILIRUBIN URINE: NEGATIVE
BLOOD URINE: ABNORMAL
BUN SERPL-MCNC: 22 MG/DL
CALCIUM SERPL-MCNC: 10.1 MG/DL
CHLORIDE SERPL-SCNC: 104 MMOL/L
CHOLEST SERPL-MCNC: 113 MG/DL
CO2 SERPL-SCNC: 24 MMOL/L
COLOR: YELLOW
CREAT SERPL-MCNC: 1.27 MG/DL
EGFR: 55 ML/MIN/1.73M2
EOSINOPHIL # BLD AUTO: 0.3 K/UL
EOSINOPHIL NFR BLD AUTO: 4.5 %
ESTIMATED AVERAGE GLUCOSE: 180 MG/DL
GLUCOSE QUALITATIVE U: NEGATIVE MG/DL
GLUCOSE SERPL-MCNC: 202 MG/DL
HBA1C MFR BLD HPLC: 7.9 %
HCT VFR BLD CALC: 33.7 %
HDLC SERPL-MCNC: 40 MG/DL
HGB BLD-MCNC: 10.7 G/DL
IMM GRANULOCYTES NFR BLD AUTO: 0.4 %
KETONES URINE: NEGATIVE MG/DL
LDLC SERPL CALC-MCNC: 52 MG/DL
LEUKOCYTE ESTERASE URINE: ABNORMAL
LYMPHOCYTES # BLD AUTO: 1.65 K/UL
LYMPHOCYTES NFR BLD AUTO: 24.7 %
MAN DIFF?: NORMAL
MCHC RBC-ENTMCNC: 29.9 PG
MCHC RBC-ENTMCNC: 31.8 GM/DL
MCV RBC AUTO: 94.1 FL
MONOCYTES # BLD AUTO: 0.5 K/UL
MONOCYTES NFR BLD AUTO: 7.5 %
NEUTROPHILS # BLD AUTO: 4.15 K/UL
NEUTROPHILS NFR BLD AUTO: 62.2 %
NITRITE URINE: POSITIVE
NONHDLC SERPL-MCNC: 73 MG/DL
PH URINE: 7
PLATELET # BLD AUTO: 173 K/UL
POTASSIUM SERPL-SCNC: 4.8 MMOL/L
PROT SERPL-MCNC: 6.9 G/DL
PROTEIN URINE: 30 MG/DL
RBC # BLD: 3.58 M/UL
RBC # FLD: 14.7 %
SODIUM SERPL-SCNC: 143 MMOL/L
SPECIFIC GRAVITY URINE: 1.01
TRIGL SERPL-MCNC: 107 MG/DL
TSH SERPL-ACNC: 1.42 UIU/ML
URATE SERPL-MCNC: 3.6 MG/DL
UROBILINOGEN URINE: 0.2 MG/DL
WBC # FLD AUTO: 6.68 K/UL

## 2023-05-05 ENCOUNTER — NON-APPOINTMENT (OUTPATIENT)
Age: 88
End: 2023-05-05

## 2023-05-05 LAB
ALBUMIN MFR SERPL ELPH: 56.2 %
ALBUMIN SERPL-MCNC: 3.9 G/DL
ALBUMIN/GLOB SERPL: 1.3 RATIO
ALPHA1 GLOB MFR SERPL ELPH: 4.3 %
ALPHA1 GLOB SERPL ELPH-MCNC: 0.3 G/DL
ALPHA2 GLOB MFR SERPL ELPH: 12 %
ALPHA2 GLOB SERPL ELPH-MCNC: 0.8 G/DL
APPEARANCE: ABNORMAL
B-GLOBULIN MFR SERPL ELPH: 12.2 %
B-GLOBULIN SERPL ELPH-MCNC: 0.9 G/DL
BACTERIA UR CULT: ABNORMAL
BASOPHILS # BLD AUTO: 0.07 K/UL
BASOPHILS NFR BLD AUTO: 1.1 %
BILIRUBIN URINE: ABNORMAL
BLOOD URINE: ABNORMAL
COLOR: NORMAL
EOSINOPHIL # BLD AUTO: 0.25 K/UL
EOSINOPHIL NFR BLD AUTO: 3.9 %
ERYTHROCYTE [SEDIMENTATION RATE] IN BLOOD BY WESTERGREN METHOD: 26 MM/HR
FERRITIN SERPL-MCNC: 34 NG/ML
GAMMA GLOB FLD ELPH-MCNC: 1.1 G/DL
GAMMA GLOB MFR SERPL ELPH: 15.3 %
GLUCOSE QUALITATIVE U: 250 MG/DL
HCT VFR BLD CALC: 35.5 %
HGB BLD-MCNC: 10.5 G/DL
IMM GRANULOCYTES NFR BLD AUTO: 0.3 %
INTERPRETATION SERPL IEP-IMP: NORMAL
IRON SATN MFR SERPL: 18 %
IRON SERPL-MCNC: 62 UG/DL
KETONES URINE: NEGATIVE MG/DL
LEUKOCYTE ESTERASE URINE: ABNORMAL
LYMPHOCYTES # BLD AUTO: 1.53 K/UL
LYMPHOCYTES NFR BLD AUTO: 24 %
MAN DIFF?: NORMAL
MCHC RBC-ENTMCNC: 29.4 PG
MCHC RBC-ENTMCNC: 29.6 GM/DL
MCV RBC AUTO: 99.4 FL
MONOCYTES # BLD AUTO: 0.52 K/UL
MONOCYTES NFR BLD AUTO: 8.2 %
NEUTROPHILS # BLD AUTO: 3.98 K/UL
NEUTROPHILS NFR BLD AUTO: 62.5 %
NITRITE URINE: NEGATIVE
PH URINE: >=9
PLATELET # BLD AUTO: 177 K/UL
PROT SERPL-MCNC: 7 G/DL
PROT SERPL-MCNC: 7 G/DL
PROTEIN URINE: 300 MG/DL
RBC # BLD: 3.57 M/UL
RBC # FLD: 15 %
SPECIFIC GRAVITY URINE: 1.02
TIBC SERPL-MCNC: 341 UG/DL
UIBC SERPL-MCNC: 279 UG/DL
UROBILINOGEN URINE: 1 MG/DL
VIT B12 SERPL-MCNC: 664 PG/ML
WBC # FLD AUTO: 6.37 K/UL

## 2023-05-08 LAB
ALBUPE: 34.6 %
ALPHA1UPE: 13.1 %
ALPHA2UPE: 12.9 %
BETAUPE: 16.4 %
CREAT 24H UR-MCNC: NORMAL G/24 H
CREATININE UR (MAYO): 83 MG/DL
GAMMAUPE: 23 %
IGA 24H UR QL IFE: NORMAL
KAPPA LC 24H UR QL: NORMAL
PROT PATTERN 24H UR ELPH-IMP: NORMAL
PROT UR-MCNC: 214 MG/DL
PROT UR-MCNC: 214 MG/DL
SPECIMEN VOL 24H UR: NORMAL ML

## 2023-05-10 ENCOUNTER — APPOINTMENT (OUTPATIENT)
Dept: UROLOGY | Facility: CLINIC | Age: 88
End: 2023-05-10
Payer: MEDICARE

## 2023-05-10 ENCOUNTER — RESULT REVIEW (OUTPATIENT)
Age: 88
End: 2023-05-10

## 2023-05-10 PROCEDURE — 99214 OFFICE O/P EST MOD 30 MIN: CPT

## 2023-05-10 NOTE — ASSESSMENT
[FreeTextEntry1] : Very pleasant 87-year-old gentleman who presents for follow-up of BPH with urinary obstruction, UTI, microscopic hematuria\par -Continue finasteride–refill sent to the pharmacy\par -Continue tamsulosin–refill sent to the pharmacy\par -Urine culture demonstrates a UTI\par -UA demonstrates a large amount of blood in the urine\par -A1c 7.9%\par -Creatinine 1.27\par -Start Augmentin x 14 days

## 2023-05-10 NOTE — HISTORY OF PRESENT ILLNESS
[FreeTextEntry1] : Very pleasant 87-year-old gentleman presents for follow-up of BPH with urinary obstruction. He continues to feel well. He reports that tamsulosin and finasteride continue to significantly improve his urinary symptoms. He now reports dysuria and increased difficulty urinating. Recent UTI on UA and UCx.

## 2023-05-16 ENCOUNTER — APPOINTMENT (OUTPATIENT)
Dept: CT IMAGING | Facility: IMAGING CENTER | Age: 88
End: 2023-05-16
Payer: MEDICARE

## 2023-05-16 ENCOUNTER — OUTPATIENT (OUTPATIENT)
Dept: OUTPATIENT SERVICES | Facility: HOSPITAL | Age: 88
LOS: 1 days | End: 2023-05-16
Payer: MEDICARE

## 2023-05-16 DIAGNOSIS — R31.0 GROSS HEMATURIA: ICD-10-CM

## 2023-05-16 DIAGNOSIS — Z95.820 PERIPHERAL VASCULAR ANGIOPLASTY STATUS WITH IMPLANTS AND GRAFTS: Chronic | ICD-10-CM

## 2023-05-16 DIAGNOSIS — Z90.89 ACQUIRED ABSENCE OF OTHER ORGANS: Chronic | ICD-10-CM

## 2023-05-16 PROCEDURE — 74178 CT ABD&PLV WO CNTR FLWD CNTR: CPT | Mod: 26,MH

## 2023-05-16 PROCEDURE — 74178 CT ABD&PLV WO CNTR FLWD CNTR: CPT

## 2023-05-16 NOTE — H&P PST ADULT - TOBACCO USE
How Severe Are Your Spot(S)?: mild What Type Of Note Output Would You Prefer (Optional)?: Bullet Format What Is The Reason For Today's Visit?: Full Body Skin Examination What Is The Reason For Today's Visit? (Being Monitored For X): concerning skin lesions on an annual basis Former smoker

## 2023-05-24 ENCOUNTER — APPOINTMENT (OUTPATIENT)
Dept: UROLOGY | Facility: CLINIC | Age: 88
End: 2023-05-24
Payer: MEDICARE

## 2023-05-24 VITALS
OXYGEN SATURATION: 96 % | TEMPERATURE: 97.8 F | DIASTOLIC BLOOD PRESSURE: 61 MMHG | HEIGHT: 69 IN | WEIGHT: 145 LBS | SYSTOLIC BLOOD PRESSURE: 124 MMHG | HEART RATE: 54 BPM | BODY MASS INDEX: 21.48 KG/M2

## 2023-05-24 DIAGNOSIS — R31.0 GROSS HEMATURIA: ICD-10-CM

## 2023-05-24 PROCEDURE — 99214 OFFICE O/P EST MOD 30 MIN: CPT

## 2023-05-24 RX ORDER — BUSPIRONE HYDROCHLORIDE 10 MG/1
10 TABLET ORAL
Qty: 30 | Refills: 5 | Status: COMPLETED | COMMUNITY
Start: 2023-01-17 | End: 2023-05-24

## 2023-05-24 NOTE — HISTORY OF PRESENT ILLNESS
[FreeTextEntry1] : Very pleasant 87-year-old gentleman presents for follow-up of BPH with urinary obstruction, dysuria, acute UTI. He reports mild dysuria at the end of his stream. He has not yet started Augmentin.  He reports that tamsulosin and finasteride continue to significantly improve his urinary symptoms.\par \par Recently underwent Ct which demonstrated an unchanged complex right renal cyst and mild circumferential bladder wall thickening.

## 2023-05-24 NOTE — ASSESSMENT
[FreeTextEntry1] : Very pleasant 87-year-old gentleman who presents for follow-up of BPH, acute urinary tract infection, hematuria\par -CT images reviewed demonstrating stability in the size of renal cysts as well as mild circumferential bladder wall thickening\par -Patient wishes to forego a cystoscopy at this time\par -Start Augmentin.  We discussed the rationale for this for treating a urinary tract infection\par -Continue finasteride\par -Continue tamsulosin follow-up in 3 weeks to recheck urine culture\par -

## 2023-05-25 RX ORDER — BLOOD SUGAR DIAGNOSTIC
STRIP MISCELLANEOUS
Qty: 1 | Refills: 3 | Status: ACTIVE | COMMUNITY
Start: 2020-06-03 | End: 1900-01-01

## 2023-05-25 RX ORDER — ATORVASTATIN CALCIUM 40 MG/1
40 TABLET, FILM COATED ORAL
Qty: 1 | Refills: 3 | Status: ACTIVE | COMMUNITY
Start: 2022-07-13 | End: 1900-01-01

## 2023-06-20 ENCOUNTER — APPOINTMENT (OUTPATIENT)
Dept: UROLOGY | Facility: CLINIC | Age: 88
End: 2023-06-20
Payer: MEDICARE

## 2023-06-20 VITALS
BODY MASS INDEX: 21.48 KG/M2 | WEIGHT: 145 LBS | DIASTOLIC BLOOD PRESSURE: 73 MMHG | SYSTOLIC BLOOD PRESSURE: 129 MMHG | TEMPERATURE: 97.1 F | HEART RATE: 73 BPM | HEIGHT: 69 IN | OXYGEN SATURATION: 97 %

## 2023-06-20 DIAGNOSIS — N39.0 URINARY TRACT INFECTION, SITE NOT SPECIFIED: ICD-10-CM

## 2023-06-20 DIAGNOSIS — E11.40 TYPE 2 DIABETES MELLITUS WITH DIABETIC NEUROPATHY, UNSPECIFIED: ICD-10-CM

## 2023-06-20 DIAGNOSIS — N28.1 CYST OF KIDNEY, ACQUIRED: ICD-10-CM

## 2023-06-20 DIAGNOSIS — N20.0 CALCULUS OF KIDNEY: ICD-10-CM

## 2023-06-20 PROCEDURE — 99214 OFFICE O/P EST MOD 30 MIN: CPT

## 2023-06-20 NOTE — ASSESSMENT
[FreeTextEntry1] : Very pleasant 87-year-old gentleman who presents for follow-up of BPH, recent urinary tract infection\par -Patient completed course of antibiotics for an Enterococcus faecalis and Proteus mirabilis UTI\par -Urinalysis\par -Urine culture\par -A1c 7.9%.  We discussed that poorly controlled diabetes may contribute to worsening urinary symptoms and urinary tract infections\par -Creatinine 1.27\par -Follow-up in 6 months if urine studies are unremarkable

## 2023-06-20 NOTE — HISTORY OF PRESENT ILLNESS
[FreeTextEntry1] : Very pleasant 87-year-old gentleman presents for follow-up of BPH with urinary obstruction, dysuria, history of UTI.  He reports that tamsulosin and finasteride continue to improve his urinary stream.  He recently completed a course of antibiotics for an Enterococcus faecalis and Proteus mirabilis urinary tract infection.  He denies dysuria at this time.  No flank pain or suprapubic pain.  No nausea or vomiting.  No other complaints.

## 2023-07-31 ENCOUNTER — APPOINTMENT (OUTPATIENT)
Dept: CARDIOLOGY | Facility: CLINIC | Age: 88
End: 2023-07-31
Payer: MEDICARE

## 2023-07-31 VITALS
DIASTOLIC BLOOD PRESSURE: 68 MMHG | TEMPERATURE: 96.8 F | HEART RATE: 59 BPM | OXYGEN SATURATION: 98 % | HEIGHT: 69 IN | SYSTOLIC BLOOD PRESSURE: 158 MMHG | BODY MASS INDEX: 21.48 KG/M2 | WEIGHT: 145 LBS

## 2023-07-31 PROCEDURE — 93000 ELECTROCARDIOGRAM COMPLETE: CPT

## 2023-07-31 PROCEDURE — 99214 OFFICE O/P EST MOD 30 MIN: CPT

## 2023-08-03 RX ORDER — CLOPIDOGREL BISULFATE 75 MG/1
75 TABLET, FILM COATED ORAL
Qty: 90 | Refills: 3 | Status: DISCONTINUED | COMMUNITY
Start: 2022-07-01 | End: 2023-08-03

## 2023-08-03 RX ORDER — CLOPIDOGREL BISULFATE 75 MG/1
75 TABLET, FILM COATED ORAL DAILY
Qty: 90 | Refills: 2 | Status: DISCONTINUED | COMMUNITY
Start: 2023-05-25 | End: 2023-08-03

## 2023-08-03 RX ORDER — KRILL/OM-3/DHA/EPA/PHOSPHO/AST 1000-230MG
81 CAPSULE ORAL
Refills: 0 | Status: ACTIVE | COMMUNITY

## 2023-08-03 NOTE — HISTORY OF PRESENT ILLNESS
[FreeTextEntry1] : 87 old male with HTN, HLD, CKD, PAD s/p L SFA stent, with abnormal nuclear stress test in 06/2022 showing apical MI and EF 45 to 50%, with subsequent cardiac catheterization and placement of LISA to mid LAD and mid RCA, with residual stenosis of LPL, who presents for follow-up visit.  Patient reports he feels well overall. Denies any chest pain, dyspnea, palpitations, lightheadedness, or syncope. Patient reports compliance with all medications, denies adverse effects. BP runs in 140s at home.

## 2023-08-03 NOTE — PHYSICAL EXAM
[de-identified] : General: No acute distress HEENT: EOMI, PERRL Neck: Supple, No JVD, no bruits Lungs: Clear to auscultation bilaterally; No rales or wheezing Heart: Regular rate and rhythm; No murmurs, rubs, or gallops Abdomen: Nontender, bowel sounds present Extremities: No clubbing, cyanosis, or edema Nervous system:  Alert & Oriented X3, no focal deficits Psychiatric: Normal affect Skin: No rashes or lesions

## 2023-08-03 NOTE — REVIEW OF SYSTEMS
[FreeTextEntry2] : Constitutional, Visual, Respiratory, Cardiovascular, Gastrointestinal, Genitourinary, Neurologic, Integumentary, Endocrine, Musculoskeletal, Psychiatric, and Hematologic systems reviewed and are all negative except as in HPI.	 OR: [ ] Unable to obtain PAST MEDICAL HISTORY:  Anxiety and depression     Cerebrovascular accident (CVA) Multiple    CHF (congestive heart failure)     CKD (chronic kidney disease), stage II     COPD, severe     Hyperlipidemia     Hypertension     Type 2 diabetes mellitus

## 2023-08-03 NOTE — ASSESSMENT
[FreeTextEntry1] : 88 old male with HTN, HLD, CKD, PAD s/p L SFA stent, with abnormal nuclear stress test in 06/2022 showing apical MI and EF 45 to 50%, with subsequent cardiac catheterization and placement of LISA to mid LAD and mid RCA, with residual stenosis of LPL, who presents for follow-up visit. Overall doing well.   1. CAD s/p PCI: Patient completed 12 months of DAPT. Though he is on Xarelto, patient is at higher risk of restenosis given his SFA stent restenosis, therefore will leave on aspirin and Xarelto together per vascular.  - Continue atorvastatin - Continue metoprolol - We will consider sending patient for PCI of LPL if he has further anginal symptoms in the future  2. HTN: Well controlled at home with metoprolol and losartan in setting of CKD   3. HLD: On atorvastatin, LDL is at target  Follow-up visit in 6 months or as needed.

## 2023-08-18 ENCOUNTER — APPOINTMENT (OUTPATIENT)
Dept: VASCULAR SURGERY | Facility: CLINIC | Age: 88
End: 2023-08-18

## 2023-08-31 NOTE — DISCHARGE NOTE NURSING/CASE MANAGEMENT/SOCIAL WORK - NSDPACMPNY_GEN_ALL_CORE
8/31/2023       RE: Gem Gama  [address]  [facility] [id number]     Dear Colleague,    Thank you for referring your patient, Gem Gama, to the [facility] [place] at [facility]. Please see a copy of my visit note below.    August 31, 2023    Gem was seen today for follow up.    Diagnoses and all orders for this visit:    History of pyelonephritis  -     Physical Therapy Referral; Future    Vaginal atrophy  -     Physical Therapy Referral; Future    History of UTI  -     Physical Therapy Referral; Future    Voiding dysfunction  -     Physical Therapy Referral; Future    Urge incontinence  -     Physical Therapy Referral; Future    Calculus of gallbladder without cholecystitis without obstruction  -     Adult General Surg Referral; Future    Referral to general surgery closer to here per patient's request    Continue estring for atrophy and UTI    Voiding dysfunction, recommend PT and discussed how this works, she is agreeable.  If not better can consider botox given unable to take the medications    RTC 6 months, sooner if needed    Claribel Espino, MS4    Attestation:  I agree with the PFSH and ROS as completed by the MS, except for changes made as needed.  The remainder of the encounter was performed by me and scribed by the MS.  The scribed note accurately reflects my personal services and the decisions made by me.    30 minutes were spent today on the day of the encounter in reviewing the EMR including UDS interpretation, direct patient care including prescription managment, coordination of care and documentation    Mel Rodriguez MD MPH  (she/her/hers)   of Urology  [facility]      Subjective    Patient continues to experience urgency incontinence. She is unable to urinate on command, but is able to urinate when she feels the sense of urgency. Patient typically urinates  "every 2 - 3 hours. Patient denies feelings of incomplete emptying. Her symptoms have been unchanged since her last visit. She has been having intermittent bilateral flank pain and was seen in the ED 6/15/2023. The patient had a recent CT scan showing bilateral mild to moderate hydronephrosis. She underwent UDS just prior to this visit and is here to discuss the results.    Patient currently uses Estring and changes every 3 months.     Patient has taken Myrbetriq in the past, but stopped taking this because it was interfering with one of her heart medications.    /65   Pulse 75   Ht 1.626 m (5' 4\")   Wt 81.6 kg (180 lb)   LMP  (LMP Unknown)   BMI 30.90 kg/m    GENERAL: healthy, alert and no distress  EYES: Eyes grossly normal to inspection, conjunctivae and sclerae normal  HENT: normal cephalic/atraumatic.  External ears, nose and mouth without ulcers or lesions.  RESP: no audible wheeze, cough, or visible cyanosis.  No visible retractions or increased work of breathing.  Able to speak fully in complete sentences.  NEURO: Cranial nerves grossly intact, mentation intact and speech normal  PSYCH: mentation appears normal, affect normal/bright, judgement and insight intact, normal speech and appearance well-groomed    Voiding diary 1.5 L soda (0.5 L of mountain dew in the AM), 1.5 L water, varied voids from 150-600mL    UDS from 8/14/23 reviewed.  On my interpretation shows a normal capacity of 629 mL with multiple episodes of DO/DOI.  No USI.  She is able to empty with some valsalva to augment    CC  Patient Care Team:  Danny Conteh MD as PCP - General (Internal Medicine)  Danny Conteh MD as Assigned PCP  Luis A Moody MD as Assigned Pulmonology Provider  Mel Dennison MD as Assigned Heart and Vascular Provider  Evangelist Lee MD as Hospitalist (Internal Medicine)  Mel Rodriguez MD as MD (Urology)  Mel Rodriguez MD as Assigned Surgical Provider  Kashif Hadley MD " as Assigned Sleep Provider  Blu Loepz PA-C as Assigned Musculoskeletal Provider  Agus Segura MD as MD (Surgery)  Kristie Bell PA-C as Assigned Neuroscience Provider  Mickey Gallego MD as MD (Surgery)     Family

## 2023-09-27 ENCOUNTER — NON-APPOINTMENT (OUTPATIENT)
Age: 88
End: 2023-09-27

## 2023-10-06 ENCOUNTER — APPOINTMENT (OUTPATIENT)
Dept: VASCULAR SURGERY | Facility: CLINIC | Age: 88
End: 2023-10-06
Payer: MEDICARE

## 2023-10-06 ENCOUNTER — APPOINTMENT (OUTPATIENT)
Age: 88
End: 2023-10-06
Payer: MEDICARE

## 2023-10-06 VITALS
DIASTOLIC BLOOD PRESSURE: 68 MMHG | HEIGHT: 69 IN | WEIGHT: 158 LBS | BODY MASS INDEX: 23.4 KG/M2 | HEART RATE: 68 BPM | OXYGEN SATURATION: 100 % | SYSTOLIC BLOOD PRESSURE: 138 MMHG

## 2023-10-06 DIAGNOSIS — I73.9 PERIPHERAL VASCULAR DISEASE, UNSPECIFIED: ICD-10-CM

## 2023-10-06 PROCEDURE — 99213 OFFICE O/P EST LOW 20 MIN: CPT

## 2023-10-06 PROCEDURE — 93925 LOWER EXTREMITY STUDY: CPT

## 2023-10-13 ENCOUNTER — APPOINTMENT (OUTPATIENT)
Dept: NEUROLOGY | Facility: CLINIC | Age: 88
End: 2023-10-13
Payer: MEDICARE

## 2023-10-13 VITALS
BODY MASS INDEX: 23.4 KG/M2 | HEART RATE: 62 BPM | TEMPERATURE: 97.8 F | DIASTOLIC BLOOD PRESSURE: 79 MMHG | HEIGHT: 69 IN | SYSTOLIC BLOOD PRESSURE: 180 MMHG | WEIGHT: 158 LBS | OXYGEN SATURATION: 97 %

## 2023-10-13 VITALS — SYSTOLIC BLOOD PRESSURE: 140 MMHG | DIASTOLIC BLOOD PRESSURE: 76 MMHG

## 2023-10-13 DIAGNOSIS — H91.8X3 OTHER SPECIFIED HEARING LOSS, BILATERAL: ICD-10-CM

## 2023-10-13 PROCEDURE — 99205 OFFICE O/P NEW HI 60 MIN: CPT

## 2023-10-16 LAB
FOLATE SERPL-MCNC: 12.6 NG/ML
T PALLIDUM AB SER QL IA: NEGATIVE
T3 SERPL-MCNC: 84 NG/DL
T4 SERPL-MCNC: 6 UG/DL
TSH SERPL-ACNC: 0.81 UIU/ML
VIT B12 SERPL-MCNC: 861 PG/ML

## 2023-10-18 LAB — METHYLMALONATE SERPL-SCNC: 188 NMOL/L

## 2023-12-09 ENCOUNTER — INPATIENT (INPATIENT)
Facility: HOSPITAL | Age: 88
LOS: 6 days | Discharge: NURSING, REHAB OR LONG-TERM CARE | DRG: 554 | End: 2023-12-16
Attending: STUDENT IN AN ORGANIZED HEALTH CARE EDUCATION/TRAINING PROGRAM | Admitting: STUDENT IN AN ORGANIZED HEALTH CARE EDUCATION/TRAINING PROGRAM
Payer: MEDICARE

## 2023-12-09 VITALS
DIASTOLIC BLOOD PRESSURE: 74 MMHG | HEIGHT: 70 IN | WEIGHT: 160.06 LBS | SYSTOLIC BLOOD PRESSURE: 174 MMHG | TEMPERATURE: 98 F | OXYGEN SATURATION: 97 % | RESPIRATION RATE: 18 BRPM | HEART RATE: 62 BPM

## 2023-12-09 DIAGNOSIS — L03.90 CELLULITIS, UNSPECIFIED: ICD-10-CM

## 2023-12-09 DIAGNOSIS — N40.0 BENIGN PROSTATIC HYPERPLASIA WITHOUT LOWER URINARY TRACT SYMPTOMS: ICD-10-CM

## 2023-12-09 DIAGNOSIS — Z90.89 ACQUIRED ABSENCE OF OTHER ORGANS: Chronic | ICD-10-CM

## 2023-12-09 DIAGNOSIS — Z95.820 PERIPHERAL VASCULAR ANGIOPLASTY STATUS WITH IMPLANTS AND GRAFTS: Chronic | ICD-10-CM

## 2023-12-09 DIAGNOSIS — M10.9 GOUT, UNSPECIFIED: ICD-10-CM

## 2023-12-09 DIAGNOSIS — E11.9 TYPE 2 DIABETES MELLITUS WITHOUT COMPLICATIONS: ICD-10-CM

## 2023-12-09 DIAGNOSIS — E78.5 HYPERLIPIDEMIA, UNSPECIFIED: ICD-10-CM

## 2023-12-09 DIAGNOSIS — Z29.9 ENCOUNTER FOR PROPHYLACTIC MEASURES, UNSPECIFIED: ICD-10-CM

## 2023-12-09 DIAGNOSIS — I10 ESSENTIAL (PRIMARY) HYPERTENSION: ICD-10-CM

## 2023-12-09 DIAGNOSIS — I25.10 ATHEROSCLEROTIC HEART DISEASE OF NATIVE CORONARY ARTERY WITHOUT ANGINA PECTORIS: ICD-10-CM

## 2023-12-09 LAB
ALBUMIN SERPL ELPH-MCNC: 3.5 G/DL — SIGNIFICANT CHANGE UP (ref 3.5–5)
ALBUMIN SERPL ELPH-MCNC: 3.5 G/DL — SIGNIFICANT CHANGE UP (ref 3.5–5)
ALP SERPL-CCNC: 56 U/L — SIGNIFICANT CHANGE UP (ref 40–120)
ALP SERPL-CCNC: 56 U/L — SIGNIFICANT CHANGE UP (ref 40–120)
ALT FLD-CCNC: 17 U/L DA — SIGNIFICANT CHANGE UP (ref 10–60)
ALT FLD-CCNC: 17 U/L DA — SIGNIFICANT CHANGE UP (ref 10–60)
ANION GAP SERPL CALC-SCNC: 5 MMOL/L — SIGNIFICANT CHANGE UP (ref 5–17)
ANION GAP SERPL CALC-SCNC: 5 MMOL/L — SIGNIFICANT CHANGE UP (ref 5–17)
APTT BLD: 33.1 SEC — SIGNIFICANT CHANGE UP (ref 24.5–35.6)
APTT BLD: 33.1 SEC — SIGNIFICANT CHANGE UP (ref 24.5–35.6)
AST SERPL-CCNC: 17 U/L — SIGNIFICANT CHANGE UP (ref 10–40)
AST SERPL-CCNC: 17 U/L — SIGNIFICANT CHANGE UP (ref 10–40)
BASOPHILS # BLD AUTO: 0.04 K/UL — SIGNIFICANT CHANGE UP (ref 0–0.2)
BASOPHILS # BLD AUTO: 0.04 K/UL — SIGNIFICANT CHANGE UP (ref 0–0.2)
BASOPHILS NFR BLD AUTO: 0.5 % — SIGNIFICANT CHANGE UP (ref 0–2)
BASOPHILS NFR BLD AUTO: 0.5 % — SIGNIFICANT CHANGE UP (ref 0–2)
BILIRUB SERPL-MCNC: 0.3 MG/DL — SIGNIFICANT CHANGE UP (ref 0.2–1.2)
BILIRUB SERPL-MCNC: 0.3 MG/DL — SIGNIFICANT CHANGE UP (ref 0.2–1.2)
BUN SERPL-MCNC: 26 MG/DL — HIGH (ref 7–18)
BUN SERPL-MCNC: 26 MG/DL — HIGH (ref 7–18)
CALCIUM SERPL-MCNC: 9.2 MG/DL — SIGNIFICANT CHANGE UP (ref 8.4–10.5)
CALCIUM SERPL-MCNC: 9.2 MG/DL — SIGNIFICANT CHANGE UP (ref 8.4–10.5)
CHLORIDE SERPL-SCNC: 107 MMOL/L — SIGNIFICANT CHANGE UP (ref 96–108)
CHLORIDE SERPL-SCNC: 107 MMOL/L — SIGNIFICANT CHANGE UP (ref 96–108)
CK SERPL-CCNC: 57 U/L — SIGNIFICANT CHANGE UP (ref 35–232)
CK SERPL-CCNC: 57 U/L — SIGNIFICANT CHANGE UP (ref 35–232)
CO2 SERPL-SCNC: 27 MMOL/L — SIGNIFICANT CHANGE UP (ref 22–31)
CO2 SERPL-SCNC: 27 MMOL/L — SIGNIFICANT CHANGE UP (ref 22–31)
CREAT SERPL-MCNC: 1.24 MG/DL — SIGNIFICANT CHANGE UP (ref 0.5–1.3)
CREAT SERPL-MCNC: 1.24 MG/DL — SIGNIFICANT CHANGE UP (ref 0.5–1.3)
CRP SERPL-MCNC: 3 MG/L — SIGNIFICANT CHANGE UP
CRP SERPL-MCNC: 3 MG/L — SIGNIFICANT CHANGE UP
EGFR: 57 ML/MIN/1.73M2 — LOW
EGFR: 57 ML/MIN/1.73M2 — LOW
EOSINOPHIL # BLD AUTO: 0.1 K/UL — SIGNIFICANT CHANGE UP (ref 0–0.5)
EOSINOPHIL # BLD AUTO: 0.1 K/UL — SIGNIFICANT CHANGE UP (ref 0–0.5)
EOSINOPHIL NFR BLD AUTO: 1.4 % — SIGNIFICANT CHANGE UP (ref 0–6)
EOSINOPHIL NFR BLD AUTO: 1.4 % — SIGNIFICANT CHANGE UP (ref 0–6)
ERYTHROCYTE [SEDIMENTATION RATE] IN BLOOD: 17 MM/HR — SIGNIFICANT CHANGE UP (ref 0–20)
ERYTHROCYTE [SEDIMENTATION RATE] IN BLOOD: 17 MM/HR — SIGNIFICANT CHANGE UP (ref 0–20)
GLUCOSE BLDC GLUCOMTR-MCNC: 187 MG/DL — HIGH (ref 70–99)
GLUCOSE BLDC GLUCOMTR-MCNC: 187 MG/DL — HIGH (ref 70–99)
GLUCOSE SERPL-MCNC: 235 MG/DL — HIGH (ref 70–99)
GLUCOSE SERPL-MCNC: 235 MG/DL — HIGH (ref 70–99)
HCT VFR BLD CALC: 32.7 % — LOW (ref 39–50)
HCT VFR BLD CALC: 32.7 % — LOW (ref 39–50)
HGB BLD-MCNC: 10.1 G/DL — LOW (ref 13–17)
HGB BLD-MCNC: 10.1 G/DL — LOW (ref 13–17)
IMM GRANULOCYTES NFR BLD AUTO: 0.3 % — SIGNIFICANT CHANGE UP (ref 0–0.9)
IMM GRANULOCYTES NFR BLD AUTO: 0.3 % — SIGNIFICANT CHANGE UP (ref 0–0.9)
INR BLD: 1.1 RATIO — SIGNIFICANT CHANGE UP (ref 0.85–1.18)
INR BLD: 1.1 RATIO — SIGNIFICANT CHANGE UP (ref 0.85–1.18)
LACTATE SERPL-SCNC: 1.8 MMOL/L — SIGNIFICANT CHANGE UP (ref 0.7–2)
LACTATE SERPL-SCNC: 1.8 MMOL/L — SIGNIFICANT CHANGE UP (ref 0.7–2)
LYMPHOCYTES # BLD AUTO: 1.08 K/UL — SIGNIFICANT CHANGE UP (ref 1–3.3)
LYMPHOCYTES # BLD AUTO: 1.08 K/UL — SIGNIFICANT CHANGE UP (ref 1–3.3)
LYMPHOCYTES # BLD AUTO: 14.7 % — SIGNIFICANT CHANGE UP (ref 13–44)
LYMPHOCYTES # BLD AUTO: 14.7 % — SIGNIFICANT CHANGE UP (ref 13–44)
MAGNESIUM SERPL-MCNC: 1.8 MG/DL — SIGNIFICANT CHANGE UP (ref 1.6–2.6)
MAGNESIUM SERPL-MCNC: 1.8 MG/DL — SIGNIFICANT CHANGE UP (ref 1.6–2.6)
MCHC RBC-ENTMCNC: 26.7 PG — LOW (ref 27–34)
MCHC RBC-ENTMCNC: 26.7 PG — LOW (ref 27–34)
MCHC RBC-ENTMCNC: 30.9 GM/DL — LOW (ref 32–36)
MCHC RBC-ENTMCNC: 30.9 GM/DL — LOW (ref 32–36)
MCV RBC AUTO: 86.5 FL — SIGNIFICANT CHANGE UP (ref 80–100)
MCV RBC AUTO: 86.5 FL — SIGNIFICANT CHANGE UP (ref 80–100)
MONOCYTES # BLD AUTO: 0.54 K/UL — SIGNIFICANT CHANGE UP (ref 0–0.9)
MONOCYTES # BLD AUTO: 0.54 K/UL — SIGNIFICANT CHANGE UP (ref 0–0.9)
MONOCYTES NFR BLD AUTO: 7.3 % — SIGNIFICANT CHANGE UP (ref 2–14)
MONOCYTES NFR BLD AUTO: 7.3 % — SIGNIFICANT CHANGE UP (ref 2–14)
NEUTROPHILS # BLD AUTO: 5.59 K/UL — SIGNIFICANT CHANGE UP (ref 1.8–7.4)
NEUTROPHILS # BLD AUTO: 5.59 K/UL — SIGNIFICANT CHANGE UP (ref 1.8–7.4)
NEUTROPHILS NFR BLD AUTO: 75.8 % — SIGNIFICANT CHANGE UP (ref 43–77)
NEUTROPHILS NFR BLD AUTO: 75.8 % — SIGNIFICANT CHANGE UP (ref 43–77)
NRBC # BLD: 0 /100 WBCS — SIGNIFICANT CHANGE UP (ref 0–0)
NRBC # BLD: 0 /100 WBCS — SIGNIFICANT CHANGE UP (ref 0–0)
NT-PROBNP SERPL-SCNC: 1212 PG/ML — HIGH (ref 0–450)
NT-PROBNP SERPL-SCNC: 1212 PG/ML — HIGH (ref 0–450)
PLATELET # BLD AUTO: 122 K/UL — LOW (ref 150–400)
PLATELET # BLD AUTO: 122 K/UL — LOW (ref 150–400)
POTASSIUM SERPL-MCNC: 4.1 MMOL/L — SIGNIFICANT CHANGE UP (ref 3.5–5.3)
POTASSIUM SERPL-MCNC: 4.1 MMOL/L — SIGNIFICANT CHANGE UP (ref 3.5–5.3)
POTASSIUM SERPL-SCNC: 4.1 MMOL/L — SIGNIFICANT CHANGE UP (ref 3.5–5.3)
POTASSIUM SERPL-SCNC: 4.1 MMOL/L — SIGNIFICANT CHANGE UP (ref 3.5–5.3)
PROT SERPL-MCNC: 6.9 G/DL — SIGNIFICANT CHANGE UP (ref 6–8.3)
PROT SERPL-MCNC: 6.9 G/DL — SIGNIFICANT CHANGE UP (ref 6–8.3)
PROTHROM AB SERPL-ACNC: 12.5 SEC — SIGNIFICANT CHANGE UP (ref 9.5–13)
PROTHROM AB SERPL-ACNC: 12.5 SEC — SIGNIFICANT CHANGE UP (ref 9.5–13)
RBC # BLD: 3.78 M/UL — LOW (ref 4.2–5.8)
RBC # BLD: 3.78 M/UL — LOW (ref 4.2–5.8)
RBC # FLD: 15.8 % — HIGH (ref 10.3–14.5)
RBC # FLD: 15.8 % — HIGH (ref 10.3–14.5)
SODIUM SERPL-SCNC: 139 MMOL/L — SIGNIFICANT CHANGE UP (ref 135–145)
SODIUM SERPL-SCNC: 139 MMOL/L — SIGNIFICANT CHANGE UP (ref 135–145)
URATE SERPL-MCNC: 5.9 MG/DL — SIGNIFICANT CHANGE UP (ref 3.4–8.8)
URATE SERPL-MCNC: 5.9 MG/DL — SIGNIFICANT CHANGE UP (ref 3.4–8.8)
WBC # BLD: 7.37 K/UL — SIGNIFICANT CHANGE UP (ref 3.8–10.5)
WBC # BLD: 7.37 K/UL — SIGNIFICANT CHANGE UP (ref 3.8–10.5)
WBC # FLD AUTO: 7.37 K/UL — SIGNIFICANT CHANGE UP (ref 3.8–10.5)
WBC # FLD AUTO: 7.37 K/UL — SIGNIFICANT CHANGE UP (ref 3.8–10.5)

## 2023-12-09 PROCEDURE — 73564 X-RAY EXAM KNEE 4 OR MORE: CPT | Mod: 26,LT

## 2023-12-09 PROCEDURE — 99285 EMERGENCY DEPT VISIT HI MDM: CPT

## 2023-12-09 PROCEDURE — 71045 X-RAY EXAM CHEST 1 VIEW: CPT | Mod: 26

## 2023-12-09 PROCEDURE — 99223 1ST HOSP IP/OBS HIGH 75: CPT | Mod: GC

## 2023-12-09 PROCEDURE — 72131 CT LUMBAR SPINE W/O DYE: CPT | Mod: 26,MA

## 2023-12-09 RX ORDER — METOPROLOL TARTRATE 50 MG
1 TABLET ORAL
Qty: 0 | Refills: 0 | DISCHARGE

## 2023-12-09 RX ORDER — PIPERACILLIN AND TAZOBACTAM 4; .5 G/20ML; G/20ML
3.38 INJECTION, POWDER, LYOPHILIZED, FOR SOLUTION INTRAVENOUS ONCE
Refills: 0 | Status: COMPLETED | OUTPATIENT
Start: 2023-12-09 | End: 2023-12-09

## 2023-12-09 RX ORDER — ALLOPURINOL 300 MG
100 TABLET ORAL DAILY
Refills: 0 | Status: DISCONTINUED | OUTPATIENT
Start: 2023-12-09 | End: 2023-12-16

## 2023-12-09 RX ORDER — ONDANSETRON 8 MG/1
4 TABLET, FILM COATED ORAL ONCE
Refills: 0 | Status: COMPLETED | OUTPATIENT
Start: 2023-12-09 | End: 2023-12-09

## 2023-12-09 RX ORDER — METOPROLOL TARTRATE 50 MG
12.5 TABLET ORAL DAILY
Refills: 0 | Status: DISCONTINUED | OUTPATIENT
Start: 2023-12-09 | End: 2023-12-13

## 2023-12-09 RX ORDER — RIVAROXABAN 15 MG-20MG
20 KIT ORAL
Refills: 0 | Status: DISCONTINUED | OUTPATIENT
Start: 2023-12-09 | End: 2023-12-09

## 2023-12-09 RX ORDER — ASPIRIN/CALCIUM CARB/MAGNESIUM 324 MG
81 TABLET ORAL DAILY
Refills: 0 | Status: DISCONTINUED | OUTPATIENT
Start: 2023-12-09 | End: 2023-12-16

## 2023-12-09 RX ORDER — APIXABAN 2.5 MG/1
1 TABLET, FILM COATED ORAL
Qty: 0 | Refills: 0 | DISCHARGE

## 2023-12-09 RX ORDER — RIVAROXABAN 15 MG-20MG
15 KIT ORAL
Refills: 0 | Status: DISCONTINUED | OUTPATIENT
Start: 2023-12-09 | End: 2023-12-16

## 2023-12-09 RX ORDER — ONDANSETRON 8 MG/1
4 TABLET, FILM COATED ORAL EVERY 8 HOURS
Refills: 0 | Status: DISCONTINUED | OUTPATIENT
Start: 2023-12-09 | End: 2023-12-16

## 2023-12-09 RX ORDER — ASPIRIN/CALCIUM CARB/MAGNESIUM 324 MG
1 TABLET ORAL
Refills: 0 | DISCHARGE

## 2023-12-09 RX ORDER — ATORVASTATIN CALCIUM 80 MG/1
1 TABLET, FILM COATED ORAL
Qty: 0 | Refills: 0 | DISCHARGE

## 2023-12-09 RX ORDER — SODIUM CHLORIDE 9 MG/ML
1000 INJECTION INTRAMUSCULAR; INTRAVENOUS; SUBCUTANEOUS
Refills: 0 | Status: DISCONTINUED | OUTPATIENT
Start: 2023-12-09 | End: 2023-12-09

## 2023-12-09 RX ORDER — OXYCODONE AND ACETAMINOPHEN 5; 325 MG/1; MG/1
1 TABLET ORAL ONCE
Refills: 0 | Status: DISCONTINUED | OUTPATIENT
Start: 2023-12-09 | End: 2023-12-09

## 2023-12-09 RX ORDER — GLIMEPIRIDE 1 MG
1 TABLET ORAL
Qty: 0 | Refills: 0 | DISCHARGE

## 2023-12-09 RX ORDER — FINASTERIDE 5 MG/1
5 TABLET, FILM COATED ORAL DAILY
Refills: 0 | Status: DISCONTINUED | OUTPATIENT
Start: 2023-12-09 | End: 2023-12-16

## 2023-12-09 RX ORDER — CEFTRIAXONE 500 MG/1
2000 INJECTION, POWDER, FOR SOLUTION INTRAMUSCULAR; INTRAVENOUS EVERY 24 HOURS
Refills: 0 | Status: DISCONTINUED | OUTPATIENT
Start: 2023-12-10 | End: 2023-12-11

## 2023-12-09 RX ORDER — ATORVASTATIN CALCIUM 80 MG/1
40 TABLET, FILM COATED ORAL AT BEDTIME
Refills: 0 | Status: DISCONTINUED | OUTPATIENT
Start: 2023-12-09 | End: 2023-12-16

## 2023-12-09 RX ORDER — TAMSULOSIN HYDROCHLORIDE 0.4 MG/1
0.4 CAPSULE ORAL AT BEDTIME
Refills: 0 | Status: DISCONTINUED | OUTPATIENT
Start: 2023-12-09 | End: 2023-12-16

## 2023-12-09 RX ORDER — INSULIN LISPRO 100/ML
VIAL (ML) SUBCUTANEOUS
Refills: 0 | Status: DISCONTINUED | OUTPATIENT
Start: 2023-12-09 | End: 2023-12-13

## 2023-12-09 RX ORDER — ACETAMINOPHEN 500 MG
650 TABLET ORAL EVERY 6 HOURS
Refills: 0 | Status: DISCONTINUED | OUTPATIENT
Start: 2023-12-09 | End: 2023-12-16

## 2023-12-09 RX ORDER — COLCHICINE 0.6 MG
1.2 TABLET ORAL ONCE
Refills: 0 | Status: COMPLETED | OUTPATIENT
Start: 2023-12-09 | End: 2023-12-09

## 2023-12-09 RX ORDER — CLOPIDOGREL BISULFATE 75 MG/1
1 TABLET, FILM COATED ORAL
Qty: 0 | Refills: 0 | DISCHARGE

## 2023-12-09 RX ORDER — METFORMIN HYDROCHLORIDE 850 MG/1
2 TABLET ORAL
Qty: 0 | Refills: 0 | DISCHARGE

## 2023-12-09 RX ORDER — MORPHINE SULFATE 50 MG/1
4 CAPSULE, EXTENDED RELEASE ORAL ONCE
Refills: 0 | Status: DISCONTINUED | OUTPATIENT
Start: 2023-12-09 | End: 2023-12-09

## 2023-12-09 RX ORDER — COLCHICINE 0.6 MG
0.6 TABLET ORAL DAILY
Refills: 0 | Status: DISCONTINUED | OUTPATIENT
Start: 2023-12-09 | End: 2023-12-16

## 2023-12-09 RX ADMIN — Medication 1: at 22:38

## 2023-12-09 RX ADMIN — ATORVASTATIN CALCIUM 40 MILLIGRAM(S): 80 TABLET, FILM COATED ORAL at 21:13

## 2023-12-09 RX ADMIN — Medication 1.2 MILLIGRAM(S): at 21:13

## 2023-12-09 RX ADMIN — TAMSULOSIN HYDROCHLORIDE 0.4 MILLIGRAM(S): 0.4 CAPSULE ORAL at 21:13

## 2023-12-09 RX ADMIN — PIPERACILLIN AND TAZOBACTAM 3.38 GRAM(S): 4; .5 INJECTION, POWDER, LYOPHILIZED, FOR SOLUTION INTRAVENOUS at 15:05

## 2023-12-09 RX ADMIN — PIPERACILLIN AND TAZOBACTAM 200 GRAM(S): 4; .5 INJECTION, POWDER, LYOPHILIZED, FOR SOLUTION INTRAVENOUS at 14:36

## 2023-12-09 RX ADMIN — SODIUM CHLORIDE 125 MILLILITER(S): 9 INJECTION INTRAMUSCULAR; INTRAVENOUS; SUBCUTANEOUS at 14:35

## 2023-12-09 RX ADMIN — Medication 40 MILLIGRAM(S): at 21:14

## 2023-12-09 RX ADMIN — Medication 0.6 MILLIGRAM(S): at 22:14

## 2023-12-09 NOTE — ED ADULT NURSE NOTE - OBJECTIVE STATEMENT
As per son, patient fell 3 days ago , c/o pain to Rt knee with ambulation  Offered pain meds and refused , states only has pain when attempting to ambulate  Denies head trauma or LOC

## 2023-12-09 NOTE — ED ADULT NURSE NOTE - NSFALLRISKFACTORS_ED_ALL_ED
Age: 85 years old or older/Bone Condition: Including osteoporosis, prolonged steroid use or metastatic bone disease/cancer/Surgery: Recent surgery, recent lower limb amputation, major abdominal or thoracic surgery

## 2023-12-09 NOTE — H&P ADULT - HISTORY OF PRESENT ILLNESS
Patient is a 88 Y/O M w/ PMH of CAD s/p stent with most recent July 2022, PAD, T2DM, BPH, HTN, Gout (right knee flair in 2022), CKD, dementia who presented with 5 day history of left knee pain. Patient reports that 5 days ago patient had a fall when he was trying to get out of bed at night. Patient denies hitting head, or knee at that time. However, patient reports from next day patient had severe pain in his left knee. Patient saw a pain doctor on the same day who gave him a intraarticular cortisone injection. Patient reports since the injection the pain has not improved but worsened. Patient reports he is now not able to put weight on the knee and is not able to ambulate. Patient also reports his knee is very tender to touch and he is not able to bend the knee at all. Patient denies any associated fevers, chills, headache, weakness, malaise, or fatigue. Patient denies any recent dizziness, lightheadedness, chest pain, palpitations, shortness of breath, cough, nausea, vomiting, abdominal pain, diarrhea, constipation, melena, hematochezia, dysuria, urinary frequency, or urgency. Patient denies any other complains at this time.

## 2023-12-09 NOTE — ED PROVIDER NOTE - OBJECTIVE STATEMENT
Patient 88-year-old male who lives with his son with history of HTN, BPH, gout, HLD, CAD, status post stents x 2, NIDDM last dose this morning.  As per son, patient claims he fell few days ago with complaint of left knee pain.  Patient saw pain management 2 days ago and was given cortisone shot with no improvement.  Patient also advised to take Tylenol 500 mg 2 tabs twice daily with no relief patient now complaining of unable to ambulate.  Denies fever, chills.  Patient also complaining of lower back pain but 4 years

## 2023-12-09 NOTE — ED ADULT NURSE REASSESSMENT NOTE - NS ED NURSE REASSESS COMMENT FT1
pt was transferred to the floor in a wheelchair, pt is aware of POC, vitals stable no other concerns are noted.

## 2023-12-09 NOTE — H&P ADULT - NSICDXPASTMEDICALHX_GEN_ALL_CORE_FT
PAST MEDICAL HISTORY:  BPH (benign prostatic hypertrophy)     CKD (chronic kidney disease)     DM (diabetes mellitus)     Gout     HLD (hyperlipidemia)     HTN (hypertension)     PAD (peripheral artery disease) s/p stent placement

## 2023-12-09 NOTE — H&P ADULT - NSHPPHYSICALEXAM_GEN_ALL_CORE
PHYSICAL EXAM:  GENERAL: NAD, speaks in full sentences, no signs of respiratory distress  HEAD:  Atraumatic, Normocephalic  EYES: EOMI, PERRLA, conjunctiva and sclera clear  NECK: Supple,  CHEST/LUNG: Clear to auscultation bilaterally; No wheeze; No crackles; No accessory muscles used  HEART: Regular rate and rhythm; No murmurs;   ABDOMEN: Soft, Nontender, Nondistended; Bowel sounds present; No guarding  EXTREMITIES:  2+ Peripheral Pulses, No edema, left knee joint more swollen than right with erythema, warmth, and tenderness to palpation, very restricted range of motion 2/2 to pain.   PSYCH: AAOx3  NEUROLOGY: non-focal  SKIN: No rashes or lesions

## 2023-12-09 NOTE — ED PROVIDER NOTE - PHYSICAL EXAMINATION
Lt knee-ant aspect-erythematous, hot/tenderness to palp., min effusion med aspect of knee, able to flex knee 30 deg., no crepitus  no calves tenderness

## 2023-12-09 NOTE — H&P ADULT - PROBLEM SELECTOR PLAN 2
- C/w aspirin, Xarelto (as per son for blood thinner, no history of atrial fibrillation), atorvastatin. metoprolol.

## 2023-12-09 NOTE — ED ADULT NURSE NOTE - NSFALLHARMRISKINTERV_ED_ALL_ED
Assistance OOB with selected safe patient handling equipment if applicable/Assistance with ambulation/Communicate risk of Fall with Harm to all staff, patient, and family/Monitor gait and stability/Provide patient with walking aids/Provide visual cue: red socks, yellow wristband, yellow gown, etc/Reinforce activity limits and safety measures with patient and family/Bed in lowest position, wheels locked, appropriate side rails in place/Call bell, personal items and telephone in reach/Instruct patient to call for assistance before getting out of bed/chair/stretcher/Non-slip footwear applied when patient is off stretcher/[place] to call system/Physically safe environment - no spills, clutter or unnecessary equipment/Purposeful Proactive Rounding/Room/bathroom lighting operational, light cord in reach Assistance OOB with selected safe patient handling equipment if applicable/Assistance with ambulation/Communicate risk of Fall with Harm to all staff, patient, and family/Monitor gait and stability/Provide patient with walking aids/Provide visual cue: red socks, yellow wristband, yellow gown, etc/Reinforce activity limits and safety measures with patient and family/Bed in lowest position, wheels locked, appropriate side rails in place/Call bell, personal items and telephone in reach/Instruct patient to call for assistance before getting out of bed/chair/stretcher/Non-slip footwear applied when patient is off stretcher/[place] to call system/Physically safe environment - no spills, clutter or unnecessary equipment/Purposeful Proactive Rounding/Room/bathroom lighting operational, light cord in reach

## 2023-12-09 NOTE — H&P ADULT - ASSESSMENT
Patient is a 86 Y/O M w/ PMH of CAD s/p stent with most recent July 2022, PAD, T2DM, BPH, HTN, Gout (right knee flair in 2022), CKD, dementia who presented with 5 day history of left knee pain. Patient left knee is more swollen, with erythema, tenderness, warmth and restricted range of motion. Patient is being admitted to medicine for Acute gout flair with concern for septic arthritis.

## 2023-12-09 NOTE — H&P ADULT - ATTENDING COMMENTS
I have seen and evaluated the patient in the Emergency Department.      Agree with the history charted above. In brief, the patient fell 5 days ago while trying to get up in the middle of the night and has been struggling with left knee pain since the fall. He saw a Pain Management specialist who provided him with a cortisol shot in the left knee but this did not provide him relief. He says the pain has progressed to the point where he can no longer bear weight and walk. No fever or other constitutional symptoms.      On exam patient is laying supine in the ED stretcher in no acute distress. He is afebrile and hemodynamically stable. Cardiopulmonary exam is unremarkable with the exception of faint systolic murmur. His left knee is erythematous, very warm and tender to touch. Left knee has reduced range of passive and active motion. Patient can actively range and only flex knee about 15-30 degrees before having to stop. He cried out when examiner attempted to passively range the knee.      I have reviewed his CBC, BMP, INR, ESR, LFTs, lactic acid, CK. He has no leukocytosis. Renal function seems to be at baseline with Cr of 1.2. LFTs are within normal limits. Lactic acid and CK testing negative.      I have personally viewed his CXR and XR of left knee. CXR is free of infiltrate appears normal. I do not see any obvious fracture line on XR of left knee but will await formal read from radiology.      Patient received Zosyn and Normal Saline in the ED.      Assessment and Plan:     Patient is an 88-year-old male with medical history most notable for gout, CAD s/p PCI July 2022, PAD s/p stent, CKD, DM, HTN, HLD, gouty arthritis involving the knee who presents with severe left knee pain following a fall a few days ago leading to ambulatory dysfunction. Exam is concerning for an inflammatory monoarthritis of the left knee +/- overlying cellulitis. Differential for any monoarticular arthritis includes septic joint and crystalline arthropathy, gout or CPPD. Fortunately, he is fever free and has no leukocytosis, although patients in this age group will not always mount these inflammatory findings. His serum CRP is completely normal at 3, and literature review notes a very strong negative predictive value for normal CRP in septic arthritis. However, this is unusual because be it gout, septic knee, or cellulitis, this value should be elevated, so will trend. Will admit for antibiotics to cover for septic arthritis pain control, PT evaluation, and gout treatment.      #Left Knee Monoarticular Arthritis, c/f Gout, r/o septic arthritis    #Intractable Left Knee pain c/b   #Ambulatory Dysfunction   #Fall   #CKD   #PAD s/p stent   #CAD s/p PCI in July 2022   #HLD   #Hypertension    #Gout      -consult Orthopedic Surgery for arthrocentesis to obtain synovial fluid for cell count with diff, gram stain, culture, crystal analysis   -will ensure continued antimicrobial coverage if arthrocentesis is delayed   -will initiate gout treatment with prednisone, colchicine, will avoid NSAIDs given multiple contraindications   -trend CRP, CBC    -PT consult   -pain control         -rest of plan per resident note

## 2023-12-09 NOTE — H&P ADULT - PROBLEM SELECTOR PLAN 1
- P/w left knee pian, swelling, tenderness, erythema and warmth and reduced ROM.   - s/p outpatient intraarticular cortisone injection 4 days ago.   - Concern for acute gout flair vs septic arthritis vs cellulitis.   - s/p zosyn in ED.   - Concern for septic arthritis low as no leukocytosis, fever, low ESR and CRP (high negative predictive value)  - Will hold further antibiotic treatment at this time.   - Start prednisone and colchicine for acute gout treatment.   - Will hold NSAIDs as patient on aspirin and xarelto (high bleeding risk)  - C/w home dose of allopurinol.   - Oxycodone - 2.5 mg - moderate pain.   - Oxycodone - 5mg - severe pain.   - Consult ortho for arthrocentesis in AM (tried calling at night but could not connect)

## 2023-12-09 NOTE — ED PROVIDER NOTE - CARE PLAN
1 Principal Discharge DX:	Cellulitis  Secondary Diagnosis:	Anemia  Secondary Diagnosis:	Bulging lumbar disc

## 2023-12-09 NOTE — ED PROVIDER NOTE - CLINICAL SUMMARY MEDICAL DECISION MAKING FREE TEXT BOX
pt s/p fall, now with Lt knee pain, redness, not resolved with cortisone injection. pt unlikely with septic joint.  Concern for cellulitis, unlikely gout since no relief with cortisone.  Also diff. ambulating can be 2/2 back pain.  Will get labs., x-ray, CT lumbar, give analgesic, Abx, poss admission.  Pt's on Xarelto ? indication

## 2023-12-09 NOTE — ED PROVIDER NOTE - PROGRESS NOTE DETAILS
None Labs/xray/CT result explained to son & pt  Pt with cellulitis, bulging disc, anemia, LAKESHIA, will admit  cased/w Dr. Rios

## 2023-12-10 LAB
A1C WITH ESTIMATED AVERAGE GLUCOSE RESULT: 8.1 % — HIGH (ref 4–5.6)
A1C WITH ESTIMATED AVERAGE GLUCOSE RESULT: 8.1 % — HIGH (ref 4–5.6)
ANION GAP SERPL CALC-SCNC: 7 MMOL/L — SIGNIFICANT CHANGE UP (ref 5–17)
ANION GAP SERPL CALC-SCNC: 7 MMOL/L — SIGNIFICANT CHANGE UP (ref 5–17)
BUN SERPL-MCNC: 24 MG/DL — HIGH (ref 7–18)
BUN SERPL-MCNC: 24 MG/DL — HIGH (ref 7–18)
CALCIUM SERPL-MCNC: 8.7 MG/DL — SIGNIFICANT CHANGE UP (ref 8.4–10.5)
CALCIUM SERPL-MCNC: 8.7 MG/DL — SIGNIFICANT CHANGE UP (ref 8.4–10.5)
CHLORIDE SERPL-SCNC: 108 MMOL/L — SIGNIFICANT CHANGE UP (ref 96–108)
CHLORIDE SERPL-SCNC: 108 MMOL/L — SIGNIFICANT CHANGE UP (ref 96–108)
CO2 SERPL-SCNC: 24 MMOL/L — SIGNIFICANT CHANGE UP (ref 22–31)
CO2 SERPL-SCNC: 24 MMOL/L — SIGNIFICANT CHANGE UP (ref 22–31)
CREAT SERPL-MCNC: 1.24 MG/DL — SIGNIFICANT CHANGE UP (ref 0.5–1.3)
CREAT SERPL-MCNC: 1.24 MG/DL — SIGNIFICANT CHANGE UP (ref 0.5–1.3)
CRP SERPL-MCNC: 5 MG/L — HIGH
CRP SERPL-MCNC: 5 MG/L — HIGH
EGFR: 57 ML/MIN/1.73M2 — LOW
EGFR: 57 ML/MIN/1.73M2 — LOW
ESTIMATED AVERAGE GLUCOSE: 186 MG/DL — HIGH (ref 68–114)
ESTIMATED AVERAGE GLUCOSE: 186 MG/DL — HIGH (ref 68–114)
GLUCOSE BLDC GLUCOMTR-MCNC: 249 MG/DL — HIGH (ref 70–99)
GLUCOSE BLDC GLUCOMTR-MCNC: 249 MG/DL — HIGH (ref 70–99)
GLUCOSE BLDC GLUCOMTR-MCNC: 270 MG/DL — HIGH (ref 70–99)
GLUCOSE BLDC GLUCOMTR-MCNC: 270 MG/DL — HIGH (ref 70–99)
GLUCOSE BLDC GLUCOMTR-MCNC: 290 MG/DL — HIGH (ref 70–99)
GLUCOSE BLDC GLUCOMTR-MCNC: 290 MG/DL — HIGH (ref 70–99)
GLUCOSE BLDC GLUCOMTR-MCNC: 369 MG/DL — HIGH (ref 70–99)
GLUCOSE BLDC GLUCOMTR-MCNC: 369 MG/DL — HIGH (ref 70–99)
GLUCOSE SERPL-MCNC: 264 MG/DL — HIGH (ref 70–99)
GLUCOSE SERPL-MCNC: 264 MG/DL — HIGH (ref 70–99)
HCT VFR BLD CALC: 32.4 % — LOW (ref 39–50)
HCT VFR BLD CALC: 32.4 % — LOW (ref 39–50)
HGB BLD-MCNC: 10.3 G/DL — LOW (ref 13–17)
HGB BLD-MCNC: 10.3 G/DL — LOW (ref 13–17)
MAGNESIUM SERPL-MCNC: 1.7 MG/DL — SIGNIFICANT CHANGE UP (ref 1.6–2.6)
MAGNESIUM SERPL-MCNC: 1.7 MG/DL — SIGNIFICANT CHANGE UP (ref 1.6–2.6)
MCHC RBC-ENTMCNC: 27.3 PG — SIGNIFICANT CHANGE UP (ref 27–34)
MCHC RBC-ENTMCNC: 27.3 PG — SIGNIFICANT CHANGE UP (ref 27–34)
MCHC RBC-ENTMCNC: 31.8 GM/DL — LOW (ref 32–36)
MCHC RBC-ENTMCNC: 31.8 GM/DL — LOW (ref 32–36)
MCV RBC AUTO: 85.9 FL — SIGNIFICANT CHANGE UP (ref 80–100)
MCV RBC AUTO: 85.9 FL — SIGNIFICANT CHANGE UP (ref 80–100)
NRBC # BLD: 0 /100 WBCS — SIGNIFICANT CHANGE UP (ref 0–0)
NRBC # BLD: 0 /100 WBCS — SIGNIFICANT CHANGE UP (ref 0–0)
PHOSPHATE SERPL-MCNC: 3.4 MG/DL — SIGNIFICANT CHANGE UP (ref 2.5–4.5)
PHOSPHATE SERPL-MCNC: 3.4 MG/DL — SIGNIFICANT CHANGE UP (ref 2.5–4.5)
PLATELET # BLD AUTO: 125 K/UL — LOW (ref 150–400)
PLATELET # BLD AUTO: 125 K/UL — LOW (ref 150–400)
POTASSIUM SERPL-MCNC: 3.9 MMOL/L — SIGNIFICANT CHANGE UP (ref 3.5–5.3)
POTASSIUM SERPL-MCNC: 3.9 MMOL/L — SIGNIFICANT CHANGE UP (ref 3.5–5.3)
POTASSIUM SERPL-SCNC: 3.9 MMOL/L — SIGNIFICANT CHANGE UP (ref 3.5–5.3)
POTASSIUM SERPL-SCNC: 3.9 MMOL/L — SIGNIFICANT CHANGE UP (ref 3.5–5.3)
RBC # BLD: 3.77 M/UL — LOW (ref 4.2–5.8)
RBC # BLD: 3.77 M/UL — LOW (ref 4.2–5.8)
RBC # FLD: 15.7 % — HIGH (ref 10.3–14.5)
RBC # FLD: 15.7 % — HIGH (ref 10.3–14.5)
SODIUM SERPL-SCNC: 139 MMOL/L — SIGNIFICANT CHANGE UP (ref 135–145)
SODIUM SERPL-SCNC: 139 MMOL/L — SIGNIFICANT CHANGE UP (ref 135–145)
URATE SERPL-MCNC: 5.5 MG/DL — SIGNIFICANT CHANGE UP (ref 3.4–8.8)
URATE SERPL-MCNC: 5.5 MG/DL — SIGNIFICANT CHANGE UP (ref 3.4–8.8)
WBC # BLD: 6.71 K/UL — SIGNIFICANT CHANGE UP (ref 3.8–10.5)
WBC # BLD: 6.71 K/UL — SIGNIFICANT CHANGE UP (ref 3.8–10.5)
WBC # FLD AUTO: 6.71 K/UL — SIGNIFICANT CHANGE UP (ref 3.8–10.5)
WBC # FLD AUTO: 6.71 K/UL — SIGNIFICANT CHANGE UP (ref 3.8–10.5)

## 2023-12-10 PROCEDURE — 99233 SBSQ HOSP IP/OBS HIGH 50: CPT

## 2023-12-10 RX ORDER — INSULIN LISPRO 100/ML
5 VIAL (ML) SUBCUTANEOUS
Refills: 0 | Status: DISCONTINUED | OUTPATIENT
Start: 2023-12-10 | End: 2023-12-12

## 2023-12-10 RX ORDER — DEXTROSE 50 % IN WATER 50 %
15 SYRINGE (ML) INTRAVENOUS ONCE
Refills: 0 | Status: DISCONTINUED | OUTPATIENT
Start: 2023-12-10 | End: 2023-12-16

## 2023-12-10 RX ORDER — DEXTROSE 50 % IN WATER 50 %
25 SYRINGE (ML) INTRAVENOUS ONCE
Refills: 0 | Status: DISCONTINUED | OUTPATIENT
Start: 2023-12-10 | End: 2023-12-16

## 2023-12-10 RX ORDER — INSULIN GLARGINE 100 [IU]/ML
10 INJECTION, SOLUTION SUBCUTANEOUS AT BEDTIME
Refills: 0 | Status: DISCONTINUED | OUTPATIENT
Start: 2023-12-10 | End: 2023-12-12

## 2023-12-10 RX ORDER — SENNA PLUS 8.6 MG/1
2 TABLET ORAL AT BEDTIME
Refills: 0 | Status: DISCONTINUED | OUTPATIENT
Start: 2023-12-10 | End: 2023-12-16

## 2023-12-10 RX ORDER — POLYETHYLENE GLYCOL 3350 17 G/17G
17 POWDER, FOR SOLUTION ORAL DAILY
Refills: 0 | Status: DISCONTINUED | OUTPATIENT
Start: 2023-12-10 | End: 2023-12-16

## 2023-12-10 RX ORDER — LIDOCAINE 4 G/100G
1 CREAM TOPICAL DAILY
Refills: 0 | Status: DISCONTINUED | OUTPATIENT
Start: 2023-12-10 | End: 2023-12-16

## 2023-12-10 RX ORDER — GLUCAGON INJECTION, SOLUTION 0.5 MG/.1ML
1 INJECTION, SOLUTION SUBCUTANEOUS ONCE
Refills: 0 | Status: DISCONTINUED | OUTPATIENT
Start: 2023-12-10 | End: 2023-12-16

## 2023-12-10 RX ORDER — INSULIN LISPRO 100/ML
5 VIAL (ML) SUBCUTANEOUS
Refills: 0 | Status: DISCONTINUED | OUTPATIENT
Start: 2023-12-10 | End: 2023-12-16

## 2023-12-10 RX ORDER — DEXTROSE 50 % IN WATER 50 %
12.5 SYRINGE (ML) INTRAVENOUS ONCE
Refills: 0 | Status: DISCONTINUED | OUTPATIENT
Start: 2023-12-10 | End: 2023-12-16

## 2023-12-10 RX ORDER — SODIUM CHLORIDE 9 MG/ML
1000 INJECTION, SOLUTION INTRAVENOUS
Refills: 0 | Status: DISCONTINUED | OUTPATIENT
Start: 2023-12-10 | End: 2023-12-16

## 2023-12-10 RX ORDER — NALOXONE HYDROCHLORIDE 4 MG/.1ML
0.4 SPRAY NASAL ONCE
Refills: 0 | Status: DISCONTINUED | OUTPATIENT
Start: 2023-12-10 | End: 2023-12-16

## 2023-12-10 RX ORDER — OXYCODONE HYDROCHLORIDE 5 MG/1
2.5 TABLET ORAL EVERY 4 HOURS
Refills: 0 | Status: DISCONTINUED | OUTPATIENT
Start: 2023-12-10 | End: 2023-12-14

## 2023-12-10 RX ORDER — OXYCODONE HYDROCHLORIDE 5 MG/1
5 TABLET ORAL EVERY 4 HOURS
Refills: 0 | Status: DISCONTINUED | OUTPATIENT
Start: 2023-12-10 | End: 2023-12-14

## 2023-12-10 RX ADMIN — Medication 3: at 22:02

## 2023-12-10 RX ADMIN — Medication 5 UNIT(S): at 17:25

## 2023-12-10 RX ADMIN — OXYCODONE HYDROCHLORIDE 5 MILLIGRAM(S): 5 TABLET ORAL at 18:10

## 2023-12-10 RX ADMIN — Medication 3: at 17:24

## 2023-12-10 RX ADMIN — Medication 100 MILLIGRAM(S): at 12:34

## 2023-12-10 RX ADMIN — OXYCODONE HYDROCHLORIDE 5 MILLIGRAM(S): 5 TABLET ORAL at 08:23

## 2023-12-10 RX ADMIN — Medication 5 UNIT(S): at 11:30

## 2023-12-10 RX ADMIN — INSULIN GLARGINE 10 UNIT(S): 100 INJECTION, SOLUTION SUBCUTANEOUS at 22:01

## 2023-12-10 RX ADMIN — RIVAROXABAN 15 MILLIGRAM(S): KIT at 17:25

## 2023-12-10 RX ADMIN — Medication 0.6 MILLIGRAM(S): at 12:34

## 2023-12-10 RX ADMIN — OXYCODONE HYDROCHLORIDE 5 MILLIGRAM(S): 5 TABLET ORAL at 08:03

## 2023-12-10 RX ADMIN — Medication 5 UNIT(S): at 07:45

## 2023-12-10 RX ADMIN — FINASTERIDE 5 MILLIGRAM(S): 5 TABLET, FILM COATED ORAL at 12:33

## 2023-12-10 RX ADMIN — Medication 12.5 MILLIGRAM(S): at 05:50

## 2023-12-10 RX ADMIN — Medication 2: at 08:18

## 2023-12-10 RX ADMIN — TAMSULOSIN HYDROCHLORIDE 0.4 MILLIGRAM(S): 0.4 CAPSULE ORAL at 22:02

## 2023-12-10 RX ADMIN — CEFTRIAXONE 100 MILLIGRAM(S): 500 INJECTION, POWDER, FOR SOLUTION INTRAMUSCULAR; INTRAVENOUS at 00:57

## 2023-12-10 RX ADMIN — ATORVASTATIN CALCIUM 40 MILLIGRAM(S): 80 TABLET, FILM COATED ORAL at 22:02

## 2023-12-10 RX ADMIN — OXYCODONE HYDROCHLORIDE 5 MILLIGRAM(S): 5 TABLET ORAL at 17:27

## 2023-12-10 RX ADMIN — Medication 81 MILLIGRAM(S): at 12:34

## 2023-12-10 RX ADMIN — POLYETHYLENE GLYCOL 3350 17 GRAM(S): 17 POWDER, FOR SOLUTION ORAL at 12:33

## 2023-12-10 RX ADMIN — LIDOCAINE 1 PATCH: 4 CREAM TOPICAL at 19:49

## 2023-12-10 RX ADMIN — LIDOCAINE 1 PATCH: 4 CREAM TOPICAL at 12:35

## 2023-12-10 RX ADMIN — SENNA PLUS 2 TABLET(S): 8.6 TABLET ORAL at 22:02

## 2023-12-10 RX ADMIN — Medication 5: at 12:32

## 2023-12-10 NOTE — PATIENT PROFILE ADULT - FUNCTIONAL ASSESSMENT - BASIC MOBILITY 6.
2-calculated by average/Not able to assess (calculate score using [facility] averaging method)  2-calculated by average/Not able to assess (calculate score using [facility] averaging method)

## 2023-12-10 NOTE — PROGRESS NOTE ADULT - SUBJECTIVE AND OBJECTIVE BOX
S: Patient says he is feeling better than the day prior. He still has left knee pain but it has improved. He is able to bend and flex the knee much more than the day prior    I have called his son Antonio and obtained collateral history about his prior admission for gout and his home medications     O:  Vital Signs Last 24 Hrs  T(C): 36.5 (10 Dec 2023 04:56), Max: 37 (09 Dec 2023 20:30)  T(F): 97.7 (10 Dec 2023 04:56), Max: 98.6 (09 Dec 2023 20:30)  HR: 56 (10 Dec 2023 05:46) (52 - 62)  BP: 132/74 (10 Dec 2023 05:46) (132/46 - 178/56)  BP(mean): --  RR: 18 (10 Dec 2023 04:56) (18 - 18)  SpO2: 93% (10 Dec 2023 04:56) (93% - 97%)    Parameters below as of 10 Dec 2023 04:56  Patient On (Oxygen Delivery Method): room air        GENERAL: laying in bed in no acute distress  HEAD:  Atraumatic, Normocephalic  EYES: EOMI, conjunctiva and sclera clear  NECK: Supple, No JVD  CHEST/LUNG: Clear to auscultation bilaterally; No wheeze  HEART: Regular rate and rhythm; soft systolic murmur; no rubs, or gallops  ABDOMEN: Soft, Nontender, Nondistended; Bowel sounds present  EXTREMITIES:  Left knee no longer has erythema overlying the patella, non-tender to touch, able to flex almost with a full range of motion  PSYCH: AAOx3  NEUROLOGY: non-focal  SKIN: No rashes or lesions    acetaminophen     Tablet .. 650 milliGRAM(s) Oral every 6 hours PRN  allopurinol 100 milliGRAM(s) Oral daily  aspirin  chewable 81 milliGRAM(s) Oral daily  atorvastatin 40 milliGRAM(s) Oral at bedtime  bisacodyl 5 milliGRAM(s) Oral daily PRN  cefTRIAXone   IVPB 2000 milliGRAM(s) IV Intermittent every 24 hours  colchicine 0.6 milliGRAM(s) Oral daily  finasteride 5 milliGRAM(s) Oral daily  insulin lispro (ADMELOG) corrective regimen sliding scale   SubCutaneous Before meals and at bedtime  lidocaine   4% Patch 1 Patch Transdermal daily  metoprolol tartrate 12.5 milliGRAM(s) Oral daily  naloxone Injectable 0.4 milliGRAM(s) IV Push once  ondansetron Injectable 4 milliGRAM(s) IV Push every 8 hours PRN  oxyCODONE    IR 2.5 milliGRAM(s) Oral every 4 hours PRN  oxyCODONE    IR 5 milliGRAM(s) Oral every 4 hours PRN  polyethylene glycol 3350 17 Gram(s) Oral daily  predniSONE   Tablet 40 milliGRAM(s) Oral daily  rivaroxaban 15 milliGRAM(s) Oral with dinner  senna 2 Tablet(s) Oral at bedtime  tamsulosin 0.4 milliGRAM(s) Oral at bedtime                            10.3   6.71  )-----------( 125      ( 10 Dec 2023 06:15 )             32.4       12-10    139  |  108  |  24<H>  ----------------------------<  264<H>  3.9   |  24  |  1.24    Ca    8.7      10 Dec 2023 06:15  Phos  3.4     12-10  Mg     1.7     12-10    TPro  6.9  /  Alb  3.5  /  TBili  0.3  /  DBili  x   /  AST  17  /  ALT  17  /  AlkPhos  56  12-09

## 2023-12-10 NOTE — PROGRESS NOTE ADULT - ASSESSMENT
I have reviewed and ordered his CBC, BMP, CRP. He continues to have no leukocytosis, WBC 6.7. Platelets mildly low in 120s but stable. Renal function stable with serum Cr 1.2. Electrolytes ok. CRP still unremarkable, 5 compared to 3 the day prior.     I have discussed his case in person with Elmer Madera of Orthopedic surgery, agree with rationale for deferring arthrocentesis.     Assessment and Plan:     Patient is an 88-year-old male with medical history most notable for gout, CAD s/p PCI July 2022, PAD s/p stent, CKD, DM, HTN, HLD, gouty arthritis involving the knee who presents with severe left knee pain following a fall a few days ago leading to ambulatory dysfunction. Exam is concerning for an inflammatory monoarthritis of the left knee +/- overlying cellulitis. Differential for any monoarticular arthritis includes septic joint and crystalline arthropathy, gout or CPPD. Fortunately, he is fever free and has no leukocytosis, although patients in this age group will not always mount these inflammatory findings. His serum CRP is completely normal at 3, and literature review notes a very strong negative predictive value for normal CRP in septic arthritis. Orthopedic surgery was consulted on 12/10, does not recommend synovial fluid analysis given improved knee range of motion, very low concern for septic arthritis.    Patient has improved, but notable received steroid dose as well as antibiotics. Hard to determine clinically if his improvement is driven by antibiotics clearing overlying cellulitis or steroids/colchicine improving gouty arthritis without a full synovial fluid analysis probing for crystalline arthropathy.    #Left Knee Monoarticular Arthritis, c/f Gout, r/o septic arthritis    #Intractable Left Knee pain, improving   #Ambulatory Dysfunction   #Fall   #CKD   #PAD s/p stent   #CAD s/p PCI in July 2022   #HLD   #Hypertension    #Gout      -consult Orthopedic Surgery for arthrocentesis to obtain synovial fluid for cell count with diff, gram stain, culture, crystal analysis -> procedure deferred, appreciate Orthopedic Assistance  -continue ceftriaxone for empiric cellulitis management   -continue gout treatment with prednisone, colchicine, will avoid NSAIDs given multiple contraindications   -trend CRP, CBC    -PT consult   -pain control   -anticipate DC 12/11 if cleared by PT and patient is able to bear weight well      I have reviewed and ordered his CBC, BMP, CRP. He continues to have no leukocytosis, WBC 6.7. Platelets mildly low in 120s but stable. Renal function stable with serum Cr 1.2. Electrolytes ok. CRP still unremarkable, 5 compared to 3 the day prior.     I have discussed his case in person with Elmer Madera of Orthopedic surgery, agree with rationale for deferring arthrocentesis.     Assessment and Plan:     Patient is an 88-year-old male with medical history most notable for gout, CAD s/p PCI July 2022, PAD s/p stent, CKD, DM, HTN, HLD, gouty arthritis involving the knee who presents with severe left knee pain following a fall a few days ago leading to ambulatory dysfunction. Exam is concerning for an inflammatory monoarthritis of the left knee +/- overlying cellulitis. Differential for any monoarticular arthritis includes septic joint and crystalline arthropathy, gout or CPPD. Fortunately, he is fever free and has no leukocytosis, although patients in this age group will not always mount these inflammatory findings. His serum CRP is completely normal at 3, and literature review notes a very strong negative predictive value for normal CRP in septic arthritis. Orthopedic surgery was consulted on 12/10, does not recommend synovial fluid analysis given improved knee range of motion, very low concern for septic arthritis.    Patient has improved, but notable received steroid dose as well as antibiotics. Hard to determine clinically if his improvement is driven by antibiotics clearing overlying cellulitis or steroids/colchicine improving gouty arthritis without a full synovial fluid analysis probing for crystalline arthropathy.    #Left Knee Monoarticular Arthritis, c/f Gout, r/o septic arthritis    #Intractable Left Knee pain, improving   #Ambulatory Dysfunction   #Fall   #CKD   #PAD s/p stent   #CAD s/p PCI in July 2022   #HLD   #Hypertension    #Gout      -consult Orthopedic Surgery for arthrocentesis to obtain synovial fluid for cell count with diff, gram stain, culture, crystal analysis -> procedure deferred, appreciate Orthopedic Assistance  -continue ceftriaxone for empiric cellulitis management   -continue gout treatment with prednisone, colchicine, will avoid NSAIDs given multiple contraindications   -trend CRP, CBC    -PT consult   -pain control   -anticipate DC 12/11 if cleared by PT and patient is able to bear weight well   -adding insulin glargine 10 units and lispro 5 units AC for steroid induced hyperglycemia

## 2023-12-10 NOTE — CONSULT NOTE ADULT - SUBJECTIVE AND OBJECTIVE BOX
Pt Name: SWAPNA FINK  MRN: [id number]    ORTHOPEDIC CONSULT    Orthopedic diagnosis:    88yMaleHPI:  Patient is a 88 Y/O M w/ PMH of CAD s/p stent with most recent July 2022, PAD, T2DM, BPH, HTN, Gout (right knee flair in 2022), CKD, dementia who presented with 5 day history of left knee pain. Patient reports that 5 days ago patient had a fall when he was trying to get out of bed at night. Patient denies hitting head, or knee at that time. However, patient reports from next day patient had severe pain in his left knee. Patient saw a pain doctor on the same day who gave him a intraarticular cortisone injection. Patient reports since the injection the pain has not improved but worsened. Patient reports he is now not able to put weight on the knee and is not able to ambulate. Patient also reports his knee is very tender to touch and he is not able to bend the knee at all. Patient denies any associated fevers, chills, headache, weakness, malaise, or fatigue. Patient denies any recent dizziness, lightheadedness, chest pain, palpitations, shortness of breath, cough, nausea, vomiting, abdominal pain, diarrhea, constipation, melena, hematochezia, dysuria, urinary frequency, or urgency. Patient denies any other complains at this time.   (09 Dec 2023 19:46)    bijan with a cane.   seen aat bedside - complains of left knee pain that has much improved since coming to hospital.   s/p fall and cortisone injection in the last week. denies issues prior   no prior surgeries to the knee  Pt denies Chest pain, SOB, dyspnea, paresthesias, N/V/D, abdominal pain, syncope, or pain anywhere else.         AMBULATION: Baseline Ambulation  [     ] independent   [   xxx  ] With Cane   [     ] With Walker   [     ]  Bedbound   [     ] Pivot transfers to Wheelchair only    4M's age-friendly:  - Medication: age-appropriate  - Mentation:  - Mobility:   - Matters-Most/Goals of Care:    PAST MEDICAL & SURGICAL HISTORY:  HTN (hypertension)      HLD (hyperlipidemia)      BPH (benign prostatic hypertrophy)      DM (diabetes mellitus)      PAD (peripheral artery disease)  s/p stent placement      CKD (chronic kidney disease)      Gout      S/P tonsillectomy      S/P peripheral artery angioplasty with stent placement          ALLERGIES: No Known Allergies      MEDICATIONS: acetaminophen     Tablet .. 650 milliGRAM(s) Oral every 6 hours PRN  allopurinol 100 milliGRAM(s) Oral daily  aspirin  chewable 81 milliGRAM(s) Oral daily  atorvastatin 40 milliGRAM(s) Oral at bedtime  bisacodyl 5 milliGRAM(s) Oral daily PRN  cefTRIAXone   IVPB 2000 milliGRAM(s) IV Intermittent every 24 hours  colchicine 0.6 milliGRAM(s) Oral daily  finasteride 5 milliGRAM(s) Oral daily  insulin lispro (ADMELOG) corrective regimen sliding scale   SubCutaneous Before meals and at bedtime  lidocaine   4% Patch 1 Patch Transdermal daily  metoprolol tartrate 12.5 milliGRAM(s) Oral daily  naloxone Injectable 0.4 milliGRAM(s) IV Push once  ondansetron Injectable 4 milliGRAM(s) IV Push every 8 hours PRN  oxyCODONE    IR 2.5 milliGRAM(s) Oral every 4 hours PRN  oxyCODONE    IR 5 milliGRAM(s) Oral every 4 hours PRN  polyethylene glycol 3350 17 Gram(s) Oral daily  predniSONE   Tablet 40 milliGRAM(s) Oral daily  rivaroxaban 15 milliGRAM(s) Oral with dinner  senna 2 Tablet(s) Oral at bedtime  tamsulosin 0.4 milliGRAM(s) Oral at bedtime      PHYSICAL EXAM:    Vital Signs Last 24 Hrs  T(C): 36.5 (10 Dec 2023 04:56), Max: 37 (09 Dec 2023 20:30)  T(F): 97.7 (10 Dec 2023 04:56), Max: 98.6 (09 Dec 2023 20:30)  HR: 56 (10 Dec 2023 05:46) (52 - 62)  BP: 132/74 (10 Dec 2023 05:46) (132/46 - 178/56)  BP(mean): --  RR: 18 (10 Dec 2023 04:56) (18 - 18)  SpO2: 93% (10 Dec 2023 04:56) (93% - 97%)    Parameters below as of 10 Dec 2023 04:56  Patient On (Oxygen Delivery Method): room air        Gen: well developed, well nourished, comfortable  MSK:  Skin pink, warm. No open lesions.  no ct  calves soft  DP/PT 2+, brisk b/l  nvi silt  5/5 strength ehl/ta/gastroc b/l.  no erythema. slightly warmer than right knee.   skin itnact. grossly nvi. distal pulses 2+. able to flex and extend 0-100 without assistance. hip flexors are weak, difficulty straight leg raising.     LABS:                        10.3   6.71  )-----------( 125      ( 10 Dec 2023 06:15 )             32.4     12-10    139  |  108  |  24<H>  ----------------------------<  264<H>  3.9   |  24  |  1.24    Ca    8.7      10 Dec 2023 06:15  Phos  3.4     12-10  Mg     1.7     12-10    TPro  6.9  /  Alb  3.5  /  TBili  0.3  /  DBili  x   /  AST  17  /  ALT  17  /  AlkPhos  56  12-09    PT/INR - ( 09 Dec 2023 16:00 )   PT: 12.5 sec;   INR: 1.10 ratio         PTT - ( 09 Dec 2023 16:00 )  PTT:33.1 sec    RADIOLOGY:   xray shows some mild effusion without fractures or dislocation.     A/P:   88y Male with: Left knee gouty attack cellulitis - resolving.     #  -  Recommendation: [ xxx ] Conservative treatment   [  ] Surgical treatment/fixation  -  All the patient's questions were answered. Pt was explained the Plan, mentioned above, thoroughly.  Risks/benefits, complications were also explained, which includes but not limited to: persistent, infection, death, PNA, thrombombolism, etc. Pt understands.   -  Pain control    - no need for arthrocentesis given negative labs, improvement with abx and steroids, nsaids, and low suspicion of septic joint.   -  DVT prophylaxis  -  Daily PT WBAT LLE  -  Case d/w Dr. Sloan  -  Pt is orthopedically stable for discharge.   -  Pt is to follow up with __ after discharge at: Follow-up with Dr. Sloan in ONE WEEK at [phone]  Pt Name: SWAPNA FINK  MRN: [id number]    ORTHOPEDIC CONSULT    Orthopedic diagnosis:    88yMaleHPI:  Patient is a 86 Y/O M w/ PMH of CAD s/p stent with most recent July 2022, PAD, T2DM, BPH, HTN, Gout (right knee flair in 2022), CKD, dementia who presented with 5 day history of left knee pain. Patient reports that 5 days ago patient had a fall when he was trying to get out of bed at night. Patient denies hitting head, or knee at that time. However, patient reports from next day patient had severe pain in his left knee. Patient saw a pain doctor on the same day who gave him a intraarticular cortisone injection. Patient reports since the injection the pain has not improved but worsened. Patient reports he is now not able to put weight on the knee and is not able to ambulate. Patient also reports his knee is very tender to touch and he is not able to bend the knee at all. Patient denies any associated fevers, chills, headache, weakness, malaise, or fatigue. Patient denies any recent dizziness, lightheadedness, chest pain, palpitations, shortness of breath, cough, nausea, vomiting, abdominal pain, diarrhea, constipation, melena, hematochezia, dysuria, urinary frequency, or urgency. Patient denies any other complains at this time.   (09 Dec 2023 19:46)    bijan with a cane.   seen aat bedside - complains of left knee pain that has much improved since coming to hospital.   s/p fall and cortisone injection in the last week. denies issues prior   no prior surgeries to the knee  Pt denies Chest pain, SOB, dyspnea, paresthesias, N/V/D, abdominal pain, syncope, or pain anywhere else.         AMBULATION: Baseline Ambulation  [     ] independent   [   xxx  ] With Cane   [     ] With Walker   [     ]  Bedbound   [     ] Pivot transfers to Wheelchair only    4M's age-friendly:  - Medication: age-appropriate  - Mentation:  - Mobility:   - Matters-Most/Goals of Care:    PAST MEDICAL & SURGICAL HISTORY:  HTN (hypertension)      HLD (hyperlipidemia)      BPH (benign prostatic hypertrophy)      DM (diabetes mellitus)      PAD (peripheral artery disease)  s/p stent placement      CKD (chronic kidney disease)      Gout      S/P tonsillectomy      S/P peripheral artery angioplasty with stent placement          ALLERGIES: No Known Allergies      MEDICATIONS: acetaminophen     Tablet .. 650 milliGRAM(s) Oral every 6 hours PRN  allopurinol 100 milliGRAM(s) Oral daily  aspirin  chewable 81 milliGRAM(s) Oral daily  atorvastatin 40 milliGRAM(s) Oral at bedtime  bisacodyl 5 milliGRAM(s) Oral daily PRN  cefTRIAXone   IVPB 2000 milliGRAM(s) IV Intermittent every 24 hours  colchicine 0.6 milliGRAM(s) Oral daily  finasteride 5 milliGRAM(s) Oral daily  insulin lispro (ADMELOG) corrective regimen sliding scale   SubCutaneous Before meals and at bedtime  lidocaine   4% Patch 1 Patch Transdermal daily  metoprolol tartrate 12.5 milliGRAM(s) Oral daily  naloxone Injectable 0.4 milliGRAM(s) IV Push once  ondansetron Injectable 4 milliGRAM(s) IV Push every 8 hours PRN  oxyCODONE    IR 2.5 milliGRAM(s) Oral every 4 hours PRN  oxyCODONE    IR 5 milliGRAM(s) Oral every 4 hours PRN  polyethylene glycol 3350 17 Gram(s) Oral daily  predniSONE   Tablet 40 milliGRAM(s) Oral daily  rivaroxaban 15 milliGRAM(s) Oral with dinner  senna 2 Tablet(s) Oral at bedtime  tamsulosin 0.4 milliGRAM(s) Oral at bedtime      PHYSICAL EXAM:    Vital Signs Last 24 Hrs  T(C): 36.5 (10 Dec 2023 04:56), Max: 37 (09 Dec 2023 20:30)  T(F): 97.7 (10 Dec 2023 04:56), Max: 98.6 (09 Dec 2023 20:30)  HR: 56 (10 Dec 2023 05:46) (52 - 62)  BP: 132/74 (10 Dec 2023 05:46) (132/46 - 178/56)  BP(mean): --  RR: 18 (10 Dec 2023 04:56) (18 - 18)  SpO2: 93% (10 Dec 2023 04:56) (93% - 97%)    Parameters below as of 10 Dec 2023 04:56  Patient On (Oxygen Delivery Method): room air        Gen: well developed, well nourished, comfortable  MSK:  Skin pink, warm. No open lesions.  no ct  calves soft  DP/PT 2+, brisk b/l  nvi silt  5/5 strength ehl/ta/gastroc b/l.  no erythema. slightly warmer than right knee.   skin itnact. grossly nvi. distal pulses 2+. able to flex and extend 0-100 without assistance. hip flexors are weak, difficulty straight leg raising.     LABS:                        10.3   6.71  )-----------( 125      ( 10 Dec 2023 06:15 )             32.4     12-10    139  |  108  |  24<H>  ----------------------------<  264<H>  3.9   |  24  |  1.24    Ca    8.7      10 Dec 2023 06:15  Phos  3.4     12-10  Mg     1.7     12-10    TPro  6.9  /  Alb  3.5  /  TBili  0.3  /  DBili  x   /  AST  17  /  ALT  17  /  AlkPhos  56  12-09    PT/INR - ( 09 Dec 2023 16:00 )   PT: 12.5 sec;   INR: 1.10 ratio         PTT - ( 09 Dec 2023 16:00 )  PTT:33.1 sec    RADIOLOGY:   xray shows some mild effusion without fractures or dislocation.     A/P:   88y Male with: Left knee gouty attack cellulitis - resolving.     #  -  Recommendation: [ xxx ] Conservative treatment   [  ] Surgical treatment/fixation  -  All the patient's questions were answered. Pt was explained the Plan, mentioned above, thoroughly.  Risks/benefits, complications were also explained, which includes but not limited to: persistent, infection, death, PNA, thrombombolism, etc. Pt understands.   -  Pain control    - no need for arthrocentesis given negative labs, improvement with abx and steroids, nsaids, and low suspicion of septic joint.   -  DVT prophylaxis  -  Daily PT WBAT LLE  -  Case d/w Dr. Sloan  -  Pt is orthopedically stable for discharge.   -  Pt is to follow up with __ after discharge at: Follow-up with Dr. Sloan in ONE WEEK at [phone]

## 2023-12-10 NOTE — PATIENT PROFILE ADULT - NSPROPTRIGHTNOTIFY_GEN_A_NUR
Pt presents to the ER with change in mental status. + blood in stool x 2 days.   Abdomen is distended.  Pt is A&Ox2- to person and place, forgetful at times.  No labs drawn, or meds gived - CTU RN aware as per CTU NP. declines

## 2023-12-10 NOTE — PATIENT PROFILE ADULT - FALL HARM RISK - HARM RISK INTERVENTIONS
Assistance with ambulation/Assistance OOB with selected safe patient handling equipment/Communicate Risk of Fall with Harm to all staff/Discuss with provider need for PT consult/Monitor gait and stability/Provide patient with walking aids - walker, cane, crutches/Reinforce activity limits and safety measures with patient and family/Tailored Fall Risk Interventions/Visual Cue: Yellow wristband and red socks/Bed in lowest position, wheels locked, appropriate side rails in place/Call bell, personal items and telephone in reach/Instruct patient to call for assistance before getting out of bed or chair/Non-slip footwear when patient is out of bed/[place] to call system/Physically safe environment - no spills, clutter or unnecessary equipment/Purposeful Proactive Rounding/Room/bathroom lighting operational, light cord in reach Assistance with ambulation/Assistance OOB with selected safe patient handling equipment/Communicate Risk of Fall with Harm to all staff/Discuss with provider need for PT consult/Monitor gait and stability/Provide patient with walking aids - walker, cane, crutches/Reinforce activity limits and safety measures with patient and family/Tailored Fall Risk Interventions/Visual Cue: Yellow wristband and red socks/Bed in lowest position, wheels locked, appropriate side rails in place/Call bell, personal items and telephone in reach/Instruct patient to call for assistance before getting out of bed or chair/Non-slip footwear when patient is out of bed/[place] to call system/Physically safe environment - no spills, clutter or unnecessary equipment/Purposeful Proactive Rounding/Room/bathroom lighting operational, light cord in reach

## 2023-12-10 NOTE — PATIENT PROFILE ADULT - ARE SIGNIFICANT INDICATORS COMPLETE.
As provider-in-triage, I performed a medical screening history and physical exam on this patient. HISTORY OF PRESENT ILLNESS  Aurea Brown is 42 y.o. female c/o fatigue, muscle aches, and some generalized back pain. Patient did have exposure of covid approx two weeks ago. Patient has chonic urinary incontinence, but otherwise no new urinary symptoms. Denies n/v, abdominal pain, flank pain, dysuria, chest pain, sob, cough     PHYSICAL EXAM  /84   Pulse 130   Temp 98 °F (36.7 °C)   Resp 18   SpO2 96%     On exam, the patient appears well-hydrated, well-nourished, and in no acute distress. Mucous membranes are moist. Speech is clear. Breathing is unlabored. Skin is dry. Mental status is normal. Moves all extremities, and is without facial droop. Comment: Please note this report has been produced using speech recognition software and may contain errors related to that system including errors in grammar, punctuation, and spelling, as well as words and phrases that may be inappropriate. If there are any questions or concerns please feel free to contact the dictating provider for clarification.         Shania Angel PA-C  12/29/21 204 Yes

## 2023-12-11 ENCOUNTER — TRANSCRIPTION ENCOUNTER (OUTPATIENT)
Age: 88
End: 2023-12-11

## 2023-12-11 LAB
ANION GAP SERPL CALC-SCNC: 4 MMOL/L — LOW (ref 5–17)
ANION GAP SERPL CALC-SCNC: 4 MMOL/L — LOW (ref 5–17)
BUN SERPL-MCNC: 32 MG/DL — HIGH (ref 7–18)
BUN SERPL-MCNC: 32 MG/DL — HIGH (ref 7–18)
CALCIUM SERPL-MCNC: 8.4 MG/DL — SIGNIFICANT CHANGE UP (ref 8.4–10.5)
CALCIUM SERPL-MCNC: 8.4 MG/DL — SIGNIFICANT CHANGE UP (ref 8.4–10.5)
CHLORIDE SERPL-SCNC: 109 MMOL/L — HIGH (ref 96–108)
CHLORIDE SERPL-SCNC: 109 MMOL/L — HIGH (ref 96–108)
CO2 SERPL-SCNC: 26 MMOL/L — SIGNIFICANT CHANGE UP (ref 22–31)
CO2 SERPL-SCNC: 26 MMOL/L — SIGNIFICANT CHANGE UP (ref 22–31)
CREAT SERPL-MCNC: 1.24 MG/DL — SIGNIFICANT CHANGE UP (ref 0.5–1.3)
CREAT SERPL-MCNC: 1.24 MG/DL — SIGNIFICANT CHANGE UP (ref 0.5–1.3)
EGFR: 57 ML/MIN/1.73M2 — LOW
EGFR: 57 ML/MIN/1.73M2 — LOW
GLUCOSE BLDC GLUCOMTR-MCNC: 195 MG/DL — HIGH (ref 70–99)
GLUCOSE BLDC GLUCOMTR-MCNC: 195 MG/DL — HIGH (ref 70–99)
GLUCOSE BLDC GLUCOMTR-MCNC: 307 MG/DL — HIGH (ref 70–99)
GLUCOSE BLDC GLUCOMTR-MCNC: 307 MG/DL — HIGH (ref 70–99)
GLUCOSE BLDC GLUCOMTR-MCNC: 341 MG/DL — HIGH (ref 70–99)
GLUCOSE BLDC GLUCOMTR-MCNC: 341 MG/DL — HIGH (ref 70–99)
GLUCOSE BLDC GLUCOMTR-MCNC: 368 MG/DL — HIGH (ref 70–99)
GLUCOSE BLDC GLUCOMTR-MCNC: 368 MG/DL — HIGH (ref 70–99)
GLUCOSE SERPL-MCNC: 176 MG/DL — HIGH (ref 70–99)
GLUCOSE SERPL-MCNC: 176 MG/DL — HIGH (ref 70–99)
HCT VFR BLD CALC: 32.1 % — LOW (ref 39–50)
HCT VFR BLD CALC: 32.1 % — LOW (ref 39–50)
HGB BLD-MCNC: 9.9 G/DL — LOW (ref 13–17)
HGB BLD-MCNC: 9.9 G/DL — LOW (ref 13–17)
MAGNESIUM SERPL-MCNC: 1.6 MG/DL — SIGNIFICANT CHANGE UP (ref 1.6–2.6)
MAGNESIUM SERPL-MCNC: 1.6 MG/DL — SIGNIFICANT CHANGE UP (ref 1.6–2.6)
MCHC RBC-ENTMCNC: 26.9 PG — LOW (ref 27–34)
MCHC RBC-ENTMCNC: 26.9 PG — LOW (ref 27–34)
MCHC RBC-ENTMCNC: 30.8 GM/DL — LOW (ref 32–36)
MCHC RBC-ENTMCNC: 30.8 GM/DL — LOW (ref 32–36)
MCV RBC AUTO: 87.2 FL — SIGNIFICANT CHANGE UP (ref 80–100)
MCV RBC AUTO: 87.2 FL — SIGNIFICANT CHANGE UP (ref 80–100)
MRSA PCR RESULT.: SIGNIFICANT CHANGE UP
MRSA PCR RESULT.: SIGNIFICANT CHANGE UP
NRBC # BLD: 0 /100 WBCS — SIGNIFICANT CHANGE UP (ref 0–0)
NRBC # BLD: 0 /100 WBCS — SIGNIFICANT CHANGE UP (ref 0–0)
PHOSPHATE SERPL-MCNC: 3.4 MG/DL — SIGNIFICANT CHANGE UP (ref 2.5–4.5)
PHOSPHATE SERPL-MCNC: 3.4 MG/DL — SIGNIFICANT CHANGE UP (ref 2.5–4.5)
PLATELET # BLD AUTO: 122 K/UL — LOW (ref 150–400)
PLATELET # BLD AUTO: 122 K/UL — LOW (ref 150–400)
POTASSIUM SERPL-MCNC: 3.9 MMOL/L — SIGNIFICANT CHANGE UP (ref 3.5–5.3)
POTASSIUM SERPL-MCNC: 3.9 MMOL/L — SIGNIFICANT CHANGE UP (ref 3.5–5.3)
POTASSIUM SERPL-SCNC: 3.9 MMOL/L — SIGNIFICANT CHANGE UP (ref 3.5–5.3)
POTASSIUM SERPL-SCNC: 3.9 MMOL/L — SIGNIFICANT CHANGE UP (ref 3.5–5.3)
RBC # BLD: 3.68 M/UL — LOW (ref 4.2–5.8)
RBC # BLD: 3.68 M/UL — LOW (ref 4.2–5.8)
RBC # FLD: 15.9 % — HIGH (ref 10.3–14.5)
RBC # FLD: 15.9 % — HIGH (ref 10.3–14.5)
S AUREUS DNA NOSE QL NAA+PROBE: DETECTED
S AUREUS DNA NOSE QL NAA+PROBE: DETECTED
SODIUM SERPL-SCNC: 139 MMOL/L — SIGNIFICANT CHANGE UP (ref 135–145)
SODIUM SERPL-SCNC: 139 MMOL/L — SIGNIFICANT CHANGE UP (ref 135–145)
WBC # BLD: 6.45 K/UL — SIGNIFICANT CHANGE UP (ref 3.8–10.5)
WBC # BLD: 6.45 K/UL — SIGNIFICANT CHANGE UP (ref 3.8–10.5)
WBC # FLD AUTO: 6.45 K/UL — SIGNIFICANT CHANGE UP (ref 3.8–10.5)
WBC # FLD AUTO: 6.45 K/UL — SIGNIFICANT CHANGE UP (ref 3.8–10.5)

## 2023-12-11 PROCEDURE — 99232 SBSQ HOSP IP/OBS MODERATE 35: CPT

## 2023-12-11 RX ORDER — CHLORHEXIDINE GLUCONATE 213 G/1000ML
1 SOLUTION TOPICAL DAILY
Refills: 0 | Status: DISCONTINUED | OUTPATIENT
Start: 2023-12-11 | End: 2023-12-16

## 2023-12-11 RX ORDER — LIDOCAINE 4 G/100G
1 CREAM TOPICAL
Qty: 3 | Refills: 0
Start: 2023-12-11 | End: 2023-12-24

## 2023-12-11 RX ORDER — COLCHICINE 0.6 MG
1 TABLET ORAL
Qty: 7 | Refills: 0
Start: 2023-12-11 | End: 2023-12-17

## 2023-12-11 RX ORDER — PROBENECID/COLCHICINE
1 TABLET ORAL
Qty: 0 | Refills: 0 | DISCHARGE

## 2023-12-11 RX ORDER — RIVAROXABAN 15 MG-20MG
1 KIT ORAL
Refills: 0 | DISCHARGE

## 2023-12-11 RX ORDER — TAMSULOSIN HYDROCHLORIDE 0.4 MG/1
1 CAPSULE ORAL
Refills: 0 | DISCHARGE

## 2023-12-11 RX ORDER — NALOXONE HYDROCHLORIDE 4 MG/.1ML
4 SPRAY NASAL
Qty: 1 | Refills: 0
Start: 2023-12-11 | End: 2023-12-11

## 2023-12-11 RX ORDER — RIVASTIGMINE 4.6 MG/24H
1 PATCH, EXTENDED RELEASE TRANSDERMAL
Refills: 0 | DISCHARGE

## 2023-12-11 RX ORDER — ACETAMINOPHEN 500 MG
2 TABLET ORAL
Qty: 0 | Refills: 0 | DISCHARGE
Start: 2023-12-11

## 2023-12-11 RX ORDER — OXYCODONE HYDROCHLORIDE 5 MG/1
1 TABLET ORAL
Qty: 9 | Refills: 0
Start: 2023-12-11 | End: 2023-12-13

## 2023-12-11 RX ORDER — SENNA PLUS 8.6 MG/1
2 TABLET ORAL
Qty: 0 | Refills: 0 | DISCHARGE
Start: 2023-12-11

## 2023-12-11 RX ORDER — MUPIROCIN 20 MG/G
1 OINTMENT TOPICAL
Refills: 0 | Status: COMPLETED | OUTPATIENT
Start: 2023-12-11 | End: 2023-12-16

## 2023-12-11 RX ADMIN — TAMSULOSIN HYDROCHLORIDE 0.4 MILLIGRAM(S): 0.4 CAPSULE ORAL at 22:07

## 2023-12-11 RX ADMIN — FINASTERIDE 5 MILLIGRAM(S): 5 TABLET, FILM COATED ORAL at 12:21

## 2023-12-11 RX ADMIN — Medication 1: at 08:42

## 2023-12-11 RX ADMIN — Medication 5 UNIT(S): at 08:43

## 2023-12-11 RX ADMIN — Medication 5 UNIT(S): at 16:52

## 2023-12-11 RX ADMIN — CEFTRIAXONE 100 MILLIGRAM(S): 500 INJECTION, POWDER, FOR SOLUTION INTRAMUSCULAR; INTRAVENOUS at 00:05

## 2023-12-11 RX ADMIN — CHLORHEXIDINE GLUCONATE 1 APPLICATION(S): 213 SOLUTION TOPICAL at 12:36

## 2023-12-11 RX ADMIN — RIVAROXABAN 15 MILLIGRAM(S): KIT at 16:51

## 2023-12-11 RX ADMIN — LIDOCAINE 1 PATCH: 4 CREAM TOPICAL at 12:35

## 2023-12-11 RX ADMIN — ATORVASTATIN CALCIUM 40 MILLIGRAM(S): 80 TABLET, FILM COATED ORAL at 22:07

## 2023-12-11 RX ADMIN — Medication 4: at 12:25

## 2023-12-11 RX ADMIN — Medication 0.6 MILLIGRAM(S): at 12:21

## 2023-12-11 RX ADMIN — Medication 5: at 16:52

## 2023-12-11 RX ADMIN — LIDOCAINE 1 PATCH: 4 CREAM TOPICAL at 00:15

## 2023-12-11 RX ADMIN — Medication 4: at 22:05

## 2023-12-11 RX ADMIN — SENNA PLUS 2 TABLET(S): 8.6 TABLET ORAL at 22:07

## 2023-12-11 RX ADMIN — Medication 100 MILLIGRAM(S): at 12:21

## 2023-12-11 RX ADMIN — LIDOCAINE 1 PATCH: 4 CREAM TOPICAL at 20:18

## 2023-12-11 RX ADMIN — Medication 40 MILLIGRAM(S): at 06:13

## 2023-12-11 RX ADMIN — POLYETHYLENE GLYCOL 3350 17 GRAM(S): 17 POWDER, FOR SOLUTION ORAL at 12:21

## 2023-12-11 RX ADMIN — OXYCODONE HYDROCHLORIDE 5 MILLIGRAM(S): 5 TABLET ORAL at 13:25

## 2023-12-11 RX ADMIN — Medication 81 MILLIGRAM(S): at 12:20

## 2023-12-11 RX ADMIN — Medication 5 UNIT(S): at 12:25

## 2023-12-11 RX ADMIN — OXYCODONE HYDROCHLORIDE 5 MILLIGRAM(S): 5 TABLET ORAL at 12:34

## 2023-12-11 RX ADMIN — INSULIN GLARGINE 10 UNIT(S): 100 INJECTION, SOLUTION SUBCUTANEOUS at 22:06

## 2023-12-11 RX ADMIN — MUPIROCIN 1 APPLICATION(S): 20 OINTMENT TOPICAL at 17:25

## 2023-12-11 NOTE — PROGRESS NOTE ADULT - PROBLEM SELECTOR PLAN 5
On metformin 1000mg BID.   Stop oral hypoglycemics.   Start SSI. insulin regimen for steroid induced hyperglycemia   A1C 8.1

## 2023-12-11 NOTE — DISCHARGE NOTE PROVIDER - CARE PROVIDER_API CALL
Eric Sloan  Orthopaedic Surgery  [address] [place] [address], [address] [place] [address]  Phone: [phone]  Fax: [phone]  Follow Up Time: 1 week    PCP,   Phone: (   )    -  Fax: (   )    -  Follow Up Time: 1 week   Eric Sloan  Orthopaedic Surgery  [address] [place] [address], [address] [place] [address]  Phone: [phone]  Fax: [phone]  Follow Up Time: 1 week    PCP,   Phone: (   )    -  Fax: (   )    -  Follow Up Time: 1 week   Eric Sloan  Orthopaedic Surgery  [address] [place] [address], [address] [place] [address]  Phone: [phone]  Fax: [phone]  Follow Up Time: 1 week    PCP,   Phone: (   )    -  Fax: (   )    -  Follow Up Time: 1 week    Facility MD at rehabilitation,   Phone: (   )    -  Fax: (   )    -  Follow Up Time: 1-3 days   Eric Sloan  Orthopaedic Surgery  [address] [place] [address], [address] [place] [address]  Phone: [phone]  Fax: [phone]  Follow Up Time: 1 week    PCP,   Phone: (   )    -  Fax: (   )    -  Follow Up Time: 1 week    Facility MD at rehabilitation,   Phone: (   )    -  Fax: (   )    -  Follow Up Time: 1-3 days

## 2023-12-11 NOTE — DISCHARGE NOTE PROVIDER - PROVIDER TOKENS
PROVIDER:[TOKEN:[[id number]],FOLLOWUP:[1 week]],FREE:[LAST:[PCP],PHONE:[(   )    -],FAX:[(   )    -],FOLLOWUP:[1 week]] PROVIDER:[TOKEN:[[id number]],FOLLOWUP:[1 week]],FREE:[LAST:[PCP],PHONE:[(   )    -],FAX:[(   )    -],FOLLOWUP:[1 week]],FREE:[LAST:[Facility MD at rehabilitation],PHONE:[(   )    -],FAX:[(   )    -],FOLLOWUP:[1-3 days]] PROVIDER:[TOKEN:[[id number]],FOLLOWUP:[1 week]],FREE:[LAST:[PCP],PHONE:[(   )    -],FAX:[(   )    -],FOLLOWUP:[1 week]],FREE:[LAST:[Facility MD at rehabilitation],PHONE:[(   )    -],FAX:[(   )    -],FOLLOWUP:[1-3 days]]

## 2023-12-11 NOTE — PHYSICAL THERAPY INITIAL EVALUATION ADULT - DIAGNOSIS, PT EVAL
Patient presents with pain on L knee(6/10), impairments in standing balance and was slightly unsteady during ambulation

## 2023-12-11 NOTE — PROGRESS NOTE ADULT - TIME BILLING
-history taking  -physical exam  -walking with patient  -clinical decision making  -documenting the encounter  -calling son Antonio to provide update, discuss rehab

## 2023-12-11 NOTE — PROGRESS NOTE ADULT - ASSESSMENT
Patient is a 86 Y/O M w/ PMH of CAD s/p stent with most recent July 2022, PAD, T2DM, BPH, HTN, Gout (right knee flair in 2022), CKD, dementia who presented with 5 day history of left knee pain. Patient left knee is more swollen, with erythema, tenderness, warmth and restricted range of motion. Patient is being admitted to medicine for Acute gout flair with concern for septic arthritis.   CT lumbar spine shows Moderate canal narrowing L3-4 and L4-5 due to diffuse disc bulges as well as facet and ligamentous degenerative changes.  Mild canal narrowing L2-3 due to diffuse disc bulge as well as facet DJD. Small right paracentral disc protrusion L5-S1 which mildly displaces the   right S1 nerve root.   Xray left knee resulted Intact bones and alignment. No evidence of fracture or suspicious lesion. Soft tissue thickening overlies the patella. A vascular stent is seen in   the distal SFA extending into the proximal popliteal level as partially visualized with heavy vascular calcifications.  Orthopedic consulted and reccs WBAT LLE. follow up with orthopedic OP in one week. started prednisone and Colchicine.   Pain improves and PT consulted, reccs YESSICA. CM following.

## 2023-12-11 NOTE — DISCHARGE NOTE PROVIDER - CARE PROVIDERS DIRECT ADDRESSES
,[email or web address] ,[email or web address],DirectAddress_Unknown ,[email or web address],DirectAddress_Unknown,DirectAddress_Unknown

## 2023-12-11 NOTE — DISCHARGE NOTE PROVIDER - NSDCMRMEDTOKEN_GEN_ALL_CORE_FT
acetaminophen 325 mg oral tablet: 2 tab(s) orally every 6 hours As needed Temp greater or equal to 38C (100.4F), Mild Pain (1 - 3)  allopurinol 100 mg oral tablet: 1 tab(s) orally once a day   aspirin 81 mg oral capsule: 1 cap(s) orally once a day  atorvastatin 40 mg oral tablet: 1 tab(s) orally once a day  bisacodyl 5 mg oral delayed release tablet: 1 tab(s) orally once a day As needed Constipation  colchicine 0.6 mg oral tablet: 1 tab(s) orally once a day  finasteride 5 mg oral tablet: 1 tab(s) orally once a day  Lidocare Pain Relief Patch 4% topical film: Apply topically to affected area once a day left knee pain  metFORMIN 1000 mg oral tablet: 1 tab(s) orally 2 times a day  metoprolol succinate 25 mg oral capsule, extended release: 1 cap(s) orally once a day  naloxone 4 mg/0.1 mL nasal spray: 4 milligram(s) intranasally once a day as needed for For narcotic overdose only for narcotic overdose  oxyCODONE 5 mg oral tablet: 1 tab(s) orally every 8 hours as needed for  severe pain 3 days as needed for severe pain MDD: 3  prednisoLONE 10 mg oral tablet, disintegratin tab(s) orally once a day 40mg once a day for 3 more days from .  rivastigmine 1.5 mg oral capsule: 1 cap(s) orally 2 times a day  senna leaf extract oral tablet: 2 tab(s) orally once a day (at bedtime)  tamsulosin 0.4 mg oral capsule: 1 cap(s) orally once a day  Xarelto 20 mg oral tablet: 1 tab(s) orally once a day   acetaminophen 325 mg oral tablet: 2 tab(s) orally every 6 hours As needed Temp greater or equal to 38C (100.4F), Mild Pain (1 - 3)  allopurinol 100 mg oral tablet: 1 tab(s) orally once a day   aspirin 81 mg oral capsule: 1 cap(s) orally once a day  atorvastatin 40 mg oral tablet: 1 tab(s) orally once a day  bisacodyl 5 mg oral delayed release tablet: 1 tab(s) orally once a day As needed Constipation  carvedilol 6.25 mg oral tablet: 1 tab(s) orally every 12 hours  colchicine 0.6 mg oral tablet: 1 tab(s) orally once a day  finasteride 5 mg oral tablet: 1 tab(s) orally once a day  Lidocare Pain Relief Patch 4% topical film: Apply topically to affected area once a day left knee pain  losartan 25 mg oral tablet: 1 tab(s) orally once a day  metFORMIN 1000 mg oral tablet: 1 tab(s) orally 2 times a day  metoprolol succinate 25 mg oral capsule, extended release: 1 cap(s) orally once a day  rivastigmine 1.5 mg oral capsule: 1 cap(s) orally 2 times a day  senna leaf extract oral tablet: 2 tab(s) orally once a day (at bedtime)  tamsulosin 0.4 mg oral capsule: 1 cap(s) orally once a day  Xarelto 20 mg oral tablet: 1 tab(s) orally once a day   acetaminophen 325 mg oral tablet: 2 tab(s) orally every 6 hours As needed Temp greater or equal to 38C (100.4F), Mild Pain (1 - 3)  allopurinol 100 mg oral tablet: 1 tab(s) orally once a day   aspirin 81 mg oral capsule: 1 cap(s) orally once a day  atorvastatin 40 mg oral tablet: 1 tab(s) orally once a day  bisacodyl 5 mg oral delayed release tablet: 1 tab(s) orally once a day As needed Constipation  carvedilol 6.25 mg oral tablet: 1 tab(s) orally every 12 hours  colchicine 0.6 mg oral tablet: 1 tab(s) orally once a day  finasteride 5 mg oral tablet: 1 tab(s) orally once a day  insulin glargine 100 units/mL subcutaneous solution: 13 unit(s) subcutaneous once a day (at bedtime)  Lidocare Pain Relief Patch 4% topical film: Apply topically to affected area once a day left knee pain  losartan 25 mg oral tablet: 1 tab(s) orally once a day  magnesium oxide 400 mg oral tablet: 1 tab(s) orally 2 times a day (with meals) FOR 2 DOSES  ON 12/16.  MONITOR YOUR MAGNESIUM LEVEL IN AM.  metFORMIN 1000 mg oral tablet: 1 tab(s) orally 2 times a day DO NOT RESUME UNTIL APPROVED BY YOUR DOCTOR.  metoprolol succinate 25 mg oral capsule, extended release: 1 cap(s) orally once a day  rivastigmine 1.5 mg oral capsule: 1 cap(s) orally 2 times a day  senna leaf extract oral tablet: 2 tab(s) orally once a day (at bedtime)  tamsulosin 0.4 mg oral capsule: 1 cap(s) orally once a day  Xarelto 20 mg oral tablet: 1 tab(s) orally once a day   acetaminophen 325 mg oral tablet: 2 tab(s) orally every 6 hours As needed Temp greater or equal to 38C (100.4F), Mild Pain (1 - 3)  allopurinol 100 mg oral tablet: 1 tab(s) orally once a day   aspirin 81 mg oral capsule: 1 cap(s) orally once a day  atorvastatin 40 mg oral tablet: 1 tab(s) orally once a day  bisacodyl 5 mg oral delayed release tablet: 1 tab(s) orally once a day As needed Constipation  carvedilol 6.25 mg oral tablet: 1 tab(s) orally every 12 hours  colchicine 0.6 mg oral tablet: 1 tab(s) orally once a day  finasteride 5 mg oral tablet: 1 tab(s) orally once a day  insulin glargine 100 units/mL subcutaneous solution: 10 unit(s) subcutaneous once a day (at bedtime)  Lidocare Pain Relief Patch 4% topical film: Apply topically to affected area once a day left knee pain  losartan 25 mg oral tablet: 1 tab(s) orally once a day  magnesium oxide 400 mg oral tablet: 1 tab(s) orally 2 times a day (with meals) FOR 2 DOSES  ON 12/16.  MONITOR YOUR MAGNESIUM LEVEL IN AM.  metFORMIN 1000 mg oral tablet: 1 tab(s) orally 2 times a day  metoprolol succinate 25 mg oral capsule, extended release: 1 cap(s) orally once a day  rivastigmine 1.5 mg oral capsule: 1 cap(s) orally 2 times a day  senna leaf extract oral tablet: 2 tab(s) orally once a day (at bedtime)  tamsulosin 0.4 mg oral capsule: 1 cap(s) orally once a day  Xarelto 20 mg oral tablet: 1 tab(s) orally once a day

## 2023-12-11 NOTE — DISCHARGE NOTE PROVIDER - HOSPITAL COURSE
Patient is a 86 Y/O M w/ PMH of CAD s/p stent with most recent July 2022, PAD, T2DM, BPH, HTN, Gout (right knee flair in 2022), CKD, dementia who presented with 5 day history of left knee pain. Patient left knee is more swollen, with erythema, tenderness, warmth and restricted range of motion. Patient is being admitted to medicine for Acute gout flair with concern for septic arthritis.   CT lumbar spine shows Moderate canal narrowing L3-4 and L4-5 due to diffuse disc bulges as well as facet and ligamentous degenerative changes.  Mild canal narrowing L2-3 due to diffuse disc bulge as well as facet DJD. Small right paracentral disc protrusion L5-S1 which mildly displaces the   right S1 nerve root.   Xray left knee resulted Intact bones and alignment. No evidence of fracture or suspicious lesion. Soft tissue thickening overlies the patella. A vascular stent is seen in   the distal SFA extending into the proximal popliteal level as partially visualized with heavy vascular calcifications.  Orthopedic consulted and reccs WBAT LLE. follow up with orthopedic OP in one week. started prednisone and Colchicine.   Pain improves and PT consulted, reccs home PT.  assisted for home PT arrangement.   Patient is medically optimized for discharge home with home PT and outpt follow up.   Please refer to medical records for in depth hospital course.          Patient is a 88 Y/O M w/ PMH of CAD s/p stent with most recent July 2022, PAD, T2DM, BPH, HTN, Gout (right knee flair in 2022), CKD, dementia who presented with 5 day history of left knee pain. Patient left knee is more swollen, with erythema, tenderness, warmth and restricted range of motion. Patient is being admitted to medicine for Acute gout flair with concern for septic arthritis.   CT lumbar spine shows Moderate canal narrowing L3-4 and L4-5 due to diffuse disc bulges as well as facet and ligamentous degenerative changes.  Mild canal narrowing L2-3 due to diffuse disc bulge as well as facet DJD. Small right paracentral disc protrusion L5-S1 which mildly displaces the   right S1 nerve root.   Xray left knee resulted Intact bones and alignment. No evidence of fracture or suspicious lesion. Soft tissue thickening overlies the patella. A vascular stent is seen in   the distal SFA extending into the proximal popliteal level as partially visualized with heavy vascular calcifications.  Orthopedic consulted and reccs WBAT LLE. follow up with orthopedic OP in one week. started prednisone and Colchicine.   Pain improves and PT consulted, reccs home PT.  assisted for home PT arrangement.   Patient is medically optimized for discharge home with home PT and outpt follow up.   Please refer to medical records for in depth hospital course.          Patient is a 88 Y/O M w/ PMH of CAD s/p stent with most recent July 2022, PAD, T2DM, BPH, HTN, Gout (right knee flair in 2022), CKD, dementia who presented with 5 day history of left knee pain. Patient left knee is more swollen, with erythema, tenderness, warmth and restricted range of motion. Patient is being admitted to medicine for Acute gout flair with concern for septic arthritis.   CT lumbar spine shows Moderate canal narrowing L3-4 and L4-5 due to diffuse disc bulges as well as facet and ligamentous degenerative changes.  Mild canal narrowing L2-3 due to diffuse disc bulge as well as facet DJD. Small right paracentral disc protrusion L5-S1 which mildly displaces the   right S1 nerve root.   Xray left knee resulted Intact bones and alignment. No evidence of fracture or suspicious lesion. Soft tissue thickening overlies the patella. A vascular stent is seen in   the distal SFA extending into the proximal popliteal level as partially visualized with heavy vascular calcifications.  Orthopedic consulted and reccs WBAT LLE. follow up with orthopedic OP in one week. started prednisone and Colchicine.   Pain improves and PT consulted, reccs home PT.  assisted for home PT arrangement.   Patient is medically optimized for discharge home with home PT and outpt follow up.   Please refer to medical records for in depth hospital course.     Attending Attestation:    Patient was admitted for an acute left knee monoarticular arthritis most concerning for an acute gout flare given his history. Orthopedic Surgery was contacted to perform arthrocentesis, but this was deferred given low clinical concern for septic arthritis given lack of fever, lack of leukocytosis, and normal CRP. He was treated with prednisone and colchicine for gout flare (multiple contraindications to NSAIDs). The patient experienced clinical improvement and improved range of motion of the left knee. PT worked with que on 12/11 and recommended YESSICA for patient's unsteadiness. The patient ultimately decided INCOMPLETE*         Patient is a 88 Y/O M w/ PMH of CAD s/p stent with most recent July 2022, PAD, T2DM, BPH, HTN, Gout (right knee flair in 2022), CKD, dementia who presented with 5 day history of left knee pain. Patient left knee is more swollen, with erythema, tenderness, warmth and restricted range of motion. Patient is being admitted to medicine for Acute gout flair with concern for septic arthritis.   CT lumbar spine shows Moderate canal narrowing L3-4 and L4-5 due to diffuse disc bulges as well as facet and ligamentous degenerative changes.  Mild canal narrowing L2-3 due to diffuse disc bulge as well as facet DJD. Small right paracentral disc protrusion L5-S1 which mildly displaces the   right S1 nerve root.   Xray left knee resulted Intact bones and alignment. No evidence of fracture or suspicious lesion. Soft tissue thickening overlies the patella. A vascular stent is seen in   the distal SFA extending into the proximal popliteal level as partially visualized with heavy vascular calcifications.  Orthopedic consulted and reccs WBAT LLE. follow up with orthopedic OP in one week. started prednisone and Colchicine.   Pain improves and PT consulted, reccs [facility].  assisted for placement.   Patient is medically optimized for discharge [facility] and outpt follow up. Discharge plan was discussed with attending physician.   Please refer to medical records for in depth hospital course.     Attending Attestation:    Patient was admitted for an acute left knee monoarticular arthritis most concerning for an acute gout flare given his history. Orthopedic Surgery was contacted to perform arthrocentesis, but this was deferred given low clinical concern for septic arthritis given lack of fever, lack of leukocytosis, and normal CRP. He was treated with prednisone and colchicine for gout flare (multiple contraindications to NSAIDs). The patient experienced clinical improvement and improved range of motion of the left knee. PT worked with que on 12/11 and recommended YESSICA for patient's unsteadiness. The patient ultimately decided INCOMPLETE*         Patient is a 86 Y/O M w/ PMH of CAD s/p stent with most recent July 2022, PAD, T2DM, BPH, HTN, Gout (right knee flair in 2022), CKD, dementia who presented with 5 day history of left knee pain. Patient left knee is more swollen, with erythema, tenderness, warmth and restricted range of motion. Patient is being admitted to medicine for Acute gout flair with concern for septic arthritis.   CT lumbar spine shows Moderate canal narrowing L3-4 and L4-5 due to diffuse disc bulges as well as facet and ligamentous degenerative changes.  Mild canal narrowing L2-3 due to diffuse disc bulge as well as facet DJD. Small right paracentral disc protrusion L5-S1 which mildly displaces the   right S1 nerve root.   Xray left knee resulted Intact bones and alignment. No evidence of fracture or suspicious lesion. Soft tissue thickening overlies the patella. A vascular stent is seen in   the distal SFA extending into the proximal popliteal level as partially visualized with heavy vascular calcifications.  Orthopedic consulted and reccs WBAT LLE. follow up with orthopedic OP in one week. started prednisone and Colchicine.   Pain improves and PT consulted, reccs [facility].  assisted for placement.   Patient is medically optimized for discharge [facility] and outpt follow up. Discharge plan was discussed with attending physician.   Please refer to medical records for in depth hospital course.                Patient is a 88 Y/O M w/ PMH of CAD s/p stent with most recent July 2022, PAD, T2DM, BPH, HTN, Gout (right knee flair in 2022), CKD, dementia who presented with 5 day history of left knee pain. Patient left knee is more swollen, with erythema, tenderness, warmth and restricted range of motion. Patient is being admitted to medicine for Acute gout flair with concern for septic arthritis.   CT lumbar spine shows Moderate canal narrowing L3-4 and L4-5 due to diffuse disc bulges as well as facet and ligamentous degenerative changes.  Mild canal narrowing L2-3 due to diffuse disc bulge as well as facet DJD. Small right paracentral disc protrusion L5-S1 which mildly displaces the   right S1 nerve root.   Xray left knee resulted Intact bones and alignment. No evidence of fracture or suspicious lesion. Soft tissue thickening overlies the patella. A vascular stent is seen in   the distal SFA extending into the proximal popliteal level as partially visualized with heavy vascular calcifications.  Orthopedic consulted and reccs WBAT LLE. follow up with orthopedic OP in one week. started prednisone and Colchicine.   Pain improves and PT consulted, reccs [facility].  assisted for placement.   Patient is medically optimized for discharge [facility] and outpt follow up. Discharge plan was discussed with attending physician.   Please refer to medical records for in depth hospital course.                Patient is a 86 Y/O M w/ PMH of CAD s/p stent with most recent July 2022, PAD, T2DM, BPH, HTN, Gout (right knee flair in 2022), CKD, dementia who presented with 5 day history of left knee pain. Patient left knee is more swollen, with erythema, tenderness, warmth and restricted range of motion. Patient is being admitted for inflammatory monoarthritis of the left knee, likely acute gout flare +/- overlying cellulitis. Noted to be afebrile, no leukocytosis, serum CRP is completely normal at 3, findings with low likelihood of septic arthritis. Orthopedic surgery was consulted on 12/10, did not recommend synovial fluid analysis given improved knee range of motion, very low concern for septic arthritis.   CT lumbar spine shows Moderate canal narrowing L3-4 and L4-5 due to diffuse disc bulges as well as facet and ligamentous degenerative changes. Mild canal narrowing L2-3 due to diffuse disc bulge as well as facet DJD. Small right paracentral disc protrusion L5-S1 which mildly displaces the right S1 nerve root.   Xray left knee resulted Intact bones and alignment. No evidence of fracture or suspicious lesion. Soft tissue thickening overlies the patella. A vascular stent is seen in   the distal SFA extending into the proximal popliteal level as partially visualized with heavy vascular calcifications.  Orthopedic consulted and recommended WBAT LLE. For the acute gout flare, he was started on prednisone for 5d and Colchicine daily. While on steroids, noted to have hyperglycemia and so lantus was increased to address this. Pain improved and PT consulted, reccs [facility]. After he completed steroids, his lantus dose was decreased, with plans to resume metformin and lantus 10u qhs upon discharge to [facility].   assisted for placement. His case to go to [facility] was initially denied by insurance however p2p was done with attending on file, and his case was accepted. Patient is medically optimized for discharge [facility] and outpt follow up. He is to continue on allopurinol and colchichine upon discharge to prevent future gout flares. Discharge plan was discussed with attending physician.   Please refer to medical records for in depth hospital course.                Patient is a 88 Y/O M w/ PMH of CAD s/p stent with most recent July 2022, PAD, T2DM, BPH, HTN, Gout (right knee flair in 2022), CKD, dementia who presented with 5 day history of left knee pain. Patient left knee is more swollen, with erythema, tenderness, warmth and restricted range of motion. Patient is being admitted for inflammatory monoarthritis of the left knee, likely acute gout flare +/- overlying cellulitis. Noted to be afebrile, no leukocytosis, serum CRP is completely normal at 3, findings with low likelihood of septic arthritis. Orthopedic surgery was consulted on 12/10, did not recommend synovial fluid analysis given improved knee range of motion, very low concern for septic arthritis.   CT lumbar spine shows Moderate canal narrowing L3-4 and L4-5 due to diffuse disc bulges as well as facet and ligamentous degenerative changes. Mild canal narrowing L2-3 due to diffuse disc bulge as well as facet DJD. Small right paracentral disc protrusion L5-S1 which mildly displaces the right S1 nerve root.   Xray left knee resulted Intact bones and alignment. No evidence of fracture or suspicious lesion. Soft tissue thickening overlies the patella. A vascular stent is seen in   the distal SFA extending into the proximal popliteal level as partially visualized with heavy vascular calcifications.  Orthopedic consulted and recommended WBAT LLE. For the acute gout flare, he was started on prednisone for 5d and Colchicine daily. While on steroids, noted to have hyperglycemia and so lantus was increased to address this. Pain improved and PT consulted, reccs [facility]. After he completed steroids, his lantus dose was decreased, with plans to resume metformin and lantus 10u qhs upon discharge to [facility].   assisted for placement. His case to go to [facility] was initially denied by insurance however p2p was done with attending on file, and his case was accepted. Patient is medically optimized for discharge [facility] and outpt follow up. He is to continue on allopurinol and colchichine upon discharge to prevent future gout flares. Discharge plan was discussed with attending physician.   Please refer to medical records for in depth hospital course.

## 2023-12-11 NOTE — PROGRESS NOTE ADULT - ASSESSMENT
I have reviewed and ordered his CBC, BMP, CRP. He continues to have no leukocytosis, WBC 6.7. Platelets mildly low in 120s but stable. Renal function stable with serum Cr 1.2. Electrolytes ok. CRP still unremarkable, 5 compared to 3 the day prior.     Assessment and Plan:     Patient is an 88-year-old male with medical history most notable for gout, CAD s/p PCI July 2022, PAD s/p stent, CKD, DM, HTN, HLD, gouty arthritis involving the knee who presents with severe left knee pain following a fall a few days ago leading to ambulatory dysfunction. Exam is concerning for an inflammatory monoarthritis of the left knee +/- overlying cellulitis. Differential for any monoarticular arthritis includes septic joint and crystalline arthropathy, gout or CPPD. Fortunately, he is fever free and has no leukocytosis, although patients in this age group will not always mount these inflammatory findings. His serum CRP is completely normal at 3, and literature review notes a very strong negative predictive value for normal CRP in septic arthritis. Orthopedic surgery was consulted on 12/10, does not recommend synovial fluid analysis given improved knee range of motion, very low concern for septic arthritis.    Patient has improved, but notable received steroid dose as well as antibiotics. Hard to determine clinically if his improvement is driven by antibiotics clearing overlying cellulitis or steroids/colchicine improving gouty arthritis without a full synovial fluid analysis probing for crystalline arthropathy.    #Left Knee Monoarticular Arthritis, c/f Gout, r/o septic arthritis    #Intractable Left Knee pain, improving   #Ambulatory Dysfunction   #Fall   #CKD   #PAD s/p stent   #CAD s/p PCI in July 2022   #HLD   #Hypertension    #Gout      -Orthopedic Surgery for arthrocentesis to obtain synovial fluid for cell count with diff, gram stain, culture, crystal analysis -> procedure deferred, appreciate Orthopedic Assistance  -discontinue antibiotics   -continue gout treatment with prednisone 40 mg, colchicine, will avoid NSAIDs given multiple contraindications; plan for 5 day course prednisone    -PT consult -> YESSICA recommended, family deliberating   -pain control   -adding insulin glargine 10 units and lispro 5 units AC for steroid induced hyperglycemia   -DC planning

## 2023-12-11 NOTE — DISCHARGE NOTE PROVIDER - NPI NUMBER (FOR SYSADMIN USE ONLY) :
[[id number]],[UNKNOWN] [[id number]],[UNKNOWN] [[id number]],[UNKNOWN],[UNKNOWN] [[id number]],[UNKNOWN],[UNKNOWN]

## 2023-12-11 NOTE — PROGRESS NOTE ADULT - SUBJECTIVE AND OBJECTIVE BOX
NP Note discussed with  Primary Attending    INTERVAL HPI/OVERNIGHT EVENTS: left knee pain improving per patient.     MEDICATIONS  (STANDING):  allopurinol 100 milliGRAM(s) Oral daily  aspirin  chewable 81 milliGRAM(s) Oral daily  atorvastatin 40 milliGRAM(s) Oral at bedtime  chlorhexidine 2% Cloths 1 Application(s) Topical daily  colchicine 0.6 milliGRAM(s) Oral daily  dextrose 5%. 1000 milliLiter(s) (50 mL/Hr) IV Continuous <Continuous>  dextrose 5%. 1000 milliLiter(s) (100 mL/Hr) IV Continuous <Continuous>  dextrose 50% Injectable 12.5 Gram(s) IV Push once  dextrose 50% Injectable 25 Gram(s) IV Push once  dextrose 50% Injectable 25 Gram(s) IV Push once  finasteride 5 milliGRAM(s) Oral daily  glucagon  Injectable 1 milliGRAM(s) IntraMuscular once  insulin glargine Injectable (LANTUS) 10 Unit(s) SubCutaneous at bedtime  insulin lispro (ADMELOG) corrective regimen sliding scale   SubCutaneous Before meals and at bedtime  insulin lispro Injectable (ADMELOG) 5 Unit(s) SubCutaneous before lunch  insulin lispro Injectable (ADMELOG) 5 Unit(s) SubCutaneous before dinner  insulin lispro Injectable (ADMELOG) 5 Unit(s) SubCutaneous before breakfast  lidocaine   4% Patch 1 Patch Transdermal daily  metoprolol tartrate 12.5 milliGRAM(s) Oral daily  mupirocin 2% Ointment 1 Application(s) Both Nostrils two times a day  naloxone Injectable 0.4 milliGRAM(s) IV Push once  polyethylene glycol 3350 17 Gram(s) Oral daily  predniSONE   Tablet 40 milliGRAM(s) Oral daily  rivaroxaban 15 milliGRAM(s) Oral with dinner  senna 2 Tablet(s) Oral at bedtime  tamsulosin 0.4 milliGRAM(s) Oral at bedtime    MEDICATIONS  (PRN):  acetaminophen     Tablet .. 650 milliGRAM(s) Oral every 6 hours PRN Temp greater or equal to 38C (100.4F), Mild Pain (1 - 3)  bisacodyl 5 milliGRAM(s) Oral daily PRN Constipation  dextrose Oral Gel 15 Gram(s) Oral once PRN Blood Glucose LESS THAN 70 milliGRAM(s)/deciliter  ondansetron Injectable 4 milliGRAM(s) IV Push every 8 hours PRN Nausea and/or Vomiting  oxyCODONE    IR 2.5 milliGRAM(s) Oral every 4 hours PRN Moderate Pain (4 - 6)  oxyCODONE    IR 5 milliGRAM(s) Oral every 4 hours PRN Severe Pain (7 - 10)      __________________________________________________  REVIEW OF SYSTEMS:    CONSTITUTIONAL: No fever,   EYES: no acute visual disturbances  NECK: No pain or stiffness  RESPIRATORY: No cough; No shortness of breath  CARDIOVASCULAR: No chest pain, no palpitations  GASTROINTESTINAL: No pain. No nausea or vomiting; No diarrhea   NEUROLOGICAL: No headache or numbness, no tremors  MUSCULOSKELETAL: No joint pain, no muscle pain  GENITOURINARY: no dysuria, no frequency, no hesitancy  PSYCHIATRY: no depression , no anxiety  ALL OTHER  ROS negative        Vital Signs Last 24 Hrs  T(C): 36.6 (11 Dec 2023 11:52), Max: 36.8 (10 Dec 2023 21:21)  T(F): 97.8 (11 Dec 2023 11:52), Max: 98.3 (10 Dec 2023 21:21)  HR: 54 (11 Dec 2023 11:52) (48 - 55)  BP: 164/63 (11 Dec 2023 11:52) (128/47 - 164/63)  BP(mean): --  RR: 17 (11 Dec 2023 11:52) (17 - 18)  SpO2: 94% (11 Dec 2023 11:52) (94% - 94%)    Parameters below as of 11 Dec 2023 11:52  Patient On (Oxygen Delivery Method): room air        ________________________________________________  PHYSICAL EXAM:  GENERAL: NAD  HEENT: Normocephalic;  conjunctivae and sclerae clear; moist mucous membranes;   NECK : supple  CHEST/LUNG: Clear to auscultation bilaterally with good air entry   HEART: S1 S2  regular; no murmurs, gallops or rubs  ABDOMEN: Soft, Nontender, Nondistended; Bowel sounds present  Musk: mild warmness left knee, no swelling or erythema, limited ROM  EXTREMITIES: no cyanosis; no edema; no calf tenderness  SKIN: warm and dry; no rash  NERVOUS SYSTEM:  Awake and alert; Oriented  to place, person and time ; no new deficits    _________________________________________________  LABS:                        9.9    6.45  )-----------( 122      ( 11 Dec 2023 07:16 )             32.1     12-11    139  |  109<H>  |  32<H>  ----------------------------<  176<H>  3.9   |  26  |  1.24    Ca    8.4      11 Dec 2023 07:16  Phos  3.4     12-11  Mg     1.6     12-11        Urinalysis Basic - ( 11 Dec 2023 07:16 )    Color: x / Appearance: x / SG: x / pH: x  Gluc: 176 mg/dL / Ketone: x  / Bili: x / Urobili: x   Blood: x / Protein: x / Nitrite: x   Leuk Esterase: x / RBC: x / WBC x   Sq Epi: x / Non Sq Epi: x / Bacteria: x      CAPILLARY BLOOD GLUCOSE      POCT Blood Glucose.: 368 mg/dL (11 Dec 2023 16:42)  POCT Blood Glucose.: 307 mg/dL (11 Dec 2023 12:00)  POCT Blood Glucose.: 195 mg/dL (11 Dec 2023 08:36)  POCT Blood Glucose.: 270 mg/dL (10 Dec 2023 21:48)  POCT Blood Glucose.: 290 mg/dL (10 Dec 2023 17:18)        RADIOLOGY & ADDITIONAL TESTS:    Imaging  Reviewed:  YES    Consultant(s) Notes Reviewed:   YES      Plan of care was discussed with patient and /or primary care giver; all questions and concerns were addressed

## 2023-12-11 NOTE — DISCHARGE NOTE PROVIDER - COLLABORATE WITH
Implemented All Universal Safety Interventions:  [place] to call system. Call bell, personal items and telephone within reach. Instruct patient to call for assistance. Room bathroom lighting operational. Non-slip footwear when patient is off stretcher. Physically safe environment: no spills, clutter or unnecessary equipment. Stretcher in lowest position, wheels locked, appropriate side rails in place.
ACP

## 2023-12-11 NOTE — DISCHARGE NOTE PROVIDER - NSDCFUADDAPPT_GEN_ALL_CORE_FT
Follow-up with Dr. Sloan in ONE WEEK at [phone] Call for an appointment.  Follow-up with Dr. Sloan in ONE WEEK at [phone] Call for an appointment.

## 2023-12-11 NOTE — DISCHARGE NOTE PROVIDER - NSDCFUSCHEDAPPT_GEN_ALL_CORE_FT
Garo Omalley  [facility]  [address]NG [address]  Scheduled Appointment: 12/14/2023    Al Lee  [facility]  UROLOGY [address]  Scheduled Appointment: 12/20/2023    Wilbert Reyes  [facility]  INTMED [address]  Scheduled Appointment: 01/03/2024    [facility]  VASCULAR [address]  Scheduled Appointment: 01/12/2024    Shay Pratt  [facility]  VASCULAR [address]  Scheduled Appointment: 01/12/2024    Irma Gomez  [facility]  NEUROLOGY [address]  Scheduled Appointment: 01/12/2024    Yogi Melton  [facility]  CARDIOLOGY [address]  Scheduled Appointment: 02/27/2024     Garo Omalley  [facility]  [address]ARYNG [address]  Scheduled Appointment: 12/14/2023    Al Lee  [facility]  UROLOGY [address]  Scheduled Appointment: 12/20/2023    Wilbert Reyes  [facility]  INTMED [address]  Scheduled Appointment: 01/03/2024    [facility]  VASCULAR [address]  Scheduled Appointment: 01/12/2024    Shay Pratt  [facility]  VASCULAR [address]  Scheduled Appointment: 01/12/2024    Irma Gomez  [facility]  NEUROLOGY [address]  Scheduled Appointment: 01/12/2024    Yogi Melton  [facility]  CARDIOLOGY [address]  Scheduled Appointment: 02/27/2024     Al Lee  [facility]  UROLOGY [address]  Scheduled Appointment: 12/20/2023    Wilbert Reyes  [facility]  INTMED [address]  Scheduled Appointment: 01/03/2024    [facility]  VASCULAR [address]  Scheduled Appointment: 01/12/2024    Shay Pratt  [facility]  VASCULAR [address]  Scheduled Appointment: 01/12/2024    Irma Gomez  [facility]  NEUROLOGY [address]  Scheduled Appointment: 01/12/2024    Yogi Melton  [facility]  CARDIOLOGY [address]  Scheduled Appointment: 02/27/2024     Al Lee  [facility]  UROLOGY [address]  Scheduled Appointment: 12/20/2023    Wilbert Reyes  [facility]  INTMED [address]  Scheduled Appointment: 01/03/2024    [facility]  VASCULAR [address]  Scheduled Appointment: 01/12/2024    Shay Pratt  [facility]  VASCULAR [address]  Scheduled Appointment: 01/12/2024    Irma Gomez  [facility]  NEUROLOGY [address]  Scheduled Appointment: 01/12/2024    Yogi Melton  [facility]  CARDIOLOGY [address]  Scheduled Appointment: 02/27/2024

## 2023-12-11 NOTE — DISCHARGE NOTE PROVIDER - NSDCCPCAREPLAN_GEN_ALL_CORE_FT
PRINCIPAL DISCHARGE DIAGNOSIS  Diagnosis: Acute gout of knee  Assessment and Plan of Treatment: You were admitted for acute gout flair left knee.   Xray left knee resulted Intact bones and alignment. No evidence of fracture or suspicious lesion. Soft tissue thickening overlies the patella. A vascular stent is seen in   the distal SFA extending into the proximal popliteal level as partially visualized with heavy vascular calcifications.  Orthopedic consulted and recommends weight bearing as tolerated to left lower leg.  Pain improves and PT consulted, recommends home Physical therapy.   continue medications as prescribed.  Colchicine 0.6mg once a day for one more week.   continue home dose allopurinol.   continue Prednisone 3 more days.   Follow up with orthopedic doctor SARAHI in one week. Call for an appointment.      SECONDARY DISCHARGE DIAGNOSES  Diagnosis: Bulging lumbar disc  Assessment and Plan of Treatment: CT lumbar spine resulted CT lumbar spine shows Moderate canal narrowing L3-4 and L4-5 due to diffuse disc bulges as well as facet and ligamentous degenerative changes.  Mild canal narrowing L2-3 due to diffuse disc bulge as well as facet DJD. Small right paracentral disc protrusion L5-S1 which mildly displaces the   right S1 nerve root.   Maintain fall precaution. Wear proper shoes while ambulating.   Continue physical therapy at home.   Follow up with orthopedic dr. Sloan in one week after discharge.    Diagnosis: Diabetes mellitus  Assessment and Plan of Treatment: HgA1C 8.1 this admission.  Make sure you get your HgA1c checked every three months.  If you take oral diabetes medications, check your blood glucose two times a day.  It's important not to skip any meals.  Keep a log of your blood glucose results and always take it with you to your doctor appointments.  Check your feet daily for redness, sores, or openings. Do not self treat. If no improvement in two days call your primary care physician for an appointment.  Low blood sugar (hypoglycemia) is a blood sugar below 70mg/dl. Check your blood sugar if you feel signs/symptoms of hypoglycemia. If your blood sugar is below 70 take 15 grams of carbohydrates (ex 4 oz of apple juice, 3-4 glucose tablets, or 4-6 oz of regular soda) wait 15 minutes and repeat blood sugar to make sure it comes up above 70.  If your blood sugar is above 70 and you are due for a meal, have a meal.  If you are not due for a meal have a snack.  This snack helps keeps your blood sugar at a safe range.  Follow up with your primary MD after discharge.       Diagnosis: Hypertension  Assessment and Plan of Treatment: Low salt diet  Activity as tolerated.  Take all medication as prescribed.  Follow up with your medical doctor for routine blood pressure monitoring at your next visit.  Notify your doctor if you have any of the following symptoms:   Dizziness, Lightheadedness, Blurry vision, Headache, Chest pain, Shortness of breath      Diagnosis: BPH (benign prostatic hyperplasia)  Assessment and Plan of Treatment: You have an enlarged prostate gland which gets bigger as men get older - it is a very common problem and has nothing to do with prostate cancer  You can help yourself by reducing the amount of fluid you drink before going to bed, limiting the amount of alcohol & caffeine you drink   Avoid cold & allergy medication that contain decongestants or antihistamines which make BPH symptoms worse  You can also "double void" by waiting a moment after urinating & trying again  Take your medication as prescribed - one medication helps to relax the muscle around the urethra and the other medication you may take prevents the prostate from growing more or even shrinking the prostate      Diagnosis: Anemia  Assessment and Plan of Treatment: Symptoms to report, bleeding, palpitations, fatigue, pale skin, cold skin, dizziness. Follow up with your primary MD after discharge to monitor blood work.       Diagnosis: CAD (coronary artery disease)  Assessment and Plan of Treatment: Take your cardiac medication as prescribed to lower cholesterol, to lower blood pressure, aspirin to prevent blood clots, and diabetes control  Make sure to keep appointments with doctor for cardiac follow up care  Coronary artery disease is a condition where the arteries the supply the heart muscle get clogges with fatty deposits & puts you at risk for a heart attack       PRINCIPAL DISCHARGE DIAGNOSIS  Diagnosis: Acute gout of knee  Assessment and Plan of Treatment: You were admitted for acute gout flair left knee.   Xray left knee resulted Intact bones and alignment. No evidence of fracture or suspicious lesion. Soft tissue thickening overlies the patella. A vascular stent is seen in   the distal SFA extending into the proximal popliteal level as partially visualized with heavy vascular calcifications.  Orthopedic consulted and recommends weight bearing as tolerated to left lower leg.  Pain improves and PT consulted, recommends home Physical therapy.   continue medications as prescribed.  Colchicine 0.6mg once a day for one more week.   Continue home dose allopurinol.   You completed 5 days of Prednisone while in the hospital.  Follow up with orthopedic doctor SARAHI in one week. Call for an appointment.      SECONDARY DISCHARGE DIAGNOSES  Diagnosis: Anemia  Assessment and Plan of Treatment: Symptoms to report, bleeding, palpitations, fatigue, pale skin, cold skin, dizziness. Follow up with your primary MD after discharge to monitor blood work.       Diagnosis: Bulging lumbar disc  Assessment and Plan of Treatment: CT lumbar spine resulted CT lumbar spine shows Moderate canal narrowing L3-4 and L4-5 due to diffuse disc bulges as well as facet and ligamentous degenerative changes.  Mild canal narrowing L2-3 due to diffuse disc bulge as well as facet DJD. Small right paracentral disc protrusion L5-S1 which mildly displaces the   right S1 nerve root.   Maintain fall precaution. Wear proper shoes while ambulating.   Continue physical therapy at home.   Follow up with orthopedic dr. Sloan in one week after discharge.    Diagnosis: Hypertension  Assessment and Plan of Treatment: Low salt diet  Activity as tolerated.  Take all medication as prescribed.  Follow up with your medical doctor for routine blood pressure monitoring at your next visit.  Notify your doctor if you have any of the following symptoms:   Dizziness, Lightheadedness, Blurry vision, Headache, Chest pain, Shortness of breath      Diagnosis: Diabetes mellitus  Assessment and Plan of Treatment: HgA1C 8.1 this admission.  Make sure you get your HgA1c checked every three months.  If you take oral diabetes medications, check your blood glucose two times a day.  It's important not to skip any meals.  Keep a log of your blood glucose results and always take it with you to your doctor appointments.  Check your feet daily for redness, sores, or openings. Do not self treat. If no improvement in two days call your primary care physician for an appointment.  Low blood sugar (hypoglycemia) is a blood sugar below 70mg/dl. Check your blood sugar if you feel signs/symptoms of hypoglycemia. If your blood sugar is below 70 take 15 grams of carbohydrates (ex 4 oz of apple juice, 3-4 glucose tablets, or 4-6 oz of regular soda) wait 15 minutes and repeat blood sugar to make sure it comes up above 70.  If your blood sugar is above 70 and you are due for a meal, have a meal.  If you are not due for a meal have a snack.  This snack helps keeps your blood sugar at a safe range.  Follow up with your primary MD after discharge. Continue with metformin and at the facility you will also have lantus 10units given at night to help control your sugars.      Diagnosis: BPH (benign prostatic hyperplasia)  Assessment and Plan of Treatment: You have an enlarged prostate gland which gets bigger as men get older - it is a very common problem and has nothing to do with prostate cancer  You can help yourself by reducing the amount of fluid you drink before going to bed, limiting the amount of alcohol & caffeine you drink   Avoid cold & allergy medication that contain decongestants or antihistamines which make BPH symptoms worse  You can also "double void" by waiting a moment after urinating & trying again  Take your medication as prescribed - one medication helps to relax the muscle around the urethra and the other medication you may take prevents the prostate from growing more or even shrinking the prostate      Diagnosis: CAD (coronary artery disease)  Assessment and Plan of Treatment: Take your cardiac medication as prescribed to lower cholesterol, to lower blood pressure, aspirin to prevent blood clots, and diabetes control  Make sure to keep appointments with doctor for cardiac follow up care  Coronary artery disease is a condition where the arteries the supply the heart muscle get clogges with fatty deposits & puts you at risk for a heart attack

## 2023-12-11 NOTE — DISCHARGE NOTE PROVIDER - ATTENDING DISCHARGE PHYSICAL EXAMINATION:
Vital Signs Last 24 Hrs  T(C): 36.3 (16 Dec 2023 12:39), Max: 36.7 (15 Dec 2023 22:07)  T(F): 97.3 (16 Dec 2023 12:39), Max: 98 (15 Dec 2023 22:07)  HR: 55 (16 Dec 2023 12:39) (55 - 69)  BP: 134/58 (16 Dec 2023 12:39) (123/59 - 154/62)  BP(mean): 80 (15 Dec 2023 22:07) (80 - 80)  RR: 17 (16 Dec 2023 12:39) (16 - 18)  SpO2: 97% (16 Dec 2023 12:39) (96% - 97%)    Parameters below as of 16 Dec 2023 12:39  Patient On (Oxygen Delivery Method): room air    Gen: elderly male, well nourished, NAD  NC/AT  RRR  CTA b/l  Abd soft ntnd  Extremities without edema, L knee with limited ROM

## 2023-12-12 LAB
ANION GAP SERPL CALC-SCNC: 5 MMOL/L — SIGNIFICANT CHANGE UP (ref 5–17)
ANION GAP SERPL CALC-SCNC: 5 MMOL/L — SIGNIFICANT CHANGE UP (ref 5–17)
BUN SERPL-MCNC: 26 MG/DL — HIGH (ref 7–18)
BUN SERPL-MCNC: 26 MG/DL — HIGH (ref 7–18)
CALCIUM SERPL-MCNC: 8.5 MG/DL — SIGNIFICANT CHANGE UP (ref 8.4–10.5)
CALCIUM SERPL-MCNC: 8.5 MG/DL — SIGNIFICANT CHANGE UP (ref 8.4–10.5)
CHLORIDE SERPL-SCNC: 108 MMOL/L — SIGNIFICANT CHANGE UP (ref 96–108)
CHLORIDE SERPL-SCNC: 108 MMOL/L — SIGNIFICANT CHANGE UP (ref 96–108)
CO2 SERPL-SCNC: 27 MMOL/L — SIGNIFICANT CHANGE UP (ref 22–31)
CO2 SERPL-SCNC: 27 MMOL/L — SIGNIFICANT CHANGE UP (ref 22–31)
CREAT SERPL-MCNC: 1.32 MG/DL — HIGH (ref 0.5–1.3)
CREAT SERPL-MCNC: 1.32 MG/DL — HIGH (ref 0.5–1.3)
EGFR: 53 ML/MIN/1.73M2 — LOW
EGFR: 53 ML/MIN/1.73M2 — LOW
GLUCOSE BLDC GLUCOMTR-MCNC: 249 MG/DL — HIGH (ref 70–99)
GLUCOSE BLDC GLUCOMTR-MCNC: 249 MG/DL — HIGH (ref 70–99)
GLUCOSE BLDC GLUCOMTR-MCNC: 302 MG/DL — HIGH (ref 70–99)
GLUCOSE BLDC GLUCOMTR-MCNC: 302 MG/DL — HIGH (ref 70–99)
GLUCOSE BLDC GLUCOMTR-MCNC: 397 MG/DL — HIGH (ref 70–99)
GLUCOSE BLDC GLUCOMTR-MCNC: 397 MG/DL — HIGH (ref 70–99)
GLUCOSE BLDC GLUCOMTR-MCNC: 413 MG/DL — HIGH (ref 70–99)
GLUCOSE BLDC GLUCOMTR-MCNC: 413 MG/DL — HIGH (ref 70–99)
GLUCOSE SERPL-MCNC: 243 MG/DL — HIGH (ref 70–99)
GLUCOSE SERPL-MCNC: 243 MG/DL — HIGH (ref 70–99)
HCT VFR BLD CALC: 31.8 % — LOW (ref 39–50)
HCT VFR BLD CALC: 31.8 % — LOW (ref 39–50)
HGB BLD-MCNC: 10.1 G/DL — LOW (ref 13–17)
HGB BLD-MCNC: 10.1 G/DL — LOW (ref 13–17)
MCHC RBC-ENTMCNC: 26.8 PG — LOW (ref 27–34)
MCHC RBC-ENTMCNC: 26.8 PG — LOW (ref 27–34)
MCHC RBC-ENTMCNC: 31.8 GM/DL — LOW (ref 32–36)
MCHC RBC-ENTMCNC: 31.8 GM/DL — LOW (ref 32–36)
MCV RBC AUTO: 84.4 FL — SIGNIFICANT CHANGE UP (ref 80–100)
MCV RBC AUTO: 84.4 FL — SIGNIFICANT CHANGE UP (ref 80–100)
NRBC # BLD: 0 /100 WBCS — SIGNIFICANT CHANGE UP (ref 0–0)
NRBC # BLD: 0 /100 WBCS — SIGNIFICANT CHANGE UP (ref 0–0)
PLATELET # BLD AUTO: 127 K/UL — LOW (ref 150–400)
PLATELET # BLD AUTO: 127 K/UL — LOW (ref 150–400)
POTASSIUM SERPL-MCNC: 4.2 MMOL/L — SIGNIFICANT CHANGE UP (ref 3.5–5.3)
POTASSIUM SERPL-MCNC: 4.2 MMOL/L — SIGNIFICANT CHANGE UP (ref 3.5–5.3)
POTASSIUM SERPL-SCNC: 4.2 MMOL/L — SIGNIFICANT CHANGE UP (ref 3.5–5.3)
POTASSIUM SERPL-SCNC: 4.2 MMOL/L — SIGNIFICANT CHANGE UP (ref 3.5–5.3)
RBC # BLD: 3.77 M/UL — LOW (ref 4.2–5.8)
RBC # BLD: 3.77 M/UL — LOW (ref 4.2–5.8)
RBC # FLD: 15.9 % — HIGH (ref 10.3–14.5)
RBC # FLD: 15.9 % — HIGH (ref 10.3–14.5)
SODIUM SERPL-SCNC: 140 MMOL/L — SIGNIFICANT CHANGE UP (ref 135–145)
SODIUM SERPL-SCNC: 140 MMOL/L — SIGNIFICANT CHANGE UP (ref 135–145)
WBC # BLD: 8.11 K/UL — SIGNIFICANT CHANGE UP (ref 3.8–10.5)
WBC # BLD: 8.11 K/UL — SIGNIFICANT CHANGE UP (ref 3.8–10.5)
WBC # FLD AUTO: 8.11 K/UL — SIGNIFICANT CHANGE UP (ref 3.8–10.5)
WBC # FLD AUTO: 8.11 K/UL — SIGNIFICANT CHANGE UP (ref 3.8–10.5)

## 2023-12-12 PROCEDURE — 99232 SBSQ HOSP IP/OBS MODERATE 35: CPT

## 2023-12-12 RX ORDER — INSULIN LISPRO 100/ML
7 VIAL (ML) SUBCUTANEOUS
Refills: 0 | Status: DISCONTINUED | OUTPATIENT
Start: 2023-12-12 | End: 2023-12-16

## 2023-12-12 RX ORDER — INSULIN GLARGINE 100 [IU]/ML
15 INJECTION, SOLUTION SUBCUTANEOUS AT BEDTIME
Refills: 0 | Status: DISCONTINUED | OUTPATIENT
Start: 2023-12-12 | End: 2023-12-15

## 2023-12-12 RX ORDER — PREDNISOLONE 5 MG
4 TABLET ORAL
Qty: 2 | Refills: 0
Start: 2023-12-12 | End: 2023-12-14

## 2023-12-12 RX ADMIN — LIDOCAINE 1 PATCH: 4 CREAM TOPICAL at 12:04

## 2023-12-12 RX ADMIN — Medication 5 UNIT(S): at 08:52

## 2023-12-12 RX ADMIN — TAMSULOSIN HYDROCHLORIDE 0.4 MILLIGRAM(S): 0.4 CAPSULE ORAL at 21:52

## 2023-12-12 RX ADMIN — LIDOCAINE 1 PATCH: 4 CREAM TOPICAL at 20:09

## 2023-12-12 RX ADMIN — Medication 81 MILLIGRAM(S): at 12:01

## 2023-12-12 RX ADMIN — Medication 40 MILLIGRAM(S): at 05:40

## 2023-12-12 RX ADMIN — MUPIROCIN 1 APPLICATION(S): 20 OINTMENT TOPICAL at 05:40

## 2023-12-12 RX ADMIN — FINASTERIDE 5 MILLIGRAM(S): 5 TABLET, FILM COATED ORAL at 12:01

## 2023-12-12 RX ADMIN — CHLORHEXIDINE GLUCONATE 1 APPLICATION(S): 213 SOLUTION TOPICAL at 12:02

## 2023-12-12 RX ADMIN — Medication 5 UNIT(S): at 12:00

## 2023-12-12 RX ADMIN — SENNA PLUS 2 TABLET(S): 8.6 TABLET ORAL at 21:52

## 2023-12-12 RX ADMIN — RIVAROXABAN 15 MILLIGRAM(S): KIT at 17:12

## 2023-12-12 RX ADMIN — LIDOCAINE 1 PATCH: 4 CREAM TOPICAL at 23:45

## 2023-12-12 RX ADMIN — Medication 4: at 22:00

## 2023-12-12 RX ADMIN — LIDOCAINE 1 PATCH: 4 CREAM TOPICAL at 00:00

## 2023-12-12 RX ADMIN — INSULIN GLARGINE 15 UNIT(S): 100 INJECTION, SOLUTION SUBCUTANEOUS at 22:13

## 2023-12-12 RX ADMIN — POLYETHYLENE GLYCOL 3350 17 GRAM(S): 17 POWDER, FOR SOLUTION ORAL at 12:01

## 2023-12-12 RX ADMIN — Medication 5 UNIT(S): at 17:11

## 2023-12-12 RX ADMIN — ATORVASTATIN CALCIUM 40 MILLIGRAM(S): 80 TABLET, FILM COATED ORAL at 21:55

## 2023-12-12 RX ADMIN — Medication 12.5 MILLIGRAM(S): at 05:39

## 2023-12-12 RX ADMIN — MUPIROCIN 1 APPLICATION(S): 20 OINTMENT TOPICAL at 17:12

## 2023-12-12 RX ADMIN — Medication 6: at 17:11

## 2023-12-12 RX ADMIN — Medication 0.6 MILLIGRAM(S): at 12:01

## 2023-12-12 RX ADMIN — Medication 5: at 11:59

## 2023-12-12 RX ADMIN — Medication 2: at 08:51

## 2023-12-12 RX ADMIN — Medication 100 MILLIGRAM(S): at 12:01

## 2023-12-12 NOTE — PROGRESS NOTE ADULT - SUBJECTIVE AND OBJECTIVE BOX
NP Note discussed with  Primary Attending    INTERVAL HPI/OVERNIGHT EVENTS: no new complaints    MEDICATIONS  (STANDING):  allopurinol 100 milliGRAM(s) Oral daily  aspirin  chewable 81 milliGRAM(s) Oral daily  atorvastatin 40 milliGRAM(s) Oral at bedtime  chlorhexidine 2% Cloths 1 Application(s) Topical daily  colchicine 0.6 milliGRAM(s) Oral daily  dextrose 5%. 1000 milliLiter(s) (100 mL/Hr) IV Continuous <Continuous>  dextrose 5%. 1000 milliLiter(s) (50 mL/Hr) IV Continuous <Continuous>  dextrose 50% Injectable 12.5 Gram(s) IV Push once  dextrose 50% Injectable 25 Gram(s) IV Push once  dextrose 50% Injectable 25 Gram(s) IV Push once  finasteride 5 milliGRAM(s) Oral daily  glucagon  Injectable 1 milliGRAM(s) IntraMuscular once  insulin glargine Injectable (LANTUS) 10 Unit(s) SubCutaneous at bedtime  insulin lispro (ADMELOG) corrective regimen sliding scale   SubCutaneous Before meals and at bedtime  insulin lispro Injectable (ADMELOG) 5 Unit(s) SubCutaneous before lunch  insulin lispro Injectable (ADMELOG) 5 Unit(s) SubCutaneous before dinner  insulin lispro Injectable (ADMELOG) 5 Unit(s) SubCutaneous before breakfast  lidocaine   4% Patch 1 Patch Transdermal daily  metoprolol tartrate 12.5 milliGRAM(s) Oral daily  mupirocin 2% Ointment 1 Application(s) Both Nostrils two times a day  naloxone Injectable 0.4 milliGRAM(s) IV Push once  polyethylene glycol 3350 17 Gram(s) Oral daily  predniSONE   Tablet 40 milliGRAM(s) Oral daily  rivaroxaban 15 milliGRAM(s) Oral with dinner  senna 2 Tablet(s) Oral at bedtime  tamsulosin 0.4 milliGRAM(s) Oral at bedtime    MEDICATIONS  (PRN):  acetaminophen     Tablet .. 650 milliGRAM(s) Oral every 6 hours PRN Temp greater or equal to 38C (100.4F), Mild Pain (1 - 3)  bisacodyl 5 milliGRAM(s) Oral daily PRN Constipation  dextrose Oral Gel 15 Gram(s) Oral once PRN Blood Glucose LESS THAN 70 milliGRAM(s)/deciliter  ondansetron Injectable 4 milliGRAM(s) IV Push every 8 hours PRN Nausea and/or Vomiting  oxyCODONE    IR 5 milliGRAM(s) Oral every 4 hours PRN Severe Pain (7 - 10)  oxyCODONE    IR 2.5 milliGRAM(s) Oral every 4 hours PRN Moderate Pain (4 - 6)      __________________________________________________  REVIEW OF SYSTEMS:    CONSTITUTIONAL: No fever,   EYES: no acute visual disturbances  NECK: No pain or stiffness  RESPIRATORY: No cough; No shortness of breath  CARDIOVASCULAR: No chest pain, no palpitations  GASTROINTESTINAL: No pain. No nausea or vomiting; No diarrhea   NEUROLOGICAL: No headache or numbness, no tremors  MUSCULOSKELETAL: No joint pain, no muscle pain  GENITOURINARY: no dysuria, no frequency, no hesitancy  PSYCHIATRY: no depression , no anxiety  ALL OTHER  ROS negative        Vital Signs Last 24 Hrs  T(C): 36.4 (12 Dec 2023 11:51), Max: 37.2 (12 Dec 2023 05:12)  T(F): 97.6 (12 Dec 2023 11:51), Max: 98.9 (12 Dec 2023 05:12)  HR: 50 (12 Dec 2023 11:51) (47 - 58)  BP: 136/58 (12 Dec 2023 11:51) (136/58 - 160/62)  BP(mean): --  RR: 18 (12 Dec 2023 11:51) (16 - 20)  SpO2: 94% (12 Dec 2023 11:51) (94% - 97%)    Parameters below as of 12 Dec 2023 11:51  Patient On (Oxygen Delivery Method): room air        ________________________________________________  PHYSICAL EXAM:  GENERAL: NAD  HEENT: Normocephalic;  conjunctivae and sclerae clear; moist mucous membranes;   NECK : supple  CHEST/LUNG: Clear to auscultation bilaterally with good air entry   HEART: S1 S2  regular; no murmurs, gallops or rubs  ABDOMEN: Soft, Nontender, Nondistended; Bowel sounds present  EXTREMITIES: no cyanosis; no edema; no calf tenderness  Musk: no swelling or erythema on left knee, Limited ROM right knee  SKIN: warm and dry; no rash  NERVOUS SYSTEM:  Awake and alert; Oriented  to person and place, easily forgetful ; no new deficits    _________________________________________________  LABS:                        10.1   8.11  )-----------( 127      ( 12 Dec 2023 07:05 )             31.8     12-12    140  |  108  |  26<H>  ----------------------------<  243<H>  4.2   |  27  |  1.32<H>    Ca    8.5      12 Dec 2023 07:05  Phos  3.4     12-11  Mg     1.6     12-11        Urinalysis Basic - ( 12 Dec 2023 07:05 )    Color: x / Appearance: x / SG: x / pH: x  Gluc: 243 mg/dL / Ketone: x  / Bili: x / Urobili: x   Blood: x / Protein: x / Nitrite: x   Leuk Esterase: x / RBC: x / WBC x   Sq Epi: x / Non Sq Epi: x / Bacteria: x      CAPILLARY BLOOD GLUCOSE      POCT Blood Glucose.: 397 mg/dL (12 Dec 2023 11:48)  POCT Blood Glucose.: 249 mg/dL (12 Dec 2023 08:32)  POCT Blood Glucose.: 341 mg/dL (11 Dec 2023 21:47)  POCT Blood Glucose.: 368 mg/dL (11 Dec 2023 16:42)        RADIOLOGY & ADDITIONAL TESTS:    Imaging  Reviewed:  YES    Consultant(s) Notes Reviewed:   YES      Plan of care was discussed with patient and /or primary care giver; all questions and concerns were addressed

## 2023-12-12 NOTE — PROGRESS NOTE ADULT - ASSESSMENT
Patient is a 86 Y/O M w/ PMH of CAD s/p stent with most recent July 2022, PAD, T2DM, BPH, HTN, Gout (right knee flair in 2022), CKD, dementia who presented with 5 day history of left knee pain. Patient left knee is more swollen, with erythema, tenderness, warmth and restricted range of motion. Patient is being admitted to medicine for Acute gout flair with concern for septic arthritis.   CT lumbar spine shows Moderate canal narrowing L3-4 and L4-5 due to diffuse disc bulges as well as facet and ligamentous degenerative changes.  Mild canal narrowing L2-3 due to diffuse disc bulge as well as facet DJD. Small right paracentral disc protrusion L5-S1 which mildly displaces the   right S1 nerve root.   Xray left knee resulted Intact bones and alignment. No evidence of fracture or suspicious lesion. Soft tissue thickening overlies the patella. A vascular stent is seen in   the distal SFA extending into the proximal popliteal level as partially visualized with heavy vascular calcifications.  Orthopedic consulted and reccs WBAT LLE. follow up with orthopedic OP in one week. started prednisone and Colchicine.   Pain improves and PT consulted, reccs YESSICA. CM following for placement.     Patient is a 88 Y/O M w/ PMH of CAD s/p stent with most recent July 2022, PAD, T2DM, BPH, HTN, Gout (right knee flair in 2022), CKD, dementia who presented with 5 day history of left knee pain. Patient left knee is more swollen, with erythema, tenderness, warmth and restricted range of motion. Patient is being admitted to medicine for Acute gout flair with concern for septic arthritis.   CT lumbar spine shows Moderate canal narrowing L3-4 and L4-5 due to diffuse disc bulges as well as facet and ligamentous degenerative changes.  Mild canal narrowing L2-3 due to diffuse disc bulge as well as facet DJD. Small right paracentral disc protrusion L5-S1 which mildly displaces the   right S1 nerve root.   Xray left knee resulted Intact bones and alignment. No evidence of fracture or suspicious lesion. Soft tissue thickening overlies the patella. A vascular stent is seen in   the distal SFA extending into the proximal popliteal level as partially visualized with heavy vascular calcifications.  Orthopedic consulted and reccs WBAT LLE. follow up with orthopedic OP in one week. started prednisone and Colchicine.   Pain improves and PT consulted, reccs YESSICA. CM following for placement.

## 2023-12-12 NOTE — PROGRESS NOTE ADULT - PROBLEM SELECTOR PLAN 5
On metformin 1000mg BID.   Stop oral hypoglycemics.   Start SSI. insulin regimen for steroid induced hyperglycemia   A1C 8.1 On metformin 1000mg BID.   Stop oral hypoglycemics.   Started SSI. insulin regimen for steroid induced hyperglycemia  increased premeal Admelog 5-7-7 units  A1C 8.1

## 2023-12-12 NOTE — PROGRESS NOTE ADULT - NS ATTEND AMEND GEN_ALL_CORE FT
89 yo M with pmh of gout, CAD s/p PCI July 2022, PAD s/p stent, CKD, DM, HTN, HLD, gouty arthritis involving the knee who presents with severe left knee pain following a fall a few days ago leading to ambulatory dysfunction, admitted for inflammatory monoarthritis of the left knee +/- overlying cellulitis. Noted to be afebrile, no leukocytosis, serum CRP is completely normal at 3, findings with low likelihood of septic arthritis. Orthopedic surgery was consulted on 12/10, did not recommend synovial fluid analysis given improved knee range of motion, very low concern for septic arthritis.     Labs and vitals reviewed. FS elevated. Pt feels L knee pain is improving but still has some limitations in ROM. On exam, L knee with no erythema or warmth, passive ROM limited to some pain.     #Left Knee Monoarticular Arthritis, c/f Gout, less likely septic arthritis     #Intractable Left Knee pain, improving    #Ambulatory Dysfunction    #Fall    #CKD    #PAD s/p stent    #CAD s/p PCI in July 2022    #HLD    #Hypertension     #DM   #Gout       -Orthopedic Surgery for arthrocentesis to obtain synovial fluid for cell count with diff, gram stain, culture, crystal analysis -> procedure deferred, appreciate Orthopedic Assistance   -monitor off antibiotics    -continue gout treatment with prednisone 40 mg, colchicine, will avoid NSAIDs given multiple contraindications; plan for 5 day course prednisone (12/9 - 12/13)   -PT consult -> YESSICA   -pain control    -increase insulin glargine to 15 units and lispro 7 units AC for steroid induced hyperglycemia    -DC planning for YESSICA, also needs pcp appt

## 2023-12-12 NOTE — PROGRESS NOTE ADULT - PROBLEM SELECTOR PLAN 7
DVT ppx-Xarelto (renally dosed)    Dispo: from home  PT recs YESSICA CM to follow, son (Antonio [phone]) wishes Adalberto Drew DVT ppx-Xarelto (renally dosed)    Dispo: from home  PT recs YESSICA CM to follow, son (Antonio [phone]) wishes Adalberto Drew

## 2023-12-13 DIAGNOSIS — N17.9 ACUTE KIDNEY FAILURE, UNSPECIFIED: ICD-10-CM

## 2023-12-13 DIAGNOSIS — E83.42 HYPOMAGNESEMIA: ICD-10-CM

## 2023-12-13 LAB
ANION GAP SERPL CALC-SCNC: 8 MMOL/L — SIGNIFICANT CHANGE UP (ref 5–17)
ANION GAP SERPL CALC-SCNC: 8 MMOL/L — SIGNIFICANT CHANGE UP (ref 5–17)
BUN SERPL-MCNC: 32 MG/DL — HIGH (ref 7–18)
BUN SERPL-MCNC: 32 MG/DL — HIGH (ref 7–18)
CALCIUM SERPL-MCNC: 9.5 MG/DL — SIGNIFICANT CHANGE UP (ref 8.4–10.5)
CALCIUM SERPL-MCNC: 9.5 MG/DL — SIGNIFICANT CHANGE UP (ref 8.4–10.5)
CHLORIDE SERPL-SCNC: 110 MMOL/L — HIGH (ref 96–108)
CHLORIDE SERPL-SCNC: 110 MMOL/L — HIGH (ref 96–108)
CO2 SERPL-SCNC: 24 MMOL/L — SIGNIFICANT CHANGE UP (ref 22–31)
CO2 SERPL-SCNC: 24 MMOL/L — SIGNIFICANT CHANGE UP (ref 22–31)
CREAT SERPL-MCNC: 1.33 MG/DL — HIGH (ref 0.5–1.3)
CREAT SERPL-MCNC: 1.33 MG/DL — HIGH (ref 0.5–1.3)
EGFR: 52 ML/MIN/1.73M2 — LOW
EGFR: 52 ML/MIN/1.73M2 — LOW
GLUCOSE BLDC GLUCOMTR-MCNC: 197 MG/DL — HIGH (ref 70–99)
GLUCOSE BLDC GLUCOMTR-MCNC: 197 MG/DL — HIGH (ref 70–99)
GLUCOSE BLDC GLUCOMTR-MCNC: 281 MG/DL — HIGH (ref 70–99)
GLUCOSE BLDC GLUCOMTR-MCNC: 281 MG/DL — HIGH (ref 70–99)
GLUCOSE BLDC GLUCOMTR-MCNC: 341 MG/DL — HIGH (ref 70–99)
GLUCOSE BLDC GLUCOMTR-MCNC: 355 MG/DL — HIGH (ref 70–99)
GLUCOSE BLDC GLUCOMTR-MCNC: 355 MG/DL — HIGH (ref 70–99)
GLUCOSE SERPL-MCNC: 167 MG/DL — HIGH (ref 70–99)
GLUCOSE SERPL-MCNC: 167 MG/DL — HIGH (ref 70–99)
HCT VFR BLD CALC: 30.4 % — LOW (ref 39–50)
HCT VFR BLD CALC: 30.4 % — LOW (ref 39–50)
HGB BLD-MCNC: 9.7 G/DL — LOW (ref 13–17)
HGB BLD-MCNC: 9.7 G/DL — LOW (ref 13–17)
MAGNESIUM SERPL-MCNC: 1.6 MG/DL — SIGNIFICANT CHANGE UP (ref 1.6–2.6)
MAGNESIUM SERPL-MCNC: 1.6 MG/DL — SIGNIFICANT CHANGE UP (ref 1.6–2.6)
MCHC RBC-ENTMCNC: 27 PG — SIGNIFICANT CHANGE UP (ref 27–34)
MCHC RBC-ENTMCNC: 27 PG — SIGNIFICANT CHANGE UP (ref 27–34)
MCHC RBC-ENTMCNC: 31.9 GM/DL — LOW (ref 32–36)
MCHC RBC-ENTMCNC: 31.9 GM/DL — LOW (ref 32–36)
MCV RBC AUTO: 84.7 FL — SIGNIFICANT CHANGE UP (ref 80–100)
MCV RBC AUTO: 84.7 FL — SIGNIFICANT CHANGE UP (ref 80–100)
NRBC # BLD: 0 /100 WBCS — SIGNIFICANT CHANGE UP (ref 0–0)
NRBC # BLD: 0 /100 WBCS — SIGNIFICANT CHANGE UP (ref 0–0)
PLATELET # BLD AUTO: 131 K/UL — LOW (ref 150–400)
PLATELET # BLD AUTO: 131 K/UL — LOW (ref 150–400)
POTASSIUM SERPL-MCNC: 3.8 MMOL/L — SIGNIFICANT CHANGE UP (ref 3.5–5.3)
POTASSIUM SERPL-MCNC: 3.8 MMOL/L — SIGNIFICANT CHANGE UP (ref 3.5–5.3)
POTASSIUM SERPL-SCNC: 3.8 MMOL/L — SIGNIFICANT CHANGE UP (ref 3.5–5.3)
POTASSIUM SERPL-SCNC: 3.8 MMOL/L — SIGNIFICANT CHANGE UP (ref 3.5–5.3)
RBC # BLD: 3.59 M/UL — LOW (ref 4.2–5.8)
RBC # BLD: 3.59 M/UL — LOW (ref 4.2–5.8)
RBC # FLD: 15.9 % — HIGH (ref 10.3–14.5)
RBC # FLD: 15.9 % — HIGH (ref 10.3–14.5)
SODIUM SERPL-SCNC: 142 MMOL/L — SIGNIFICANT CHANGE UP (ref 135–145)
SODIUM SERPL-SCNC: 142 MMOL/L — SIGNIFICANT CHANGE UP (ref 135–145)
WBC # BLD: 8.21 K/UL — SIGNIFICANT CHANGE UP (ref 3.8–10.5)
WBC # BLD: 8.21 K/UL — SIGNIFICANT CHANGE UP (ref 3.8–10.5)
WBC # FLD AUTO: 8.21 K/UL — SIGNIFICANT CHANGE UP (ref 3.8–10.5)
WBC # FLD AUTO: 8.21 K/UL — SIGNIFICANT CHANGE UP (ref 3.8–10.5)

## 2023-12-13 PROCEDURE — 99232 SBSQ HOSP IP/OBS MODERATE 35: CPT

## 2023-12-13 RX ORDER — CARVEDILOL PHOSPHATE 80 MG/1
6.25 CAPSULE, EXTENDED RELEASE ORAL EVERY 12 HOURS
Refills: 0 | Status: DISCONTINUED | OUTPATIENT
Start: 2023-12-13 | End: 2023-12-16

## 2023-12-13 RX ORDER — MAGNESIUM SULFATE 500 MG/ML
1 VIAL (ML) INJECTION ONCE
Refills: 0 | Status: COMPLETED | OUTPATIENT
Start: 2023-12-13 | End: 2023-12-13

## 2023-12-13 RX ORDER — INSULIN LISPRO 100/ML
VIAL (ML) SUBCUTANEOUS
Refills: 0 | Status: DISCONTINUED | OUTPATIENT
Start: 2023-12-13 | End: 2023-12-16

## 2023-12-13 RX ORDER — SODIUM CHLORIDE 9 MG/ML
1000 INJECTION INTRAMUSCULAR; INTRAVENOUS; SUBCUTANEOUS
Refills: 0 | Status: DISCONTINUED | OUTPATIENT
Start: 2023-12-13 | End: 2023-12-16

## 2023-12-13 RX ORDER — INSULIN LISPRO 100/ML
VIAL (ML) SUBCUTANEOUS AT BEDTIME
Refills: 0 | Status: DISCONTINUED | OUTPATIENT
Start: 2023-12-13 | End: 2023-12-16

## 2023-12-13 RX ADMIN — Medication 8: at 17:12

## 2023-12-13 RX ADMIN — CHLORHEXIDINE GLUCONATE 1 APPLICATION(S): 213 SOLUTION TOPICAL at 12:53

## 2023-12-13 RX ADMIN — LIDOCAINE 1 PATCH: 4 CREAM TOPICAL at 12:52

## 2023-12-13 RX ADMIN — Medication 7 UNIT(S): at 12:34

## 2023-12-13 RX ADMIN — Medication 100 GRAM(S): at 16:46

## 2023-12-13 RX ADMIN — Medication 7 UNIT(S): at 17:12

## 2023-12-13 RX ADMIN — RIVAROXABAN 15 MILLIGRAM(S): KIT at 17:11

## 2023-12-13 RX ADMIN — Medication 0.6 MILLIGRAM(S): at 12:51

## 2023-12-13 RX ADMIN — Medication 40 MILLIGRAM(S): at 05:24

## 2023-12-13 RX ADMIN — Medication 12.5 MILLIGRAM(S): at 05:25

## 2023-12-13 RX ADMIN — FINASTERIDE 5 MILLIGRAM(S): 5 TABLET, FILM COATED ORAL at 12:52

## 2023-12-13 RX ADMIN — CARVEDILOL PHOSPHATE 6.25 MILLIGRAM(S): 80 CAPSULE, EXTENDED RELEASE ORAL at 19:33

## 2023-12-13 RX ADMIN — LIDOCAINE 1 PATCH: 4 CREAM TOPICAL at 20:00

## 2023-12-13 RX ADMIN — SENNA PLUS 2 TABLET(S): 8.6 TABLET ORAL at 21:19

## 2023-12-13 RX ADMIN — Medication 2: at 21:20

## 2023-12-13 RX ADMIN — Medication 81 MILLIGRAM(S): at 12:52

## 2023-12-13 RX ADMIN — MUPIROCIN 1 APPLICATION(S): 20 OINTMENT TOPICAL at 17:11

## 2023-12-13 RX ADMIN — TAMSULOSIN HYDROCHLORIDE 0.4 MILLIGRAM(S): 0.4 CAPSULE ORAL at 21:19

## 2023-12-13 RX ADMIN — POLYETHYLENE GLYCOL 3350 17 GRAM(S): 17 POWDER, FOR SOLUTION ORAL at 12:52

## 2023-12-13 RX ADMIN — INSULIN GLARGINE 15 UNIT(S): 100 INJECTION, SOLUTION SUBCUTANEOUS at 21:20

## 2023-12-13 RX ADMIN — ATORVASTATIN CALCIUM 40 MILLIGRAM(S): 80 TABLET, FILM COATED ORAL at 21:21

## 2023-12-13 RX ADMIN — Medication 5: at 12:33

## 2023-12-13 RX ADMIN — Medication 100 MILLIGRAM(S): at 12:51

## 2023-12-13 RX ADMIN — Medication 1: at 08:25

## 2023-12-13 RX ADMIN — Medication 5 UNIT(S): at 08:26

## 2023-12-13 RX ADMIN — MUPIROCIN 1 APPLICATION(S): 20 OINTMENT TOPICAL at 05:25

## 2023-12-13 NOTE — PROGRESS NOTE ADULT - PROBLEM SELECTOR PLAN 4
C/w metoprolol tartrate 12.5 mg bid  BP goal <140/90  controlled -160s systolic  dc metoprolol   switch to coreg 6.25 mg bid  BP goal <140/90 A1C 8.1  On metformin 1000mg BID.   Stop oral hypoglycemics.   insulin regimen for steroid induced hyperglycemia  glucose 300 in afternoon  c/w lantus 15 units at bedtime  c/w admelog 5 units before breakfast, 7 units before lunch and dinner  change to moderate ISS tid before meals and at bedtime  add IV fluids

## 2023-12-13 NOTE — PROGRESS NOTE ADULT - PROBLEM SELECTOR PLAN 6
A1C 8.1  On metformin 1000mg BID.   Stop oral hypoglycemics.   insulin regimen for steroid induced hyperglycemia  glucose 300 in afternoon  c/w lantus 15 units at bedtime  c/w admelog 5 units before breakfast, 7 units before lunch and dinner  change to moderate ISS tid before meals and at bedtime  add IV fluids - C/w aspirin, Xarelto (as per son for blood thinner, no history of atrial fibrillation), atorvastatin. metoprolol.

## 2023-12-13 NOTE — PROGRESS NOTE ADULT - PROBLEM SELECTOR PLAN 9
DVT ppx-Xarelto (renally dosed)    Dispo: from home  PT recs YESSICA CM to follow, son (Antonio [phone]) wishes Adalberto Drew DVT ppx-Xarelto (renally dosed)    Dispo: from home  PT recs YESSICA CM to follow, son (Antonio [phone]) wishes Adalberto Drew

## 2023-12-13 NOTE — PROGRESS NOTE ADULT - PROBLEM SELECTOR PLAN 3
- C/w aspirin, Xarelto (as per son for blood thinner, no history of atrial fibrillation), atorvastatin. metoprolol. -160s systolic  dc metoprolol   switch to coreg 6.25 mg bid  BP goal <140/90 -160s systolic  dc metoprolol   switch to coreg 6.25 mg bid  BP goal <140/90  hold ace/arb due to possible alon

## 2023-12-13 NOTE — PROGRESS NOTE ADULT - ASSESSMENT
88 y/o male w/ PMH of CAD s/p stent with most recent July 2022, PAD, T2DM, BPH, HTN, Gout (right knee flair in 2022), CKD, dementia who presented with 5 day history of left knee pain. Patient is being admitted to medicine for Acute gout flair and cellulitis.   CT lumbar spine shows Moderate canal narrowing L3-4 and L4-5 due to diffuse disc bulges as well as facet and ligamentous degenerative changes. Mild canal narrowing L2-3 due to diffuse disc bulge as well as facet DJD. Small right paracentral disc protrusion L5-S1 which mildly displaces the right S1 nerve root.   Xray left knee resulted Intact bones and alignment. No evidence of fracture or suspicious lesion. Soft tissue thickening overlies the patella. A vascular stent is seen in the distal SFA extending into the proximal popliteal level as partially visualized with heavy vascular calcifications.  Orthopedics followed reccs WBAT LLE. follow up with orthopedic OP in one week. started prednisone and Colchicine.   Awaiting Auth for YESSICA.

## 2023-12-13 NOTE — PROGRESS NOTE ADULT - SUBJECTIVE AND OBJECTIVE BOX
Patient is a 88y old  Male who presents with a chief complaint of Gout flare (12 Dec 2023 12:19)    OVERNIGHT EVENTS: no acute changes.     Pt is aox3, comfortable appearing, eating well, ambulating with assistance.     REVIEW OF SYSTEMS:  CONSTITUTIONAL: No fever, chills  ENMT:  No difficulty hearing, no change in vision  NECK: No pain or stiffness  RESPIRATORY: No cough, SOB  CARDIOVASCULAR: No chest pain, palpitations  GASTROINTESTINAL: No abdominal pain. No nausea, vomiting, or diarrhea  GENITOURINARY: No dysuria  NEUROLOGICAL: No HA  SKIN: No itching, burning, rashes, or lesions   LYMPH NODES: No enlarged glands  ENDOCRINE: No heat or cold intolerance; No hair loss  MUSCULOSKELETAL: No joint pain or swelling; No muscle, back, or extremity pain  PSYCHIATRIC: No depression, anxiety  HEME/LYMPH: No easy bruising, or bleeding gums    T(C): 36.3 (12-13-23 @ 12:33), Max: 36.7 (12-12-23 @ 21:20)  HR: 50 (12-13-23 @ 12:33) (50 - 65)  BP: 166/78 (12-13-23 @ 12:33) (145/59 - 166/78)  RR: 18 (12-13-23 @ 12:33) (18 - 19)  SpO2: 94% (12-13-23 @ 12:33) (94% - 96%)  Wt(kg): --Vital Signs Last 24 Hrs  T(C): 36.3 (13 Dec 2023 12:33), Max: 36.7 (12 Dec 2023 21:20)  T(F): 97.4 (13 Dec 2023 12:33), Max: 98.1 (12 Dec 2023 21:20)  HR: 50 (13 Dec 2023 12:33) (50 - 65)  BP: 166/78 (13 Dec 2023 12:33) (145/59 - 166/78)  BP(mean): 92 (12 Dec 2023 21:20) (92 - 92)  RR: 18 (13 Dec 2023 12:33) (18 - 19)  SpO2: 94% (13 Dec 2023 12:33) (94% - 96%)    Parameters below as of 13 Dec 2023 12:33  Patient On (Oxygen Delivery Method): room air        MEDICATIONS  (STANDING):  allopurinol 100 milliGRAM(s) Oral daily  aspirin  chewable 81 milliGRAM(s) Oral daily  atorvastatin 40 milliGRAM(s) Oral at bedtime  chlorhexidine 2% Cloths 1 Application(s) Topical daily  colchicine 0.6 milliGRAM(s) Oral daily  dextrose 5%. 1000 milliLiter(s) (100 mL/Hr) IV Continuous <Continuous>  dextrose 5%. 1000 milliLiter(s) (50 mL/Hr) IV Continuous <Continuous>  dextrose 50% Injectable 12.5 Gram(s) IV Push once  dextrose 50% Injectable 25 Gram(s) IV Push once  dextrose 50% Injectable 25 Gram(s) IV Push once  finasteride 5 milliGRAM(s) Oral daily  glucagon  Injectable 1 milliGRAM(s) IntraMuscular once  insulin glargine Injectable (LANTUS) 15 Unit(s) SubCutaneous at bedtime  insulin lispro (ADMELOG) corrective regimen sliding scale   SubCutaneous Before meals and at bedtime  insulin lispro Injectable (ADMELOG) 7 Unit(s) SubCutaneous before lunch  insulin lispro Injectable (ADMELOG) 7 Unit(s) SubCutaneous before dinner  insulin lispro Injectable (ADMELOG) 5 Unit(s) SubCutaneous before breakfast  lidocaine   4% Patch 1 Patch Transdermal daily  metoprolol tartrate 12.5 milliGRAM(s) Oral daily  mupirocin 2% Ointment 1 Application(s) Both Nostrils two times a day  naloxone Injectable 0.4 milliGRAM(s) IV Push once  polyethylene glycol 3350 17 Gram(s) Oral daily  rivaroxaban 15 milliGRAM(s) Oral with dinner  senna 2 Tablet(s) Oral at bedtime  sodium chloride 0.9%. 1000 milliLiter(s) (70 mL/Hr) IV Continuous <Continuous>  tamsulosin 0.4 milliGRAM(s) Oral at bedtime    MEDICATIONS  (PRN):  acetaminophen     Tablet .. 650 milliGRAM(s) Oral every 6 hours PRN Temp greater or equal to 38C (100.4F), Mild Pain (1 - 3)  bisacodyl 5 milliGRAM(s) Oral daily PRN Constipation  dextrose Oral Gel 15 Gram(s) Oral once PRN Blood Glucose LESS THAN 70 milliGRAM(s)/deciliter  ondansetron Injectable 4 milliGRAM(s) IV Push every 8 hours PRN Nausea and/or Vomiting  oxyCODONE    IR 5 milliGRAM(s) Oral every 4 hours PRN Severe Pain (7 - 10)  oxyCODONE    IR 2.5 milliGRAM(s) Oral every 4 hours PRN Moderate Pain (4 - 6)      PHYSICAL EXAM:  GENERAL: NAD  EYES: clear conjunctiva; EOMI  ENMT: Moist mucous membranes  NECK: Supple, No JVD, Normal thyroid  CHEST/LUNG: Clear to auscultation bilaterally; No rales, rhonchi, wheezing, or rubs  HEART: S1, S2, Regular rate and rhythm  ABDOMEN: Soft, Nontender, Nondistended; Bowel sounds present  NEURO: Alert & Oriented X3  EXTREMITIES: No LE edema, no calf tenderness  LYMPH: No lymphadenopathy noted  SKIN: No rashes or lesions    Consultant(s) Notes Reviewed:  [x ] YES  [ ] NO  Care Discussed with Consultants/Other Providers [ x] YES  [ ] NO    LABS:                        9.7    8.21  )-----------( 131      ( 13 Dec 2023 07:20 )             30.4     12-13    142  |  110<H>  |  32<H>  ----------------------------<  167<H>  3.8   |  24  |  1.33<H>    Ca    9.5      13 Dec 2023 07:20  Mg     1.6     12-13        CAPILLARY BLOOD GLUCOSE      POCT Blood Glucose.: 355 mg/dL (13 Dec 2023 11:49)  POCT Blood Glucose.: 197 mg/dL (13 Dec 2023 08:13)  POCT Blood Glucose.: 302 mg/dL (12 Dec 2023 21:50)  POCT Blood Glucose.: 413 mg/dL (12 Dec 2023 17:03)        Urinalysis Basic - ( 13 Dec 2023 07:20 )    Color: x / Appearance: x / SG: x / pH: x  Gluc: 167 mg/dL / Ketone: x  / Bili: x / Urobili: x   Blood: x / Protein: x / Nitrite: x   Leuk Esterase: x / RBC: x / WBC x   Sq Epi: x / Non Sq Epi: x / Bacteria: x        RADIOLOGY & ADDITIONAL TESTS:    Imaging Personally Reviewed:  [ ] YES  [ ] NO   Patient is a 88y old  Male who presents with a chief complaint of Gout flare (12 Dec 2023 12:19)    OVERNIGHT EVENTS: no acute changes.     Pt is aox3, comfortable appearing, eating well, ambulating with assistance.     REVIEW OF SYSTEMS:  CONSTITUTIONAL: No fever, chills  ENMT:  No difficulty hearing, no change in vision  NECK: No pain or stiffness  RESPIRATORY: No cough, SOB  CARDIOVASCULAR: No chest pain, palpitations  GASTROINTESTINAL: No abdominal pain. No nausea, vomiting, or diarrhea  GENITOURINARY: No dysuria  NEUROLOGICAL: No HA  SKIN: No itching, burning, rashes, or lesions   LYMPH NODES: No enlarged glands  ENDOCRINE: No heat or cold intolerance; No hair loss  MUSCULOSKELETAL: +Left Knee Pain is better  PSYCHIATRIC: No depression, anxiety  HEME/LYMPH: No easy bruising, or bleeding gums    T(C): 36.3 (12-13-23 @ 12:33), Max: 36.7 (12-12-23 @ 21:20)  HR: 50 (12-13-23 @ 12:33) (50 - 65)  BP: 166/78 (12-13-23 @ 12:33) (145/59 - 166/78)  RR: 18 (12-13-23 @ 12:33) (18 - 19)  SpO2: 94% (12-13-23 @ 12:33) (94% - 96%)  Wt(kg): --Vital Signs Last 24 Hrs  T(C): 36.3 (13 Dec 2023 12:33), Max: 36.7 (12 Dec 2023 21:20)  T(F): 97.4 (13 Dec 2023 12:33), Max: 98.1 (12 Dec 2023 21:20)  HR: 50 (13 Dec 2023 12:33) (50 - 65)  BP: 166/78 (13 Dec 2023 12:33) (145/59 - 166/78)  BP(mean): 92 (12 Dec 2023 21:20) (92 - 92)  RR: 18 (13 Dec 2023 12:33) (18 - 19)  SpO2: 94% (13 Dec 2023 12:33) (94% - 96%)    Parameters below as of 13 Dec 2023 12:33  Patient On (Oxygen Delivery Method): room air        MEDICATIONS  (STANDING):  allopurinol 100 milliGRAM(s) Oral daily  aspirin  chewable 81 milliGRAM(s) Oral daily  atorvastatin 40 milliGRAM(s) Oral at bedtime  chlorhexidine 2% Cloths 1 Application(s) Topical daily  colchicine 0.6 milliGRAM(s) Oral daily  dextrose 5%. 1000 milliLiter(s) (100 mL/Hr) IV Continuous <Continuous>  dextrose 5%. 1000 milliLiter(s) (50 mL/Hr) IV Continuous <Continuous>  dextrose 50% Injectable 12.5 Gram(s) IV Push once  dextrose 50% Injectable 25 Gram(s) IV Push once  dextrose 50% Injectable 25 Gram(s) IV Push once  finasteride 5 milliGRAM(s) Oral daily  glucagon  Injectable 1 milliGRAM(s) IntraMuscular once  insulin glargine Injectable (LANTUS) 15 Unit(s) SubCutaneous at bedtime  insulin lispro (ADMELOG) corrective regimen sliding scale   SubCutaneous Before meals and at bedtime  insulin lispro Injectable (ADMELOG) 7 Unit(s) SubCutaneous before lunch  insulin lispro Injectable (ADMELOG) 7 Unit(s) SubCutaneous before dinner  insulin lispro Injectable (ADMELOG) 5 Unit(s) SubCutaneous before breakfast  lidocaine   4% Patch 1 Patch Transdermal daily  metoprolol tartrate 12.5 milliGRAM(s) Oral daily  mupirocin 2% Ointment 1 Application(s) Both Nostrils two times a day  naloxone Injectable 0.4 milliGRAM(s) IV Push once  polyethylene glycol 3350 17 Gram(s) Oral daily  rivaroxaban 15 milliGRAM(s) Oral with dinner  senna 2 Tablet(s) Oral at bedtime  sodium chloride 0.9%. 1000 milliLiter(s) (70 mL/Hr) IV Continuous <Continuous>  tamsulosin 0.4 milliGRAM(s) Oral at bedtime    MEDICATIONS  (PRN):  acetaminophen     Tablet .. 650 milliGRAM(s) Oral every 6 hours PRN Temp greater or equal to 38C (100.4F), Mild Pain (1 - 3)  bisacodyl 5 milliGRAM(s) Oral daily PRN Constipation  dextrose Oral Gel 15 Gram(s) Oral once PRN Blood Glucose LESS THAN 70 milliGRAM(s)/deciliter  ondansetron Injectable 4 milliGRAM(s) IV Push every 8 hours PRN Nausea and/or Vomiting  oxyCODONE    IR 5 milliGRAM(s) Oral every 4 hours PRN Severe Pain (7 - 10)  oxyCODONE    IR 2.5 milliGRAM(s) Oral every 4 hours PRN Moderate Pain (4 - 6)      PHYSICAL EXAM:  GENERAL: NAD  EYES: clear conjunctiva  ENMT: Moist mucous membranes  NECK: Supple, No JVD, Normal thyroid  CHEST/LUNG: Clear to auscultation bilaterally; No rales, rhonchi, wheezing, or rubs  HEART: S1, S2, Regular rate and rhythm  ABDOMEN: Soft, Nontender, Nondistended; Bowel sounds present  NEURO: Alert & Oriented X3  EXTREMITIES: +mild left knee tenderness, no erythema or swelling noted.   LYMPH: No lymphadenopathy noted  SKIN: No rashes or lesions    Consultant(s) Notes Reviewed:  [x ] YES  [ ] NO  Care Discussed with Consultants/Other Providers [ x] YES  [ ] NO    LABS:                        9.7    8.21  )-----------( 131      ( 13 Dec 2023 07:20 )             30.4     12-13    142  |  110<H>  |  32<H>  ----------------------------<  167<H>  3.8   |  24  |  1.33<H>    Ca    9.5      13 Dec 2023 07:20  Mg     1.6     12-13        CAPILLARY BLOOD GLUCOSE      POCT Blood Glucose.: 355 mg/dL (13 Dec 2023 11:49)  POCT Blood Glucose.: 197 mg/dL (13 Dec 2023 08:13)  POCT Blood Glucose.: 302 mg/dL (12 Dec 2023 21:50)  POCT Blood Glucose.: 413 mg/dL (12 Dec 2023 17:03)        Urinalysis Basic - ( 13 Dec 2023 07:20 )    Color: x / Appearance: x / SG: x / pH: x  Gluc: 167 mg/dL / Ketone: x  / Bili: x / Urobili: x   Blood: x / Protein: x / Nitrite: x   Leuk Esterase: x / RBC: x / WBC x   Sq Epi: x / Non Sq Epi: x / Bacteria: x        RADIOLOGY & ADDITIONAL TESTS:  < from: CT Lumbar Spine No Cont (12.09.23 @ 14:02) >  ACC: [id number] EXAM:  CT LUMBAR SPINE   ORDERED BY: YONI CEE     PROCEDURE DATE:  12/09/2023          INTERPRETATION:  CT LUMBAR SPINE    CLINICAL INFORMATION: back pain    TECHNIQUE:  Noncontrast CT.  Axial acquisition. Sagittal and coronal reformations.    COMPARISON:  No prior studies for comparison    FINDINGS:  SPINAL COLUMN:  Straightening of the normal lumbar lordosis. No subluxation.  Vertebral body heights are well-maintained.  Mild calcification of the T11-12 disc.  Narrowing of the L2-3 and L3-4 disc spaces with associated endplate   degenerative changes and osteophytes as well as vacuum disc phenomenon.  There is no evidence of an acute lumbar spine fracture    DISC LEVELS:  T12/L1: No disc bulge or protrusion. No canal or neural foramina   narrowing.  L1-2: No disc bulge or protrusion. No canal or neural foramina narrowing.  L2-3: Mild disc bulge. Mild facet and ligamentous degenerative changes.   Mild canal and bilateral neural foramina narrowing  L3-4: Mild to moderate disc bulge. Mild facet and ligamentous   degenerative changes. Moderate canal, moderate left and mild right right   neural foramina narrowing.  L4-5: Mild to moderate disc bulge. Mild facet and ligamentous   degenerative changes. Moderate canal and mild bilateral neural foramina   narrowing  L5-S1: Small right paracentral disc protrusion which mildly displaces the   right S1 nerve root. Mild facet DJD.    OTHER:  Visualized sacrum and sacroiliac joints appear intact.  Atherosclerotic calcification of the abdominal aorta.    IMPRESSION:  Moderate canal narrowing L3-4 and L4-5 due to diffuse disc bulges as well   as facet and ligamentous degenerative changes.  Mild canal narrowing L2-3 due to diffuse disc bulge as well as facet DJD.  Small right paracentral disc protrusion L5-S1 which mildly displaces the   right S1 nerve root    --- End of Report ---    < end of copied text >  < from: Xray Knee 4 Views, Left (12.09.23 @ 13:50) >    ACC: [id number] EXAM:  XR KNEE COMP 4+ VIEWS LT   ORDERED BY: YONI CEE     PROCEDURE DATE:  12/09/2023          INTERPRETATION:  Left knee 3 views    CLINICAL HISTORY: Pain status post fall    IMPRESSION: Intact bones and alignment. No evidence of fracture or   suspicious lesion.    Soft tissue thickening overlies the patella. A vascular stent is seen in   the distal SFA extending into the proximal popliteal level as partially   visualized with heavy vascular calcifications.        --- End of Report ---          < end of copied text >      Imaging Personally Reviewed:  [ ] YES  [ ] NO   Patient is a 88y old  Male who presents with a chief complaint of Gout flare (12 Dec 2023 12:19)    OVERNIGHT EVENTS: no acute changes.     Pt is aox3, comfortable appearing, eating well, ambulating with assistance.     REVIEW OF SYSTEMS:  CONSTITUTIONAL: No fever, chills  ENMT:  No difficulty hearing, no change in vision  NECK: No pain or stiffness  RESPIRATORY: No cough, SOB  CARDIOVASCULAR: No chest pain, palpitations  GASTROINTESTINAL: No abdominal pain. No nausea, vomiting, or diarrhea  GENITOURINARY: No dysuria  NEUROLOGICAL: No HA  SKIN: No itching, burning, rashes, or lesions   LYMPH NODES: No enlarged glands  ENDOCRINE: No heat or cold intolerance; No hair loss  MUSCULOSKELETAL: +Left Knee Pain is better  PSYCHIATRIC: No depression, anxiety  HEME/LYMPH: No easy bruising, or bleeding gums    T(C): 36.3 (12-13-23 @ 12:33), Max: 36.7 (12-12-23 @ 21:20)  HR: 50 (12-13-23 @ 12:33) (50 - 65)  BP: 166/78 (12-13-23 @ 12:33) (145/59 - 166/78)  RR: 18 (12-13-23 @ 12:33) (18 - 19)  SpO2: 94% (12-13-23 @ 12:33) (94% - 96%)  Wt(kg): --Vital Signs Last 24 Hrs  T(C): 36.3 (13 Dec 2023 12:33), Max: 36.7 (12 Dec 2023 21:20)  T(F): 97.4 (13 Dec 2023 12:33), Max: 98.1 (12 Dec 2023 21:20)  HR: 50 (13 Dec 2023 12:33) (50 - 65)  BP: 166/78 (13 Dec 2023 12:33) (145/59 - 166/78)  BP(mean): 92 (12 Dec 2023 21:20) (92 - 92)  RR: 18 (13 Dec 2023 12:33) (18 - 19)  SpO2: 94% (13 Dec 2023 12:33) (94% - 96%)    Parameters below as of 13 Dec 2023 12:33  Patient On (Oxygen Delivery Method): room air        MEDICATIONS  (STANDING):  allopurinol 100 milliGRAM(s) Oral daily  aspirin  chewable 81 milliGRAM(s) Oral daily  atorvastatin 40 milliGRAM(s) Oral at bedtime  chlorhexidine 2% Cloths 1 Application(s) Topical daily  colchicine 0.6 milliGRAM(s) Oral daily  dextrose 5%. 1000 milliLiter(s) (100 mL/Hr) IV Continuous <Continuous>  dextrose 5%. 1000 milliLiter(s) (50 mL/Hr) IV Continuous <Continuous>  dextrose 50% Injectable 12.5 Gram(s) IV Push once  dextrose 50% Injectable 25 Gram(s) IV Push once  dextrose 50% Injectable 25 Gram(s) IV Push once  finasteride 5 milliGRAM(s) Oral daily  glucagon  Injectable 1 milliGRAM(s) IntraMuscular once  insulin glargine Injectable (LANTUS) 15 Unit(s) SubCutaneous at bedtime  insulin lispro (ADMELOG) corrective regimen sliding scale   SubCutaneous Before meals and at bedtime  insulin lispro Injectable (ADMELOG) 7 Unit(s) SubCutaneous before lunch  insulin lispro Injectable (ADMELOG) 7 Unit(s) SubCutaneous before dinner  insulin lispro Injectable (ADMELOG) 5 Unit(s) SubCutaneous before breakfast  lidocaine   4% Patch 1 Patch Transdermal daily  metoprolol tartrate 12.5 milliGRAM(s) Oral daily  mupirocin 2% Ointment 1 Application(s) Both Nostrils two times a day  naloxone Injectable 0.4 milliGRAM(s) IV Push once  polyethylene glycol 3350 17 Gram(s) Oral daily  rivaroxaban 15 milliGRAM(s) Oral with dinner  senna 2 Tablet(s) Oral at bedtime  sodium chloride 0.9%. 1000 milliLiter(s) (70 mL/Hr) IV Continuous <Continuous>  tamsulosin 0.4 milliGRAM(s) Oral at bedtime    MEDICATIONS  (PRN):  acetaminophen     Tablet .. 650 milliGRAM(s) Oral every 6 hours PRN Temp greater or equal to 38C (100.4F), Mild Pain (1 - 3)  bisacodyl 5 milliGRAM(s) Oral daily PRN Constipation  dextrose Oral Gel 15 Gram(s) Oral once PRN Blood Glucose LESS THAN 70 milliGRAM(s)/deciliter  ondansetron Injectable 4 milliGRAM(s) IV Push every 8 hours PRN Nausea and/or Vomiting  oxyCODONE    IR 5 milliGRAM(s) Oral every 4 hours PRN Severe Pain (7 - 10)  oxyCODONE    IR 2.5 milliGRAM(s) Oral every 4 hours PRN Moderate Pain (4 - 6)      PHYSICAL EXAM:  GENERAL: NAD  EYES: clear conjunctiva  ENMT: Moist mucous membranes  NECK: Supple, No JVD, Normal thyroid  CHEST/LUNG: Clear to auscultation bilaterally; No rales, rhonchi, wheezing, or rubs  HEART: S1, S2, Regular rate and rhythm  ABDOMEN: Soft, Nontender, Nondistended; Bowel sounds present  NEURO: Alert & Oriented X3  EXTREMITIES: +mild left knee tenderness, no erythema or swelling noted.   LYMPH: No lymphadenopathy noted  SKIN: No rashes or lesions    Consultant(s) Notes Reviewed:  [x ] YES  [ ] NO  Care Discussed with Consultants/Other Providers [ x] YES  [ ] NO    LABS:                        9.7    8.21  )-----------( 131      ( 13 Dec 2023 07:20 )             30.4     12-13    142  |  110<H>  |  32<H>  ----------------------------<  167<H>  3.8   |  24  |  1.33<H>    Ca    9.5      13 Dec 2023 07:20  Mg     1.6     12-13        CAPILLARY BLOOD GLUCOSE      POCT Blood Glucose.: 355 mg/dL (13 Dec 2023 11:49)  POCT Blood Glucose.: 197 mg/dL (13 Dec 2023 08:13)  POCT Blood Glucose.: 302 mg/dL (12 Dec 2023 21:50)  POCT Blood Glucose.: 413 mg/dL (12 Dec 2023 17:03)        Urinalysis Basic - ( 13 Dec 2023 07:20 )    Color: x / Appearance: x / SG: x / pH: x  Gluc: 167 mg/dL / Ketone: x  / Bili: x / Urobili: x   Blood: x / Protein: x / Nitrite: x   Leuk Esterase: x / RBC: x / WBC x   Sq Epi: x / Non Sq Epi: x / Bacteria: x        RADIOLOGY & ADDITIONAL TESTS:  < from: CT Lumbar Spine No Cont (12.09.23 @ 14:02) >  ACC: [id number] EXAM:  CT LUMBAR SPINE   ORDERED BY: YONI CEE     PROCEDURE DATE:  12/09/2023          INTERPRETATION:  CT LUMBAR SPINE    CLINICAL INFORMATION: back pain    TECHNIQUE:  Noncontrast CT.  Axial acquisition. Sagittal and coronal reformations.    COMPARISON:  No prior studies for comparison    FINDINGS:  SPINAL COLUMN:  Straightening of the normal lumbar lordosis. No subluxation.  Vertebral body heights are well-maintained.  Mild calcification of the T11-12 disc.  Narrowing of the L2-3 and L3-4 disc spaces with associated endplate   degenerative changes and osteophytes as well as vacuum disc phenomenon.  There is no evidence of an acute lumbar spine fracture    DISC LEVELS:  T12/L1: No disc bulge or protrusion. No canal or neural foramina   narrowing.  L1-2: No disc bulge or protrusion. No canal or neural foramina narrowing.  L2-3: Mild disc bulge. Mild facet and ligamentous degenerative changes.   Mild canal and bilateral neural foramina narrowing  L3-4: Mild to moderate disc bulge. Mild facet and ligamentous   degenerative changes. Moderate canal, moderate left and mild right right   neural foramina narrowing.  L4-5: Mild to moderate disc bulge. Mild facet and ligamentous   degenerative changes. Moderate canal and mild bilateral neural foramina   narrowing  L5-S1: Small right paracentral disc protrusion which mildly displaces the   right S1 nerve root. Mild facet DJD.    OTHER:  Visualized sacrum and sacroiliac joints appear intact.  Atherosclerotic calcification of the abdominal aorta.    IMPRESSION:  Moderate canal narrowing L3-4 and L4-5 due to diffuse disc bulges as well   as facet and ligamentous degenerative changes.  Mild canal narrowing L2-3 due to diffuse disc bulge as well as facet DJD.  Small right paracentral disc protrusion L5-S1 which mildly displaces the   right S1 nerve root    --- End of Report ---    < end of copied text >  < from: Xray Knee 4 Views, Left (12.09.23 @ 13:50) >    ACC: [id number] EXAM:  XR KNEE COMP 4+ VIEWS LT   ORDERED BY: YONI CEE     PROCEDURE DATE:  12/09/2023          INTERPRETATION:  Left knee 3 views    CLINICAL HISTORY: Pain status post fall    IMPRESSION: Intact bones and alignment. No evidence of fracture or   suspicious lesion.    Soft tissue thickening overlies the patella. A vascular stent is seen in   the distal SFA extending into the proximal popliteal level as partially   visualized with heavy vascular calcifications.        --- End of Report ---          < end of copied text >      Imaging Personally Reviewed:  [ ] YES  [ ] NO

## 2023-12-13 NOTE — PROGRESS NOTE ADULT - NS ATTEND AMEND GEN_ALL_CORE FT
87 yo M with pmh of gout, CAD s/p PCI July 2022, PAD s/p stent, CKD, DM, HTN, HLD, gouty arthritis involving the knee who presents with severe left knee pain following a fall a few days ago leading to ambulatory dysfunction, admitted for inflammatory monoarthritis of the left knee +/- overlying cellulitis. Noted to be afebrile, no leukocytosis, serum CRP is completely normal at 3, findings with low likelihood of septic arthritis. Orthopedic surgery was consulted on 12/10, did not recommend synovial fluid analysis given improved knee range of motion, very low concern for septic arthritis.     Labs and vitals reviewed. FS elevated. Pt feels L knee pain is improving but mild limitations in range of motion. On exam, L knee with no erythema or warmth, passive ROM limited to some pain.     #Left Knee Monoarticular Arthritis, c/f Gout, less likely septic arthritis     #Intractable Left Knee pain, improving    #Ambulatory Dysfunction    #Fall    #CKD    #PAD s/p stent    #CAD s/p PCI in July 2022    #HLD    #Hypertension     #DM with steroid induced hyperglycemia  #Gout       -Orthopedic Surgery for arthrocentesis to obtain synovial fluid for cell count with diff, gram stain, culture, crystal analysis -> procedure deferred, appreciate Orthopedic Assistance   -monitor off antibiotics    -continue gout treatment with prednisone 40 mg, colchicine, will avoid NSAIDs given multiple contraindications; complete 5 day course prednisone (12/9 - 12/13), c/w colchicine  -PT consult -> YESSICA   -pain control    -c/w insulin glargine to 15 units and lispro 7 units AC for steroid induced hyperglycemia   -DC planning for YESSICA, also needs pcp appt 89 yo M with pmh of gout, CAD s/p PCI July 2022, PAD s/p stent, CKD, DM, HTN, HLD, gouty arthritis involving the knee who presents with severe left knee pain following a fall a few days ago leading to ambulatory dysfunction, admitted for inflammatory monoarthritis of the left knee +/- overlying cellulitis. Noted to be afebrile, no leukocytosis, serum CRP is completely normal at 3, findings with low likelihood of septic arthritis. Orthopedic surgery was consulted on 12/10, did not recommend synovial fluid analysis given improved knee range of motion, very low concern for septic arthritis.     Labs and vitals reviewed. FS elevated. Mild Cr elevation to 1.33. Pt feels L knee pain is improving but mild limitations in range of motion. On exam, L knee with no erythema or warmth, passive ROM limited to some pain.     #Left Knee Monoarticular Arthritis, c/f Gout, less likely septic arthritis     #Intractable Left Knee pain, improving    #Ambulatory Dysfunction    #Fall    #CKD    #PAD s/p stent    #CAD s/p PCI in July 2022    #HLD    #Hypertension     #DM with steroid induced hyperglycemia  #Gout     #Mild Cr elevation    -Orthopedic Surgery for arthrocentesis to obtain synovial fluid for cell count with diff, gram stain, culture, crystal analysis -> procedure deferred, appreciate Orthopedic Assistance   -monitor off antibiotics    -continue gout treatment with prednisone 40 mg, colchicine, will avoid NSAIDs given multiple contraindications; complete 5 day course prednisone (12/9 - 12/13), c/w colchicine  -PT consult -> YESSICA   -IVF, recheck Cr in AM  -pain control    -c/w insulin glargine to 15 units and lispro 7 units AC for steroid induced hyperglycemia   -DC planning for YESSICA, also needs pcp appt

## 2023-12-13 NOTE — PROGRESS NOTE ADULT - PROBLEM SELECTOR PLAN 8
DVT ppx-Xarelto (renally dosed)    Dispo: from home  PT recs YESSICA CM to follow, son (Antonio [phone]) wishes Adalberto Drew DVT ppx-Xarelto (renally dosed)    Dispo: from home  PT recs YESSICA CM to follow, son (Antonio [phone]) wishes Adalberto Drew C/w tamsulosin, and finasteride.

## 2023-12-13 NOTE — PROGRESS NOTE ADULT - PROBLEM SELECTOR PLAN 2
bun:cr ratio >20  possible pre-renal   Scr 1.33  add IV fluids - gentle hydration  monitor BMP  Avoid Nephrotoxic Meds/ Agents such as (NSAIDs, IV contrast, Aminoglycosides such as gentamicin, Gadolinium contrast, Phosphate containing enemas, etc..) bun:cr ratio >20  possible pre-renal   on admission Scr 1.2   now slight uptrend Scr 1.33  add IV fluids - gentle hydration  monitor BMP  Avoid Nephrotoxic Meds/ Agents such as (NSAIDs, IV contrast, Aminoglycosides such as gentamicin, Gadolinium contrast, Phosphate containing enemas, etc..)

## 2023-12-14 ENCOUNTER — APPOINTMENT (OUTPATIENT)
Dept: OTOLARYNGOLOGY | Facility: CLINIC | Age: 88
End: 2023-12-14

## 2023-12-14 LAB
ANION GAP SERPL CALC-SCNC: 5 MMOL/L — SIGNIFICANT CHANGE UP (ref 5–17)
ANION GAP SERPL CALC-SCNC: 5 MMOL/L — SIGNIFICANT CHANGE UP (ref 5–17)
BUN SERPL-MCNC: 34 MG/DL — HIGH (ref 7–18)
BUN SERPL-MCNC: 34 MG/DL — HIGH (ref 7–18)
CALCIUM SERPL-MCNC: 9.1 MG/DL — SIGNIFICANT CHANGE UP (ref 8.4–10.5)
CALCIUM SERPL-MCNC: 9.1 MG/DL — SIGNIFICANT CHANGE UP (ref 8.4–10.5)
CHLORIDE SERPL-SCNC: 108 MMOL/L — SIGNIFICANT CHANGE UP (ref 96–108)
CHLORIDE SERPL-SCNC: 108 MMOL/L — SIGNIFICANT CHANGE UP (ref 96–108)
CO2 SERPL-SCNC: 27 MMOL/L — SIGNIFICANT CHANGE UP (ref 22–31)
CO2 SERPL-SCNC: 27 MMOL/L — SIGNIFICANT CHANGE UP (ref 22–31)
CREAT SERPL-MCNC: 1.27 MG/DL — SIGNIFICANT CHANGE UP (ref 0.5–1.3)
CREAT SERPL-MCNC: 1.27 MG/DL — SIGNIFICANT CHANGE UP (ref 0.5–1.3)
CULTURE RESULTS: SIGNIFICANT CHANGE UP
EGFR: 55 ML/MIN/1.73M2 — LOW
EGFR: 55 ML/MIN/1.73M2 — LOW
GLUCOSE BLDC GLUCOMTR-MCNC: 127 MG/DL — HIGH (ref 70–99)
GLUCOSE BLDC GLUCOMTR-MCNC: 127 MG/DL — HIGH (ref 70–99)
GLUCOSE BLDC GLUCOMTR-MCNC: 203 MG/DL — HIGH (ref 70–99)
GLUCOSE BLDC GLUCOMTR-MCNC: 203 MG/DL — HIGH (ref 70–99)
GLUCOSE BLDC GLUCOMTR-MCNC: 229 MG/DL — HIGH (ref 70–99)
GLUCOSE BLDC GLUCOMTR-MCNC: 229 MG/DL — HIGH (ref 70–99)
GLUCOSE BLDC GLUCOMTR-MCNC: 289 MG/DL — HIGH (ref 70–99)
GLUCOSE BLDC GLUCOMTR-MCNC: 289 MG/DL — HIGH (ref 70–99)
GLUCOSE SERPL-MCNC: 149 MG/DL — HIGH (ref 70–99)
GLUCOSE SERPL-MCNC: 149 MG/DL — HIGH (ref 70–99)
HCT VFR BLD CALC: 33 % — LOW (ref 39–50)
HCT VFR BLD CALC: 33 % — LOW (ref 39–50)
HGB BLD-MCNC: 10.4 G/DL — LOW (ref 13–17)
HGB BLD-MCNC: 10.4 G/DL — LOW (ref 13–17)
MAGNESIUM SERPL-MCNC: 1.9 MG/DL — SIGNIFICANT CHANGE UP (ref 1.6–2.6)
MAGNESIUM SERPL-MCNC: 1.9 MG/DL — SIGNIFICANT CHANGE UP (ref 1.6–2.6)
MCHC RBC-ENTMCNC: 26.9 PG — LOW (ref 27–34)
MCHC RBC-ENTMCNC: 26.9 PG — LOW (ref 27–34)
MCHC RBC-ENTMCNC: 31.5 GM/DL — LOW (ref 32–36)
MCHC RBC-ENTMCNC: 31.5 GM/DL — LOW (ref 32–36)
MCV RBC AUTO: 85.5 FL — SIGNIFICANT CHANGE UP (ref 80–100)
MCV RBC AUTO: 85.5 FL — SIGNIFICANT CHANGE UP (ref 80–100)
NRBC # BLD: 0 /100 WBCS — SIGNIFICANT CHANGE UP (ref 0–0)
NRBC # BLD: 0 /100 WBCS — SIGNIFICANT CHANGE UP (ref 0–0)
PHOSPHATE SERPL-MCNC: 3.8 MG/DL — SIGNIFICANT CHANGE UP (ref 2.5–4.5)
PHOSPHATE SERPL-MCNC: 3.8 MG/DL — SIGNIFICANT CHANGE UP (ref 2.5–4.5)
PLATELET # BLD AUTO: 136 K/UL — LOW (ref 150–400)
PLATELET # BLD AUTO: 136 K/UL — LOW (ref 150–400)
POTASSIUM SERPL-MCNC: 3.9 MMOL/L — SIGNIFICANT CHANGE UP (ref 3.5–5.3)
POTASSIUM SERPL-MCNC: 3.9 MMOL/L — SIGNIFICANT CHANGE UP (ref 3.5–5.3)
POTASSIUM SERPL-SCNC: 3.9 MMOL/L — SIGNIFICANT CHANGE UP (ref 3.5–5.3)
POTASSIUM SERPL-SCNC: 3.9 MMOL/L — SIGNIFICANT CHANGE UP (ref 3.5–5.3)
RBC # BLD: 3.86 M/UL — LOW (ref 4.2–5.8)
RBC # BLD: 3.86 M/UL — LOW (ref 4.2–5.8)
RBC # FLD: 15.5 % — HIGH (ref 10.3–14.5)
RBC # FLD: 15.5 % — HIGH (ref 10.3–14.5)
SODIUM SERPL-SCNC: 140 MMOL/L — SIGNIFICANT CHANGE UP (ref 135–145)
SODIUM SERPL-SCNC: 140 MMOL/L — SIGNIFICANT CHANGE UP (ref 135–145)
SPECIMEN SOURCE: SIGNIFICANT CHANGE UP
WBC # BLD: 9.16 K/UL — SIGNIFICANT CHANGE UP (ref 3.8–10.5)
WBC # BLD: 9.16 K/UL — SIGNIFICANT CHANGE UP (ref 3.8–10.5)
WBC # FLD AUTO: 9.16 K/UL — SIGNIFICANT CHANGE UP (ref 3.8–10.5)
WBC # FLD AUTO: 9.16 K/UL — SIGNIFICANT CHANGE UP (ref 3.8–10.5)

## 2023-12-14 PROCEDURE — 99232 SBSQ HOSP IP/OBS MODERATE 35: CPT

## 2023-12-14 RX ORDER — LOSARTAN POTASSIUM 100 MG/1
50 TABLET, FILM COATED ORAL DAILY
Refills: 0 | Status: DISCONTINUED | OUTPATIENT
Start: 2023-12-14 | End: 2023-12-14

## 2023-12-14 RX ORDER — LOSARTAN POTASSIUM 100 MG/1
25 TABLET, FILM COATED ORAL DAILY
Refills: 0 | Status: DISCONTINUED | OUTPATIENT
Start: 2023-12-14 | End: 2023-12-16

## 2023-12-14 RX ADMIN — MUPIROCIN 1 APPLICATION(S): 20 OINTMENT TOPICAL at 05:14

## 2023-12-14 RX ADMIN — Medication 100 MILLIGRAM(S): at 11:42

## 2023-12-14 RX ADMIN — ATORVASTATIN CALCIUM 40 MILLIGRAM(S): 80 TABLET, FILM COATED ORAL at 21:59

## 2023-12-14 RX ADMIN — RIVAROXABAN 15 MILLIGRAM(S): KIT at 18:11

## 2023-12-14 RX ADMIN — LIDOCAINE 1 PATCH: 4 CREAM TOPICAL at 13:56

## 2023-12-14 RX ADMIN — INSULIN GLARGINE 15 UNIT(S): 100 INJECTION, SOLUTION SUBCUTANEOUS at 22:00

## 2023-12-14 RX ADMIN — Medication 81 MILLIGRAM(S): at 11:42

## 2023-12-14 RX ADMIN — Medication 5 UNIT(S): at 08:45

## 2023-12-14 RX ADMIN — CARVEDILOL PHOSPHATE 6.25 MILLIGRAM(S): 80 CAPSULE, EXTENDED RELEASE ORAL at 18:11

## 2023-12-14 RX ADMIN — FINASTERIDE 5 MILLIGRAM(S): 5 TABLET, FILM COATED ORAL at 11:42

## 2023-12-14 RX ADMIN — Medication 4: at 16:58

## 2023-12-14 RX ADMIN — LIDOCAINE 1 PATCH: 4 CREAM TOPICAL at 18:19

## 2023-12-14 RX ADMIN — Medication 7 UNIT(S): at 16:57

## 2023-12-14 RX ADMIN — CHLORHEXIDINE GLUCONATE 1 APPLICATION(S): 213 SOLUTION TOPICAL at 11:42

## 2023-12-14 RX ADMIN — LIDOCAINE 1 PATCH: 4 CREAM TOPICAL at 00:00

## 2023-12-14 RX ADMIN — SENNA PLUS 2 TABLET(S): 8.6 TABLET ORAL at 22:10

## 2023-12-14 RX ADMIN — Medication 0.6 MILLIGRAM(S): at 11:42

## 2023-12-14 RX ADMIN — CARVEDILOL PHOSPHATE 6.25 MILLIGRAM(S): 80 CAPSULE, EXTENDED RELEASE ORAL at 05:13

## 2023-12-14 RX ADMIN — TAMSULOSIN HYDROCHLORIDE 0.4 MILLIGRAM(S): 0.4 CAPSULE ORAL at 22:10

## 2023-12-14 RX ADMIN — Medication 7 UNIT(S): at 12:59

## 2023-12-14 RX ADMIN — MUPIROCIN 1 APPLICATION(S): 20 OINTMENT TOPICAL at 18:10

## 2023-12-14 RX ADMIN — POLYETHYLENE GLYCOL 3350 17 GRAM(S): 17 POWDER, FOR SOLUTION ORAL at 11:42

## 2023-12-14 RX ADMIN — Medication 6: at 12:59

## 2023-12-14 NOTE — PROGRESS NOTE ADULT - NS ATTEND AMEND GEN_ALL_CORE FT
89 yo M with pmh of gout, CAD s/p PCI July 2022, PAD s/p stent, CKD, DM, HTN, HLD, gouty arthritis involving the knee who presents with severe left knee pain following a fall a few days ago leading to ambulatory dysfunction, admitted for inflammatory monoarthritis of the left knee +/- overlying cellulitis. Noted to be afebrile, no leukocytosis, serum CRP is completely normal at 3, findings with low likelihood of septic arthritis. Orthopedic surgery was consulted on 12/10, did not recommend synovial fluid analysis given improved knee range of motion, very low concern for septic arthritis.     Labs and vitals reviewed.  this AM, His Cr normalized. Pt feels L knee pain is improving but mild limitations in range of motion. On exam, L knee with no erythema, slight warmth, passive ROM with slight stiffness    #Left Knee Monoarticular Arthritis, c/f Gout, less likely septic arthritis     #Intractable Left Knee pain, improving    #Ambulatory Dysfunction    #Fall    #CKD    #PAD s/p stent    #CAD s/p PCI in July 2022    #HLD    #Hypertension     #DM with steroid induced hyperglycemia  #Gout     #Mild Cr elevation    -Orthopedic Surgery for arthrocentesis to obtain synovial fluid for cell count with diff, gram stain, culture, crystal analysis -> procedure deferred, appreciate Orthopedic Assistance   -monitor off antibiotics    -continue gout treatment with prednisone 40 mg, colchicine, will avoid NSAIDs given multiple contraindications; completed 5 day course prednisone (12/9 - 12/13)  -c/w colchicine  -PT consult -> YESSICA   -pain control with tylenol  -c/w insulin glargine to 15 units and lispro 7 units AC for steroid induced hyperglycemia, anticipate less needs as steroids stopped 12/13   -DC planning for YESSICA, pending auth

## 2023-12-14 NOTE — PROGRESS NOTE ADULT - PROBLEM SELECTOR PLAN 9
DVT ppx-Xarelto (renally dosed)    Dispo: from home  PT recs YESSICA CM to follow, son (Antonio [phone]) wishes Adalberto Drew DVT ppx-Xarelto (renally dosed)    Dispo: from home  PT recs YESSICA CM to follow, son (Antonio [phone]) wishes Adalberto Drew

## 2023-12-14 NOTE — PROGRESS NOTE ADULT - PROBLEM SELECTOR PLAN 3
-160s systolic  dc metoprolol   switch to coreg 6.25 mg bid  pt was last on losartan 50 mg daily 8/2023  resume losartan 50 mg daily  BP goal <140/90 -160s systolic  dc metoprolol   switch to coreg 6.25 mg bid  pt was last on losartan 50 mg daily 8/2023  resume losartan 25 mg daily, increase as needed  BP goal <140/90

## 2023-12-14 NOTE — PROGRESS NOTE ADULT - ASSESSMENT
88 y/o male w/ PMH of CAD s/p stent with most recent July 2022, PAD, T2DM, BPH, HTN, Gout (right knee flair in 2022), CKD, dementia who presented with 5 day history of left knee pain. Patient is being admitted to medicine for Acute gout flair and cellulitis.   CT lumbar spine shows Moderate canal narrowing L3-4 and L4-5 due to diffuse disc bulges as well as facet and ligamentous degenerative changes. Mild canal narrowing L2-3 due to diffuse disc bulge as well as facet DJD. Small right paracentral disc protrusion L5-S1 which mildly displaces the right S1 nerve root.   Xray left knee resulted Intact bones and alignment. No evidence of fracture or suspicious lesion. Soft tissue thickening overlies the patella. A vascular stent is seen in the distal SFA extending into the proximal popliteal level as partially visualized with heavy vascular calcifications.  Orthopedics followed reccs WBAT LLE. follow up with orthopedic OP in one week. s/p prednisone, currently on Colchicine and Allopurinol.   Awaiting Auth for YESSICA. Pt is now medically optimized.     Daughter Alla Ruggiero was updated on current plan and on BP medication adjustment due to ongoing HTN.

## 2023-12-14 NOTE — PROGRESS NOTE ADULT - PROBLEM SELECTOR PLAN 6
Creatinine Clearance 43 ml/min  - renal dose xarelto decrease to 15 mg qhs  - C/w aspirin, Xarelto (as per son for blood thinner, no history of atrial fibrillation), atorvastatin. beta blocker

## 2023-12-14 NOTE — PROGRESS NOTE ADULT - SUBJECTIVE AND OBJECTIVE BOX
Patient is a 88y old  Male who presents with a chief complaint of acute gout flair and left knee cellulitis (13 Dec 2023 15:58)    OVERNIGHT EVENTS: no acute changes.     Pt is aox3, comfortable appearing, eating well, ambulating with assistance.     REVIEW OF SYSTEMS:  CONSTITUTIONAL: No fever, chills  ENMT:  No difficulty hearing, no change in vision  NECK: No pain or stiffness  RESPIRATORY: No cough, SOB  CARDIOVASCULAR: No chest pain, palpitations  GASTROINTESTINAL: No abdominal pain. No nausea, vomiting, or diarrhea  GENITOURINARY: No dysuria  NEUROLOGICAL: No HA  SKIN: No itching, burning, rashes, or lesions   LYMPH NODES: No enlarged glands  ENDOCRINE: No heat or cold intolerance; No hair loss  MUSCULOSKELETAL: No joint pain or swelling; No muscle, back, or extremity pain  PSYCHIATRIC: No depression, anxiety  HEME/LYMPH: No easy bruising, or bleeding gums    T(C): 36.7 (12-14-23 @ 12:28), Max: 36.7 (12-14-23 @ 12:28)  HR: 68 (12-14-23 @ 18:14) (53 - 81)  BP: 156/69 (12-14-23 @ 18:14) (151/67 - 163/67)  RR: 17 (12-14-23 @ 12:28) (17 - 18)  SpO2: 96% (12-14-23 @ 12:28) (95% - 98%)  Wt(kg): --Vital Signs Last 24 Hrs  T(C): 36.7 (14 Dec 2023 12:28), Max: 36.7 (14 Dec 2023 12:28)  T(F): 98 (14 Dec 2023 12:28), Max: 98 (14 Dec 2023 12:28)  HR: 68 (14 Dec 2023 18:14) (53 - 81)  BP: 156/69 (14 Dec 2023 18:14) (151/67 - 163/67)  BP(mean): --  RR: 17 (14 Dec 2023 12:28) (17 - 18)  SpO2: 96% (14 Dec 2023 12:28) (95% - 98%)    Parameters below as of 14 Dec 2023 12:28  Patient On (Oxygen Delivery Method): room air        MEDICATIONS  (STANDING):  allopurinol 100 milliGRAM(s) Oral daily  aspirin  chewable 81 milliGRAM(s) Oral daily  atorvastatin 40 milliGRAM(s) Oral at bedtime  carvedilol 6.25 milliGRAM(s) Oral every 12 hours  chlorhexidine 2% Cloths 1 Application(s) Topical daily  colchicine 0.6 milliGRAM(s) Oral daily  dextrose 5%. 1000 milliLiter(s) (50 mL/Hr) IV Continuous <Continuous>  dextrose 5%. 1000 milliLiter(s) (100 mL/Hr) IV Continuous <Continuous>  dextrose 50% Injectable 12.5 Gram(s) IV Push once  dextrose 50% Injectable 25 Gram(s) IV Push once  dextrose 50% Injectable 25 Gram(s) IV Push once  finasteride 5 milliGRAM(s) Oral daily  glucagon  Injectable 1 milliGRAM(s) IntraMuscular once  insulin glargine Injectable (LANTUS) 15 Unit(s) SubCutaneous at bedtime  insulin lispro (ADMELOG) corrective regimen sliding scale   SubCutaneous at bedtime  insulin lispro (ADMELOG) corrective regimen sliding scale   SubCutaneous three times a day before meals  insulin lispro Injectable (ADMELOG) 7 Unit(s) SubCutaneous before lunch  insulin lispro Injectable (ADMELOG) 7 Unit(s) SubCutaneous before dinner  insulin lispro Injectable (ADMELOG) 5 Unit(s) SubCutaneous before breakfast  lidocaine   4% Patch 1 Patch Transdermal daily  mupirocin 2% Ointment 1 Application(s) Both Nostrils two times a day  naloxone Injectable 0.4 milliGRAM(s) IV Push once  polyethylene glycol 3350 17 Gram(s) Oral daily  rivaroxaban 15 milliGRAM(s) Oral with dinner  senna 2 Tablet(s) Oral at bedtime  sodium chloride 0.9%. 1000 milliLiter(s) (70 mL/Hr) IV Continuous <Continuous>  tamsulosin 0.4 milliGRAM(s) Oral at bedtime    MEDICATIONS  (PRN):  acetaminophen     Tablet .. 650 milliGRAM(s) Oral every 6 hours PRN Temp greater or equal to 38C (100.4F), Mild Pain (1 - 3)  bisacodyl 5 milliGRAM(s) Oral daily PRN Constipation  dextrose Oral Gel 15 Gram(s) Oral once PRN Blood Glucose LESS THAN 70 milliGRAM(s)/deciliter  ondansetron Injectable 4 milliGRAM(s) IV Push every 8 hours PRN Nausea and/or Vomiting  oxyCODONE    IR 5 milliGRAM(s) Oral every 4 hours PRN Severe Pain (7 - 10)  oxyCODONE    IR 2.5 milliGRAM(s) Oral every 4 hours PRN Moderate Pain (4 - 6)      PHYSICAL EXAM:  GENERAL: NAD  EYES: clear conjunctiva  ENMT: Moist mucous membranes  NECK: Supple, No JVD, Normal thyroid  CHEST/LUNG: Clear to auscultation bilaterally; No rales, rhonchi, wheezing, or rubs  HEART: S1, S2, Regular rate and rhythm  ABDOMEN: Soft, Nontender, Nondistended; Bowel sounds present  NEURO: Alert & Oriented X3  EXTREMITIES: no swelling, erythema, or tenderness in any extremities  LYMPH: No lymphadenopathy noted  SKIN: No rashes or lesions    Consultant(s) Notes Reviewed:  [x ] YES  [ ] NO  Care Discussed with Consultants/Other Providers [ x] YES  [ ] NO    LABS:                        10.4   9.16  )-----------( 136      ( 14 Dec 2023 07:34 )             33.0     12-14    140  |  108  |  34<H>  ----------------------------<  149<H>  3.9   |  27  |  1.27    Ca    9.1      14 Dec 2023 07:34  Phos  3.8     12-14  Mg     1.9     12-14        CAPILLARY BLOOD GLUCOSE      POCT Blood Glucose.: 203 mg/dL (14 Dec 2023 16:34)  POCT Blood Glucose.: 289 mg/dL (14 Dec 2023 12:12)  POCT Blood Glucose.: 127 mg/dL (14 Dec 2023 08:29)  POCT Blood Glucose.: 281 mg/dL (13 Dec 2023 21:07)        Urinalysis Basic - ( 14 Dec 2023 07:34 )    Color: x / Appearance: x / SG: x / pH: x  Gluc: 149 mg/dL / Ketone: x  / Bili: x / Urobili: x   Blood: x / Protein: x / Nitrite: x   Leuk Esterase: x / RBC: x / WBC x   Sq Epi: x / Non Sq Epi: x / Bacteria: x        RADIOLOGY & ADDITIONAL TESTS:    Imaging Personally Reviewed:  [ ] YES  [ ] NO

## 2023-12-14 NOTE — PROGRESS NOTE ADULT - PROBLEM SELECTOR PLAN 4
A1C 8.1  On metformin 1000mg BID.   Stop oral hypoglycemics.   dc steroids  glucose now improved off steroids   s/p IV fluids   c/w lantus 15 units at bedtime  c/w admelog 5 units before breakfast, 7 units before lunch and dinner  change to moderate ISS tid before meals and at bedtime

## 2023-12-14 NOTE — PROGRESS NOTE ADULT - PROBLEM SELECTOR PLAN 2
Scr 1.2-1.3  bun:cr ratio >20  possible pre-renal   s/p IV fluids   unknown baseline  Avoid Nephrotoxic Meds/ Agents such as (NSAIDs, IV contrast, Aminoglycosides such as gentamicin, Gadolinium contrast, Phosphate containing enemas, etc..)

## 2023-12-15 ENCOUNTER — TRANSCRIPTION ENCOUNTER (OUTPATIENT)
Age: 88
End: 2023-12-15

## 2023-12-15 LAB
GLUCOSE BLDC GLUCOMTR-MCNC: 128 MG/DL — HIGH (ref 70–99)
GLUCOSE BLDC GLUCOMTR-MCNC: 128 MG/DL — HIGH (ref 70–99)
GLUCOSE BLDC GLUCOMTR-MCNC: 195 MG/DL — HIGH (ref 70–99)
GLUCOSE BLDC GLUCOMTR-MCNC: 195 MG/DL — HIGH (ref 70–99)
GLUCOSE BLDC GLUCOMTR-MCNC: 279 MG/DL — HIGH (ref 70–99)
GLUCOSE BLDC GLUCOMTR-MCNC: 279 MG/DL — HIGH (ref 70–99)
GLUCOSE BLDC GLUCOMTR-MCNC: 389 MG/DL — HIGH (ref 70–99)
GLUCOSE BLDC GLUCOMTR-MCNC: 389 MG/DL — HIGH (ref 70–99)

## 2023-12-15 PROCEDURE — 99233 SBSQ HOSP IP/OBS HIGH 50: CPT

## 2023-12-15 RX ORDER — CARVEDILOL PHOSPHATE 80 MG/1
1 CAPSULE, EXTENDED RELEASE ORAL
Qty: 0 | Refills: 0 | DISCHARGE
Start: 2023-12-15

## 2023-12-15 RX ORDER — INSULIN GLARGINE 100 [IU]/ML
13 INJECTION, SOLUTION SUBCUTANEOUS AT BEDTIME
Refills: 0 | Status: DISCONTINUED | OUTPATIENT
Start: 2023-12-15 | End: 2023-12-16

## 2023-12-15 RX ORDER — LOSARTAN POTASSIUM 100 MG/1
1 TABLET, FILM COATED ORAL
Qty: 0 | Refills: 0 | DISCHARGE
Start: 2023-12-15

## 2023-12-15 RX ADMIN — INSULIN GLARGINE 13 UNIT(S): 100 INJECTION, SOLUTION SUBCUTANEOUS at 22:08

## 2023-12-15 RX ADMIN — RIVAROXABAN 15 MILLIGRAM(S): KIT at 17:32

## 2023-12-15 RX ADMIN — LOSARTAN POTASSIUM 25 MILLIGRAM(S): 100 TABLET, FILM COATED ORAL at 05:55

## 2023-12-15 RX ADMIN — Medication 7 UNIT(S): at 12:17

## 2023-12-15 RX ADMIN — CHLORHEXIDINE GLUCONATE 1 APPLICATION(S): 213 SOLUTION TOPICAL at 12:19

## 2023-12-15 RX ADMIN — FINASTERIDE 5 MILLIGRAM(S): 5 TABLET, FILM COATED ORAL at 12:18

## 2023-12-15 RX ADMIN — Medication 100 MILLIGRAM(S): at 12:18

## 2023-12-15 RX ADMIN — LIDOCAINE 1 PATCH: 4 CREAM TOPICAL at 19:59

## 2023-12-15 RX ADMIN — Medication 81 MILLIGRAM(S): at 12:18

## 2023-12-15 RX ADMIN — Medication 2: at 17:14

## 2023-12-15 RX ADMIN — ATORVASTATIN CALCIUM 40 MILLIGRAM(S): 80 TABLET, FILM COATED ORAL at 21:38

## 2023-12-15 RX ADMIN — POLYETHYLENE GLYCOL 3350 17 GRAM(S): 17 POWDER, FOR SOLUTION ORAL at 12:20

## 2023-12-15 RX ADMIN — Medication 0.6 MILLIGRAM(S): at 12:19

## 2023-12-15 RX ADMIN — LIDOCAINE 1 PATCH: 4 CREAM TOPICAL at 01:00

## 2023-12-15 RX ADMIN — LIDOCAINE 1 PATCH: 4 CREAM TOPICAL at 12:41

## 2023-12-15 RX ADMIN — Medication 7 UNIT(S): at 17:14

## 2023-12-15 RX ADMIN — SENNA PLUS 2 TABLET(S): 8.6 TABLET ORAL at 21:38

## 2023-12-15 RX ADMIN — TAMSULOSIN HYDROCHLORIDE 0.4 MILLIGRAM(S): 0.4 CAPSULE ORAL at 21:38

## 2023-12-15 RX ADMIN — CARVEDILOL PHOSPHATE 6.25 MILLIGRAM(S): 80 CAPSULE, EXTENDED RELEASE ORAL at 05:55

## 2023-12-15 RX ADMIN — Medication 5 UNIT(S): at 08:07

## 2023-12-15 RX ADMIN — CARVEDILOL PHOSPHATE 6.25 MILLIGRAM(S): 80 CAPSULE, EXTENDED RELEASE ORAL at 17:31

## 2023-12-15 RX ADMIN — Medication 2: at 21:56

## 2023-12-15 RX ADMIN — MUPIROCIN 1 APPLICATION(S): 20 OINTMENT TOPICAL at 05:55

## 2023-12-15 RX ADMIN — MUPIROCIN 1 APPLICATION(S): 20 OINTMENT TOPICAL at 17:33

## 2023-12-15 RX ADMIN — Medication 10: at 12:16

## 2023-12-15 NOTE — PROGRESS NOTE ADULT - NS ATTEND OPT1 GEN_ALL_CORE
I independently performed the documented:
I attest my time as attending is greater than 50% of the total combined time spent on qualifying patient care activities by the PA/NP and attending.

## 2023-12-15 NOTE — DIETITIAN INITIAL EVALUATION ADULT - PERTINENT LABORATORY DATA
12-14    140  |  108  |  34<H>  ----------------------------<  149<H>  3.9   |  27  |  1.27    Ca    9.1      14 Dec 2023 07:34  Phos  3.8     12-14  Mg     1.9     12-14    POCT Blood Glucose.: 389 mg/dL (12-15-23 @ 12:03)  A1C with Estimated Average Glucose Result: 8.1 % (12-10-23 @ 06:15)

## 2023-12-15 NOTE — PROGRESS NOTE ADULT - PROBLEM SELECTOR PLAN 1
- P/w left knee pian, swelling, tenderness, erythema and warmth and reduced ROM.   - s/p outpatient intraarticular cortisone injection 4 days ago.   - Concern for acute gout flair vs septic arthritis vs cellulitis.   - s/p zosyn in ED.   - Concern for septic arthritis low as no leukocytosis, fever, low ESR and CRP (high negative predictive value)  - Will hold further antibiotic treatment at this time.   - s/p prednisone x5 days  - c/w colchicine for acute gout treatment.   - C/w home dose of allopurinol.   - Will hold NSAIDs as patient on aspirin and xarelto (high bleeding risk)    - Oxycodone - 2.5 mg - moderate pain.   - Oxycodone - 5mg - severe pain.   - Ortho followed, no need for arthrocentesis this time, f/u with dr. Sloan in one week. WBMARGARITA LLE  - PT recs YESSICA
- P/w left knee pian, swelling, tenderness, erythema and warmth and reduced ROM.   - s/p outpatient intraarticular cortisone injection 4 days ago.   - Concern for acute gout flair vs septic arthritis vs cellulitis.   - s/p zosyn in ED.   - Concern for septic arthritis low as no leukocytosis, fever, low ESR and CRP (high negative predictive value)  - Will hold further antibiotic treatment at this time.   - s/p prednisone x5 days  - c/w colchicine for acute gout treatment.   - C/w home dose of allopurinol.   - Will hold NSAIDs as patient on aspirin and xarelto (high bleeding risk)  - pain management   - Ortho followed, no need for arthrocentesis this time, f/u with dr. Sloan in one week. ANGELIA MAGALLONE  - PT recs YESSICA
- P/w left knee pian, swelling, tenderness, erythema and warmth and reduced ROM.   - s/p outpatient intraarticular cortisone injection 4 days ago.   - Concern for acute gout flair vs septic arthritis vs cellulitis.   - s/p zosyn in ED.   - Concern for septic arthritis low as no leukocytosis, fever, low ESR and CRP (high negative predictive value)  - Will hold further antibiotic treatment at this time.   - Start prednisone and colchicine for acute gout treatment.   - Will hold NSAIDs as patient on aspirin and xarelto (high bleeding risk)  - C/w home dose of allopurinol.   - Oxycodone - 2.5 mg - moderate pain.   - Oxycodone - 5mg - severe pain.   - Consulted ortho, no need for arthrocentesis this time, f/u with dr. Sloan in one week. WBAT LLE  -PT recs YESSICA
- P/w left knee pian, swelling, tenderness, erythema and warmth and reduced ROM.   - s/p outpatient intraarticular cortisone injection 4 days ago.   - Concern for acute gout flair vs septic arthritis vs cellulitis.   - s/p zosyn in ED.   - Concern for septic arthritis low as no leukocytosis, fever, low ESR and CRP (high negative predictive value)  - Will hold further antibiotic treatment at this time.   - Start prednisone and colchicine for acute gout treatment.   - Will hold NSAIDs as patient on aspirin and xarelto (high bleeding risk)  - C/w home dose of allopurinol.   - Oxycodone - 2.5 mg - moderate pain.   - Oxycodone - 5mg - severe pain.   - Consulted ortho, no need for arthrocentesis this time, f/u with dr. Sloan in one week. WBAT LLE  -PT recs YESSICA
- P/w left knee pian, swelling, tenderness, erythema and warmth and reduced ROM.   - s/p outpatient intraarticular cortisone injection 4 days ago.   - Concern for acute gout flair vs septic arthritis vs cellulitis.   - s/p zosyn in ED.   - Concern for septic arthritis low as no leukocytosis, fever, low ESR and CRP (high negative predictive value)  - Will hold further antibiotic treatment at this time.   - s/p prednisone x5 days  - c/w colchicine for acute gout treatment.   - Will hold NSAIDs as patient on aspirin and xarelto (high bleeding risk)  - C/w home dose of allopurinol.   - Oxycodone - 2.5 mg - moderate pain.   - Oxycodone - 5mg - severe pain.   - Ortho followed, no need for arthrocentesis this time, f/u with dr. Sloan in one week. WBMARGARITA LLE  - PT recs YESSICA

## 2023-12-15 NOTE — PROGRESS NOTE ADULT - PROBLEM SELECTOR PLAN 3
-160s systolic  dc metoprolol   switch to coreg 6.25 mg bid  pt was last on losartan 50 mg daily 8/2023  resume losartan 25 mg daily, increase as needed  BP goal <140/90

## 2023-12-15 NOTE — DISCHARGE NOTE NURSING/CASE MANAGEMENT/SOCIAL WORK - NSDCFUADDAPPT_GEN_ALL_CORE_FT
Follow-up with Dr. Sloan in ONE WEEK at [phone] Call for an appointment.  Follow-up with Dr. Sloan in ONE WEEK at [phone] Call for an appointment.

## 2023-12-15 NOTE — DISCHARGE NOTE NURSING/CASE MANAGEMENT/SOCIAL WORK - NSDCPEFALRISK_GEN_ALL_CORE
For information on Fall & Injury Prevention, visit: [email or web address] OR  [email or web address] OR  https://www.cdc.gov/steadi/patient.html For information on Fall & Injury Prevention, visit: [email or web address] OR  [email or web address] OR  https://www.cdc.gov/steadi/patient.html

## 2023-12-15 NOTE — PROGRESS NOTE ADULT - SUBJECTIVE AND OBJECTIVE BOX
NP Note discussed with  primary attending    Patient is a 88y old  Male who presents with a chief complaint of Cellulitis     (15 Dec 2023 12:05)      INTERVAL HPI/OVERNIGHT EVENTS: no new complaints, pt seen at bedside with acute flare of the left knee. Reporting feeling better compared to past couple of days.     MEDICATIONS  (STANDING):  allopurinol 100 milliGRAM(s) Oral daily  aspirin  chewable 81 milliGRAM(s) Oral daily  atorvastatin 40 milliGRAM(s) Oral at bedtime  carvedilol 6.25 milliGRAM(s) Oral every 12 hours  chlorhexidine 2% Cloths 1 Application(s) Topical daily  colchicine 0.6 milliGRAM(s) Oral daily  dextrose 5%. 1000 milliLiter(s) (100 mL/Hr) IV Continuous <Continuous>  dextrose 5%. 1000 milliLiter(s) (50 mL/Hr) IV Continuous <Continuous>  dextrose 50% Injectable 12.5 Gram(s) IV Push once  dextrose 50% Injectable 25 Gram(s) IV Push once  dextrose 50% Injectable 25 Gram(s) IV Push once  finasteride 5 milliGRAM(s) Oral daily  glucagon  Injectable 1 milliGRAM(s) IntraMuscular once  insulin glargine Injectable (LANTUS) 15 Unit(s) SubCutaneous at bedtime  insulin lispro (ADMELOG) corrective regimen sliding scale   SubCutaneous three times a day before meals  insulin lispro (ADMELOG) corrective regimen sliding scale   SubCutaneous at bedtime  insulin lispro Injectable (ADMELOG) 7 Unit(s) SubCutaneous before lunch  insulin lispro Injectable (ADMELOG) 7 Unit(s) SubCutaneous before dinner  insulin lispro Injectable (ADMELOG) 5 Unit(s) SubCutaneous before breakfast  lidocaine   4% Patch 1 Patch Transdermal daily  losartan 25 milliGRAM(s) Oral daily  mupirocin 2% Ointment 1 Application(s) Both Nostrils two times a day  naloxone Injectable 0.4 milliGRAM(s) IV Push once  polyethylene glycol 3350 17 Gram(s) Oral daily  rivaroxaban 15 milliGRAM(s) Oral with dinner  senna 2 Tablet(s) Oral at bedtime  sodium chloride 0.9%. 1000 milliLiter(s) (70 mL/Hr) IV Continuous <Continuous>  tamsulosin 0.4 milliGRAM(s) Oral at bedtime    MEDICATIONS  (PRN):  acetaminophen     Tablet .. 650 milliGRAM(s) Oral every 6 hours PRN Temp greater or equal to 38C (100.4F), Mild Pain (1 - 3)  bisacodyl 5 milliGRAM(s) Oral daily PRN Constipation  dextrose Oral Gel 15 Gram(s) Oral once PRN Blood Glucose LESS THAN 70 milliGRAM(s)/deciliter  ondansetron Injectable 4 milliGRAM(s) IV Push every 8 hours PRN Nausea and/or Vomiting      __________________________________________________  REVIEW OF SYSTEMS:    CONSTITUTIONAL: No fever,   EYES: no acute visual disturbances  NECK: No pain or stiffness  RESPIRATORY: denies cough, and shortness of breath   CARDIOVASCULAR: No chest pain, no palpitations  GASTROINTESTINAL: No pain. No nausea or vomiting; No diarrhea   NEUROLOGICAL: No headache or numbness, no tremors  MUSCULOSKELETAL: left knee flare up, and cellulitis   GENITOURINARY: no dysuria, no frequency, no hesitancy  PSYCHIATRY: no depression , no anxiety  ALL OTHER  ROS negative        Vital Signs Last 24 Hrs  T(C): 36.7 (15 Dec 2023 13:12), Max: 36.8 (14 Dec 2023 20:25)  T(F): 98.1 (15 Dec 2023 13:12), Max: 98.2 (14 Dec 2023 20:25)  HR: 63 (15 Dec 2023 13:12) (57 - 69)  BP: 135/57 (15 Dec 2023 13:12) (125/55 - 160/59)  BP(mean): 3 (15 Dec 2023 13:12) (3 - 3)  RR: 18 (15 Dec 2023 13:12) (18 - 18)  SpO2: 98% (15 Dec 2023 13:12) (95% - 98%)    Parameters below as of 15 Dec 2023 13:12  Patient On (Oxygen Delivery Method): room air        ________________________________________________  PHYSICAL EXAM:  GENERAL: NAD  HEENT: Normocephalic;  conjunctivae and sclerae clear; moist mucous membranes;   NECK : supple  CHEST/LUNG: Diminished to ausculitation bilaterally with good air entry   HEART: S1 S2  regular; no murmurs, gallops or rubs  ABDOMEN: Soft, Nontender, Nondistended; Bowel sounds present  EXTREMITIES: left knee gout flare up   SKIN: warm and dry; no rash  NERVOUS SYSTEM:  Awake and alert; Oriented  to place, person and time ; no new deficits    _________________________________________________  LABS:                        10.4   9.16  )-----------( 136      ( 14 Dec 2023 07:34 )             33.0     12-14    140  |  108  |  34<H>  ----------------------------<  149<H>  3.9   |  27  |  1.27    Ca    9.1      14 Dec 2023 07:34  Phos  3.8     12-14  Mg     1.9     12-14        Urinalysis Basic - ( 14 Dec 2023 07:34 )    Color: x / Appearance: x / SG: x / pH: x  Gluc: 149 mg/dL / Ketone: x  / Bili: x / Urobili: x   Blood: x / Protein: x / Nitrite: x   Leuk Esterase: x / RBC: x / WBC x   Sq Epi: x / Non Sq Epi: x / Bacteria: x      CAPILLARY BLOOD GLUCOSE      POCT Blood Glucose.: 389 mg/dL (15 Dec 2023 12:03)  POCT Blood Glucose.: 128 mg/dL (15 Dec 2023 08:00)  POCT Blood Glucose.: 229 mg/dL (14 Dec 2023 21:33)  POCT Blood Glucose.: 203 mg/dL (14 Dec 2023 16:34)        RADIOLOGY & ADDITIONAL TESTS:    < from: Xray Chest 1 View AP/PA (12.09.23 @ 13:51) >  INTERPRETATION:  AP chest on December 9, 2023 at 1:31 PM. Patient has   knee pain.    Heart size normal.    Lungs remain clear.    Chest is similar to November 14, 2022.    IMPRESSION: Negative chest.    --- End of Report ---      < end of copied text >      Imaging Personally Reviewed:  YES/NO    Consultant(s) Notes Reviewed:   YES/ No    Care Discussed with Consultants :     Plan of care was discussed with patient and /or primary care giver; all questions and concerns were addressed and care was aligned with patient's wishes.

## 2023-12-15 NOTE — PROGRESS NOTE ADULT - PROBLEM SELECTOR PROBLEM 2
CAD (coronary artery disease)
CAD (coronary artery disease)
Acute kidney injury superimposed on CKD

## 2023-12-15 NOTE — DIETITIAN INITIAL EVALUATION ADULT - OTHER INFO
Pt visited. Pt Reports Good appetite. NKFA. Per Pt he lives with his Son at home. H/O DM . On Oral Hypoglycemic agent at Home. Labs noted A1c 8.1. Pt admitted with Acute Gout of L knee. Pt Provided with Diet education and Literature on Low Purine Diet. Labs noted. A1c 8.1, Uric acid 5.5. Per pt Ht 68 inches,

## 2023-12-15 NOTE — PROGRESS NOTE ADULT - PROBLEM SELECTOR PLAN 9
DVT ppx-Xarelto (renally dosed)    Dispo: from home  PT recs [facility]ESS[facility] CM to follow, son (Antonio [phone]) wishes Adalberto Drew DVT ppx-Xarelto (renally dosed)    Dispo: from home  PT recs YESSICA CM to follow, son (Antonio [phone]) wishes Adalberto Drew

## 2023-12-15 NOTE — PROGRESS NOTE ADULT - NS ATTEND AMEND GEN_ALL_CORE FT
87 yo M with pmh of gout, CAD s/p PCI July 2022, PAD s/p stent, CKD, DM, HTN, HLD, gouty arthritis involving the knee who presents with severe left knee pain following a fall a few days ago leading to ambulatory dysfunction, admitted for inflammatory monoarthritis of the left knee +/- overlying cellulitis. Noted to be afebrile, no leukocytosis, serum CRP is completely normal at 3, findings with low likelihood of septic arthritis. Orthopedic surgery was consulted on 12/10, did not recommend synovial fluid analysis given improved knee range of motion, very low concern for septic arthritis.     Labs and vitals reviewed.  this AM, Cr nl. Pt feels L knee pain is improving but requiring lidocaine patch. On exam, L knee with no erythema, slight warmth, passive ROM with slight stiffness    #Left Knee Monoarticular Arthritis, c/f Gout, less likely septic arthritis     #Intractable Left Knee pain, improving    #Ambulatory Dysfunction    #Fall    #CKD    #PAD s/p stent    #CAD s/p PCI in July 2022    #HLD    #Hypertension     #DM with steroid induced hyperglycemia  #Gout     #Mild Cr elevation    -Orthopedic Surgery for arthrocentesis to obtain synovial fluid for cell count with diff, gram stain, culture, crystal analysis -> procedure deferred, appreciate Orthopedic Assistance   -monitor off antibiotics    -completed gout treatment with 5 day course prednisone 40mg (12/9 - 12/13), colchicine, will avoid NSAIDs given multiple contraindications  -c/w colchicine  -PT consult -> [facility]   -pain control with tylenol  -change insulin glargine to 13 units and lispro 7 units AC for steroid induced hyperglycemia, anticipate less needs as steroids stopped 12/13   -DC planning for [facility], pending auth, however notified by CM that insurance had denied the request. Spoke to MD Marrero at [facility] for P2P, she states she will overturn and allow patient to go to [facility], awaiting to hear back regarding status of [facility] 89 yo M with pmh of gout, CAD s/p PCI July 2022, PAD s/p stent, CKD, DM, HTN, HLD, gouty arthritis involving the knee who presents with severe left knee pain following a fall a few days ago leading to ambulatory dysfunction, admitted for inflammatory monoarthritis of the left knee +/- overlying cellulitis. Noted to be afebrile, no leukocytosis, serum CRP is completely normal at 3, findings with low likelihood of septic arthritis. Orthopedic surgery was consulted on 12/10, did not recommend synovial fluid analysis given improved knee range of motion, very low concern for septic arthritis.     Labs and vitals reviewed.  this AM, Cr nl. Pt feels L knee pain is improving but requiring lidocaine patch. On exam, L knee with no erythema, slight warmth, passive ROM with slight stiffness    #Left Knee Monoarticular Arthritis, c/f Gout, less likely septic arthritis     #Intractable Left Knee pain, improving    #Ambulatory Dysfunction    #Fall    #CKD    #PAD s/p stent    #CAD s/p PCI in July 2022    #HLD    #Hypertension     #DM with steroid induced hyperglycemia  #Gout     #Mild Cr elevation    -Orthopedic Surgery for arthrocentesis to obtain synovial fluid for cell count with diff, gram stain, culture, crystal analysis -> procedure deferred, appreciate Orthopedic Assistance   -monitor off antibiotics    -completed gout treatment with 5 day course prednisone 40mg (12/9 - 12/13), colchicine, will avoid NSAIDs given multiple contraindications  -c/w colchicine  -PT consult -> [facility]   -pain control with tylenol  -change insulin glargine to 13 units and lispro 7 units AC for steroid induced hyperglycemia, anticipate less needs as steroids stopped 12/13   -DC planning for [facility], pending auth, however notified by CM that insurance had denied the request. Spoke to MD Marrero at [facility] for P2P, she states she will overturn and allow patient to go to [facility], awaiting to hear back regarding status of [facility]

## 2023-12-15 NOTE — PROGRESS NOTE ADULT - ASSESSMENT
86 y/o male w/ PMH of CAD s/p stent with most recent July 2022, PAD, T2DM, BPH, HTN, Gout (right knee flair in 2022), CKD, dementia who presented with 5 day history of left knee pain. Patient is being admitted to medicine for Acute gout flair and cellulitis.   CT lumbar spine shows Moderate canal narrowing L3-4 and L4-5 due to diffuse disc bulges as well as facet and ligamentous degenerative changes. Mild canal narrowing L2-3 due to diffuse disc bulge as well as facet DJD. Small right paracentral disc protrusion L5-S1 which mildly displaces the right S1 nerve root.   Xray left knee resulted Intact bones and alignment. No evidence of fracture or suspicious lesion. Soft tissue thickening overlies the patella. A vascular stent is seen in the distal SFA extending into the proximal popliteal level as partially visualized with heavy vascular calcifications.  Orthopedics followed reccs [facility]AT LLE. follow up with orthopedic OP in one week. s/p prednisone, currently on Colchicine and Allopurinol.   Awaiting Auth for [facility]. Pt is now medically optimized.

## 2023-12-15 NOTE — DIETITIAN INITIAL EVALUATION ADULT - PERTINENT MEDS FT
MEDICATIONS  (STANDING):  allopurinol 100 milliGRAM(s) Oral daily  aspirin  chewable 81 milliGRAM(s) Oral daily  atorvastatin 40 milliGRAM(s) Oral at bedtime  carvedilol 6.25 milliGRAM(s) Oral every 12 hours  chlorhexidine 2% Cloths 1 Application(s) Topical daily  colchicine 0.6 milliGRAM(s) Oral daily  dextrose 5%. 1000 milliLiter(s) (100 mL/Hr) IV Continuous <Continuous>  dextrose 5%. 1000 milliLiter(s) (50 mL/Hr) IV Continuous <Continuous>  dextrose 50% Injectable 12.5 Gram(s) IV Push once  dextrose 50% Injectable 25 Gram(s) IV Push once  dextrose 50% Injectable 25 Gram(s) IV Push once  finasteride 5 milliGRAM(s) Oral daily  glucagon  Injectable 1 milliGRAM(s) IntraMuscular once  insulin glargine Injectable (LANTUS) 15 Unit(s) SubCutaneous at bedtime  insulin lispro (ADMELOG) corrective regimen sliding scale   SubCutaneous three times a day before meals  insulin lispro (ADMELOG) corrective regimen sliding scale   SubCutaneous at bedtime  insulin lispro Injectable (ADMELOG) 7 Unit(s) SubCutaneous before lunch  insulin lispro Injectable (ADMELOG) 7 Unit(s) SubCutaneous before dinner  insulin lispro Injectable (ADMELOG) 5 Unit(s) SubCutaneous before breakfast  lidocaine   4% Patch 1 Patch Transdermal daily  losartan 25 milliGRAM(s) Oral daily  mupirocin 2% Ointment 1 Application(s) Both Nostrils two times a day  naloxone Injectable 0.4 milliGRAM(s) IV Push once  polyethylene glycol 3350 17 Gram(s) Oral daily  rivaroxaban 15 milliGRAM(s) Oral with dinner  senna 2 Tablet(s) Oral at bedtime  sodium chloride 0.9%. 1000 milliLiter(s) (70 mL/Hr) IV Continuous <Continuous>  tamsulosin 0.4 milliGRAM(s) Oral at bedtime    MEDICATIONS  (PRN):  acetaminophen     Tablet .. 650 milliGRAM(s) Oral every 6 hours PRN Temp greater or equal to 38C (100.4F), Mild Pain (1 - 3)  bisacodyl 5 milliGRAM(s) Oral daily PRN Constipation  dextrose Oral Gel 15 Gram(s) Oral once PRN Blood Glucose LESS THAN 70 milliGRAM(s)/deciliter  ondansetron Injectable 4 milliGRAM(s) IV Push every 8 hours PRN Nausea and/or Vomiting

## 2023-12-15 NOTE — DISCHARGE NOTE NURSING/CASE MANAGEMENT/SOCIAL WORK - PATIENT PORTAL LINK FT
You can access the FollowMyHealth Patient Portal offered by [facility] by registering at the following website: [email or web address]. By joining [facility]’s FollowMyHealth portal, you will also be able to view your health information using other applications (apps) compatible with our system. You can access the FollowMyHealth Patient Portal offered by [facility] by registering at the following website: [email or web address]. By joining [facility]’s FollowMyHealth portal, you will also be able to view your health information using other applications (apps) compatible with our system.

## 2023-12-15 NOTE — PROGRESS NOTE ADULT - REASON FOR ADMISSION
acute gout flair and left knee cellulitis
Gout flare
Gout flare
acute gout flair and left knee cellulitis
Acute gout flare

## 2023-12-16 VITALS
TEMPERATURE: 97 F | RESPIRATION RATE: 17 BRPM | HEART RATE: 55 BPM | OXYGEN SATURATION: 97 % | SYSTOLIC BLOOD PRESSURE: 134 MMHG | DIASTOLIC BLOOD PRESSURE: 58 MMHG

## 2023-12-16 LAB
ANION GAP SERPL CALC-SCNC: 4 MMOL/L — LOW (ref 5–17)
ANION GAP SERPL CALC-SCNC: 4 MMOL/L — LOW (ref 5–17)
BUN SERPL-MCNC: 35 MG/DL — HIGH (ref 7–18)
BUN SERPL-MCNC: 35 MG/DL — HIGH (ref 7–18)
CALCIUM SERPL-MCNC: 9 MG/DL — SIGNIFICANT CHANGE UP (ref 8.4–10.5)
CALCIUM SERPL-MCNC: 9 MG/DL — SIGNIFICANT CHANGE UP (ref 8.4–10.5)
CHLORIDE SERPL-SCNC: 104 MMOL/L — SIGNIFICANT CHANGE UP (ref 96–108)
CHLORIDE SERPL-SCNC: 104 MMOL/L — SIGNIFICANT CHANGE UP (ref 96–108)
CO2 SERPL-SCNC: 30 MMOL/L — SIGNIFICANT CHANGE UP (ref 22–31)
CO2 SERPL-SCNC: 30 MMOL/L — SIGNIFICANT CHANGE UP (ref 22–31)
CREAT SERPL-MCNC: 1.26 MG/DL — SIGNIFICANT CHANGE UP (ref 0.5–1.3)
CREAT SERPL-MCNC: 1.26 MG/DL — SIGNIFICANT CHANGE UP (ref 0.5–1.3)
EGFR: 56 ML/MIN/1.73M2 — LOW
EGFR: 56 ML/MIN/1.73M2 — LOW
GLUCOSE BLDC GLUCOMTR-MCNC: 172 MG/DL — HIGH (ref 70–99)
GLUCOSE BLDC GLUCOMTR-MCNC: 172 MG/DL — HIGH (ref 70–99)
GLUCOSE BLDC GLUCOMTR-MCNC: 187 MG/DL — HIGH (ref 70–99)
GLUCOSE BLDC GLUCOMTR-MCNC: 187 MG/DL — HIGH (ref 70–99)
GLUCOSE BLDC GLUCOMTR-MCNC: 243 MG/DL — HIGH (ref 70–99)
GLUCOSE BLDC GLUCOMTR-MCNC: 243 MG/DL — HIGH (ref 70–99)
GLUCOSE SERPL-MCNC: 265 MG/DL — HIGH (ref 70–99)
GLUCOSE SERPL-MCNC: 265 MG/DL — HIGH (ref 70–99)
HCT VFR BLD CALC: 36.6 % — LOW (ref 39–50)
HCT VFR BLD CALC: 36.6 % — LOW (ref 39–50)
HGB BLD-MCNC: 11.3 G/DL — LOW (ref 13–17)
HGB BLD-MCNC: 11.3 G/DL — LOW (ref 13–17)
MAGNESIUM SERPL-MCNC: 1.6 MG/DL — SIGNIFICANT CHANGE UP (ref 1.6–2.6)
MAGNESIUM SERPL-MCNC: 1.6 MG/DL — SIGNIFICANT CHANGE UP (ref 1.6–2.6)
MCHC RBC-ENTMCNC: 26.8 PG — LOW (ref 27–34)
MCHC RBC-ENTMCNC: 26.8 PG — LOW (ref 27–34)
MCHC RBC-ENTMCNC: 30.9 GM/DL — LOW (ref 32–36)
MCHC RBC-ENTMCNC: 30.9 GM/DL — LOW (ref 32–36)
MCV RBC AUTO: 86.7 FL — SIGNIFICANT CHANGE UP (ref 80–100)
MCV RBC AUTO: 86.7 FL — SIGNIFICANT CHANGE UP (ref 80–100)
NRBC # BLD: 0 /100 WBCS — SIGNIFICANT CHANGE UP (ref 0–0)
NRBC # BLD: 0 /100 WBCS — SIGNIFICANT CHANGE UP (ref 0–0)
PHOSPHATE SERPL-MCNC: 4.1 MG/DL — SIGNIFICANT CHANGE UP (ref 2.5–4.5)
PHOSPHATE SERPL-MCNC: 4.1 MG/DL — SIGNIFICANT CHANGE UP (ref 2.5–4.5)
PLATELET # BLD AUTO: 151 K/UL — SIGNIFICANT CHANGE UP (ref 150–400)
PLATELET # BLD AUTO: 151 K/UL — SIGNIFICANT CHANGE UP (ref 150–400)
POTASSIUM SERPL-MCNC: 4 MMOL/L — SIGNIFICANT CHANGE UP (ref 3.5–5.3)
POTASSIUM SERPL-MCNC: 4 MMOL/L — SIGNIFICANT CHANGE UP (ref 3.5–5.3)
POTASSIUM SERPL-SCNC: 4 MMOL/L — SIGNIFICANT CHANGE UP (ref 3.5–5.3)
POTASSIUM SERPL-SCNC: 4 MMOL/L — SIGNIFICANT CHANGE UP (ref 3.5–5.3)
RBC # BLD: 4.22 M/UL — SIGNIFICANT CHANGE UP (ref 4.2–5.8)
RBC # BLD: 4.22 M/UL — SIGNIFICANT CHANGE UP (ref 4.2–5.8)
RBC # FLD: 15.6 % — HIGH (ref 10.3–14.5)
RBC # FLD: 15.6 % — HIGH (ref 10.3–14.5)
SODIUM SERPL-SCNC: 138 MMOL/L — SIGNIFICANT CHANGE UP (ref 135–145)
SODIUM SERPL-SCNC: 138 MMOL/L — SIGNIFICANT CHANGE UP (ref 135–145)
WBC # BLD: 7.12 K/UL — SIGNIFICANT CHANGE UP (ref 3.8–10.5)
WBC # BLD: 7.12 K/UL — SIGNIFICANT CHANGE UP (ref 3.8–10.5)
WBC # FLD AUTO: 7.12 K/UL — SIGNIFICANT CHANGE UP (ref 3.8–10.5)
WBC # FLD AUTO: 7.12 K/UL — SIGNIFICANT CHANGE UP (ref 3.8–10.5)

## 2023-12-16 PROCEDURE — 82962 GLUCOSE BLOOD TEST: CPT

## 2023-12-16 PROCEDURE — 96365 THER/PROPH/DIAG IV INF INIT: CPT

## 2023-12-16 PROCEDURE — 99239 HOSP IP/OBS DSCHRG MGMT >30: CPT

## 2023-12-16 PROCEDURE — 36415 COLL VENOUS BLD VENIPUNCTURE: CPT

## 2023-12-16 PROCEDURE — 87641 MR-STAPH DNA AMP PROBE: CPT

## 2023-12-16 PROCEDURE — 87040 BLOOD CULTURE FOR BACTERIA: CPT

## 2023-12-16 PROCEDURE — 84550 ASSAY OF BLOOD/URIC ACID: CPT

## 2023-12-16 PROCEDURE — 85652 RBC SED RATE AUTOMATED: CPT

## 2023-12-16 PROCEDURE — 87640 STAPH A DNA AMP PROBE: CPT

## 2023-12-16 PROCEDURE — 97110 THERAPEUTIC EXERCISES: CPT

## 2023-12-16 PROCEDURE — 82550 ASSAY OF CK (CPK): CPT

## 2023-12-16 PROCEDURE — 80048 BASIC METABOLIC PNL TOTAL CA: CPT

## 2023-12-16 PROCEDURE — 85730 THROMBOPLASTIN TIME PARTIAL: CPT

## 2023-12-16 PROCEDURE — 85027 COMPLETE CBC AUTOMATED: CPT

## 2023-12-16 PROCEDURE — 83036 HEMOGLOBIN GLYCOSYLATED A1C: CPT

## 2023-12-16 PROCEDURE — 97530 THERAPEUTIC ACTIVITIES: CPT

## 2023-12-16 PROCEDURE — 72131 CT LUMBAR SPINE W/O DYE: CPT | Mod: MA

## 2023-12-16 PROCEDURE — 97116 GAIT TRAINING THERAPY: CPT

## 2023-12-16 PROCEDURE — 85610 PROTHROMBIN TIME: CPT

## 2023-12-16 PROCEDURE — 84100 ASSAY OF PHOSPHORUS: CPT

## 2023-12-16 PROCEDURE — 80053 COMPREHEN METABOLIC PANEL: CPT

## 2023-12-16 PROCEDURE — 85025 COMPLETE CBC W/AUTO DIFF WBC: CPT

## 2023-12-16 PROCEDURE — 71045 X-RAY EXAM CHEST 1 VIEW: CPT

## 2023-12-16 PROCEDURE — 83735 ASSAY OF MAGNESIUM: CPT

## 2023-12-16 PROCEDURE — 73564 X-RAY EXAM KNEE 4 OR MORE: CPT

## 2023-12-16 PROCEDURE — 83605 ASSAY OF LACTIC ACID: CPT

## 2023-12-16 PROCEDURE — 99285 EMERGENCY DEPT VISIT HI MDM: CPT | Mod: 25

## 2023-12-16 PROCEDURE — 86140 C-REACTIVE PROTEIN: CPT

## 2023-12-16 PROCEDURE — 83880 ASSAY OF NATRIURETIC PEPTIDE: CPT

## 2023-12-16 RX ORDER — METFORMIN HYDROCHLORIDE 850 MG/1
1 TABLET ORAL
Qty: 0 | Refills: 0 | DISCHARGE

## 2023-12-16 RX ORDER — MAGNESIUM OXIDE 400 MG ORAL TABLET 241.3 MG
1 TABLET ORAL
Qty: 0 | Refills: 0 | DISCHARGE
Start: 2023-12-16

## 2023-12-16 RX ORDER — MAGNESIUM OXIDE 400 MG ORAL TABLET 241.3 MG
400 TABLET ORAL
Refills: 0 | Status: DISCONTINUED | OUTPATIENT
Start: 2023-12-16 | End: 2023-12-16

## 2023-12-16 RX ORDER — INSULIN GLARGINE 100 [IU]/ML
10 INJECTION, SOLUTION SUBCUTANEOUS
Qty: 0 | Refills: 0 | DISCHARGE
Start: 2023-12-16

## 2023-12-16 RX ADMIN — Medication 7 UNIT(S): at 12:13

## 2023-12-16 RX ADMIN — Medication 4: at 12:12

## 2023-12-16 RX ADMIN — FINASTERIDE 5 MILLIGRAM(S): 5 TABLET, FILM COATED ORAL at 12:08

## 2023-12-16 RX ADMIN — Medication 100 MILLIGRAM(S): at 12:08

## 2023-12-16 RX ADMIN — LOSARTAN POTASSIUM 25 MILLIGRAM(S): 100 TABLET, FILM COATED ORAL at 06:11

## 2023-12-16 RX ADMIN — Medication 2: at 08:21

## 2023-12-16 RX ADMIN — CARVEDILOL PHOSPHATE 6.25 MILLIGRAM(S): 80 CAPSULE, EXTENDED RELEASE ORAL at 06:11

## 2023-12-16 RX ADMIN — Medication 2: at 17:10

## 2023-12-16 RX ADMIN — LIDOCAINE 1 PATCH: 4 CREAM TOPICAL at 12:07

## 2023-12-16 RX ADMIN — Medication 7 UNIT(S): at 17:10

## 2023-12-16 RX ADMIN — MUPIROCIN 1 APPLICATION(S): 20 OINTMENT TOPICAL at 06:11

## 2023-12-16 RX ADMIN — MAGNESIUM OXIDE 400 MG ORAL TABLET 400 MILLIGRAM(S): 241.3 TABLET ORAL at 17:08

## 2023-12-16 RX ADMIN — Medication 81 MILLIGRAM(S): at 12:08

## 2023-12-16 RX ADMIN — CHLORHEXIDINE GLUCONATE 1 APPLICATION(S): 213 SOLUTION TOPICAL at 12:11

## 2023-12-16 RX ADMIN — RIVAROXABAN 15 MILLIGRAM(S): KIT at 17:07

## 2023-12-16 RX ADMIN — Medication 5 UNIT(S): at 08:21

## 2023-12-16 RX ADMIN — Medication 0.6 MILLIGRAM(S): at 12:08

## 2023-12-16 RX ADMIN — LIDOCAINE 1 PATCH: 4 CREAM TOPICAL at 00:05

## 2023-12-20 ENCOUNTER — APPOINTMENT (OUTPATIENT)
Dept: UROLOGY | Facility: CLINIC | Age: 88
End: 2023-12-20

## 2024-01-03 ENCOUNTER — APPOINTMENT (OUTPATIENT)
Dept: INTERNAL MEDICINE | Facility: CLINIC | Age: 89
End: 2024-01-03

## 2024-01-08 PROBLEM — M10.9 GOUT, UNSPECIFIED: Chronic | Status: ACTIVE | Noted: 2023-12-09

## 2024-01-12 ENCOUNTER — APPOINTMENT (OUTPATIENT)
Dept: VASCULAR SURGERY | Facility: CLINIC | Age: 89
End: 2024-01-12
Payer: MEDICARE

## 2024-01-12 ENCOUNTER — APPOINTMENT (OUTPATIENT)
Dept: NEUROLOGY | Facility: CLINIC | Age: 89
End: 2024-01-12
Payer: MEDICARE

## 2024-01-12 VITALS
TEMPERATURE: 96.5 F | DIASTOLIC BLOOD PRESSURE: 64 MMHG | HEART RATE: 64 BPM | BODY MASS INDEX: 22.96 KG/M2 | HEIGHT: 69 IN | WEIGHT: 155 LBS | SYSTOLIC BLOOD PRESSURE: 119 MMHG | OXYGEN SATURATION: 100 %

## 2024-01-12 DIAGNOSIS — R41.3 OTHER AMNESIA: ICD-10-CM

## 2024-01-12 PROCEDURE — 93925 LOWER EXTREMITY STUDY: CPT

## 2024-01-12 PROCEDURE — 99214 OFFICE O/P EST MOD 30 MIN: CPT

## 2024-01-12 PROCEDURE — 99215 OFFICE O/P EST HI 40 MIN: CPT

## 2024-01-12 NOTE — PHYSICAL EXAM
[General Appearance - Alert] : alert [General Appearance - In No Acute Distress] : in no acute distress [Person] : oriented to person [Concentration Intact] : normal concentrating ability [Naming Objects] : no difficulty naming common objects [Fluency] : fluency intact [Comprehension] : comprehension intact [Vocabulary] : adequate range of vocabulary [Motor Tone] : muscle tone was normal in all four extremities [Motor Strength] : muscle strength was normal in all four extremities [Sensation Tactile Decrease] : light touch was intact [Abnormal Walk] : normal gait [Balance] : balance was intact

## 2024-01-12 NOTE — DATA REVIEWED
[de-identified] : Hemoglobin A1c 8.3 ED note appreciated CT of the lumbar spine shows moderate canal narrowing L3-4 and L4-5 due to diffuse disc bulges as well as facet and ligamentous degenerative changes. Mild canal narrowing L2-3 due to diffuse disc bulge as well as facet DJD. Small right paracentral disc protrusion L5-S1 which mildly displaces the right S1 nerve root  [de-identified] : B12, folate, methylmalonic acid normal RPR nonreactive Thyroid function test normal

## 2024-01-12 NOTE — PHYSICAL EXAM
[JVD] : no jugular venous distention  [Normal Breath Sounds] : Normal breath sounds [Normal Rate and Rhythm] : normal rate and rhythm [2+] : left 2+ [Ankle Swelling (On Exam)] : not present [Varicose Veins Of Lower Extremities] : not present [] : not present [No Rash or Lesion] : No rash or lesion [Skin Ulcer] : no ulcer [Alert] : alert [Calm] : calm [de-identified] : appears well  [de-identified] : Intact

## 2024-01-12 NOTE — HISTORY OF PRESENT ILLNESS
[FreeTextEntry1] : The patient is here for evaluation of memory problems which started in 2023. The memory problems are characterized by short-term forgetting, forgetting things him to stop as well as drawers open. He lives with his son but he is able to do his own cooking and cleaning without any problems. He is able to maintain and balance his checkbook. He goes outside without getting lost. He sometimes drives without any problems.  Problems with mood or sleep. There is some difficulty with hearing. He was started on rivastigmine patch with some improvement of his memory, however he is allergic to the patch.

## 2024-01-12 NOTE — HISTORY OF PRESENT ILLNESS
[FreeTextEntry1] : Patient is an 88-year-old male with past medical history significant for diabetes, hypertension, hyperlipidemia, and peripheral arterial disease status post bilateral SFA stents who presents to the office today for routine follow-up.  Denies rest pain or claudication symptoms.  Denies current tissue loss or ulceration.

## 2024-01-12 NOTE — ASSESSMENT
[FreeTextEntry1] : 88-year-old male with peripheral arterial disease status post bilateral SFA stents.  In the office today, patient underwent duplex which demonstrates patent SFA stents bilaterally.  Patient remains asymptomatic. No intervention is necessary at this time.  Patient to continue aspirin and Eliquis.  Follow-up in 3 months with repeat duplex. [Arterial/Venous Disease] : arterial/venous disease [Medication Management] : medication management

## 2024-01-12 NOTE — ASSESSMENT
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 Will obtain an MRI of the brain.  Labs for reversible causes of dementia were unremarakable. Will cw Aricept 5 mg at bedtime, pt declines increasing the dose of the med despite worsening memory.    Advised patient and son on the importance of Mediterranean diet, physical activity, including 120 to 150 minutes/week of mild to moderate cardiovascular activity as well as intellectual and social stimulation.  low back pain CT LS spine shows Moderate canal narrowing L3-4 and L4-5 due to diffuse disc bulges as well as facet and ligamentous degenerative changes. Mild canal narrowing L2-3 due to diffuse disc bulge as well as facet DJD. Small right paracentral disc protrusion L5-S1 which mildly displaces the right S1 nerve root pt declines further testing and PT declines radicular symptoms, will give Tylenol  I spent the time noted on the day of this patient encounter preparing for, providing and documenting the above E/M service and counseling and educate patient on differential, workup, disease course, and treatment/management. Education was provided to the patient during this encounter. All questions and concerns were answered and addressed in detail. The patient verbalized understanding and agreed to plan. Patient was advised to continue to monitor for neurologic symptoms and to notify my office or go to the nearest emergency room if there are any changes. Any orders/referrals and communications were provided as well.  Side effects of the above medications were discussed in detail including but not limited to applicable black box warning and teratogenicity as appropriate. For patients with seizures, I discussed risk of SUDEP with patient and advised that SUDEP risk can be minimized with good seizure control through medication compliance and other measures. Patient was advised to bring previous records to my office, including CD of imaging, when applicable.

## 2024-01-19 ENCOUNTER — APPOINTMENT (OUTPATIENT)
Dept: NEUROLOGY | Facility: CLINIC | Age: 89
End: 2024-01-19

## 2024-01-19 ENCOUNTER — APPOINTMENT (OUTPATIENT)
Dept: MRI IMAGING | Facility: CLINIC | Age: 89
End: 2024-01-19
Payer: MEDICARE

## 2024-01-19 PROCEDURE — 70551 MRI BRAIN STEM W/O DYE: CPT

## 2024-02-23 RX ORDER — AMOXICILLIN AND CLAVULANATE POTASSIUM 875; 125 MG/1; MG/1
875-125 TABLET, COATED ORAL
Qty: 14 | Refills: 0 | Status: DISCONTINUED | COMMUNITY
Start: 2023-05-05 | End: 2024-02-23

## 2024-02-23 RX ORDER — AMOXICILLIN AND CLAVULANATE POTASSIUM 875; 125 MG/1; MG/1
875-125 TABLET, COATED ORAL TWICE DAILY
Qty: 28 | Refills: 0 | Status: DISCONTINUED | COMMUNITY
Start: 2023-05-10 | End: 2024-02-23

## 2024-02-27 ENCOUNTER — APPOINTMENT (OUTPATIENT)
Dept: CARDIOLOGY | Facility: CLINIC | Age: 89
End: 2024-02-27
Payer: COMMERCIAL

## 2024-02-27 VITALS
SYSTOLIC BLOOD PRESSURE: 153 MMHG | OXYGEN SATURATION: 98 % | BODY MASS INDEX: 23.92 KG/M2 | HEART RATE: 66 BPM | DIASTOLIC BLOOD PRESSURE: 62 MMHG | TEMPERATURE: 97.4 F | WEIGHT: 162 LBS

## 2024-02-27 VITALS — SYSTOLIC BLOOD PRESSURE: 160 MMHG | DIASTOLIC BLOOD PRESSURE: 74 MMHG

## 2024-02-27 PROCEDURE — G2211 COMPLEX E/M VISIT ADD ON: CPT

## 2024-02-27 PROCEDURE — 99214 OFFICE O/P EST MOD 30 MIN: CPT

## 2024-02-27 NOTE — REASON FOR VISIT
[Hypertension] : hypertension [Family Member] : family member [Coronary Artery Disease] : coronary artery disease

## 2024-02-28 RX ORDER — RIVAROXABAN 20 MG/1
20 TABLET, FILM COATED ORAL
Qty: 90 | Refills: 3 | Status: DISCONTINUED | COMMUNITY
Start: 2023-02-22 | End: 2024-02-28

## 2024-02-28 NOTE — HISTORY OF PRESENT ILLNESS
[FreeTextEntry1] : 88 old male with HTN, HLD, DM, CKD, PAD s/p B/L SFA stent on Xarelto, with abnormal nuclear stress test in 06/2022 showing apical MI and EF 45 to 50%, with subsequent cardiac catheterization and placement of LISA to mid LAD and mid RCA, with residual stenosis of LPL, who presents for follow-up visit.    Patient reports he feels well overall. Denies any chest pain, dyspnea, palpitations, lightheadedness, or syncope. Patient reports compliance with all medications, denies adverse effects. Patient has not been checking his blood pressure at home.  He went to the ER in December with a gout flare and cellulitis.  Reports that as result of his leg pain he has been having less activity.  Patient's metoprolol was switched to carvedilol and his losartan was decreased from 50 to 25 mg previously.  His Xarelto was also decreased from 20 to 15 mg daily.  Has upcoming visit with PMD.

## 2024-02-28 NOTE — ASSESSMENT
[FreeTextEntry1] : 88 old male with HTN, HLD, CKD, PAD s/p B/L SFA stent on Xarelto, with abnormal nuclear stress test in 06/2022 showing apical MI and EF 45 to 50%, with subsequent cardiac catheterization and placement of LISA to mid LAD and mid RCA, with residual stenosis of LPL, who presents for follow-up visit. Overall doing well.  1. CAD s/p PCI: No anginal complaints - Continue atorvastatin - Continue carvedilol (patient previously switched from metoprolol at OSH) - Will consider sending patient for PCI of LPL if he has further anginal symptoms in the future - Patient is currently on aspirin 81 mg, he was also on clopidogrel previously as well as Xarelto per vascular surgery in setting of his PAD.  Unclear why patient is on 15 mg of Xarelto since his last renal function was normal.  I have asked patient to discuss with vascular surgeon to clarify anticoagulation regimen.  2. HTN: Previously well controlled at home with carvedilol and losartan in setting of CKD.  -Patient's blood pressure was elevated today in the office, but he was normotensive at a previous office visit a few weeks ago.  I have therefore asked patient to keep a daily blood pressure log for the next 2 weeks, at which point I will give him a call to see what the numbers are.  If the blood pressure is indeed elevated, can increase losartan from 25 back to 50 as previously.  Patient also is due for repeat blood work including renal function.  He has upcoming appointment with PMD, at which point he will go for all of his blood work. ****Will talk to son Antonio at [phone]  3. HLD: On atorvastatin, LDL was at target on last blood work. -Patient will need new lipid panel; Rx placed but he can do it after he sees PMD  Follow-up visit in 6 months or as needed.

## 2024-02-28 NOTE — PHYSICAL EXAM
[de-identified] : General: No acute distress HEENT: EOMI  Neck: Supple, No JVD, no bruits Lungs: Clear to auscultation bilaterally; No rales or wheezing Heart: Regular rate and rhythm; No murmurs, rubs, or gallops Abdomen: Nontender, bowel sounds present Extremities: No clubbing, cyanosis, or edema Nervous system:  Alert & Oriented X3, no focal deficits Psychiatric: Normal affect Skin: No rashes or lesions

## 2024-02-29 ENCOUNTER — APPOINTMENT (OUTPATIENT)
Dept: INTERNAL MEDICINE | Facility: CLINIC | Age: 89
End: 2024-02-29
Payer: MEDICARE

## 2024-02-29 VITALS
TEMPERATURE: 98.3 F | WEIGHT: 161 LBS | SYSTOLIC BLOOD PRESSURE: 158 MMHG | DIASTOLIC BLOOD PRESSURE: 78 MMHG | RESPIRATION RATE: 16 BRPM | HEART RATE: 66 BPM | BODY MASS INDEX: 23.85 KG/M2 | HEIGHT: 69 IN | OXYGEN SATURATION: 97 %

## 2024-02-29 DIAGNOSIS — I25.10 ATHEROSCLEROTIC HEART DISEASE OF NATIVE CORONARY ARTERY W/OUT ANGINA PECTORIS: ICD-10-CM

## 2024-02-29 DIAGNOSIS — E11.9 TYPE 2 DIABETES MELLITUS W/OUT COMPLICATIONS: ICD-10-CM

## 2024-02-29 DIAGNOSIS — N40.0 BENIGN PROSTATIC HYPERPLASIA WITHOUT LOWER URINARY TRACT SYMPMS: ICD-10-CM

## 2024-02-29 DIAGNOSIS — M54.50 LOW BACK PAIN, UNSPECIFIED: ICD-10-CM

## 2024-02-29 DIAGNOSIS — M10.9 GOUT, UNSPECIFIED: ICD-10-CM

## 2024-02-29 DIAGNOSIS — G89.29 DORSALGIA, UNSPECIFIED: ICD-10-CM

## 2024-02-29 DIAGNOSIS — F03.90 UNSPECIFIED DEMENTIA W/OUT BEHAVIORAL DISTURBANCE: ICD-10-CM

## 2024-02-29 DIAGNOSIS — M54.9 DORSALGIA, UNSPECIFIED: ICD-10-CM

## 2024-02-29 PROCEDURE — G0439: CPT

## 2024-02-29 RX ORDER — METHYLPREDNISOLONE 4 MG/1
4 TABLET ORAL
Qty: 21 | Refills: 0 | Status: DISCONTINUED | COMMUNITY
Start: 2023-03-14 | End: 2024-02-29

## 2024-02-29 RX ORDER — INSULIN GLARGINE-YFGN 100 [IU]/ML
100 INJECTION, SOLUTION SUBCUTANEOUS AT BEDTIME
Qty: 3 | Refills: 2 | Status: ACTIVE | COMMUNITY
Start: 2024-02-29 | End: 1900-01-01

## 2024-02-29 RX ORDER — GABAPENTIN 100 MG
100 TABLET ORAL
Refills: 0 | Status: DISCONTINUED | COMMUNITY
End: 2024-02-29

## 2024-02-29 RX ORDER — PROBENECID AND COLCHICINE 500; .5 MG/1; MG/1
0.5-5 TABLET ORAL DAILY
Qty: 90 | Refills: 3 | Status: DISCONTINUED | COMMUNITY
Start: 2023-01-11 | End: 2024-02-29

## 2024-02-29 RX ORDER — IBUPROFEN 600 MG/1
600 TABLET, FILM COATED ORAL
Qty: 16 | Refills: 0 | Status: DISCONTINUED | COMMUNITY
Start: 2023-04-19 | End: 2024-02-29

## 2024-02-29 RX ORDER — DONEPEZIL HYDROCHLORIDE 5 MG/1
5 TABLET ORAL
Qty: 30 | Refills: 5 | Status: DISCONTINUED | COMMUNITY
Start: 2023-10-13 | End: 2024-02-29

## 2024-02-29 RX ORDER — LOSARTAN POTASSIUM 25 MG/1
25 TABLET, FILM COATED ORAL DAILY
Qty: 1 | Refills: 1 | Status: ACTIVE | COMMUNITY
Start: 2022-10-14 | End: 1900-01-01

## 2024-02-29 RX ORDER — ALLOPURINOL 100 MG/1
100 TABLET ORAL DAILY
Qty: 90 | Refills: 0 | Status: ACTIVE | COMMUNITY
Start: 1900-01-01 | End: 1900-01-01

## 2024-02-29 RX ORDER — RIVAROXABAN 15 MG-20MG
15 & 20 KIT ORAL
Qty: 1 | Refills: 0 | Status: DISCONTINUED | COMMUNITY
Start: 2023-01-25 | End: 2024-02-29

## 2024-02-29 RX ORDER — TRAMADOL HYDROCHLORIDE 50 MG/1
50 TABLET, COATED ORAL
Qty: 12 | Refills: 0 | Status: DISCONTINUED | COMMUNITY
Start: 2023-01-31 | End: 2024-02-29

## 2024-02-29 RX ORDER — RIVASTIGMINE 4.6 MG/24H
4.6 PATCH, EXTENDED RELEASE TRANSDERMAL
Qty: 90 | Refills: 0 | Status: DISCONTINUED | COMMUNITY
Start: 2023-05-30 | End: 2024-02-29

## 2024-02-29 RX ORDER — PROBENECID 500 MG/1
TABLET ORAL
Refills: 0 | Status: DISCONTINUED | COMMUNITY
End: 2024-02-29

## 2024-02-29 RX ORDER — TRIAMCINOLONE ACETONIDE 1 MG/G
0.1 OINTMENT TOPICAL TWICE DAILY
Qty: 1 | Refills: 1 | Status: DISCONTINUED | COMMUNITY
Start: 2021-07-28 | End: 2024-02-29

## 2024-02-29 NOTE — HISTORY OF PRESENT ILLNESS
[de-identified] : 89 y/o M with HTN, HLD, DM, BPH, CKD, gout, dementia, PAD s/p B/L SFA stent on Xarelto, and CAD with LISA to mid LAD and mid RCA (2021) with residual stenosis of LPL, and lumbosacral canal narrowing with multiple disc bulge, presenting for annual visit. Accompanied by daughter and HHA. + lower back pain chronic, ongoing. Pt hospitalized 12/2023 for gout flare, tx with steroids. Feeling well since hospital DC.

## 2024-02-29 NOTE — ASSESSMENT
[FreeTextEntry1] : .  87 y/o M with HTN, HLD, DM, BPH, CKD, gout, dementia, PAD s/p B/L SFA stent on Xarelto, and CAD with LISA to mid LAD and mid RCA (2021) with residual stenosis of LPL, and lumbosacral canal narrowing with multiple disc bulge, presenting for annual visit. HPI as above.  # HTN; controlled - home BP wnl 120s/70-80s - continue to monitor home BP - low salt diet, exercise as tolerated - c/w losartan 25mg daily and carvedilol 6.25mg BID  # HLD - 4/2023 lipid panel wnl; f/u repeat - c/w atorvastatin 40mg daily  # DM - 12/2023 A1c 8.3; f/u repeat - started on insulin, tolerating well - c/w metformin 1g BID - endo referral  # BPH; stable - c/w finasteride 5mg daily and flomax 0.4mg daily  - urology f/u  # CKD; stable - c/w DM and BP control - on losartan  # Gout; stable - f/u repeat labs for renal function (last GFR 60 and Cr Cl conservative measure 45) - for now, c/w allopurinol 100mg daily and colchicine 0.6mg daily  # Dementia - off prior meds now, following with neuro  # PAD s/p B/L SFA stent on Xarelto # CAD with LISA to mid LAD and mid RCA (2021), with residual stenosis of LPL - following with cardio - c/w xarelto 20mg daily, 81mg asa, and carvedilol 6.25mg BID  # lumbosacral canal narrowing with multiple disc bulge - rec tylenol, hot/cold pack - avoid PO NSAIDs in setting of CKD, can try topical voltaren - PT referral  # HCM - recent labs reviewed - s/p flu vaccine  f/u 3 months

## 2024-02-29 NOTE — PHYSICAL EXAM
[No Acute Distress] : no acute distress [Well-Appearing] : well-appearing [No Lymphadenopathy] : no lymphadenopathy [No Respiratory Distress] : no respiratory distress  [No Accessory Muscle Use] : no accessory muscle use [Clear to Auscultation] : lungs were clear to auscultation bilaterally [Normal Rate] : normal rate  [Soft] : abdomen soft [Regular Rhythm] : with a regular rhythm [Non Tender] : non-tender [Non-distended] : non-distended [Coordination Grossly Intact] : coordination grossly intact [Normal Affect] : the affect was normal [Normal Insight/Judgement] : insight and judgment were intact

## 2024-02-29 NOTE — HEALTH RISK ASSESSMENT
[Good] : ~his/her~  mood as  good [No] : No [No falls in past year] : Patient reported no falls in the past year [0] : 1) Little interest or pleasure doing things: Not at all (0) [PHQ-2 Negative - No further assessment needed] : PHQ-2 Negative - No further assessment needed [1] : 2) Feeling down, depressed, or hopeless for several days (1) [With Family] : lives with family [Fully functional (bathing, dressing, toileting, transferring, walking, feeding)] : Fully functional (bathing, dressing, toileting, transferring, walking, feeding) [Former] : Former [> 15 Years] : > 15 Years

## 2024-03-04 RX ORDER — RIVAROXABAN 20 MG/1
20 TABLET, FILM COATED ORAL
Qty: 30 | Refills: 3 | Status: ACTIVE | COMMUNITY
Start: 2023-05-25 | End: 1900-01-01

## 2024-03-05 LAB
ALBUMIN SERPL ELPH-MCNC: 4.5 G/DL
ALP BLD-CCNC: 60 U/L
ALT SERPL-CCNC: 17 U/L
ANION GAP SERPL CALC-SCNC: 13 MMOL/L
AST SERPL-CCNC: 21 U/L
BILIRUB SERPL-MCNC: 0.3 MG/DL
BUN SERPL-MCNC: 23 MG/DL
CALCIUM SERPL-MCNC: 10 MG/DL
CHLORIDE SERPL-SCNC: 102 MMOL/L
CHOLEST SERPL-MCNC: 97 MG/DL
CO2 SERPL-SCNC: 26 MMOL/L
CREAT SERPL-MCNC: 1.14 MG/DL
EGFR: 63 ML/MIN/1.73M2
GLUCOSE SERPL-MCNC: 158 MG/DL
HDLC SERPL-MCNC: 38 MG/DL
LDLC SERPL CALC-MCNC: 35 MG/DL
NONHDLC SERPL-MCNC: 59 MG/DL
POTASSIUM SERPL-SCNC: 4.6 MMOL/L
PROT SERPL-MCNC: 7 G/DL
SODIUM SERPL-SCNC: 142 MMOL/L
TRIGL SERPL-MCNC: 137 MG/DL

## 2024-03-05 NOTE — PATIENT PROFILE ADULT - FUNCTIONAL ASSESSMENT - DAILY ACTIVITY 1.
Outreach attempt was made to schedule a Medicare Wellness Visit. This was the first attempt. Contact was not made, no answer/busy.   
Initial
4 = No assist / stand by assistance

## 2024-03-08 LAB
ESTIMATED AVERAGE GLUCOSE: 177 MG/DL
HBA1C MFR BLD HPLC: 7.8 %

## 2024-03-19 ENCOUNTER — APPOINTMENT (OUTPATIENT)
Dept: OTOLARYNGOLOGY | Facility: CLINIC | Age: 89
End: 2024-03-19
Payer: MEDICARE

## 2024-03-19 VITALS
HEART RATE: 60 BPM | BODY MASS INDEX: 23.85 KG/M2 | HEIGHT: 69 IN | WEIGHT: 161 LBS | DIASTOLIC BLOOD PRESSURE: 67 MMHG | SYSTOLIC BLOOD PRESSURE: 154 MMHG

## 2024-03-19 DIAGNOSIS — H90.3 SENSORINEURAL HEARING LOSS, BILATERAL: ICD-10-CM

## 2024-03-19 PROCEDURE — 92557 COMPREHENSIVE HEARING TEST: CPT

## 2024-03-19 PROCEDURE — 92567 TYMPANOMETRY: CPT

## 2024-03-19 PROCEDURE — 92504 EAR MICROSCOPY EXAMINATION: CPT

## 2024-03-19 PROCEDURE — 99203 OFFICE O/P NEW LOW 30 MIN: CPT

## 2024-03-19 NOTE — HISTORY OF PRESENT ILLNESS
[de-identified] : 88 year old male presents with gradual hearing loss for a few years  History of DM2, Gout, HTN and early stages of dementia. PAD s/p B/L SFA stent on Xarelto, and CAD with LISA to mid LAD and mid RCA (2021) with residual stenosis of LPL, and lumbosacral canal narrowing with multiple disc bulge Accompanied by son  Son has noticed he is becoming non- responsive when spoken to, unsure if dementia related.  Patient denies otalgia, otorrhea, recent ear infections, tinnitus, dizziness, vertigo, headaches related to hearing.  No recent audio.  No hx of ear surgeries, chemo or radiation exposure or loud noise exposure

## 2024-03-19 NOTE — REASON FOR VISIT
[Initial Evaluation] : an initial evaluation for [Family Member] : family member [Other: _____] : [unfilled] [FreeTextEntry2] : hearing loss

## 2024-03-19 NOTE — DATA REVIEWED
[de-identified] : An audiogram was ordered and performed including tympanometry, pure tones and speech, for patient's complaint of hearing loss I have independently reviewed the patient's audiogram from today and my findings include valentina SNHL SDS in 60s

## 2024-03-19 NOTE — PROCEDURE
[Other ___] : [unfilled] [Same] : same as the Pre Op Dx. [] : Binocular Microscopy [FreeTextEntry4] : none [FreeTextEntry1] : hearing loss [FreeTextEntry6] : Operative microscope was used to examine the ear canal, ear drum and visible middle ear landmarks. Adequate exam would not have been possible without the use of a microscope. Findings are described.

## 2024-03-19 NOTE — PHYSICAL EXAM
[Binocular Microscopic Exam] : Binocular microscopic exam was performed [Hearing Loss Left Only] : normal [Hearing Loss Right Only] : normal [Normal] : no rashes

## 2024-04-18 NOTE — DISCHARGE NOTE NURSING/CASE MANAGEMENT/SOCIAL WORK - DATE OF LAST VACCINATION
Doing well on low-dose Rosuvastatin, patient hoping to come off of this in the future.  Working on plant-based diet.   17-Apr-2021

## 2024-04-19 ENCOUNTER — APPOINTMENT (OUTPATIENT)
Dept: VASCULAR SURGERY | Facility: CLINIC | Age: 89
End: 2024-04-19
Payer: MEDICARE

## 2024-04-19 PROCEDURE — 93925 LOWER EXTREMITY STUDY: CPT

## 2024-04-19 PROCEDURE — G0506: CPT

## 2024-04-19 PROCEDURE — 99204 OFFICE O/P NEW MOD 45 MIN: CPT | Mod: 25

## 2024-04-26 ENCOUNTER — RX RENEWAL (OUTPATIENT)
Age: 89
End: 2024-04-26

## 2024-04-26 RX ORDER — METOPROLOL SUCCINATE 25 MG/1
25 TABLET, EXTENDED RELEASE ORAL
Qty: 90 | Refills: 3 | Status: ACTIVE | COMMUNITY
Start: 2017-04-04 | End: 1900-01-01

## 2024-05-13 ENCOUNTER — RX RENEWAL (OUTPATIENT)
Age: 89
End: 2024-05-13

## 2024-05-13 RX ORDER — CARVEDILOL 6.25 MG/1
6.25 TABLET, FILM COATED ORAL
Qty: 180 | Refills: 0 | Status: ACTIVE | COMMUNITY
Start: 1900-01-01 | End: 1900-01-01

## 2024-05-13 RX ORDER — COLCHICINE 0.6 MG/1
0.6 TABLET ORAL DAILY
Qty: 90 | Refills: 0 | Status: ACTIVE | COMMUNITY
Start: 2024-02-29 | End: 1900-01-01

## 2024-05-29 ENCOUNTER — APPOINTMENT (OUTPATIENT)
Dept: INTERNAL MEDICINE | Facility: CLINIC | Age: 89
End: 2024-05-29
Payer: MEDICARE

## 2024-05-29 VITALS
WEIGHT: 167 LBS | TEMPERATURE: 98 F | HEIGHT: 69 IN | HEART RATE: 70 BPM | RESPIRATION RATE: 16 BRPM | OXYGEN SATURATION: 97 % | BODY MASS INDEX: 24.73 KG/M2 | SYSTOLIC BLOOD PRESSURE: 150 MMHG | DIASTOLIC BLOOD PRESSURE: 63 MMHG

## 2024-05-29 DIAGNOSIS — I10 ESSENTIAL (PRIMARY) HYPERTENSION: ICD-10-CM

## 2024-05-29 DIAGNOSIS — R06.83 SNORING: ICD-10-CM

## 2024-05-29 DIAGNOSIS — R40.0 SOMNOLENCE: ICD-10-CM

## 2024-05-29 DIAGNOSIS — E78.5 HYPERLIPIDEMIA, UNSPECIFIED: ICD-10-CM

## 2024-05-29 PROCEDURE — 99214 OFFICE O/P EST MOD 30 MIN: CPT

## 2024-05-29 PROCEDURE — G2211 COMPLEX E/M VISIT ADD ON: CPT

## 2024-05-29 RX ORDER — RIVASTIGMINE TARTRATE 1.5 MG/1
1.5 CAPSULE ORAL TWICE DAILY
Refills: 0 | Status: ACTIVE | COMMUNITY
Start: 2024-05-29

## 2024-05-29 RX ORDER — METFORMIN HYDROCHLORIDE 1000 MG/1
1000 TABLET, COATED ORAL
Qty: 180 | Refills: 1 | Status: ACTIVE | COMMUNITY
Start: 2023-01-11 | End: 1900-01-01

## 2024-05-29 RX ORDER — SYRING-NEEDL,DISP,INSUL,0.3 ML 31 G X1/4"
31G X 1/4" SYRINGE, EMPTY DISPOSABLE MISCELLANEOUS
Qty: 3 | Refills: 5 | Status: ACTIVE | COMMUNITY
Start: 2024-05-29 | End: 1900-01-01

## 2024-05-29 RX ORDER — BLOOD-GLUCOSE METER
70 EACH MISCELLANEOUS
Qty: 270 | Refills: 1 | Status: ACTIVE | COMMUNITY
Start: 2024-02-29 | End: 1900-01-01

## 2024-05-29 RX ORDER — FINASTERIDE 5 MG/1
5 TABLET, FILM COATED ORAL DAILY
Qty: 90 | Refills: 1 | Status: ACTIVE | COMMUNITY
Start: 2020-11-21 | End: 1900-01-01

## 2024-05-29 NOTE — HEALTH RISK ASSESSMENT
[0] : 2) Feeling down, depressed, or hopeless: Not at all (0) [PHQ-2 Negative - No further assessment needed] : PHQ-2 Negative - No further assessment needed [Former] : Former [15-19] : 15-19 [> 15 Years] : > 15 Years [[id number]] : 0

## 2024-05-29 NOTE — ASSESSMENT
There are no preventive care reminders to display for this patient.    Patient is up to date, no discussion needed.   [FreeTextEntry1] : .  89 y/o M with HTN, HLD, DM, BPH, CKD, gout, dementia, PAD s/p B/L SFA stent on Xarelto, CAD with LISA to mid LAD and mid RCA (2021) with residual stenosis of LPL, lumbosacral canal narrowing with multiple disc bulge, BL SNHL, presenting for f/u. HPI as above.  # HTN; uncontrolled? - home BP with some elevated readings - continue to monitor home BP and review in 4 weeks - low salt diet, exercise as tolerated - c/w losartan 25mg po daily and carvedilol 6.25mg po BID  # HLD - 2/2024 LDL 35; will contact cardio if ok to trial lower dose statin - c/w atorvastatin 40mg po daily for now  # DM - 3/2024 A1c 7.8 - tolerating insulin well, home sugars ~110s - c/w metformin 1g BID - endo referral - UTD optho, no DM retinopathy - podiatry referral  # BPH; stable - c/w finasteride 5mg po daily and flomax 0.4mg po daily - urology f/u  # CKD; stable - c/w DM and BP control - on losartan  # Gout; stable - recent Cr Cl conservative measure 46 - for now, c/w allopurinol 100mg daily and colchicine 0.6mg daily  # Dementia - c/w rivastigmine as per neuro - neuro f/u  # PAD s/p B/L SFA stent on Xarelto # CAD with LISA to mid LAD and mid RCA (2021), with residual stenosis of LPL - following with cardio - c/w xarelto 20mg po daily, 81mg asa, and carvedilol 6.25mg po BID  # lumbosacral canal narrowing with multiple disc bulge - rec tylenol, hot/cold pack - avoid PO NSAIDs in setting of CKD, can try topical voltaren - PT referral  # BL SNHL - seen by ENT, doesnt want hearing aids - counselled against driving  # HCM - rec geriatric eval if further concern for med regimen - counselled against driving with hearing loss and reported daytime nodding off. Pulm referral given for sleep study.  f/u 1 month

## 2024-05-29 NOTE — HISTORY OF PRESENT ILLNESS
[de-identified] : 89 y/o M with HTN, HLD, DM, BPH, CKD, gout, dementia, PAD s/p B/L SFA stent on Xarelto, CAD with LISA to mid LAD and mid RCA (2021) with residual stenosis of LPL, lumbosacral canal narrowing with multiple disc bulge, BL SNHL, presenting for f/u. Accompanied by son. Back pain stable. Some concern for pt nodding off during day. Still driving. Chronic issue. Wants to know if any meds can be stopped. Sleeping ok, some snoring.  Dementia stable with short term memory loss.

## 2024-06-03 RX ORDER — PEN NEEDLE, DIABETIC 32GX 5/32"
32G X 4 MM NEEDLE, DISPOSABLE MISCELLANEOUS
Qty: 1 | Refills: 5 | Status: ACTIVE | COMMUNITY
Start: 2024-06-03 | End: 1900-01-01

## 2024-06-04 ENCOUNTER — TRANSCRIPTION ENCOUNTER (OUTPATIENT)
Age: 89
End: 2024-06-04

## 2024-06-06 ENCOUNTER — APPOINTMENT (OUTPATIENT)
Dept: OTOLARYNGOLOGY | Facility: CLINIC | Age: 89
End: 2024-06-06

## 2024-07-09 ENCOUNTER — APPOINTMENT (OUTPATIENT)
Dept: INTERNAL MEDICINE | Facility: CLINIC | Age: 89
End: 2024-07-09

## 2024-07-26 ENCOUNTER — APPOINTMENT (OUTPATIENT)
Dept: VASCULAR SURGERY | Facility: CLINIC | Age: 89
End: 2024-07-26
Payer: MEDICARE

## 2024-07-26 PROCEDURE — 99214 OFFICE O/P EST MOD 30 MIN: CPT

## 2024-07-26 PROCEDURE — 93925 LOWER EXTREMITY STUDY: CPT

## 2024-07-26 PROCEDURE — 93923 UPR/LXTR ART STDY 3+ LVLS: CPT

## 2024-07-26 PROCEDURE — G2211 COMPLEX E/M VISIT ADD ON: CPT

## 2024-07-26 NOTE — HISTORY OF PRESENT ILLNESS
[FreeTextEntry1] : Patient is an 88-year-old male with past medical history significant for diabetes, hypertension, hyperlipidemia, and peripheral arterial disease status post bilateral SFA stents who presents to the office today for routine follow-up. Patient reports intermittent left lower extremity pain. Denies rest pain or claudication symptoms.  Denies current tissue loss or ulceration.

## 2024-07-26 NOTE — PHYSICAL EXAM
[JVD] : no jugular venous distention  [Normal Breath Sounds] : Normal breath sounds [Normal Rate and Rhythm] : normal rate and rhythm [2+] : left 2+ [Ankle Swelling (On Exam)] : not present [Varicose Veins Of Lower Extremities] : not present [] : not present [No Rash or Lesion] : No rash or lesion [Skin Ulcer] : no ulcer [Alert] : alert [Calm] : calm [de-identified] : appears well  [de-identified] : Intact

## 2024-07-26 NOTE — ASSESSMENT
[FreeTextEntry1] : 88-year-old male with peripheral arterial disease status post bilateral SFA stents.  In the office today, patient underwent duplex which demonstrates patent SFA stents bilaterally.  Patient with significant IntraStent stenosis of the left SFA with decreased flow.  Will schedule the patient for left lower extremity angiogram and intervention.  Continue Eliquis for treatment of PVD and atorvastatin for hyperlipidemia.  I spoke to the patient about the details.  Will schedule. [Arterial/Venous Disease] : arterial/venous disease [Medication Management] : medication management

## 2024-07-31 ENCOUNTER — APPOINTMENT (OUTPATIENT)
Dept: NEUROLOGY | Facility: CLINIC | Age: 89
End: 2024-07-31

## 2024-08-01 ENCOUNTER — APPOINTMENT (OUTPATIENT)
Dept: ENDOCRINOLOGY | Facility: CLINIC | Age: 89
End: 2024-08-01
Payer: MEDICARE

## 2024-08-01 ENCOUNTER — APPOINTMENT (OUTPATIENT)
Dept: PULMONOLOGY | Facility: CLINIC | Age: 89
End: 2024-08-01
Payer: MEDICARE

## 2024-08-01 ENCOUNTER — APPOINTMENT (OUTPATIENT)
Dept: INTERNAL MEDICINE | Facility: CLINIC | Age: 89
End: 2024-08-01
Payer: MEDICARE

## 2024-08-01 VITALS
SYSTOLIC BLOOD PRESSURE: 136 MMHG | BODY MASS INDEX: 23.4 KG/M2 | TEMPERATURE: 97.3 F | DIASTOLIC BLOOD PRESSURE: 67 MMHG | HEART RATE: 67 BPM | WEIGHT: 158 LBS | RESPIRATION RATE: 16 BRPM | OXYGEN SATURATION: 96 % | HEIGHT: 69 IN

## 2024-08-01 DIAGNOSIS — E11.9 TYPE 2 DIABETES MELLITUS W/OUT COMPLICATIONS: ICD-10-CM

## 2024-08-01 DIAGNOSIS — I25.10 ATHEROSCLEROTIC HEART DISEASE OF NATIVE CORONARY ARTERY W/OUT ANGINA PECTORIS: ICD-10-CM

## 2024-08-01 DIAGNOSIS — N40.0 BENIGN PROSTATIC HYPERPLASIA WITHOUT LOWER URINARY TRACT SYMPMS: ICD-10-CM

## 2024-08-01 DIAGNOSIS — E78.5 HYPERLIPIDEMIA, UNSPECIFIED: ICD-10-CM

## 2024-08-01 DIAGNOSIS — G47.9 SLEEP DISORDER, UNSPECIFIED: ICD-10-CM

## 2024-08-01 DIAGNOSIS — R31.29 OTHER MICROSCOPIC HEMATURIA: ICD-10-CM

## 2024-08-01 DIAGNOSIS — I10 ESSENTIAL (PRIMARY) HYPERTENSION: ICD-10-CM

## 2024-08-01 DIAGNOSIS — F03.90 UNSPECIFIED DEMENTIA W/OUT BEHAVIORAL DISTURBANCE: ICD-10-CM

## 2024-08-01 DIAGNOSIS — I73.9 PERIPHERAL VASCULAR DISEASE, UNSPECIFIED: ICD-10-CM

## 2024-08-01 PROCEDURE — 99215 OFFICE O/P EST HI 40 MIN: CPT

## 2024-08-01 PROCEDURE — 36415 COLL VENOUS BLD VENIPUNCTURE: CPT

## 2024-08-01 PROCEDURE — G2211 COMPLEX E/M VISIT ADD ON: CPT

## 2024-08-01 PROCEDURE — 82962 GLUCOSE BLOOD TEST: CPT

## 2024-08-01 PROCEDURE — 83036 HEMOGLOBIN GLYCOSYLATED A1C: CPT | Mod: QW

## 2024-08-01 PROCEDURE — 99214 OFFICE O/P EST MOD 30 MIN: CPT

## 2024-08-01 PROCEDURE — 99203 OFFICE O/P NEW LOW 30 MIN: CPT

## 2024-08-01 RX ORDER — EMPAGLIFLOZIN AND METFORMIN HYDROCHLORIDE 5; 1000 MG/1; MG/1
5-1000 TABLET ORAL
Qty: 180 | Refills: 3 | Status: ACTIVE | COMMUNITY
Start: 2024-08-01 | End: 1900-01-01

## 2024-08-01 NOTE — HISTORY OF PRESENT ILLNESS
[Former] : former [Never] : never [TextBox_4] :  89 year old patient presents for evaluation of snoring and witnessed apnea as per son who lives with him.   He has reportedly been a heavy snorer.   he does have regular sleep schedule.    he does drive, no witnessed episodes   Primary doctor is  Dr Purdy     PSH:  coronary stent       PMH:    peripheral arterial disease  CAD  CKD  gout   HTN BPH CAD  H pylori  DM on insulin  MI  SH:    former smoker  quit over 20 year ago        ETOH:  none     Occupation: retired, worked as transit          ALLERGY:   NKDA         Review of Systems:    no sinusitis, sinus infections, nasal obstruction no dysphagia no dry mouth       no pneumonia no obstructive airway disease  no lung cancer    no chest pain no murmur no CHF  no edema   gout no abdominal pain no liver disease    no thyroid disease     no bleeding   no DVT or PE   no kidney disease   no stroke no seizure                         [TextBox_11] : 1 [TextBox_13] : 30+ [YearQuit] : 2000

## 2024-08-01 NOTE — HISTORY OF PRESENT ILLNESS
[FreeTextEntry1] : HPI: 89-year-old male old presenting to establish care for type 2 DM.   PMHx:  HTN, HLD, DM, BPH, CKD, gout, dementia, PAD s/p B/L SFA stent on Xarelto, CAD with LISA to mid LAD and mid RCA (2021) with residual stenosis of LPL, lumbosacral canal narrowing with multiple disc bulge, BL SNHL   Allergies: NKDA    Diabetes History:  When and how diagnosed:  50+ year of Diabetes, never needed  Type of Diabetes:  Hx. of DKA or HHS?  Current DM medication regimen: Metformin 1000mg BID, Glargine 10 units at bedtime  Fingerstick Monitoring: FSG AM 65-93 FSG PM  FSG Bedtime 180 Last HBA1C:7.8% POCT A1c: 7.9% POCT Glucose: 180    LIFESTYLE FACTORS:  Eating patterns and weight history:  Diet: Breakfast: oatmeal, cereal, coffee (almond milk + splenda) Lunch: sandwich (wheat), cold cuts, oro or peanut butter, + coffee or no sugar drink  Dinner: protein chicken or fish, mixed veggies, 2 bread with peanuts butter, sometimes some white rice,  Snacks: fruits (apples, pears) stopped bananas, SF cookies  Beverages: sugar free drinks and coffee    Activity/Sleep:  will start therapy soon - would like home PT    Complications: Macrovascular- CVA/Stroke? developing dementia  CVD? CAD, denies MI  PVD? yes s/p stents    Microvascular- Retinopathy? denies  Nephropathy?  denies Neuropathy?   Follow up care: PCP: Dr. 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

## 2024-08-01 NOTE — HISTORY OF PRESENT ILLNESS
[de-identified] : 89 y/o M with HTN, HLD, DM, BPH, CKD, gout, dementia, PAD s/p B/L SFA stent on Xarelto, CAD with LISA to mid LAD and mid RCA (2021) with residual stenosis of LPL, lumbosacral canal narrowing with multiple disc bulge, BL SNHL, presenting for f/u. Accompanied by son and daughter over phone. Back pain stable. Still nodding off during day. Still driving. Snoring, seen by pulm. Dementia stable with short term memory loss.

## 2024-08-01 NOTE — REVIEW OF SYSTEMS
[Negative] : Genitourinary [FreeTextEntry6] :  as per HPI  [FreeTextEntry9] :  as per HPI  [de-identified] :  as per HPI

## 2024-08-01 NOTE — PHYSICAL EXAM
[No Acute Distress] : no acute distress [Well Nourished] : well nourished [Well Developed] : well developed [Elongated Uvula] : elongated uvula [Enlarged Base of the Tongue] : enlarged base of the tongue [III] : Mallampati Class: III [Normal Appearance] : normal appearance [Supple] : supple [No Neck Mass] : no neck mass [No JVD] : no jvd [Normal Rate/Rhythm] : normal rate/rhythm [Normal S1, S2] : normal s1, s2 [No Murmurs] : no murmurs [No Resp Distress] : no resp distress [Benign] : benign [Not Tender] : not tender [Soft] : soft [No Clubbing] : no clubbing [No Edema] : no edema [No Focal Deficits] : no focal deficits [Oriented x3] : oriented x3 [Normal Affect] : normal affect [TextBox_68] : few basilar crackles

## 2024-08-01 NOTE — DISCUSSION/SUMMARY
[FreeTextEntry1] : 89 year old man who  has been a heavy snorer during the day  he has some daytime somnolence heneralized fatigue  Son describes that he sleeps during though usually on schedule after breakfast  He did have complicated COVID-19 infection about 2 years ago  CT abdomen did not demonstrated basilar findings  PLAN  Home overnight sleep study Further recommendations based on results. Total time spent : 40 minutes Including: Preparation prior to visit - Reviewing prior record, results of tests and Consultation Reports as applicable Conducting an appropriate H & P during today's encounter  reviewing all available CT chest exams.  demonstrating images to pt as appropriate Counseling patient  Note completion  med renewal discussing differential diagnosis  Mukund Branham MD

## 2024-08-01 NOTE — ASSESSMENT
[Diabetes Foot Care] : diabetes foot care [Long Term Vascular Complications] : long term vascular complications of diabetes [Carbohydrate Consistent Diet] : carbohydrate consistent diet [Importance of Diet and Exercise] : importance of diet and exercise to improve glycemic control, achieve weight loss and improve cardiovascular health [Exercise/Effect on Glucose] : exercise/effect on glucose [Hypoglycemia Management] : hypoglycemia management [Glucagon Use] : glucagon use [Ketone Testing] : ketone testing [Action and use of Insulin] : action and use of short and long-acting insulin [Self Monitoring of Blood Glucose] : self monitoring of blood glucose [Insulin Self-Administration] : insulin self-administration [Injection Technique, Storage, Sharps Disposal] : injection technique, storage, and sharps disposal [Sick-Day Management] : sick-day management [Retinopathy Screening] : Patient was referred to ophthalmology for retinopathy screening [Diabetic Medications] : Risks and benefits of diabetic medications were discussed [FreeTextEntry1] : Type 2 Diabetes uncontrolled: c/b MI, PVD, Dementia A1c: 7.9% Glucose: 180 Current regimen: Metformin 1000mg BID, Glargine 10 units  New regimen: Stop insulin, dropping overnight- Likely highs during the day contributing to rise in A1c. To simplify regimen with try to replace Metformin with Synjardy 5-1000mg BID ophthalmology exam: going today podiatry exam: as seen one over the years  LDL: 35 GFR: 63 ace/arb: yes Losartan 25mg  statin: yes - atorvastatin 40mg BP: 136/67 BMI: 23

## 2024-08-01 NOTE — ASSESSMENT
[FreeTextEntry1] : . 89 y/o M with HTN, HLD, DM, BPH, CKD, gout, dementia, PAD s/p B/L SFA stent on Xarelto, CAD with LISA to mid LAD and mid RCA (2021) with residual stenosis of LPL, lumbosacral canal narrowing with multiple disc bulge, BL SNHL, presenting for f/u. HPI as above.  # HTN; borderline controlled - continue to monitor home BP and review with cardio at end of month - low salt diet, exercise as tolerated - c/w losartan 25mg po daily and carvedilol 6.25mg po BID  # HLD - c/w atorvastatin 40mg po daily  # DM - 3/2024 A1c 7.8 - c/w metformin 1g BID, insulin as per endo - endo eval today - UTD optho, no DM retinopathy - podiatry referral  # BPH; stable # Hx microscopic hematuria - c/w finasteride 5mg po daily and flomax 0.4mg po daily - urology f/u  # CKD; stable - c/w DM and BP control - on losartan  # Gout; stable - last Cr Cl conservative measure 46 - for now, c/w allopurinol 100mg daily and colchicine 0.6mg daily  # Dementia - c/w rivastigmine as per neuro - neuro f/u  # PAD s/p B/L SFA stent on Xarelto; planned for angiogram and possible intervention # CAD with LISA to mid LAD and mid RCA (2021), with residual stenosis of LPL - following with cardio and vascular - c/w xarelto 20mg po daily, 81mg asa, atorvastatin 40mg po daily, and carvedilol 6.25mg po BID  # lumbosacral canal narrowing with multiple disc bulge - rec tylenol, hot/cold pack - avoid PO NSAIDs in setting of CKD, can try topical voltaren - PT referral, will try to arrange home PT, doesnt want to see ortho at this time  # BL SNHL - seen by ENT, doesnt want hearing aids - counselled against driving  # HCM - counselled against driving with hearing loss and reported daytime nodding off. seen by pulm, planned for sleep study  f/u 2-3 months

## 2024-08-02 LAB
ALBUMIN SERPL ELPH-MCNC: 4.6 G/DL
ALP BLD-CCNC: 59 U/L
ALT SERPL-CCNC: 15 U/L
ANION GAP SERPL CALC-SCNC: 15 MMOL/L
APPEARANCE: CLEAR
AST SERPL-CCNC: 25 U/L
BASOPHILS # BLD AUTO: 0.06 K/UL
BASOPHILS NFR BLD AUTO: 0.9 %
BILIRUB SERPL-MCNC: 0.4 MG/DL
BILIRUBIN URINE: NEGATIVE
BLOOD URINE: NEGATIVE
BUN SERPL-MCNC: 20 MG/DL
CALCIUM SERPL-MCNC: 9.9 MG/DL
CHLORIDE SERPL-SCNC: 107 MMOL/L
CHOLEST SERPL-MCNC: 93 MG/DL
CO2 SERPL-SCNC: 22 MMOL/L
COLOR: YELLOW
CREAT SERPL-MCNC: 1.16 MG/DL
EGFR: 61 ML/MIN/1.73M2
EOSINOPHIL # BLD AUTO: 0.48 K/UL
EOSINOPHIL NFR BLD AUTO: 7.3 %
GLUCOSE QUALITATIVE U: NEGATIVE MG/DL
GLUCOSE SERPL-MCNC: 136 MG/DL
HCT VFR BLD CALC: 36.8 %
HDLC SERPL-MCNC: 35 MG/DL
HGB BLD-MCNC: 11 G/DL
IMM GRANULOCYTES NFR BLD AUTO: 0.3 %
IRON SATN MFR SERPL: 13 %
IRON SERPL-MCNC: 56 UG/DL
KETONES URINE: NEGATIVE MG/DL
LDLC SERPL CALC-MCNC: 39 MG/DL
LEUKOCYTE ESTERASE URINE: NEGATIVE
LYMPHOCYTES # BLD AUTO: 1.51 K/UL
LYMPHOCYTES NFR BLD AUTO: 22.8 %
MAN DIFF?: NORMAL
MCHC RBC-ENTMCNC: 25.6 PG
MCHC RBC-ENTMCNC: 29.9 GM/DL
MCV RBC AUTO: 85.8 FL
MONOCYTES # BLD AUTO: 0.52 K/UL
MONOCYTES NFR BLD AUTO: 7.9 %
NEUTROPHILS # BLD AUTO: 4.03 K/UL
NEUTROPHILS NFR BLD AUTO: 60.8 %
NITRITE URINE: NEGATIVE
NONHDLC SERPL-MCNC: 58 MG/DL
PH URINE: 6
PLATELET # BLD AUTO: 128 K/UL
POTASSIUM SERPL-SCNC: 5.2 MMOL/L
PROT SERPL-MCNC: 7.2 G/DL
PROTEIN URINE: NORMAL MG/DL
RBC # BLD: 4.29 M/UL
RBC # FLD: 19.2 %
SODIUM SERPL-SCNC: 144 MMOL/L
SPECIFIC GRAVITY URINE: 1.02
TIBC SERPL-MCNC: 417 UG/DL
TRIGL SERPL-MCNC: 105 MG/DL
TSH SERPL-ACNC: 1.07 UIU/ML
UIBC SERPL-MCNC: 361 UG/DL
UROBILINOGEN URINE: 1 MG/DL
WBC # FLD AUTO: 6.62 K/UL

## 2024-08-03 LAB
ALBUMIN SERPL ELPH-MCNC: 4.3 G/DL
ALP BLD-CCNC: 56 U/L
ALT SERPL-CCNC: 17 U/L
ANION GAP SERPL CALC-SCNC: 12 MMOL/L
AST SERPL-CCNC: 26 U/L
BILIRUB SERPL-MCNC: 0.4 MG/DL
BUN SERPL-MCNC: 22 MG/DL
CALCIUM SERPL-MCNC: 9.7 MG/DL
CHLORIDE SERPL-SCNC: 104 MMOL/L
CO2 SERPL-SCNC: 23 MMOL/L
CREAT SERPL-MCNC: 1.26 MG/DL
EGFR: 55 ML/MIN/1.73M2
FERRITIN SERPL-MCNC: 20 NG/ML
GLUCOSE SERPL-MCNC: 182 MG/DL
POTASSIUM SERPL-SCNC: 4.6 MMOL/L
PROT SERPL-MCNC: 6.4 G/DL
SODIUM SERPL-SCNC: 139 MMOL/L

## 2024-08-04 LAB
APTT BLD: 34.7 SEC
HCT VFR BLD CALC: 32.4 %
HGB BLD-MCNC: 10 G/DL
INR PPP: 1.15 RATIO
MCHC RBC-ENTMCNC: 25.8 PG
MCHC RBC-ENTMCNC: 30.9 GM/DL
MCV RBC AUTO: 83.7 FL
PLATELET # BLD AUTO: 120 K/UL
PT BLD: 12.9 SEC
RBC # BLD: 3.87 M/UL
RBC # FLD: 18.6 %
WBC # FLD AUTO: 6.81 K/UL

## 2024-08-06 LAB
FOLATE SERPL-MCNC: 10.5 NG/ML
VIT B12 SERPL-MCNC: 537 PG/ML

## 2024-08-13 NOTE — ASSESSMENT
[FreeTextEntry1] : 88-year-old male with peripheral arterial disease status post bilateral SFA stents  Patient with significant IntraStent stenosis of the left SFA with decreased flow. plan for left lower extremity angiogram and intervention.  . [Arterial/Venous Disease] : arterial/venous disease [Medication Management] : medication management

## 2024-08-13 NOTE — PHYSICAL EXAM
[JVD] : no jugular venous distention  [Normal Heart Sounds] : normal heart sounds [Normal Breath Sounds] : Normal breath sounds [0] : right 0 [2+] : left 2+ [Ankle Swelling (On Exam)] : not present [Varicose Veins Of Lower Extremities] : not present [] : not present [Abdomen Masses] : No abdominal masses [No Rash or Lesion] : No rash or lesion [Skin Ulcer] : no ulcer [Alert] : alert [Oriented to Person] : oriented to person [Oriented to Place] : oriented to place [Oriented to Time] : oriented to time [Calm] : calm [de-identified] : appears well

## 2024-08-13 NOTE — HISTORY OF PRESENT ILLNESS
[FreeTextEntry1] : accompanied by son Antonio [phone] Cr 1.26 8/3/2024 alert and oriented last Eliquis feels ok no reported falls [FreeTextEntry5] : yesterday at [FreeTextEntry6] :

## 2024-08-13 NOTE — PROCEDURE
[FreeTextEntry1] : aortogram, left  leg angiogram [Groin check for bleeding/hematoma, vitals checked, and Doppler/pulse checked on admission, then every 15 minutes for an hour and every 30 minutes for 3 hours thereafter.] : Groin check for bleeding/hematoma, vitals checked, and Doppler/pulse checked on admission, then every 15 minutes for an hour and every 30 minutes for 3 hours thereafter.  [IVF: ____ 0.9 NS] : IVF: [unfilled] 0.9 NS [at ____ ml/hr] : at [unfilled] ml/hr [x ____ h, then d/c.] : x [unfilled] h, then d/c. [Keep flat for 2 hours, then elevate head gradually as tolerated] : Keep flat for 2 hours, then elevate head gradually as tolerated [Bed rest for 3.5 hours, then ambulate and observe site for bleeding] : Bed rest for 3.5 hours, then ambulate and observe site for bleeding [Asprin 81mg] : Aspirin 81mg [Resume Diet] : resume diet [Other: _____] : [unfilled] [D/C IV on discharge] : D/C IV on discharge [Resume diet] : resume diet

## 2024-08-14 ENCOUNTER — APPOINTMENT (OUTPATIENT)
Dept: ENDOVASCULAR SURGERY | Facility: CLINIC | Age: 89
End: 2024-08-14

## 2024-08-14 ENCOUNTER — RX RENEWAL (OUTPATIENT)
Age: 89
End: 2024-08-14

## 2024-08-14 DIAGNOSIS — I73.9 PERIPHERAL VASCULAR DISEASE, UNSPECIFIED: ICD-10-CM

## 2024-08-20 ENCOUNTER — APPOINTMENT (OUTPATIENT)
Dept: UROLOGY | Facility: CLINIC | Age: 89
End: 2024-08-20
Payer: MEDICARE

## 2024-08-20 VITALS
WEIGHT: 158 LBS | BODY MASS INDEX: 23.4 KG/M2 | SYSTOLIC BLOOD PRESSURE: 176 MMHG | HEART RATE: 62 BPM | HEIGHT: 69 IN | TEMPERATURE: 97.4 F | DIASTOLIC BLOOD PRESSURE: 74 MMHG | OXYGEN SATURATION: 96 %

## 2024-08-20 DIAGNOSIS — N39.0 URINARY TRACT INFECTION, SITE NOT SPECIFIED: ICD-10-CM

## 2024-08-20 DIAGNOSIS — N40.0 BENIGN PROSTATIC HYPERPLASIA WITHOUT LOWER URINARY TRACT SYMPMS: ICD-10-CM

## 2024-08-20 PROCEDURE — G2211 COMPLEX E/M VISIT ADD ON: CPT

## 2024-08-20 PROCEDURE — 99214 OFFICE O/P EST MOD 30 MIN: CPT

## 2024-08-20 NOTE — HISTORY OF PRESENT ILLNESS
[FreeTextEntry1] : Very pleasant 89-year-old gentleman presents for follow-up of BPH with urinary obstruction, dysuria, history of UTI.  He reports that tamsulosin and finasteride continue to improve his urinary symptoms.  He denies dysuria at this time.  No flank pain or suprapubic pain.  No nausea or vomiting.  No other complaints.

## 2024-08-20 NOTE — ASSESSMENT
[FreeTextEntry1] : Very pleasant 89 year old gentleman who presents for follow up of BPH, recurrent UTIs -Continue tamsulosin- refill sent to the pharmacy -Continue finasteride- refill sent to the pharmacy -WBC 6.81 -UA demonstrates no blood, negative leukocyte esterase, negative nitrites -Creatinine 1.26 -F/U in 6 months  Patient is being seen today for evaluation and management of a chronic and longitudinal ongoing condition and I am of the primary treating physician

## 2024-08-26 ENCOUNTER — APPOINTMENT (OUTPATIENT)
Dept: NEUROLOGY | Facility: CLINIC | Age: 89
End: 2024-08-26
Payer: MEDICARE

## 2024-08-26 VITALS
SYSTOLIC BLOOD PRESSURE: 137 MMHG | HEART RATE: 68 BPM | BODY MASS INDEX: 21.77 KG/M2 | DIASTOLIC BLOOD PRESSURE: 72 MMHG | WEIGHT: 147 LBS | HEIGHT: 69 IN

## 2024-08-26 DIAGNOSIS — E11.9 TYPE 2 DIABETES MELLITUS W/OUT COMPLICATIONS: ICD-10-CM

## 2024-08-26 DIAGNOSIS — F03.90 UNSPECIFIED DEMENTIA W/OUT BEHAVIORAL DISTURBANCE: ICD-10-CM

## 2024-08-26 DIAGNOSIS — R45.3 DEMORALIZATION AND APATHY: ICD-10-CM

## 2024-08-26 DIAGNOSIS — G47.00 INSOMNIA, UNSPECIFIED: ICD-10-CM

## 2024-08-26 DIAGNOSIS — M10.9 GOUT, UNSPECIFIED: ICD-10-CM

## 2024-08-26 DIAGNOSIS — R41.3 OTHER AMNESIA: ICD-10-CM

## 2024-08-26 PROCEDURE — 99205 OFFICE O/P NEW HI 60 MIN: CPT

## 2024-08-26 PROCEDURE — G2211 COMPLEX E/M VISIT ADD ON: CPT

## 2024-08-26 NOTE — DATA REVIEWED
[de-identified] : 	 EXAM: [id number] - MR BRAIN  - ORDERED BY: TORO RIZO   PROCEDURE DATE:  01/19/2024    INTERPRETATION:  CLINICAL INDICATION: Dementia.  TECHNIQUE: Multi-planar, multi-squence noncontrast brain MRI was performed.  COMPARISON: 10/18/2022  FINDINGS:  VENTRICLES AND SULCI:  Normal. INTRA-AXIAL:  No mass, acute intracranial hemorrhage or evidence of acute cerebral ischemia. There are a few scattered foci of hyperintense T2 signal within the subcortical and periventricular white matter which are nonspecific but likely related to chronic microvascular ischemic disease. EXTRA-AXIAL:  No mass or collection. VISUALIZED SINUSES: Mild polypoid mucosal thickening. VISUALIZED MASTOIDS:  Clear. CALVARIUM:  Normal. CAROTID FLOW VOIDS: Normal. MISCELLANEOUS:  None.  IMPRESSION:  No evidence for intracranial mass, acute territorial infarct, acute intracranial hemorrhage, or midline shift.  --- End of Report ---

## 2024-08-26 NOTE — DATA REVIEWED
[de-identified] : 	 EXAM: [id number] - MR BRAIN  - ORDERED BY: TORO RIZO   PROCEDURE DATE:  01/19/2024    INTERPRETATION:  CLINICAL INDICATION: Dementia.  TECHNIQUE: Multi-planar, multi-squence noncontrast brain MRI was performed.  COMPARISON: 10/18/2022  FINDINGS:  VENTRICLES AND SULCI:  Normal. INTRA-AXIAL:  No mass, acute intracranial hemorrhage or evidence of acute cerebral ischemia. There are a few scattered foci of hyperintense T2 signal within the subcortical and periventricular white matter which are nonspecific but likely related to chronic microvascular ischemic disease. EXTRA-AXIAL:  No mass or collection. VISUALIZED SINUSES: Mild polypoid mucosal thickening. VISUALIZED MASTOIDS:  Clear. CALVARIUM:  Normal. CAROTID FLOW VOIDS: Normal. MISCELLANEOUS:  None.  IMPRESSION:  No evidence for intracranial mass, acute territorial infarct, acute intracranial hemorrhage, or midline shift.  --- End of Report ---

## 2024-08-26 NOTE — ASSESSMENT
[FreeTextEntry1] : Assessment: 89-year-old male with pmh gout, gld, htn, d.m. Independent in adls and needs minimal assiatnce with iadls. MMSE 18/30 with poor visual spatial sill, poor recall of words. He was able to draw a clock but missed a 2 in the clock. He was able to write a complete sentence.   Diagnostic Impression:     -Dementia -cognitive decline -Apathy   Plan: -Continue meds as is.  -Educated on importance of lifestyle modifications such and daily exercise, healthy balanced diet and good sleep habits

## 2024-08-26 NOTE — PHYSICAL EXAM
[General Appearance - Alert] : alert [Affect] : the affect was normal [Date / Time ___ / 5] : date / time [unfilled] / 5 [Place ___ / 5] : place [unfilled] / 5 [Registration ___ / 3] : registration [unfilled] / 3 [Serial Sevens ___/5] : serial sevens [unfilled] / 5 [Naming 2 Objects ___ / 2] : naming two objects [unfilled] / 2 [Repeating a Sentence ___ / 1] : repeating a sentence [unfilled] / 1 [3-stage Verbal Command ___ / 3] : three-stage verbal command [unfilled] / 3 [Person] : oriented to person [Place] : oriented to place [Time] : oriented to time [Short Term Intact] : short term memory impaired [Remote Intact] : remote memory intact [Registration Intact] : recent registration memory intact [Span Intact] : the attention span was normal [Concentration Intact] : normal concentrating ability [Visual Intact] : visual attention was ~T ~L decreased [Naming Objects] : no difficulty naming common objects [Repeating Phrases] : no difficulty repeating a phrase [Writing A Sentence] : no difficulty writing a sentence [Fluency] : fluency intact [Comprehension] : comprehension intact [Reading] : reading intact [Current Events] : inadequate knowledge of current events [Past History] : inadequate knowledge of personal past history [Vocabulary] : adequate range of vocabulary [Total Score ___ / 30] : the patient achieved a score of [unfilled] /30 [Writing a Sentence ___ / 1] : write sentence [unfilled] / 1 [Written Command ___ / 1] : written command [unfilled] / 1 [Copy a Design ___ / 1] : copy a design [unfilled] / 1 [Recall ___ / 3] : recall [unfilled] / 3 [Cranial Nerves Optic (II)] : visual acuity intact bilaterally,  visual fields full to confrontation, pupils equal round and reactive to light [Cranial Nerves Oculomotor (III)] : extraocular motion intact [Cranial Nerves Trigeminal (V)] : facial sensation intact symmetrically [Cranial Nerves Facial (VII)] : face symmetrical [Cranial Nerves Glossopharyngeal (IX)] : tongue and palate midline [Cranial Nerves Accessory (XI - Cranial And Spinal)] : head turning and shoulder shrug symmetric [Cranial Nerves Hypoglossal (XII)] : there was no tongue deviation with protrusion [Motor Tone] : muscle tone was normal in all four extremities [Motor Strength] : muscle strength was normal in all four extremities [Motor Strength Upper Extremities Bilaterally] : strength was normal in both upper extremities [Motor Strength Lower Extremities Bilaterally] : strength was normal in both lower extremities [Romberg's Sign] : Romberg's sign was negtive [Abnormal Walk] : normal gait [Limited Balance] : balance was intact [Tremor] : no tremor present [2+] : Ankle jerk left 2+ [FreeTextEntry4] : Mental Status Exam   Presidents: 0/5 Alternating Pattern: ok Spiral: ok Clock: 1/3- missing 2 in clock  Repetition: ok Trail A: B:  Fluency: A: Animals:   Go-No-Go:  difficulty  R/L discrimination on self and examiner: ok  Cross-line commands: ok Praxis: ok - Motor: ok -Dynamic/Luria: difficulty   -Ideomotor/Imitation: ok  -Ideational/writing/closing-in: ok  -Dressing: ok [Sclera] : the sclera and conjunctiva were normal [PERRL With Normal Accommodation] : pupils were equal in size, round, reactive to light, with normal accommodation [Extraocular Movements] : extraocular movements were intact [Full Visual Field] : full visual field

## 2024-08-26 NOTE — HISTORY OF PRESENT ILLNESS
[FreeTextEntry1] : COVID 2020, hospitalized a day, no meds  COVID VACCINE FULL.  HPI: 89-year-old male presents today for initial cognitive evaluation with his son. He was diagnosed with early stages of Dementia. He lives with his son. Memory loss x 2-3 years with slow progression. No hallucinations or delusions. His sister has Dementia (80s). No changes in behavior or personality. He receives PT 2x weekly    PMH:  HTN, HLD, D.M, PVD  -Memory: STM loss   -Speech: ok  -Orientation: ok -Praxis: ok  -Decision making/Executive fx/Multitasking: ok  -Sleep: good    -Appetite: good  -Motor symptoms: fell when getting up in morning today. foot fell asleep    -B/B:  none -Psychiatric symptoms: None    -Functional status:   Pardo Index of [place] in Activities of Daily Livin. Bathing/Showerin  2. Dressin  3. Toiletin   4. Transferrin 5. Continence:  1 6: Feedin TOTAL:  6     Simeon-Braeden Instrumental Activities of Daily Living:  A. Ability to Use Telephone: 1   B. Shoppin C. Food Preparation: 1    D. Housekeepin   E. Laundry:  0 F. Transportation: 0    G. Responsibility for Own Medications: 0  H: Ability to Handle Finances:  1 TOTAL: 5 CDR:  0.5  -Professional status: drove trains   PCP and other physicians:  -PCP: Dr. Luis Purdy   -Neuro: Irma Hobbs  Workup done: MRI - chronic microvascular changes, some volume loss

## 2024-08-26 NOTE — REASON FOR VISIT
[Initial Evaluation] : an initial evaluation [Family Member] : family member [FreeTextEntry1] : memory loss

## 2024-08-26 NOTE — REVIEW OF SYSTEMS
[Memory Lapses or Loss] : memory loss [Leg Weakness] : leg weakness [Loss Of Hearing] : hearing loss [Negative] : Heme/Lymph [Confused or Disoriented] : no confusion [Decr. Concentrating Ability] : no decrease in concentrating ability [Difficulty with Language] : no ~M difficulty with language [Changed Thought Patterns] : no change in thought patterns [Repeating Questions] : no repeated questioning about recent events [Facial Weakness] : no facial weakness [Arm Weakness] : no arm weakness [Hand Weakness] : no hand weakness [Poor Coordination] : good coordination [Difficulty Writing] : no difficulty writing [Difficulties in Speech] : no speech difficulties [Numbness] : no numbness [Tingling] : no tingling [Abnormal Sensation] : no abnormal sensation [Hypersensitivity] : no hypersensitivity [Seizures] : no convulsions [Dizziness] : no dizziness [Fainting] : no fainting [Lightheadedness] : no lightheadedness [Vertigo] : no vertigo [Cluster Headache] : no cluster headache [Migraine Headache] : no migraine headache [Tension Headache] : no tension-type headache

## 2024-08-26 NOTE — HISTORY OF PRESENT ILLNESS
[FreeTextEntry1] : COVID 2020, hospitalized a day, no meds  COVID VACCINE FULL.  HPI: 89-year-old male presents today for initial cognitive evaluation with his son. He was diagnosed with early stages of Dementia. He lives with his son. Memory loss x 2-3 years with slow progression. No hallucinations or delusions. His sister has Dementia (80s). No changes in behavior or personality. He receives PT 2x weekly    PMH:  HTN, HLD, D.M, PVD  -Memory: STM loss   -Speech: ok  -Orientation: ok -Praxis: ok  -Decision making/Executive fx/Multitasking: ok  -Sleep: good    -Appetite: good  -Motor symptoms: fell when getting up in morning today. foot fell asleep    -B/B:  none -Psychiatric symptoms: None    -Functional status:   Pardo Index of [place] in Activities of Daily Livin. Bathing/Showerin  2. Dressin  3. Toiletin   4. Transferrin 5. Continence:  1 6: Feedin TOTAL:  6     Simeon-Braeden Instrumental Activities of Daily Living:  A. Ability to Use Telephone: 1   B. Shoppin C. Food Preparation: 1    D. Housekeepin   E. Laundry:  0 F. Transportation: 0    G. Responsibility for Own Medications: 0  H: Ability to Handle Finances:  1 TOTAL: 5 CDR:  0.5  -Professional status: drove trains   PCP and other physicians:  -PCP: Dr. Luis Purdy   -Neuro: Irma Hobbs  Workup done: MRI - chronic microvascular changes, some volume loss

## 2024-08-27 ENCOUNTER — NON-APPOINTMENT (OUTPATIENT)
Age: 89
End: 2024-08-27

## 2024-08-27 ENCOUNTER — APPOINTMENT (OUTPATIENT)
Dept: CARDIOLOGY | Facility: CLINIC | Age: 89
End: 2024-08-27
Payer: COMMERCIAL

## 2024-08-27 VITALS
WEIGHT: 158 LBS | TEMPERATURE: 97.3 F | DIASTOLIC BLOOD PRESSURE: 65 MMHG | OXYGEN SATURATION: 97 % | SYSTOLIC BLOOD PRESSURE: 129 MMHG | BODY MASS INDEX: 23.33 KG/M2 | HEART RATE: 70 BPM

## 2024-08-27 DIAGNOSIS — I10 ESSENTIAL (PRIMARY) HYPERTENSION: ICD-10-CM

## 2024-08-27 DIAGNOSIS — I25.10 ATHEROSCLEROTIC HEART DISEASE OF NATIVE CORONARY ARTERY W/OUT ANGINA PECTORIS: ICD-10-CM

## 2024-08-27 DIAGNOSIS — E78.5 HYPERLIPIDEMIA, UNSPECIFIED: ICD-10-CM

## 2024-08-27 PROCEDURE — G2211 COMPLEX E/M VISIT ADD ON: CPT | Mod: NC

## 2024-08-27 PROCEDURE — 99214 OFFICE O/P EST MOD 30 MIN: CPT

## 2024-08-27 NOTE — REASON FOR VISIT
[Hypertension] : hypertension [Coronary Artery Disease] : coronary artery disease [Other: _____] : [unfilled]

## 2024-08-30 NOTE — PHYSICAL EXAM
[de-identified] : General: No acute distress HEENT: EOMI  Neck: Supple, No JVD, no bruits Lungs: Clear to auscultation bilaterally; No rales or wheezing Heart: Regular rate and rhythm; No murmurs, rubs, or gallops Abdomen: Nontender, bowel sounds present Extremities: No clubbing, cyanosis; trace LE edema B/L Nervous system:  Alert & Oriented X3, no focal deficits Psychiatric: Normal affect Skin: No rashes or lesions

## 2024-08-30 NOTE — ASSESSMENT
[FreeTextEntry1] : 89 old male with HTN, HLD, CKD, PAD s/p B/L SFA stent on Xarelto, with abnormal nuclear stress test in 06/2022 showing apical MI and EF 45 to 50%, with subsequent cardiac catheterization and placement of LISA to mid LAD and mid RCA, with residual stenosis of LPL, who presents for follow-up visit. Overall doing well.  1. CAD s/p PCI: No anginal complaints - Continue atorvastatin - Continue carvedilol (patient previously switched from metoprolol at OSH) - Will consider sending patient for PCI of LPL if he has further anginal symptoms in the future - Patient is currently on aspirin 81 mg, on Xarelto for PAD  2. HTN: Well controlled, continue losartan 25 and Coreg 6.25mg PO BID   3. HLD: Continue atorvastatin, LDL at target   Follow-up visit in 6 months or as needed.

## 2024-08-30 NOTE — HISTORY OF PRESENT ILLNESS
[FreeTextEntry1] : 89 old male with HTN, HLD, DM, CKD, PAD s/p B/L SFA stent on Xarelto, CAD s/p LISA to mid LAD and mid RCA, with residual stenosis of LPL (2022), and EF 45-50% in 06/2022 who presents for FUV.   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 Denies any chest pain, dyspnea, palpitations, lightheadedness, or syncope. No bleeding with Xarelto.

## 2024-08-30 NOTE — PHYSICAL EXAM
[de-identified] : General: No acute distress HEENT: EOMI  Neck: Supple, No JVD, no bruits Lungs: Clear to auscultation bilaterally; No rales or wheezing Heart: Regular rate and rhythm; No murmurs, rubs, or gallops Abdomen: Nontender, bowel sounds present Extremities: No clubbing, cyanosis; trace LE edema B/L Nervous system:  Alert & Oriented X3, no focal deficits Psychiatric: Normal affect Skin: No rashes or lesions

## 2024-09-03 RX ORDER — METFORMIN HYDROCHLORIDE 1000 MG/1
1000 TABLET, COATED ORAL
Qty: 180 | Refills: 3 | Status: ACTIVE | COMMUNITY
Start: 2024-09-03 | End: 1900-01-01

## 2024-09-03 RX ORDER — SITAGLIPTIN AND METFORMIN HYDROCHLORIDE 50; 1000 MG/1; MG/1
50-1000 TABLET, FILM COATED ORAL TWICE DAILY
Refills: 0 | Status: DISCONTINUED | COMMUNITY
Start: 2024-08-26 | End: 2024-09-03

## 2024-09-04 ENCOUNTER — APPOINTMENT (OUTPATIENT)
Dept: ENDOVASCULAR SURGERY | Facility: CLINIC | Age: 89
End: 2024-09-04
Payer: MEDICARE

## 2024-09-04 ENCOUNTER — RX RENEWAL (OUTPATIENT)
Age: 89
End: 2024-09-04

## 2024-09-04 ENCOUNTER — LABORATORY RESULT (OUTPATIENT)
Age: 89
End: 2024-09-04

## 2024-09-04 ENCOUNTER — RESULT REVIEW (OUTPATIENT)
Age: 89
End: 2024-09-04

## 2024-09-04 VITALS
SYSTOLIC BLOOD PRESSURE: 153 MMHG | TEMPERATURE: 97.4 F | RESPIRATION RATE: 16 BRPM | HEIGHT: 69 IN | WEIGHT: 165 LBS | HEART RATE: 74 BPM | BODY MASS INDEX: 24.44 KG/M2 | DIASTOLIC BLOOD PRESSURE: 79 MMHG | OXYGEN SATURATION: 99 %

## 2024-09-04 DIAGNOSIS — I73.9 PERIPHERAL VASCULAR DISEASE, UNSPECIFIED: ICD-10-CM

## 2024-09-04 PROCEDURE — 37225Z: CUSTOM | Mod: LT

## 2024-09-04 PROCEDURE — 37225Z: CUSTOM | Mod: 82,LT

## 2024-09-04 PROCEDURE — 75625 CONTRAST EXAM ABDOMINL AORTA: CPT

## 2024-09-04 PROCEDURE — 76937 US GUIDE VASCULAR ACCESS: CPT

## 2024-09-04 PROCEDURE — 37186Z: CUSTOM | Mod: 59

## 2024-09-10 NOTE — PHYSICAL EXAM
[JVD] : no jugular venous distention  [Ankle Swelling (On Exam)] : not present [Varicose Veins Of Lower Extremities] : not present [Abdomen Masses] : No abdominal masses [] : not present [Skin Ulcer] : no ulcer [de-identified] : appears well

## 2024-09-10 NOTE — PHYSICAL EXAM
[JVD] : no jugular venous distention  [Ankle Swelling (On Exam)] : not present [Varicose Veins Of Lower Extremities] : not present [Abdomen Masses] : No abdominal masses [] : not present [Skin Ulcer] : no ulcer [de-identified] : appears well

## 2024-09-10 NOTE — HISTORY OF PRESENT ILLNESS
[FreeTextEntry1] : accompanied by son Antonio [phone] Cr 1.26 8/3/2024 alert and oriented last Xarelto and Synjardy 4 days ago took BP meds, plavix and aspirin this morning feels ok no reported falls [FreeTextEntry5] : yesterday at 600pm [FreeTextEntry6] : Dr. Chow

## 2024-09-10 NOTE — PAST MEDICAL HISTORY
[FreeTextEntry1] : Malignant Hyperthermia Screening Tool and Risk of Bleeding Assessment\par  \par  Mr. SWAPNA FINK denies family history of unexpected death following Anesthesia or Exercise.\par  Denies Family history of Malignant Hyperthermia, Muscle or Neuromuscular disorder and High Temperature following exercise.\par  \par  Mr. SWAPNA FINK denies history of Muscle Spasm, Dark or Chocolate - Colored urine and Unanticipated fever immediately following anesthesia or serious exercise. \par  Mr. FINK also denies bleeding tendencies/ Risks of Bleeding.\par

## 2024-09-10 NOTE — HISTORY OF PRESENT ILLNESS
[FreeTextEntry1] : accompanied by son Antonio [phone] Cr 1.26 8/3/2024 alert and oriented last Xarelto and Synjardy 4 days ago took BP meds, plavix and aspirin this morning feels ok no reported falls [FreeTextEntry6] : Dr. Chow [FreeTextEntry5] : yesterday at 600pm

## 2024-09-10 NOTE — HISTORY OF PRESENT ILLNESS
[FreeTextEntry1] : accompanied by son Antonio [phone] Cr 1.26 8/3/2024 alert and oriented last Xarelto and Synjardy 4 days ago took BP meds, plavix and aspirin this morning feels ok no reported falls [FreeTextEntry6] : Dr. Chow [FreeTextEntry5] : yesterday at 600pm

## 2024-09-10 NOTE — ASSESSMENT
[FreeTextEntry1] : 88-year-old male with peripheral arterial disease status post bilateral SFA stents  Patient with significant IntraStent stenosis of the left SFA with decreased flow. plan for left lower extremity angiogram and intervention.  .

## 2024-09-10 NOTE — PHYSICAL EXAM
[JVD] : no jugular venous distention  [Ankle Swelling (On Exam)] : not present [Varicose Veins Of Lower Extremities] : not present [] : not present [Abdomen Masses] : No abdominal masses [Skin Ulcer] : no ulcer [de-identified] : appears well

## 2024-09-20 ENCOUNTER — APPOINTMENT (OUTPATIENT)
Dept: VASCULAR SURGERY | Facility: CLINIC | Age: 89
End: 2024-09-20
Payer: MEDICARE

## 2024-09-20 PROCEDURE — 99214 OFFICE O/P EST MOD 30 MIN: CPT

## 2024-09-20 PROCEDURE — G2211 COMPLEX E/M VISIT ADD ON: CPT

## 2024-09-20 PROCEDURE — 93926 LOWER EXTREMITY STUDY: CPT | Mod: LT

## 2024-09-20 NOTE — ASSESSMENT
[FreeTextEntry1] : 88-year-old male with peripheral arterial disease status post bilateral SFA stents.  In the office today, patient underwent duplex which demonstrates patent SFA stents bilaterally. Status post left IntraStent stenosis atherectomy and angioplasty with excellent results.  Patient reports significant improvement of symptoms..  Continue Eliquis for treatment of PVD and atorvastatin for hyperlipidemia.  I spoke to the patient about the details.   [Arterial/Venous Disease] : arterial/venous disease [Medication Management] : medication management

## 2024-09-20 NOTE — PHYSICAL EXAM
[JVD] : no jugular venous distention  [Normal Breath Sounds] : Normal breath sounds [Normal Rate and Rhythm] : normal rate and rhythm [2+] : left 2+ [Ankle Swelling (On Exam)] : not present [Varicose Veins Of Lower Extremities] : not present [] : not present [No Rash or Lesion] : No rash or lesion [Skin Ulcer] : no ulcer [Alert] : alert [Calm] : calm [de-identified] : appears well  [de-identified] : Intact

## 2024-10-01 NOTE — PATIENT PROFILE ADULT - NSPROGENBLOODRESTRICT_GEN_A_NUR
Staff returned call to pt.. she advised she had a positive cologuard and need to make appt w/NBP.. staff asked her if her PCP sent referral.. she stated Dr Marino Bernstein may have faxed referral but she will call his ofc in the morning to make sure.. staff advised pt she wasn't taking new pt at this time but if he sent the referral we would check on it...  guero   none

## 2024-10-09 ENCOUNTER — APPOINTMENT (OUTPATIENT)
Dept: NEUROLOGY | Facility: CLINIC | Age: 89
End: 2024-10-09

## 2024-11-05 ENCOUNTER — RX RENEWAL (OUTPATIENT)
Age: 89
End: 2024-11-05

## 2024-11-07 ENCOUNTER — APPOINTMENT (OUTPATIENT)
Dept: ENDOCRINOLOGY | Facility: CLINIC | Age: 89
End: 2024-11-07
Payer: MEDICARE

## 2024-11-07 ENCOUNTER — APPOINTMENT (OUTPATIENT)
Dept: PULMONOLOGY | Facility: CLINIC | Age: 89
End: 2024-11-07
Payer: MEDICARE

## 2024-11-07 ENCOUNTER — APPOINTMENT (OUTPATIENT)
Dept: INTERNAL MEDICINE | Facility: CLINIC | Age: 89
End: 2024-11-07

## 2024-11-07 VITALS
TEMPERATURE: 97.8 F | OXYGEN SATURATION: 98 % | BODY MASS INDEX: 24.29 KG/M2 | SYSTOLIC BLOOD PRESSURE: 162 MMHG | DIASTOLIC BLOOD PRESSURE: 66 MMHG | HEART RATE: 58 BPM | HEIGHT: 69 IN | WEIGHT: 164 LBS

## 2024-11-07 DIAGNOSIS — E11.40 TYPE 2 DIABETES MELLITUS WITH DIABETIC NEUROPATHY, UNSPECIFIED: ICD-10-CM

## 2024-11-07 DIAGNOSIS — I73.9 PERIPHERAL VASCULAR DISEASE, UNSPECIFIED: ICD-10-CM

## 2024-11-07 DIAGNOSIS — G47.9 SLEEP DISORDER, UNSPECIFIED: ICD-10-CM

## 2024-11-07 DIAGNOSIS — R41.3 OTHER AMNESIA: ICD-10-CM

## 2024-11-07 PROCEDURE — 83036 HEMOGLOBIN GLYCOSYLATED A1C: CPT | Mod: QW

## 2024-11-07 PROCEDURE — G2211 COMPLEX E/M VISIT ADD ON: CPT

## 2024-11-07 PROCEDURE — 99214 OFFICE O/P EST MOD 30 MIN: CPT

## 2024-11-07 PROCEDURE — 82962 GLUCOSE BLOOD TEST: CPT

## 2024-11-07 NOTE — ASSESSMENT
[Diabetes Foot Care] : diabetes foot care [Long Term Vascular Complications] : long term vascular complications of diabetes [Carbohydrate Consistent Diet] : carbohydrate consistent diet [Importance of Diet and Exercise] : importance of diet and exercise to improve glycemic control, achieve weight loss and improve cardiovascular health [Exercise/Effect on Glucose] : exercise/effect on glucose [Hypoglycemia Management] : hypoglycemia management [Glucagon Use] : glucagon use [Ketone Testing] : ketone testing [Action and use of Insulin] : action and use of short and long-acting insulin [Self Monitoring of Blood Glucose] : self monitoring of blood glucose [Insulin Self-Administration] : insulin self-administration [Injection Technique, Storage, Sharps Disposal] : injection technique, storage, and sharps disposal [Sick-Day Management] : sick-day management [Retinopathy Screening] : Patient was referred to ophthalmology for retinopathy screening [Diabetic Medications] : Risks and benefits of diabetic medications were discussed [FreeTextEntry1] : Type 2 Diabetes uncontrolled: c/b MI, PVD, Dementia A1c: 8.4% Glucose: 191 Current regimen: Metformin 1000mg BID New regimen: Would like to stay on Metformin 1000mg BID as pt is tolerating and feels more energetic , does not want additional agent at this time asked to records some evening sugars to evaluate the effect of foods.  ophthalmology exam: up to date podiatry exam: no issues  LDL: 39 GFR: 55 ace/arb: yes Losartan 25mg  statin: yes - atorvastatin 40mg BP: 162/66 BMI: 24.22

## 2024-11-07 NOTE — HISTORY OF PRESENT ILLNESS
[Former] : former [Never] : never [TextBox_4] :  89 year old patient presents for evaluation of snoring and witnessed apnea as per son who lives with him.   He has reportedly been a heavy snorer.   he does have regular sleep schedule.    he does drive, no witnessed episodes   Primary doctor is  Dr Díaz     PSH:  coronary stent       PMH:    peripheral arterial disease  CAD  CKD  gout   HTN BPH CAD  H pylori  DM on insulin  MI  SH:    former smoker  quit over 20 year ago        ETOH:  none     Occupation: retired, worked as transit          ALLERGY:   NKDA         Review of Systems:    no sinusitis, sinus infections, nasal obstruction no dysphagia no dry mouth       no pneumonia no obstructive airway disease  no lung cancer    no chest pain no murmur no CHF  no edema   gout no abdominal pain no liver disease    no thyroid disease     no bleeding   no DVT or PE   no kidney disease   no stroke no seizure                         [TextBox_11] : 1 [TextBox_13] : 30+ [YearQuit] : 2000

## 2024-11-07 NOTE — HISTORY OF PRESENT ILLNESS
[FreeTextEntry1] : Interval Hx: 89-year-old male old presenting for type 2 DM f/u     Diabetes History:  When and how diagnosed:  50+ year of Diabetes, never needed  Type of Diabetes:  Hx. of DKA or HHS?  Current DM medication regimen: Metformin 1000mg BID Tried Synjardy but was feeling dizzy.   Fingerstick Monitoring: FSG -130 FSG PM  FSG Bedtime 180 Last HBA1C:7.9% POCT A1c: 8.4% POCT Glucose: 191    LIFESTYLE FACTORS:  Eating patterns and weight history:  Diet: stable diet and regimented, preparing own food at home, low salt no sugar, had no sugar cookies at times, Once in a while son will bring him a fish sandwich. Drinks water regularly     Activity/Sleep:  will start therapy soon - would like home PT    Complications: Macrovascular- CVA/Stroke? developing dementia  CVD? CAD, denies MI  PVD? yes s/p stents    Microvascular- Retinopathy? denies  Nephropathy?  denies Neuropathy?   Follow up care: PCP: Dr. Díaz Ophthalmology: up to date Podiatry: goes to one  Nutrition:       Technology Use: Glucose Monitoring (meter/CMG): family helps to check it

## 2024-11-07 NOTE — DISCUSSION/SUMMARY
[FreeTextEntry1] : 89 year old man who  has been a heavy snorer  he has some daytime somnolence generalized fatigue  Son describes that he sleeps during the day usually on schedule after breakfast  He did have complicated COVID-19 infection about 2 years ago  CT abdomen did not demonstrate basilar findings   he has not undergone the overnight sleep study that was prescribed He is still sleepy after breakfast He sleeps at night as well  He suspects daytime sleepiness may be due to medications  PLAN  Home overnight sleep study: Reorder  Further recommendations based on results. Total time spent : 30 minutes Including: Preparation prior to visit - Reviewing prior record, results of tests and Consultation Reports as applicable Conducting an appropriate H & P during today's encounter  reviewing all available CT chest exams.  demonstrating images to pt as appropriate Counseling patient  Note completion  med renewal discussing differential diagnosis  Joseph Cash MD

## 2024-11-09 LAB
GLUCOSE BLDC GLUCOMTR-MCNC: 191
HBA1C MFR BLD HPLC: 8.4

## 2024-11-14 ENCOUNTER — APPOINTMENT (OUTPATIENT)
Dept: INTERNAL MEDICINE | Facility: CLINIC | Age: 89
End: 2024-11-14

## 2024-11-14 VITALS
BODY MASS INDEX: 23.85 KG/M2 | OXYGEN SATURATION: 97 % | WEIGHT: 161 LBS | HEIGHT: 69 IN | HEART RATE: 65 BPM | RESPIRATION RATE: 16 BRPM | SYSTOLIC BLOOD PRESSURE: 175 MMHG | TEMPERATURE: 98.3 F | DIASTOLIC BLOOD PRESSURE: 82 MMHG

## 2024-11-14 DIAGNOSIS — N18.9 CHRONIC KIDNEY DISEASE, UNSPECIFIED: ICD-10-CM

## 2024-11-14 DIAGNOSIS — N40.0 BENIGN PROSTATIC HYPERPLASIA WITHOUT LOWER URINARY TRACT SYMPMS: ICD-10-CM

## 2024-11-14 DIAGNOSIS — I25.10 ATHEROSCLEROTIC HEART DISEASE OF NATIVE CORONARY ARTERY W/OUT ANGINA PECTORIS: ICD-10-CM

## 2024-11-14 DIAGNOSIS — E78.5 HYPERLIPIDEMIA, UNSPECIFIED: ICD-10-CM

## 2024-11-14 DIAGNOSIS — E11.9 TYPE 2 DIABETES MELLITUS W/OUT COMPLICATIONS: ICD-10-CM

## 2024-11-14 DIAGNOSIS — I10 ESSENTIAL (PRIMARY) HYPERTENSION: ICD-10-CM

## 2024-11-14 DIAGNOSIS — D64.9 ANEMIA, UNSPECIFIED: ICD-10-CM

## 2024-11-14 PROCEDURE — 99214 OFFICE O/P EST MOD 30 MIN: CPT | Mod: 25

## 2024-11-14 PROCEDURE — G0008: CPT

## 2024-11-14 PROCEDURE — 90662 IIV NO PRSV INCREASED AG IM: CPT

## 2024-11-14 NOTE — ASSESSMENT
[FreeTextEntry1] : . 88 y/o M with HTN, HLD, DM, BPH, CKD, gout, dementia, PAD s/p B/L SFA stent on Xarelto, CAD with DIAZ to mid LAD and mid RCA (2021) with residual stenosis of LPL, lumbosacral canal narrowing with multiple disc bulge, BL SNHL, presenting for f/u. HPI as above.  # HTN; uncontrolled?  - low salt diet, exercise as tolerated - increase losartan to 50mg po daily, c/w carvedilol 6.25mg po BID - going to [place] at end of Month, will get BMP there  # HLD - c/w atorvastatin 40mg po daily  # DM - 11/2024 A1c 8.4 - c/w metformin 1g BID, off insulin as per endo - endo eval today - UTD optho, no DM retinopathy - podiatry referral  # BPH; stable # Hx microscopic hematuria - c/w finasteride 5mg po daily and flomax 0.4mg po daily - urology f/u  # CKD; stable - c/w DM and BP control - on losartan  # Gout; stable - last Cr Cl conservative measure 46 - for now, c/w allopurinol 100mg daily and colchicine 0.6mg daily  # Dementia with hallucinations? 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

## 2024-11-14 NOTE — HISTORY OF PRESENT ILLNESS
[de-identified] : 88 y/o M with HTN, HLD, DM, BPH, CKD, gout, dementia, PAD s/p B/L SFA stent on Xarelto, CAD with DIAZ to mid LAD and mid RCA (2021) with residual stenosis of LPL, lumbosacral canal narrowing with multiple disc bulge, BL SNHL, presenting for f/u. Accompanied by son. Back pain getting with PT. Still nodding off during day. No longer driving. Snoring, EDIS w/u in progress. Dementia stable with short term memory loss. Now reporting hearing abnormal sounds when he wakes up regardless of location ([place] or [place]) Seen by hematology for TYESHA, rec trial PO iron and colace

## 2024-11-18 ENCOUNTER — APPOINTMENT (OUTPATIENT)
Dept: NEUROLOGY | Facility: CLINIC | Age: 89
End: 2024-11-18

## 2024-11-21 ENCOUNTER — NON-APPOINTMENT (OUTPATIENT)
Age: 89
End: 2024-11-21

## 2024-11-22 ENCOUNTER — APPOINTMENT (OUTPATIENT)
Dept: VASCULAR SURGERY | Facility: CLINIC | Age: 89
End: 2024-11-22
Payer: MEDICARE

## 2024-11-22 DIAGNOSIS — I73.9 PERIPHERAL VASCULAR DISEASE, UNSPECIFIED: ICD-10-CM

## 2024-11-22 DIAGNOSIS — Z95.828 PRESENCE OF OTHER VASCULAR IMPLANTS AND GRAFTS: ICD-10-CM

## 2024-11-22 PROCEDURE — G2211 COMPLEX E/M VISIT ADD ON: CPT

## 2024-11-22 PROCEDURE — 93925 LOWER EXTREMITY STUDY: CPT

## 2024-11-22 PROCEDURE — 99214 OFFICE O/P EST MOD 30 MIN: CPT

## 2024-11-22 NOTE — ASSESSMENT
[Arterial/Venous Disease] : arterial/venous disease [Medication Management] : medication management [FreeTextEntry1] : 89-year-old male with peripheral arterial disease status post bilateral SFA stents.  In the office today, patient underwent duplex which demonstrates patent SFA stents bilaterally. Status post left IntraStent stenosis atherectomy and angioplasty with excellent results.  Patient reports significant improvement of symptoms..  Patient to continue conservative management of peripheral vascular disease with Eliquis.  Continue atorvastatin for treatment of hyperlipidemia.  Follow-up 3 months with arterial duplex.

## 2024-11-22 NOTE — PHYSICAL EXAM
[Normal Breath Sounds] : Normal breath sounds [Normal Rate and Rhythm] : normal rate and rhythm [2+] : left 2+ [No Rash or Lesion] : No rash or lesion [Alert] : alert [Calm] : calm [JVD] : no jugular venous distention  [Ankle Swelling (On Exam)] : not present [Varicose Veins Of Lower Extremities] : not present [] : not present [Skin Ulcer] : no ulcer [de-identified] : appears well  [de-identified] : Intact

## 2024-11-22 NOTE — HISTORY OF PRESENT ILLNESS
[FreeTextEntry1] : Patient is an 89-year-old male with past medical history significant for diabetes, hypertension, hyperlipidemia, and peripheral arterial disease status post bilateral SFA stents who presents to the office today for routine follow-up.   Denies rest pain or claudication symptoms.  Denies current tissue loss or ulceration.

## 2024-11-23 LAB
ANION GAP SERPL CALC-SCNC: 15 MMOL/L
BUN SERPL-MCNC: 29 MG/DL
CALCIUM SERPL-MCNC: 10 MG/DL
CHLORIDE SERPL-SCNC: 100 MMOL/L
CO2 SERPL-SCNC: 25 MMOL/L
CREAT SERPL-MCNC: 1.3 MG/DL
EGFR: 53 ML/MIN/1.73M2
GLUCOSE SERPL-MCNC: 346 MG/DL
POTASSIUM SERPL-SCNC: 4.9 MMOL/L
SODIUM SERPL-SCNC: 140 MMOL/L

## 2025-01-14 ENCOUNTER — RX RENEWAL (OUTPATIENT)
Age: 89
End: 2025-01-14

## 2025-02-24 ENCOUNTER — APPOINTMENT (OUTPATIENT)
Dept: INTERNAL MEDICINE | Facility: CLINIC | Age: 89
End: 2025-02-24

## 2025-03-03 ENCOUNTER — APPOINTMENT (OUTPATIENT)
Dept: CARDIOLOGY | Facility: CLINIC | Age: 89
End: 2025-03-03

## 2025-03-03 ENCOUNTER — APPOINTMENT (OUTPATIENT)
Dept: ENDOCRINOLOGY | Facility: CLINIC | Age: 89
End: 2025-03-03
Payer: MEDICARE

## 2025-03-03 ENCOUNTER — APPOINTMENT (OUTPATIENT)
Dept: CARDIOLOGY | Facility: CLINIC | Age: 89
End: 2025-03-03
Payer: MEDICARE

## 2025-03-03 VITALS
WEIGHT: 160 LBS | OXYGEN SATURATION: 97 % | DIASTOLIC BLOOD PRESSURE: 61 MMHG | TEMPERATURE: 97.2 F | BODY MASS INDEX: 23.63 KG/M2 | HEART RATE: 66 BPM | SYSTOLIC BLOOD PRESSURE: 150 MMHG

## 2025-03-03 DIAGNOSIS — E11.9 TYPE 2 DIABETES MELLITUS W/OUT COMPLICATIONS: ICD-10-CM

## 2025-03-03 DIAGNOSIS — I25.10 ATHEROSCLEROTIC HEART DISEASE OF NATIVE CORONARY ARTERY W/OUT ANGINA PECTORIS: ICD-10-CM

## 2025-03-03 DIAGNOSIS — E78.5 HYPERLIPIDEMIA, UNSPECIFIED: ICD-10-CM

## 2025-03-03 DIAGNOSIS — E11.40 TYPE 2 DIABETES MELLITUS WITH DIABETIC NEUROPATHY, UNSPECIFIED: ICD-10-CM

## 2025-03-03 DIAGNOSIS — I10 ESSENTIAL (PRIMARY) HYPERTENSION: ICD-10-CM

## 2025-03-03 PROCEDURE — 99213 OFFICE O/P EST LOW 20 MIN: CPT | Mod: 93

## 2025-03-03 PROCEDURE — 99214 OFFICE O/P EST MOD 30 MIN: CPT

## 2025-03-03 PROCEDURE — G2211 COMPLEX E/M VISIT ADD ON: CPT

## 2025-03-03 NOTE — ASSESSMENT
[Diabetes Foot Care] : diabetes foot care [Long Term Vascular Complications] : long term vascular complications of diabetes [Carbohydrate Consistent Diet] : carbohydrate consistent diet [Importance of Diet and Exercise] : importance of diet and exercise to improve glycemic control, achieve weight loss and improve cardiovascular health [Exercise/Effect on Glucose] : exercise/effect on glucose [Hypoglycemia Management] : hypoglycemia management [Glucagon Use] : glucagon use [Ketone Testing] : ketone testing [Action and use of Insulin] : action and use of short and long-acting insulin [Self Monitoring of Blood Glucose] : self monitoring of blood glucose [Insulin Self-Administration] : insulin self-administration [Injection Technique, Storage, Sharps Disposal] : injection technique, storage, and sharps disposal [Sick-Day Management] : sick-day management [Retinopathy Screening] : Patient was referred to ophthalmology for retinopathy screening [Diabetic Medications] : Risks and benefits of diabetic medications were discussed [FreeTextEntry1] : Type 2 Diabetes uncontrolled: c/b MI, PVD, Dementia A1c: 8.4% Goal <8% Will recheck with bloodwork next week.  Current regimen: Metformin 1000mg BID New regimen: Would like to stay on Metformin 1000mg BID as pt is tolerating and feels more energetic , does not want additional agent at this time, will check A1c - may consider adding DPP4 if not at goal next week.  asked to record some evening sugars to evaluate the effect of foods- has not checked. Will check this week  ophthalmology exam: up to date podiatry exam: no issues  LDL: 39 GFR: 53 ace/arb: yes Losartan 75mg  statin: yes - atorvastatin 40mg BP: 150/61 at cardiology office today BMI: 23.63

## 2025-03-03 NOTE — HISTORY OF PRESENT ILLNESS
[FreeTextEntry1] : Interval Hx: 89-year-old male old presenting for type 2 DM f/u     Diabetes History:  When and how diagnosed:  50+ year of Diabetes, never needed  Type of Diabetes:  Hx. of DKA or HHS?  Current DM medication regimen: Metformin 1000mg BID Tried Synjardy but was feeling dizzy.   Fingerstick Monitoring: FSG -120 FSG PM  FSG Bedtime ?  Last HBA1C: 8.4 % Will do blood work next week for cardiology     LIFESTYLE FACTORS:  Eating patterns and weight history:  Diet: stable diet and regimented, preparing own food at home, low salt no sugar, had no sugar cookies at times, once in a while son will bring him a fish sandwich. Drinks water regularly     Activity/Sleep:  home PT    Complications: Macrovascular- CVA/Stroke? developing dementia  CVD? CAD, denies MI  PVD? yes s/p stents    Microvascular- Retinopathy? denies  Nephropathy?  denies Neuropathy?   Follow up care: PCP: Dr. Díaz Ophthalmology: up to date Podiatry: goes to one  Nutrition:       Technology Use: Glucose Monitoring (meter/CMG): family helps to check it

## 2025-03-04 NOTE — HISTORY OF PRESENT ILLNESS
[FreeTextEntry1] :  Subjective: 89 old male with HTN, HLD, DM, CKD, PAD s/p B/L SFA stent on Xarelto, CAD s/p DIAZ to mid LAD and mid RCA, with residual stenosis of LPL (2022), and EF 45-50% in 06/2022 who presents for FUV.   - Chief Complaint (CC): Elevated blood pressure readings at home.   - History of Present Illness: The patient reports that his blood pressure readings have been slightly elevated at home than previously recorded. This morning, he registered a blood pressure of 140/63; yesterday, the reading was 151. He takes carvedilol 6.25mg twice a day and losartan 50mg. There were no associated symptoms like chest pain or difficulty in breathing. He also reported an episode of a leg cramp a week ago that resolved spontaneously.   - Past Medical History: The patient has a medical history of hypertension, which has been managed with carvedilol and losartan. He also has a confirmed diagnosis of diabetes and kidney disease.   - Past Surgical History: The patient had stents placed in his heart previously. One artery had blockages but was not intervened upon as it was not deemed significant at that time.   - Family History:     - No family history of premature CAD.   - Social History: The patient is active, walking up and down the stairs regularly without difficulty or discomfort.   - Other History:    - Review of Systems: The patient does not report any symptoms such as weight loss, fatigue, malaise, fever, dizziness, vertigo, syncope, weakness, numbness, tingling, tremors, seizures, changes in coordination or balance, alterations in sensation or cognition, joint pain, stiffness, swelling, muscle weakness, chest pain, palpitations, dyspnea on exertion, orthopnea, paroxysmal nocturnal dyspnea, lower extremity edema, cough, dyspnea, wheezing, hemoptysis, pleuritic chest pain, sputum production, abdominal pain, bloating, nausea, vomiting, dysphagia, heartburn, regurgitation, changes in bowel habits, melena, hematochezia, dysuria, frequency, urgency, hematuria, incontinence, nocturia, rashes, lesions, pruritus, changes in mood, affect, sleep patterns, appetite, energy levels, concentration, memory, and motivation. Had a past history of heart procedure for a coronary block and surgically placed stents.   - Medications: The patient is currently taking Carvedilol 6.25mg, Losartan 50mg, and Atorvastatin.   - Allergies: The patient has not reported any known allergies.   Objective:   - Diagnostic Results: The patient has reported home blood pressure readings of 140/63 in the morning and 151 the previous day. Laboratory tests, including kidney function and potassium levels, will be ordered due to the proposed medication change.   - Vital Signs: Blood pressure readings reported are 140/63 and 151.   - Physical Examination (PE):     - General: No acute distress     - HEENT: EOMI     - Neck: Supple, No JVD, no bruits.     - Lungs: Clear to auscultation bilaterally; No rales or wheezing     - Heart: Regular rate and rhythm; No murmurs, rubs, or gallops     - Abdomen: Nontender, bowel sounds present     - Extremities: No visual signs of swelling noted during the physical examination.     - Nervous system: Alert & Oriented X3, no focal deficits     - Psychiatric: Normal affect     - Skin: No rashes or lesions   Assessment and Plan:   1. Hypertension: Patient's home blood pressure readings have been slightly elevated. Due to the readings crossing 150, I have decided that medication modification will be required.     - Therapeutic Interventions: Initially proposed starting a low dose Amlodipine in addition to current medication. However, after discussion with patient's family member, we decided to first try increasing the current Losartan dose from 50mg to 75mg daily. If blood pressure control does not improve, we may reconsider adding Amlodipine.      - Diagnostic Tests: Lab work will be placed to monitor creatinine and potassium levels considering the increase in Losartan.      - Patient Education: Educated the patient and his family about the need to maintain a lower target of blood pressure considering his age and comorbidities, including the disease progression of diabetes and kidney disease. Counseling provided on the short-term plan of increasing Losartan dose and long-term plan of careful observation, monitoring, and adjustments of medication.      - Follow-Up: A blood work is ordered for one week after the start of the increased dose of Losartan to check renal function and electrolytes. A follow-up visit ordered for three months after to check the effectiveness of the medication and blood pressure control.   2. HLD: Continue statin, well controlled  3. CAD s/p PCI: Continue aspirin, xarelto (for PAD), and statin. No anginal complaints; if need can refer for PCI of LPL depending on symptoms.  Will call patient's daughter Shakir with results of testing at [phone], OK to leave voicemail.   Disclaimer:   - This note was generated using ambient listening software. A reasonable effort has been made for proofreading its contents, but typos may still remain. If there are any questions or points of clarification needed, please notify the office

## 2025-03-07 ENCOUNTER — APPOINTMENT (OUTPATIENT)
Dept: UROLOGY | Facility: CLINIC | Age: 89
End: 2025-03-07
Payer: MEDICARE

## 2025-03-07 ENCOUNTER — APPOINTMENT (OUTPATIENT)
Dept: VASCULAR SURGERY | Facility: CLINIC | Age: 89
End: 2025-03-07
Payer: MEDICARE

## 2025-03-07 VITALS
WEIGHT: 160 LBS | TEMPERATURE: 97.1 F | HEART RATE: 67 BPM | BODY MASS INDEX: 23.7 KG/M2 | OXYGEN SATURATION: 98 % | SYSTOLIC BLOOD PRESSURE: 138 MMHG | DIASTOLIC BLOOD PRESSURE: 77 MMHG | HEIGHT: 69 IN

## 2025-03-07 DIAGNOSIS — E11.9 TYPE 2 DIABETES MELLITUS W/OUT COMPLICATIONS: ICD-10-CM

## 2025-03-07 DIAGNOSIS — N40.0 BENIGN PROSTATIC HYPERPLASIA WITHOUT LOWER URINARY TRACT SYMPMS: ICD-10-CM

## 2025-03-07 PROCEDURE — 99214 OFFICE O/P EST MOD 30 MIN: CPT

## 2025-03-07 PROCEDURE — 93925 LOWER EXTREMITY STUDY: CPT

## 2025-03-07 PROCEDURE — G2211 COMPLEX E/M VISIT ADD ON: CPT

## 2025-03-07 NOTE — ASSESSMENT
[FreeTextEntry1] : 89-year-old male with peripheral arterial disease status post bilateral SFA stents.  In the office today, patient underwent duplex which demonstrates patent SFA stents bilaterally. Status post left IntraStent stenosis atherectomy and angioplasty with excellent results.  Patient reports significant improvement of symptoms..  Patient to continue conservative management of peripheral vascular disease with Eliquis.  Continue atorvastatin for treatment of hyperlipidemia.  Follow-up 3 months with arterial duplex. [Arterial/Venous Disease] : arterial/venous disease [Medication Management] : medication management

## 2025-03-07 NOTE — PHYSICAL EXAM
[JVD] : no jugular venous distention  [Normal Breath Sounds] : Normal breath sounds [Normal Rate and Rhythm] : normal rate and rhythm [2+] : left 2+ [Ankle Swelling (On Exam)] : not present [Varicose Veins Of Lower Extremities] : not present [] : not present [No Rash or Lesion] : No rash or lesion [Skin Ulcer] : no ulcer [Alert] : alert [Calm] : calm [de-identified] : appears well  [de-identified] : Intact

## 2025-03-07 NOTE — ASSESSMENT
[FreeTextEntry1] : Very pleasant 89 year old gentleman who presents for follow up of BPH, recurrent UTIs -Continue tamsulosin- refill sent to the pharmacy -Continue finasteride- refill sent to the pharmacy -WBC 6.81 -A1c 8.4% -Creatinine 1.30 -F/U in 6 months  Patient is being seen today for evaluation and management of a chronic and longitudinal ongoing condition and I am of the primary treating physician

## 2025-06-09 ENCOUNTER — APPOINTMENT (OUTPATIENT)
Dept: CARDIOLOGY | Facility: CLINIC | Age: 89
End: 2025-06-09
Payer: MEDICARE

## 2025-06-09 VITALS
WEIGHT: 160 LBS | HEART RATE: 60 BPM | SYSTOLIC BLOOD PRESSURE: 137 MMHG | TEMPERATURE: 97 F | OXYGEN SATURATION: 97 % | BODY MASS INDEX: 23.63 KG/M2 | DIASTOLIC BLOOD PRESSURE: 64 MMHG

## 2025-06-09 PROCEDURE — 99214 OFFICE O/P EST MOD 30 MIN: CPT

## 2025-06-09 PROCEDURE — G2211 COMPLEX E/M VISIT ADD ON: CPT

## 2025-06-10 NOTE — HISTORY OF PRESENT ILLNESS
[FreeTextEntry1] :  Subjective: 89 old male with HTN, HLD, DM, CKD, PAD s/p B/L SFA stent on Xarelto, CAD s/p DIAZ to mid LAD and mid RCA, with residual stenosis of LPL (2022), and EF 45-50% in 06/2022 who presents for FUV.   - Chief Complaint (CC): Follow-up visit for hypertension management and leg pain   - History of Present Illness: The patient is a male who was last seen three months ago for hypertension management. At that time, his blood pressure was elevated, and the Losartan dosage was increased from 50mg to 75mg daily. Today, the patient reports improved blood pressure control with no adverse effects from the medication change. He denies any chest pain or difficulty breathing. The patient reports ongoing leg pain bilaterally, which he describes as mild but constant. This pain reportedly started a long time ago, possibly after stent procedures in his legs. The patient has undergone stent procedures in his legs three times, with temporary relief lasting a few months each time. The leg pain affects both legs and can sometimes become very intense. Agapito chest pain, dyspnea, palpitations, lightheadedness, or syncope   - Past Medical History: Hypertension, Coronary Artery Disease status post stent placement three years ago, Peripheral Artery Disease with multiple leg stent procedures   - Past Surgical History: Coronary stent placement three years ago, Multiple leg stent procedures (exact dates not provided)   - Family History:     - No family history of premature CAD mentioned   - Social History:     - Limited physical activity, does not walk in the park or engage in similar activities   - Other History: The patient has a history of coronary artery disease with stents placed three years ago. At that time, one other artery had some blockages but was not causing symptoms and did not require intervention.   - Review of Systems:     - General: No reported weight changes or fatigue     - Neurological: No reported headaches, dizziness, or other neurological symptoms     - Musculoskeletal: Reports constant mild pain in both legs, sometimes becoming very intense     - Cardiovascular: Denies chest pain or difficulty breathing     - Respiratory: No reported cough or shortness of breath     - Gastrointestinal: No reported abdominal pain or digestive issues     - Genitourinary: No reported urinary symptoms     - Integumentary: Reports a possible 'hole' on one side, possibly referring to a dental issue     - Psychiatric: No reported mood or sleep disturbances   - Medications:     - Losartan 75mg daily     - Atorvastatin (dosage not specified)     - Aspirin (dosage not specified)   - Allergies: No allergies mentioned in the conversation   Objective:   - Diagnostic Results: No specific diagnostic results mentioned in the conversation   - Vital Signs: Blood pressure reported to be in the 130s, which is an improvement from previous visit   - Physical Examination (PE):     - General: No acute distress     - HEENT: EOMI     - Neck: Supple, No JVD, no bruits.     - Lungs: Clear to auscultation bilaterally; No rales or wheezing     - Heart: Regular rate and rhythm; No murmurs, rubs, or gallops     - Abdomen: Nontender, bowel sounds present     - Extremities: Bilateral leg pain reported, no specific examination findings mentioned     - Nervous system: Alert & Oriented X3, no focal deficits     - Psychiatric: Normal affect     - Skin: No rashes or lesions   Assessment and Plan:   -  1. Hypertension: Blood pressure is now well-controlled on Losartan 75mg daily. The current regimen appears effective, and I plan to continue the same dosage. We will monitor blood pressure at future visits and adjust as needed.   -  2. Coronary Artery Disease: Patient is stable without current symptoms. Continuing atorvastatin and aspirin for secondary prevention. Emphasized the importance of these medications in preventing future cardiac events.   -  3. Peripheral Artery Disease: Patient reports ongoing bilateral leg pain. This could be related to his known peripheral artery disease. I have advised the patient to discuss this with vascular at his upcoming appointment.    -  4. Leg Pain: Differential includes peripheral artery disease, medication side effect (possibly from atorvastatin), or musculoskeletal issues. Recommended to discuss with vascular specialist first. If vascular causes are ruled out, consider trial of CoQ10 supplement for potential statin-related muscle pain. Emphasized not to discontinue atorvastatin without consulting me first due to its importance in cardiovascular protection.   5. Follow-up: Scheduled for 6-month follow-up. Instructed patient to call sooner if any changes or concerns arise before the next appointment.   Disclaimer:   - This note was generated using [employer] software. A reasonable effort has been made for proofreading its contents, but typos may still remain. If there are any questions or points of clarification needed, please notify the office

## 2025-06-13 ENCOUNTER — APPOINTMENT (OUTPATIENT)
Dept: INTERNAL MEDICINE | Facility: CLINIC | Age: 89
End: 2025-06-13

## 2025-06-16 ENCOUNTER — APPOINTMENT (OUTPATIENT)
Dept: INTERNAL MEDICINE | Facility: CLINIC | Age: 89
End: 2025-06-16
Payer: MEDICARE

## 2025-06-16 VITALS
SYSTOLIC BLOOD PRESSURE: 132 MMHG | HEIGHT: 69 IN | HEART RATE: 72 BPM | WEIGHT: 160 LBS | TEMPERATURE: 97.1 F | OXYGEN SATURATION: 96 % | RESPIRATION RATE: 16 BRPM | BODY MASS INDEX: 23.7 KG/M2 | DIASTOLIC BLOOD PRESSURE: 62 MMHG

## 2025-06-16 PROCEDURE — G2211 COMPLEX E/M VISIT ADD ON: CPT

## 2025-06-16 PROCEDURE — 99212 OFFICE O/P EST SF 10 MIN: CPT

## 2025-07-14 ENCOUNTER — APPOINTMENT (OUTPATIENT)
Dept: OTOLARYNGOLOGY | Facility: CLINIC | Age: 89
End: 2025-07-14
Payer: MEDICARE

## 2025-07-14 VITALS
HEART RATE: 72 BPM | BODY MASS INDEX: 23.7 KG/M2 | SYSTOLIC BLOOD PRESSURE: 116 MMHG | DIASTOLIC BLOOD PRESSURE: 68 MMHG | HEIGHT: 69 IN | OXYGEN SATURATION: 97 % | WEIGHT: 160 LBS

## 2025-07-14 PROBLEM — R13.10 DYSPHAGIA: Status: ACTIVE | Noted: 2025-06-16

## 2025-07-14 PROCEDURE — 31575 DIAGNOSTIC LARYNGOSCOPY: CPT

## 2025-07-14 PROCEDURE — 99213 OFFICE O/P EST LOW 20 MIN: CPT | Mod: 25

## 2025-07-14 NOTE — PROCEDURE
[de-identified] : Procedure: Transnasal flexible laryngoscopy Description: Informed consent was obtained from the patient prior to the procedure. The patient was seated in the clinic chair. Topical anesthesia was achieved by first spraying the nasal cavities with 4% lidocaine and 1% phenylephrine.   Exam: Clear vallecula, crisp epiglottis. Aryepiglottic folds: intact and symmetric bilaterally Hypopharynx: No pooling Interarytenoid space: No lesions or pachydermia False vocal folds: Symmetric and without lesions or masses. False fold voicing (plica ventricularis) is not noted True vocal folds: normal and symmetric motion bilaterally. No paradoxical motion. Right medial edge is crisp and shows no lesions or masses. Left medial edge is crisp and shows no lesions or masses. Mucosal covering is minimally edematous. No erythema. No obvious vascular ectasias. Vocal processes do not demonstrate granulomas. Subglottis/proximal trachea: clear and unobstructed to the limits of the examination today. Other: ~  ~

## 2025-07-14 NOTE — PROCEDURE
[de-identified] : Procedure: Transnasal flexible laryngoscopy Description: Informed consent was obtained from the patient prior to the procedure. The patient was seated in the clinic chair. Topical anesthesia was achieved by first spraying the nasal cavities with 4% lidocaine and 1% phenylephrine.   Exam: Clear vallecula, crisp epiglottis. Aryepiglottic folds: intact and symmetric bilaterally Hypopharynx: No pooling Interarytenoid space: No lesions or pachydermia False vocal folds: Symmetric and without lesions or masses. False fold voicing (plica ventricularis) is not noted True vocal folds: normal and symmetric motion bilaterally. No paradoxical motion. Right medial edge is crisp and shows no lesions or masses. Left medial edge is crisp and shows no lesions or masses. Mucosal covering is minimally edematous. No erythema. No obvious vascular ectasias. Vocal processes do not demonstrate granulomas. Subglottis/proximal trachea: clear and unobstructed to the limits of the examination today. Other: ~  ~

## 2025-07-14 NOTE — ASSESSMENT
[FreeTextEntry1] : Assessment/Plan: #1 Dysphagia to solids #2 Dementia #3 On Xeralto  After a thorough discussion of our findings today, the patient understands we need to do further workup to determine the potential cause of their dysphagia.  As such I have ordered them for a Modified Barium Swallow (MBS) to be performed by our SLP team.  I would like to follow up with them in clinic following this swallow study to go over the results and next steps.  The patient expressed understanding and agreement with this plan.  We will do telemed follow up with patient and his son whom he lives with.

## 2025-07-14 NOTE — HISTORY OF PRESENT ILLNESS
[de-identified] : FRANK SANTOS is a 89 year old man who presents to the [facility] with difficulty swallowing for the past ~6mths. No illness or surgery when this first began.  He is referred by self Has odynophagia on the right side of the neck that has mostly resolved. Is coughing when swallowing. Had 1 day of bright red blood that he coughed up.  They do not alter their diet to avoid troublesome consistencies. They do not regurgitate recently eaten foods. Chews his food very slowly. They do not have recent weight loss. They do not have frequent classic reflux symptoms. Hx of mild dementia, coronary stents No MBS Denies dyspnea, dysphonia, fevers/chills, and otalgia.

## 2025-07-14 NOTE — HISTORY OF PRESENT ILLNESS
[de-identified] : FRANK SANTOS is a 89 year old man who presents to the [facility] with difficulty swallowing for the past ~6mths. No illness or surgery when this first began.  He is referred by self Has odynophagia on the right side of the neck that has mostly resolved. Is coughing when swallowing. Had 1 day of bright red blood that he coughed up.  They do not alter their diet to avoid troublesome consistencies. They do not regurgitate recently eaten foods. Chews his food very slowly. They do not have recent weight loss. They do not have frequent classic reflux symptoms. Hx of mild dementia, coronary stents No MBS Denies dyspnea, dysphonia, fevers/chills, and otalgia.

## 2025-07-21 ENCOUNTER — APPOINTMENT (OUTPATIENT)
Dept: INTERNAL MEDICINE | Facility: CLINIC | Age: 89
End: 2025-07-21

## 2025-07-22 ENCOUNTER — RX RENEWAL (OUTPATIENT)
Age: 89
End: 2025-07-22

## 2025-08-08 ENCOUNTER — APPOINTMENT (OUTPATIENT)
Dept: VASCULAR SURGERY | Facility: CLINIC | Age: 89
End: 2025-08-08

## 2025-09-09 ENCOUNTER — APPOINTMENT (OUTPATIENT)
Dept: UROLOGY | Facility: CLINIC | Age: 89
End: 2025-09-09

## 2025-09-09 ENCOUNTER — APPOINTMENT (OUTPATIENT)
Dept: RADIOLOGY | Facility: HOSPITAL | Age: 89
End: 2025-09-09

## 2025-09-12 ENCOUNTER — APPOINTMENT (OUTPATIENT)
Dept: OTOLARYNGOLOGY | Facility: CLINIC | Age: 89
End: 2025-09-12

## 2025-09-13 ENCOUNTER — RX RENEWAL (OUTPATIENT)
Age: 89
End: 2025-09-13

## 2025-09-13 RX ORDER — LOSARTAN POTASSIUM 25 MG/1
25 TABLET, FILM COATED ORAL
Qty: 90 | Refills: 2 | Status: ACTIVE | COMMUNITY
Start: 2025-09-13 | End: 1900-01-01

## 2025-09-18 ENCOUNTER — NON-APPOINTMENT (OUTPATIENT)
Age: 89
End: 2025-09-18